# Patient Record
Sex: MALE | Race: WHITE | NOT HISPANIC OR LATINO | Employment: OTHER | URBAN - METROPOLITAN AREA
[De-identification: names, ages, dates, MRNs, and addresses within clinical notes are randomized per-mention and may not be internally consistent; named-entity substitution may affect disease eponyms.]

---

## 2017-01-09 ENCOUNTER — OFFICE VISIT (OUTPATIENT)
Dept: CARDIOLOGY | Facility: CLINIC | Age: 82
End: 2017-01-09
Attending: INTERNAL MEDICINE
Payer: COMMERCIAL

## 2017-01-09 ENCOUNTER — DOCUMENTATION ONLY (OUTPATIENT)
Dept: OTHER | Facility: CLINIC | Age: 82
End: 2017-01-09

## 2017-01-09 VITALS
HEART RATE: 62 BPM | BODY MASS INDEX: 35.12 KG/M2 | DIASTOLIC BLOOD PRESSURE: 60 MMHG | SYSTOLIC BLOOD PRESSURE: 112 MMHG | WEIGHT: 223.8 LBS | HEIGHT: 67 IN

## 2017-01-09 DIAGNOSIS — E78.00 HYPERCHOLESTEROLEMIA: Primary | ICD-10-CM

## 2017-01-09 DIAGNOSIS — I25.10 CORONARY ARTERY DISEASE INVOLVING NATIVE CORONARY ARTERY OF NATIVE HEART WITHOUT ANGINA PECTORIS: ICD-10-CM

## 2017-01-09 DIAGNOSIS — E78.5 HYPERLIPIDEMIA WITH TARGET LDL LESS THAN 100: ICD-10-CM

## 2017-01-09 DIAGNOSIS — Z71.89 ADVANCE CARE PLANNING: Primary | Chronic | ICD-10-CM

## 2017-01-09 LAB
ALT SERPL W P-5'-P-CCNC: <5 U/L (ref 5–30)
CHOLEST SERPL-MCNC: 148 MG/DL
HDLC SERPL-MCNC: 42 MG/DL
LDLC SERPL CALC-MCNC: 85 MG/DL
NONHDLC SERPL-MCNC: 106 MG/DL
TRIGL SERPL-MCNC: 103 MG/DL

## 2017-01-09 PROCEDURE — 80061 LIPID PANEL: CPT | Performed by: INTERNAL MEDICINE

## 2017-01-09 PROCEDURE — 36415 COLL VENOUS BLD VENIPUNCTURE: CPT | Performed by: INTERNAL MEDICINE

## 2017-01-09 PROCEDURE — 99214 OFFICE O/P EST MOD 30 MIN: CPT | Performed by: INTERNAL MEDICINE

## 2017-01-09 PROCEDURE — 84460 ALANINE AMINO (ALT) (SGPT): CPT | Performed by: INTERNAL MEDICINE

## 2017-01-09 RX ORDER — OMEPRAZOLE 40 MG/1
40 CAPSULE, DELAYED RELEASE ORAL DAILY
COMMUNITY
End: 2018-02-13

## 2017-01-09 RX ORDER — ROSUVASTATIN CALCIUM 40 MG/1
40 TABLET, COATED ORAL DAILY
Qty: 90 TABLET | Refills: 3 | Status: SHIPPED | OUTPATIENT
Start: 2017-01-09 | End: 2017-08-16

## 2017-01-09 NOTE — MR AVS SNAPSHOT
After Visit Summary   1/9/2017    Ivan Payton    MRN: [id number]           Patient Information     Date Of Birth          1935        Visit Information        Provider Department      1/9/2017 9:45 AM Willy Johansen MD [facility]        Today's Diagnoses     Hypercholesterolemia    -  1     Coronary artery disease involving native coronary artery of native heart without angina pectoris            Follow-ups after your visit        Additional Services     Follow-Up with Cardiologist                 Future tests that were ordered for you today     Open Future Orders        Priority Expected Expires Ordered    Lipid Profile Routine 7/8/2017 1/9/2018 1/9/2017    ALT Routine 7/8/2017 1/9/2018 1/9/2017    Follow-Up with Cardiologist Routine 7/8/2017 1/9/2018 1/9/2017    NM Exercise stress test (nuc card) Routine 7/8/2017 1/9/2018 1/9/2017            Who to contact     If you have questions or need follow up information about today's clinic visit or your schedule please contact [facility] directly at [phone].  Normal or non-critical lab and imaging results will be communicated to you by [employer], letter or phone within 4 business days after the clinic has received the results. If you do not hear from us within 7 days, please contact the clinic through BO[employer]LT[employer] or phone. If you have a critical or abnormal lab result, we will notify you by phone as soon as possible.  Submit refill requests through [employer] or call your pharmacy and they will forward the refill request to us. Please allow 3 business days for your refill to be completed.          Additional Information About Your Visit        MyChart Information     [employer] gives you secure access to your electronic health record. If you see a primary care provider, you can also send messages to your care team and make appointments. If you have questions, please call  "your primary care clinic.  If you do not have a primary care provider, please call [phone] and they will assist you.        Care EveryWhere ID     This is your Care EveryWhere ID. This could be used by other organizations to access your [facility] medical records  [id number]        Your Vitals Were     Pulse Height BMI (Body Mass Index)             62 1.702 m (5' 7\") 35.04 kg/m2          Blood Pressure from Last 3 Encounters:   01/09/17 112/60   12/20/16 121/64   05/25/16 141/71    Weight from Last 3 Encounters:   01/09/17 101.515 kg (223 lb 12.8 oz)   05/25/16 90.719 kg (200 lb)   05/24/16 98.93 kg (218 lb 1.6 oz)              We Performed the Following     Follow-Up with Cardiologist          Today's Medication Changes          These changes are accurate as of: 1/9/17 10:26 AM.  If you have any questions, ask your nurse or doctor.               Start taking these medicines.        Dose/Directions    rosuvastatin 40 MG tablet   Commonly known as:  CRESTOR   Used for:  Hypercholesterolemia   Started by:  Willy Johansen MD        Dose:  40 mg   Take 1 tablet (40 mg) by mouth daily   Quantity:  90 tablet   Refills:  3         These medicines have changed or have updated prescriptions.        Dose/Directions    clopidogrel 75 MG tablet   Commonly known as:  PLAVIX   This may have changed:  Another medication with the same name was removed. Continue taking this medication, and follow the directions you see here.   Used for:  Carotid artery disease (H)   Changed by:  Yobani Jones MD        TAKE ONE TABLET BY MOUTH EVERY DAY   Quantity:  90 tablet   Refills:  3         Stop taking these medicines if you haven't already. Please contact your care team if you have questions.     atorvastatin 40 MG tablet   Commonly known as:  LIPITOR   Stopped by:  Willy Johansen MD                Where to get your medicines      These medications were sent to [facility] [id number] [place] [address] [address] AT " [address] [place] [address]  [address] [address]    Hours:  24-hours Phone:  [phone]    - rosuvastatin 40 MG tablet             Primary Care Provider Office Phone # Fax #    William Thompson [phone]       [facility] [address]  [facility] [address]        Thank you!     Thank you for choosing [facility]  for your care. Our goal is always to provide you with excellent care. Hearing back from our patients is one way we can continue to improve our services. Please take a few minutes to complete the written survey that you may receive in the mail after your visit with us. Thank you!             Your Updated Medication List - Protect others around you: Learn how to safely use, store and throw away your medicines at www.disposemymeds.org.          This list is accurate as of: 1/9/17 10:26 AM.  Always use your most recent med list.                   Brand Name Dispense Instructions for use    atenolol 50 MG tablet    TENORMIN    90 tablet    TAKE ONE TABLET BY MOUTH EVERY DAY       calcium carbonate 500 MG tablet    OS-SREEDHAR 500 mg [place]. Ca     Take 500 mg by mouth daily       clopidogrel 75 MG tablet    PLAVIX    90 tablet    TAKE ONE TABLET BY MOUTH EVERY DAY       glucosamine-chondroitin 500-400 MG Caps per capsule      Take 1 capsule by mouth daily       lisinopril 10 MG tablet    PRINIVIL/ZESTRIL    90 tablet    Take 1 tablet (10 mg) by mouth daily       OMEGA-3 FISH OIL PO      Take 1 g by mouth daily       omeprazole 40 MG capsule    priLOSEC     Take 40 mg by mouth daily       rosuvastatin 40 MG tablet    CRESTOR    90 tablet    Take 1 tablet (40 mg) by mouth daily       triamterene-hydrochlorothiazide 37.5-25 MG per tablet    MAXZIDE-25    90 tablet    TAKE 1 TABLET BY MOUTH EVERY DAY       vitamin B complex with vitamin C Tabs tablet      Take 1 tablet by mouth daily       VITAMIN C PO      Take 1,000 mg by  mouth daily       VITAMIN D (CHOLECALCIFEROL) PO      Take 5,000 Units by mouth daily

## 2017-01-09 NOTE — Clinical Note
2017             William Thompson MD   [facility]   [address] [place] [address]       RE:    Ivan Payton   MRN:  [id number]   :  1935      Dear William:      I had the opportunity to see Mr. Ivan Payton in Cardiology Clinic today at the [facility] in [place] for reevaluation of coronary artery disease and carotid artery disease.  He has cardiac risk factors including hypertension and dyslipidemia.  He had symptoms primarily of shortness of breath in .  Initially he was found to have carotid artery disease requiring left carotid endarterectomy in 2014 and was discovered to have coronary artery disease somewhat incidentally after stress testing was done for his shortness of breath and atherosclerotic disease issues.  He did not have any chest pain.  He preferred not to do bypass surgery when he was found to have multiple artery blockages but preferred multivessel stenting instead.  This was completed on 2014 with stents to his LAD, circumflex and obtuse marginal.  Some distal disease was not treated, but felt to be in areas of arteries that were too small for stenting or angioplasty.  He has been treated medically and has done well since then.      He has had some stress testing, including a year ago, that looked good.  We do not see any progressive coronary artery disease signs or symptoms at this point.  He continues to exercise on his treadmill 4 times a week and lift weights as well.  He is very active, volunteering at the hospital and elsewhere.  He is undergoing some monitoring of his carotid arteries but does not seem to have any significant residual stenosis there.  Ultrasound recently showed some significant tortuosity on the left side where he had his previous endarterectomy, but no severe occlusive disease was suspected.  He continues on Plavix.      He had some muscle stiffness with atorvastatin in the past and cut the  dose in half on his own.  Unfortunately, his cholesterol numbers are up a bit this year with an LDL of 85, previously 70.  His HDL was 42 and triglycerides 103.      On examination today, his blood pressure is 112/60, heart rate 62 and weight 223 pounds.  His lungs are clear.  His heart rhythm is regular.  He has no cardiac murmurs or carotid bruits.      IMPRESSIONS:  Mr. Ivan Payton is an 81-year-old gentleman with a history of multivessel coronary artery stenting and carotid endarterectomy in 2014.  He seems to be doing well now in terms of cardiac status.  He has no significant chest discomfort or shortness of breath but did not have chest discomfort when he had severe artery blockages either.  He continues his exercise without too much difficulty.  His cholesterol numbers could use some improvement and so I will stop his atorvastatin and start Crestor 40 mg a day, which hopefully he can take without recurrent muscle stiffness.  Otherwise, I will plan to have him come back and visit with me in 6 months, repeat a stress test and a cholesterol panel at that point and review, his symptoms.      Sincerely,         Willy Johansen MD, F.A.C.C.

## 2017-01-09 NOTE — PROGRESS NOTES
2017             William Thompson MD   [facility]   [address] [place] [address]       RE:    Ivan Payton   MRN:  [id number]   :  1935      Dear William:      I had the opportunity to see Mr. Ivan Payton in Cardiology Clinic today at the [facility] in [place] for reevaluation of coronary artery disease and carotid artery disease.  He has cardiac risk factors including hypertension and dyslipidemia.  He had symptoms primarily of shortness of breath in .  Initially he was found to have carotid artery disease requiring left carotid endarterectomy in 2014 and was discovered to have coronary artery disease somewhat incidentally after stress testing was done for his shortness of breath and atherosclerotic disease issues.  He did not have any chest pain.  He preferred not to do bypass surgery when he was found to have multiple artery blockages but preferred multivessel stenting instead.  This was completed on 2014 with stents to his LAD, circumflex and obtuse marginal.  Some distal disease was not treated, but felt to be in areas of arteries that were too small for stenting or angioplasty.  He has been treated medically and has done well since then.      He has had some stress testing, including a year ago, that looked good.  We do not see any progressive coronary artery disease signs or symptoms at this point.  He continues to exercise on his treadmill 4 times a week and lift weights as well.  He is very active, volunteering at the hospital and elsewhere.  He is undergoing some monitoring of his carotid arteries but does not seem to have any significant residual stenosis there.  Ultrasound recently showed some significant tortuosity on the left side where he had his previous endarterectomy, but no severe occlusive disease was suspected.  He continues on Plavix.      He had some muscle stiffness with atorvastatin in the past and cut the  dose in half on his own.  Unfortunately, his cholesterol numbers are up a bit this year with an LDL of 85, previously 70.  His HDL was 42 and triglycerides 103.      On examination today, his blood pressure is 112/60, heart rate 62 and weight 223 pounds.  His lungs are clear.  His heart rhythm is regular.  He has no cardiac murmurs or carotid bruits.      IMPRESSIONS:  Mr. Geovani Payton is an 81-year-old gentleman with a history of multivessel coronary artery stenting and carotid endarterectomy in .  He seems to be doing well now in terms of cardiac status.  He has no significant chest discomfort or shortness of breath but did not have chest discomfort when he had severe artery blockages either.  He continues his exercise without too much difficulty.  His cholesterol numbers could use some improvement and so I will stop his atorvastatin and start Crestor 40 mg a day, which hopefully he can take without recurrent muscle stiffness.  Otherwise, I will plan to have him come back and visit with me in 6 months, repeat a stress test and a cholesterol panel at that point and review, his symptoms.      Sincerely,         Kathy Johansen MD, F.A.C.C.         KATHY JOHANSEN MD, [facility]             D: 2017 10:36   T: 2017 12:03   MT: [place]      Name:     GEOVANI PAYTON   MRN:      [id number]        Account:      [id number]   :      1935           Service Date: 2017      Document: [id number]

## 2017-01-09 NOTE — PROGRESS NOTES
HPI and Plan:   See dictation    Orders Placed This Encounter   Procedures     NM Exercise stress test (nuc card)     Lipid Profile     ALT     Follow-Up with Cardiologist       Orders Placed This Encounter   Medications     omeprazole (PRILOSEC) 40 MG capsule     Sig: Take 40 mg by mouth daily     rosuvastatin (CRESTOR) 40 MG tablet     Sig: Take 1 tablet (40 mg) by mouth daily     Dispense:  90 tablet     Refill:  3       Medications Discontinued During This Encounter   Medication Reason     clotrimazole-betamethasone (LOTRISONE) cream Stopped by Patient     Clopidogrel Bisulfate (PLAVIX PO)      atorvastatin (LIPITOR) 40 MG tablet          Encounter Diagnoses   Name Primary?     Coronary artery disease involving native coronary artery of native heart without angina pectoris      Hypercholesterolemia Yes       CURRENT MEDICATIONS:  Current Outpatient Prescriptions   Medication Sig Dispense Refill     omeprazole (PRILOSEC) 40 MG capsule Take 40 mg by mouth daily       rosuvastatin (CRESTOR) 40 MG tablet Take 1 tablet (40 mg) by mouth daily 90 tablet 3     atenolol (TENORMIN) 50 MG tablet TAKE ONE TABLET BY MOUTH EVERY DAY 90 tablet 3     clopidogrel (PLAVIX) 75 MG tablet TAKE ONE TABLET BY MOUTH EVERY DAY 90 tablet 3     triamterene-hydrochlorothiazide (MAXZIDE-25) 37.5-25 MG per tablet TAKE 1 TABLET BY MOUTH EVERY DAY 90 tablet 3     lisinopril (PRINIVIL,ZESTRIL) 10 MG tablet Take 1 tablet (10 mg) by mouth daily 90 tablet 3     glucosamine-chondroitin 500-400 MG CAPS Take 1 capsule by mouth daily       VITAMIN D, CHOLECALCIFEROL, PO Take 5,000 Units by mouth daily        Omega-3 Fatty Acids (OMEGA-3 FISH OIL PO) Take 1 g by mouth daily        vitamin  B complex with vitamin C (VITAMIN  B COMPLEX) TABS Take 1 tablet by mouth daily       Ascorbic Acid (VITAMIN C PO) Take 1,000 mg by mouth daily       calcium carbonate (OS-SREEDHAR 500 MG [place]. [place]) 500 MG tablet Take 500 mg by mouth daily         ALLERGIES     Allergies    Allergen Reactions     Contrast Dye Hives       PAST MEDICAL HISTORY:  Past Medical History   Diagnosis Date     Essential hypertension      Mixed hyperlipidemia      Hydrocele      Right hydrocelectomy 2/2012     CVA (cerebral infarction) 3/2014     2 small acute lesions noted left parietal/left posterior frontal region. Old lacunar infarct left caudate nucleus     Claustrophobia      Need sedation for MRI scans     RBBB (right bundle branch block) 3/2014     Cerebral vascular disease 3/2014     multiple intracranial stenoses - lt post communic, lt post cerebral, rt middle cerebral, bilat porx M2 segments     Shortness of breath      Enlarged prostate      Coronary artery disease 2014     Cath 9/2014: PTCA and KIKE to mLAD, KIKE to prox circumflex, PTCA and DESx2 to OM2     Stented coronary artery      Uncomplicated asthma      GERD (gastroesophageal reflux disease)      Hiatal hernia      Arthritis        PAST SURGICAL HISTORY:  Past Surgical History   Procedure Laterality Date     Hc removal of tonsils,<12 y/o       Tonsils <13y.o.     Herniorrhaphy inguinal       Right     Surgical history of -        tonail removal     Herniorrhaphy inguinal  2/10/2012     Procedure:HERNIORRHAPHY INGUINAL; LEFT OPEN INGUINAL HERNIA REPAIR WITH MESH, RIGHT HYDROCELECTOMY; Surgeon:JULI LOPES; Location:[facility]     Hydrocelectomy inguinal  2/10/2012     Procedure:HYDROCELECTOMY INGUINAL; Surgeon:JULI LOPES; Location:[facility]     Mr brain and orbits  3/2014     6 mm area of restricted diffusion subcortical white matter left parietal lobe/questionable area of restricted diffusion left posterior frontal region - c/w recent small infarcts. Small chronic lacunar infarct left caudate nucleus     Endarterectomy carotid  3/26/2014     Procedure: ENDARTERECTOMY CAROTID;  LEFT CAROTID ENDARTERECTOMY WITH EEG;  Surgeon: Yobani Jones MD;  Location:  OR     Heart cath, angioplasty  9/9/14     Stenting of LAD,Prox  LCx, and 2 stents to OM2     Hydrocelectomy scrotal Right 1/29/2016     Procedure: HYDROCELECTOMY SCROTAL;  Surgeon: Yobani Stevens MD;  Location: [facility]     Laparoscopic herniorrhaphy inguinal bilateral Bilateral 5/24/2016     Procedure: LAPAROSCOPIC HERNIORRHAPHY INGUINAL BILATERAL;  Surgeon: Chandler Bowen MD;  Location:  OR       FAMILY HISTORY:  Family History   Problem Relation Age of Onset     C.A.D. Brother      older brother, CABG about age 56     C.A.D. Brother      younger brother, angioplasty     Hypertension Sister      Hypertension Brother      Respiratory Father      lung disease - farmer,      Hypertension Mother        SOCIAL HISTORY:  Social History     Social History     Marital Status:      Spouse Name: madhavi     Number of Children: 2     Years of Education: N/A     Occupational History     part time, building maintenance Retired     Social History Main Topics     Smoking status: Former Smoker -- 0.50 packs/day for 20 years     Types: Cigarettes     Quit date: 06/04/1968     Smokeless tobacco: Former User     Alcohol Use: No     Drug Use: No     Sexual Activity:     Partners: Female      Comment:      Other Topics Concern     Blood Transfusions No     Caffeine Concern Yes     1-2 cups per day     Occupational Exposure No     Hobby Hazards No     Sleep Concern No     Stress Concern No     Weight Concern No     Special Diet No     Back Care No     Exercise Yes     gym 4 days week, treadmill, 1.5 miles,  weights     Bike Helmet No     Seat Belt Yes     Self-Exams No     Parent/Sibling W/ Cabg, Mi Or Angioplasty Before 65f 55m? Yes     brothers - 1 had CABG 1 had MI pt unsure of age     Social History Narrative       Review of Systems:  Skin:  Negative       Eyes:  Positive for glasses    ENT:  Negative      Respiratory:  Negative       Cardiovascular:  Negative      Gastroenterology: Negative      Genitourinary:  not assessed      Musculoskeletal:  Negative     "  Neurologic:  Negative      Psychiatric:  Negative      Heme/Lymph/Imm:  Negative      Endocrine:  Negative        Physical Exam:  Vitals: /60 mmHg  Pulse 62  Ht 1.702 m (5' 7\")  Wt 101.515 kg (223 lb 12.8 oz)  BMI 35.04 kg/m2    Constitutional:  cooperative, alert and oriented, well developed, well nourished, in no acute distress        Skin:  warm and dry to the touch, no apparent skin lesions or masses noted        Head:  normocephalic, no masses or lesions        Eyes:  pupils equal and round, conjunctivae and lids unremarkable, sclera white, no xanthalasma, EOMS intact, no nystagmus        ENT:  no pallor or cyanosis, dentition good        Neck:  JVP normal;carotid pulses are full and equal bilaterally, JVP normal, no carotid bruit, no thyromegaly        Chest:  normal breath sounds, clear to auscultation, normal A-P diameter, normal symmetry, normal respiratory excursion, no use of accessory muscles          Cardiac: regular rhythm, normal S1/S2, no S3 or S4, apical impulse not displaced, no murmurs, gallops or rubs                  Abdomen:           Vascular: pulses full and equal, no bruits auscultated                                        Extremities and Back:  no deformities, clubbing, cyanosis, erythema observed;no edema              Neurological:  affect appropriate, oriented to time, person and place;no gross motor deficits              CC  Willy Johansen MD   [facility]  [id number]  [place] [id number]                "

## 2017-02-27 DIAGNOSIS — I77.9 CAROTID ARTERY DISEASE (H): ICD-10-CM

## 2017-02-27 RX ORDER — CLOPIDOGREL BISULFATE 75 MG/1
TABLET ORAL
Qty: 90 TABLET | Refills: 0 | Status: SHIPPED | OUTPATIENT
Start: 2017-02-27 | End: 2017-05-31

## 2017-02-27 NOTE — TELEPHONE ENCOUNTER
"Refill request received for Plavix  Last Fill Quantity: 90; # refills: 3  Last Written Prescription Date: 6-13-16  Last Office Visit 12-20-16 with Dr. Jones who notes \"He is noted to have mild disease in the right carotid bifurcation and bilateral mild intracerebral carotid disease. He also has a history of a Coronary stent and because of the two factors he's on chronic Plavix Which he is tolerating well.\"            Lab Results   Component Value Date    WBC 5.8 05/18/2016     Lab Results   Component Value Date    RBC 5.07 05/18/2016     Lab Results   Component Value Date    HGB 15.5 05/18/2016     Lab Results   Component Value Date    HCT 46.6 05/18/2016     No components found for: MCT  Lab Results   Component Value Date    MCV 92 05/18/2016     Lab Results   Component Value Date    MCH 30.6 05/18/2016     Lab Results   Component Value Date    MCHC 33.3 05/18/2016     Lab Results   Component Value Date    RDW 13.8 05/18/2016     Lab Results   Component Value Date     05/18/2016     Lab Results   Component Value Date    AST 16 12/16/2015     Lab Results   Component Value Date    ALT <5 01/09/2017     Creatinine   Date Value Ref Range Status   05/18/2016 1.10 0.66 - 1.25 mg/dL Final   ]    Will refill per RN protocol, pharmacy to send future requests to pts PCP.     Katina Dumont, ESPERANZA, BSN  "

## 2017-03-08 DIAGNOSIS — I10 ESSENTIAL HYPERTENSION: ICD-10-CM

## 2017-03-08 NOTE — TELEPHONE ENCOUNTER
Triamterene-hctz 37.5-25 mg      Last Written Prescription Date: 3/7/16  Last Fill Quantity: 90, # refills: 3  Last Office Visit with [facility], P or [facility] prescribing provider: 5/18/16       Potassium   Date Value Ref Range Status   05/18/2016 4.1 3.4 - 5.3 mmol/L Final     Creatinine   Date Value Ref Range Status   05/18/2016 1.10 0.66 - 1.25 mg/dL Final     BP Readings from Last 3 Encounters:   01/09/17 112/60   12/20/16 121/64   05/25/16 141/71

## 2017-03-10 RX ORDER — TRIAMTERENE/HYDROCHLOROTHIAZID 37.5-25 MG
TABLET ORAL
Qty: 90 TABLET | Refills: 0 | Status: SHIPPED | OUTPATIENT
Start: 2017-03-10 | End: 2017-06-15

## 2017-03-10 NOTE — TELEPHONE ENCOUNTER
Medication is being filled for 1 time refill only due to:  patient is due for physical and labs in May  Amie Cruz RN- Triage [employer]

## 2017-05-10 ENCOUNTER — HOSPITAL ENCOUNTER (OUTPATIENT)
Dept: ULTRASOUND IMAGING | Facility: CLINIC | Age: 82
Discharge: HOME | End: 2017-05-10
Attending: INTERNAL MEDICINE | Admitting: INTERNAL MEDICINE
Payer: MEDICARE

## 2017-05-10 ENCOUNTER — OFFICE VISIT (OUTPATIENT)
Dept: FAMILY MEDICINE | Facility: CLINIC | Age: 82
End: 2017-05-10
Payer: COMMERCIAL

## 2017-05-10 VITALS
HEART RATE: 60 BPM | WEIGHT: 228.5 LBS | TEMPERATURE: 98.2 F | RESPIRATION RATE: 16 BRPM | BODY MASS INDEX: 35.87 KG/M2 | HEIGHT: 67 IN | SYSTOLIC BLOOD PRESSURE: 136 MMHG | DIASTOLIC BLOOD PRESSURE: 70 MMHG | OXYGEN SATURATION: 96 %

## 2017-05-10 DIAGNOSIS — M79.89 PAIN AND SWELLING OF LEFT LOWER EXTREMITY: ICD-10-CM

## 2017-05-10 DIAGNOSIS — M79.605 PAIN AND SWELLING OF LEFT LOWER EXTREMITY: ICD-10-CM

## 2017-05-10 DIAGNOSIS — M79.89 PAIN AND SWELLING OF LEFT LOWER EXTREMITY: Primary | ICD-10-CM

## 2017-05-10 DIAGNOSIS — M79.605 PAIN AND SWELLING OF LEFT LOWER EXTREMITY: Primary | ICD-10-CM

## 2017-05-10 PROCEDURE — 99213 OFFICE O/P EST LOW 20 MIN: CPT | Performed by: INTERNAL MEDICINE

## 2017-05-10 PROCEDURE — 93971 EXTREMITY STUDY: CPT | Mod: LT

## 2017-05-10 NOTE — PROGRESS NOTES
SUBJECTIVE:                                                    Ivan Payton is a 81 year old male who presents to clinic today for the following health issues:        Musculoskeletal problem/pain      Duration: X1 week    Description  Location: Left knee pain    Intensity:  moderate    Accompanying signs and symptoms: discoloration of left upper thigh    History  Previous similar problem: no   Previous evaluation:  none    Precipitating or alleviating factors:  Trauma or overuse: no   Aggravating factors include: none    Therapies tried and outcome: heat and ice               For approximately 1 week or so, he has noted some poorly localized discomfort mainly posterior to the left knee.   There is more pain actually with sitting than with walking.             Now he has also noted some ecchymosis the left medial distal thigh, with no preceding trauma.          He is lower legs are always swollen, but now the left is a bit larger than the right which is atypical according to the patient.             He was on a very long car trip, but this ended 5 weeks ago.       Anticoagulant medications include Plavix.                 No prior history of knee problems.                Problem list and histories reviewed & adjusted, as indicated.  Additional history: as documented    Current Outpatient Prescriptions   Medication Sig Dispense Refill     triamterene-hydrochlorothiazide (MAXZIDE-25) 37.5-25 MG per tablet TAKE 1 TABLET BY MOUTH EVERY DAY 90 tablet 0     clopidogrel (PLAVIX) 75 MG tablet TAKE 1 TABLET BY MOUTH EVERY DAY 90 tablet 0     lisinopril (PRINIVIL/ZESTRIL) 10 MG tablet TAKE 1 TABLET BY MOUTH DAILY 90 tablet 3     omeprazole (PRILOSEC) 40 MG capsule Take 40 mg by mouth daily       rosuvastatin (CRESTOR) 40 MG tablet Take 1 tablet (40 mg) by mouth daily 90 tablet 3     atenolol (TENORMIN) 50 MG tablet TAKE ONE TABLET BY MOUTH EVERY DAY 90 tablet 3     glucosamine-chondroitin 500-400 MG CAPS Take 1 capsule by  "mouth daily       VITAMIN D, CHOLECALCIFEROL, PO Take 5,000 Units by mouth daily        Omega-3 Fatty Acids (OMEGA-3 FISH OIL PO) Take 1 g by mouth daily        vitamin  B complex with vitamin C (VITAMIN  B COMPLEX) TABS Take 1 tablet by mouth daily       Ascorbic Acid (VITAMIN C PO) Take 1,000 mg by mouth daily       calcium carbonate (OS-SREEDHAR 500 MG [place]. [place]) 500 MG tablet Take 500 mg by mouth daily       Allergies   Allergen Reactions     Contrast Dye Hives     BP Readings from Last 3 Encounters:   05/10/17 136/70   01/09/17 112/60   12/20/16 121/64    Wt Readings from Last 3 Encounters:   05/10/17 228 lb 8 oz (103.6 kg)   01/09/17 223 lb 12.8 oz (101.5 kg)   05/25/16 200 lb (90.7 kg)                    Reviewed and updated as needed this visit by clinical staff  Tobacco  Allergies  Meds  Med Hx  Surg Hx  Fam Hx  Soc Hx      Reviewed and updated as needed this visit by Provider         ROS:  CONSTITUTIONAL:NEGATIVE for fever, chills, change in weight  MUSCULOSKELETAL: NEGATIVE for injury to left leg     OBJECTIVE:                                                    /70 (BP Location: Left arm, Patient Position: Chair, Cuff Size: Adult Regular)  Pulse 60  Temp 98.2  F (36.8  C) (Tympanic)  Resp 16  Ht 5' 7\" (1.702 m)  Wt 228 lb 8 oz (103.6 kg)  SpO2 96%  BMI 35.79 kg/m2  Body mass index is 35.79 kg/(m^2).  GENERAL APPEARANCE: alert and no distress  CV: regular rates and rhythm and pitting B/L LE edema to 2+ left leg, slightly less on the right  MS: Both lower legs are large. There is some ecchymosis left medial distal thigh. This is mildly tender to palpation. Range of motion left knee reasonably good.          Diagnostic test results:  none      ASSESSMENT/PLAN:                                                        ICD-10-CM    1. Pain and swelling of left lower extremity M79.605 US Lower Extremity Venous Duplex Left    M79.89        Etiology of is poorly localized left popliteal area discomfort " and asymmetric leg edema is uncertain.               Ecchymosis distal thigh not suggestive of a DVT, but we do need to look for that.       If that is negative, then I really don't have a good idea as far as what's causing his discomfort. Orthopedics consult advised if the ultrasound is negative.  Patient Instructions   Let's plan an ultrasound of your left leg.                            If this does not show a blood clot, then you should see an orthopedist for a consult, such as at [facility].       William Thompson MD  [facility]

## 2017-05-10 NOTE — PATIENT INSTRUCTIONS
Let's plan an ultrasound of your left leg.                            If this does not show a blood clot, then you should see an orthopedist for a consult, such as at [facility].

## 2017-05-10 NOTE — MR AVS SNAPSHOT
After Visit Summary   5/10/2017    Ivan Payton    MRN: [id number]           Patient Information     Date Of Birth          1935        Visit Information        Provider Department      5/10/2017 3:45 PM William Thompson MD [facility]        Today's Diagnoses     Pain and swelling of left lower extremity    -  1      Care Instructions    Let's plan an ultrasound of your left leg.                            If this does not show a blood clot, then you should see an orthopedist for a consult, such as at [facility].         Follow-ups after your visit        Your next 10 appointments already scheduled     Aug 08, 2017  8:00 AM CDT   NM SH CV MPI MULT RST ST 1 DAY with SCINM1   [facility] CV Nuclear Medicine (Cardiovascular Imaging at [place] [facility])    [address] [place] [address]  [address] [place] [address] [phone]           For a ONE day exam: Allow 3-4 hours for test. For a TWO day exam: Allow 2 hours PER day for test.  You may need to stop some medicines before the test. Follow your doctor s orders. - If you take a beta blocker: Follow your doctor s specific instructions on taking it prior to and on the day of your exam. - If you take Aggrenox or dipyridamole (Persantine, Permole), stop taking it 48 hours before your test. - If you take Viagra, Cialis or Levitra, stop taking it 48 hours before your test. - If you take theophylline or aminophylline, stop taking it 12 hours before your test.  For patients with diabetes: - If you take insulin, call your diabetes care team. Ask if you should take a 1/2 dose the morning of your test. - If you take diabetes medicine by mouth, don t take it on the morning of your test. Bring it with you to take after the test. (If you have questions, call your diabetes care team.)  Do not take nitrates on the day of your test. Do not wear your Nitro-Patch.  Stop all caffeine 12 hours before the test.  This includes coffee, tea, soda pop, chocolate and certain medicines (such as Anacin, Excedrin and NoDoz). Also avoid decaf coffee and tea, as these contain small amounts of caffeine.  No alcohol, smoking or other tobacco for 12 hours before the test.  Stop eating 3 hours before the test. You may drink water.  Please wear a loose two-piece outfit. If you will have an exercise test, bring rubber-soled walking shoes.  When you arrive, please tell us if you: - Have diabetes - Are breastfeeding - May be pregnant - Have a pacemaker of ICD (implantable defibrillator).  Please call your Imaging Department at your exam site with any questions.            Aug 08, 2017  8:30 AM CDT   LAB with BENITES LAB   [facility] ([facility])    [address] [place] [id number] [phone]           Patient must bring picture ID.  Patient should be prepared to give a urine specimen  Please do not eat 10-12 hours before your appointment if you are coming in fasting for labs on lipids, cholesterol, or glucose (sugar).  Pregnant women should follow their Care Team instructions. Water with medications is okay. Do not drink coffee or other fluids.   If you have concerns about taking  your medications, please ask at office or if scheduling via Yostro, send a message by clicking on Secure Messaging, Message Your Care Team.            Aug 16, 2017  1:45 PM CDT   Return Visit with Willy Johansen MD   [facility] ([facility])    [address] [place], [place] [id number]  [facility] [address]   [phone]              Future tests that were ordered for you today     Open Future Orders        Priority Expected Expires Ordered    US Lower Extremity Venous Duplex Left Routine  5/10/2018 5/10/2017            Who to contact     If you have questions or need follow up information about today's clinic visit or your schedule please contact  "[facility] directly at [phone].  Normal or non-critical lab and imaging results will be communicated to you by MyChart, letter or phone within 4 business days after the clinic has received the results. If you do not hear from us within 7 days, please contact the clinic through [employer] or phone. If you have a critical or abnormal lab result, we will notify you by phone as soon as possible.  Submit refill requests through [employer] or call your pharmacy and they will forward the refill request to us. Please allow 3 business days for your refill to be completed.          Additional Information About Your Visit        [employer] Information     [employer] gives you secure access to your electronic health record. If you see a primary care provider, you can also send messages to your care team and make appointments. If you have questions, please call your primary care clinic.  If you do not have a primary care provider, please call [phone] and they will assist you.        Care EveryWhere ID     This is your Care EveryWhere ID. This could be used by other organizations to access your [place] medical records  [id number]        Your Vitals Were     Pulse Temperature Respirations Height Pulse Oximetry BMI (Body Mass Index)    60 98.2  F (36.8  C) (Tympanic) 16 5' 7\" (1.702 m) 96% 35.79 kg/m2       Blood Pressure from Last 3 Encounters:   05/10/17 136/70   01/09/17 112/60   12/20/16 121/64    Weight from Last 3 Encounters:   05/10/17 228 lb 8 oz (103.6 kg)   01/09/17 223 lb 12.8 oz (101.5 kg)   05/25/16 200 lb (90.7 kg)               Primary Care Provider Office Phone # Fax #    William Thompson [phone]       [facility] [address]  [facility] [address]        Thank you!     Thank you for choosing [facility]  for your care. Our goal is always to provide you with excellent care. Hearing back from our patients is one way " we can continue to improve our services. Please take a few minutes to complete the written survey that you may receive in the mail after your visit with us. Thank you!             Your Updated Medication List - Protect others around you: Learn how to safely use, store and throw away your medicines at www.disposemymeds.org.          This list is accurate as of: 5/10/17  4:24 PM.  Always use your most recent med list.                   Brand Name Dispense Instructions for use    atenolol 50 MG tablet    TENORMIN    90 tablet    TAKE ONE TABLET BY MOUTH EVERY DAY       calcium carbonate 500 MG tablet    OS-SREEDHAR 500 mg [place]. [place]     Take 500 mg by mouth daily       clopidogrel 75 MG tablet    PLAVIX    90 tablet    TAKE 1 TABLET BY MOUTH EVERY DAY       glucosamine-chondroitin 500-400 MG Caps per capsule      Take 1 capsule by mouth daily       lisinopril 10 MG tablet    PRINIVIL/ZESTRIL    90 tablet    TAKE 1 TABLET BY MOUTH DAILY       OMEGA-3 FISH OIL PO      Take 1 g by mouth daily       omeprazole 40 MG capsule    priLOSEC     Take 40 mg by mouth daily       rosuvastatin 40 MG tablet    CRESTOR    90 tablet    Take 1 tablet (40 mg) by mouth daily       triamterene-hydrochlorothiazide 37.5-25 MG per tablet    MAXZIDE-25    90 tablet    TAKE 1 TABLET BY MOUTH EVERY DAY       vitamin B complex with vitamin C Tabs tablet      Take 1 tablet by mouth daily       VITAMIN C PO      Take 1,000 mg by mouth daily       VITAMIN D (CHOLECALCIFEROL) PO      Take 5,000 Units by mouth daily

## 2017-05-10 NOTE — NURSING NOTE
"Chief Complaint   Patient presents with     Knee Pain     Left knee pain with bruising on the inner aspect of the upper left thigh, sxs X1 week+       Initial /70 (BP Location: Left arm, Patient Position: Chair, Cuff Size: Adult Regular)  Pulse 60  Temp 98.2  F (36.8  C) (Tympanic)  Resp 16  Ht 5' 7\" (1.702 m)  Wt 228 lb 8 oz (103.6 kg)  SpO2 96%  BMI 35.79 kg/m2 Estimated body mass index is 35.79 kg/(m^2) as calculated from the following:    Height as of this encounter: 5' 7\" (1.702 m).    Weight as of this encounter: 228 lb 8 oz (103.6 kg).  Medication Reconciliation: complete     Beulah Guzman LPN    Patient for schedule @ [facility] @ 5:15 for Venous Doppler Left Leg. They will read and call when exam is completed.  "

## 2017-05-31 DIAGNOSIS — I77.9 CAROTID ARTERY DISEASE (H): ICD-10-CM

## 2017-05-31 NOTE — TELEPHONE ENCOUNTER
"Refill request received for Plavix  Last Fill Quantity: 90; # refills: 0  Last Written Prescription Date: 2-27-17  Last Office Visit 12-20-16 with Dr. Jones who notes \"Known asymptomatic intracerebral disease on Plavix\"  Next OV due in .     Lab Results   Component Value Date    WBC 5.8 05/18/2016     Lab Results   Component Value Date    RBC 5.07 05/18/2016     Lab Results   Component Value Date    HGB 15.5 05/18/2016     Lab Results   Component Value Date    HCT 46.6 05/18/2016     No components found for: MCT  Lab Results   Component Value Date    MCV 92 05/18/2016     Lab Results   Component Value Date    MCH 30.6 05/18/2016     Lab Results   Component Value Date    MCHC 33.3 05/18/2016     Lab Results   Component Value Date    RDW 13.8 05/18/2016     Lab Results   Component Value Date     05/18/2016     Lab Results   Component Value Date    AST 16 12/16/2015     Lab Results   Component Value Date    ALT <5 01/09/2017     Creatinine   Date Value Ref Range Status   05/18/2016 1.10 0.66 - 1.25 mg/dL Final   ]    Refill not within RN protocol, thus sending to MD for approval.    Katina Dumont, RN, BSN  "

## 2017-06-01 RX ORDER — CLOPIDOGREL BISULFATE 75 MG/1
TABLET ORAL
Qty: 90 TABLET | Refills: 0 | Status: SHIPPED | OUTPATIENT
Start: 2017-06-01 | End: 2017-08-25

## 2017-07-08 ENCOUNTER — OFFICE VISIT (OUTPATIENT)
Dept: URGENT CARE | Facility: URGENT CARE | Age: 82
End: 2017-07-08
Payer: COMMERCIAL

## 2017-07-08 VITALS
BODY MASS INDEX: 34.46 KG/M2 | HEART RATE: 76 BPM | SYSTOLIC BLOOD PRESSURE: 154 MMHG | DIASTOLIC BLOOD PRESSURE: 74 MMHG | OXYGEN SATURATION: 97 % | WEIGHT: 220 LBS | TEMPERATURE: 97 F

## 2017-07-08 DIAGNOSIS — L60.9 NAIL PROBLEM: Primary | ICD-10-CM

## 2017-07-08 PROCEDURE — 99213 OFFICE O/P EST LOW 20 MIN: CPT | Performed by: PHYSICIAN ASSISTANT

## 2017-07-08 NOTE — MR AVS SNAPSHOT
After Visit Summary   7/8/2017    Ivan Payton    MRN: [id number]           Patient Information     Date Of Birth          1935        Visit Information        Provider Department      7/8/2017 9:10 AM Maycol Hughes PA-C [facility]        Today's Diagnoses     Nail problem    -  1       Follow-ups after your visit        Your next 10 appointments already scheduled     Aug 08, 2017  8:00 AM CDT   NM SH CV MPI MULT RST ST 1 DAY with SCINM1   [facility] CV Nuclear Medicine (Cardiovascular Imaging at [facility])    [address] [facility]  [address]  [facility] [address] [phone]           For a ONE day exam: Allow 3-4 hours for test. For a TWO day exam: Allow 2 hours PER day for test.  You may need to stop some medicines before the test. Follow your doctor s orders. - If you take a beta blocker: Follow your doctor s specific instructions on taking it prior to and on the day of your exam. - If you take Aggrenox or dipyridamole (Persantine, Permole), stop taking it 48 hours before your test. - If you take Viagra, Cialis or Levitra, stop taking it 48 hours before your test. - If you take theophylline or aminophylline, stop taking it 12 hours before your test.  For patients with diabetes: - If you take insulin, call your diabetes care team. Ask if you should take a 1/2 dose the morning of your test. - If you take diabetes medicine by mouth, don t take it on the morning of your test. Bring it with you to take after the test. (If you have questions, call your diabetes care team.)  Do not take nitrates on the day of your test. Do not wear your Nitro-Patch.  Stop all caffeine 12 hours before the test. This includes coffee, tea, soda pop, chocolate and certain medicines (such as Anacin, Excedrin and NoDoz). Also avoid decaf coffee and tea, as these contain small amounts of caffeine.  No alcohol, smoking or other tobacco for 12 hours before the test.   Stop eating 3 hours before the test. You may drink water.  Please wear a loose two-piece outfit. If you will have an exercise test, bring rubber-soled walking shoes.  When you arrive, please tell us if you: - Have diabetes - Are breastfeeding - May be pregnant - Have a pacemaker of ICD (implantable defibrillator).  Please call your Imaging Department at your exam site with any questions.            Aug 08, 2017  8:30 AM CDT   LAB with BENITES LAB   [facility] ([facility])    [address] [place] [id number] [phone]           Patient must bring picture ID.  Patient should be prepared to give a urine specimen  Please do not eat 10-12 hours before your appointment if you are coming in fasting for labs on lipids, cholesterol, or glucose (sugar).  Pregnant women should follow their Care Team instructions. Water with medications is okay. Do not drink coffee or other fluids.   If you have concerns about taking  your medications, please ask at office or if scheduling via Shaker, send a message by clicking on Secure Messaging, Message Your Care Team.            Aug 16, 2017  1:45 PM CDT   Return Visit with Willy Johansen MD   [facility] ([facility])    [address] [place], [place] [address]  [facility] [id number] [phone]              Who to contact     If you have questions or need follow up information about today's clinic visit or your schedule please contact [facility] directly at [phone].  Normal or non-critical lab and imaging results will be communicated to you by MyChart, letter or phone within 4 business days after the clinic has received the results. If you do not hear from us within 7 days, please contact the clinic through [employer] or phone. If you have a critical or abnormal lab result, we will notify you by phone as soon as possible.  Submit refill requests  through "[employer]" or call your pharmacy and they will forward the refill request to us. Please allow 3 business days for your refill to be completed.          Additional Information About Your Visit        [employer] Information     "[employer]" gives you secure access to your electronic health record. If you see a primary care provider, you can also send messages to your care team and make appointments. If you have questions, please call your primary care clinic.  If you do not have a primary care provider, please call [phone] and they will assist you.        Care EveryWhere ID     This is your Care EveryWhere ID. This could be used by other organizations to access your [place] medical records  [id number]        Your Vitals Were     Pulse Temperature Pulse Oximetry BMI (Body Mass Index)          76 97  F (36.1  C) 97% 34.46 kg/m2         Blood Pressure from Last 3 Encounters:   07/08/17 154/74   05/10/17 136/70   01/09/17 112/60    Weight from Last 3 Encounters:   07/08/17 220 lb (99.8 kg)   05/10/17 228 lb 8 oz (103.6 kg)   01/09/17 223 lb 12.8 oz (101.5 kg)              Today, you had the following     No orders found for display       Primary Care Provider Office Phone # Fax #    William Thompson [phone]       [facility] [address] [facility] [address]        Equal Access to Services     [facility]: Hadii aad ku hadasho Soomaali, waaxda luqadaha, qaybta kaalmada adeegyada, quin bernard . So [facility] [phone].    ATENCIÓN: Si habla español, tiene a beckett disposición servicios gratuitos de asistencia lingüística. Llame al [phone].    We comply with applicable federal civil rights laws and [place] laws. We do not discriminate on the basis of race, color, national origin, age, disability sex, sexual orientation or gender identity.            Thank you!     Thank you for choosing [facility]  for your care. Our goal  is always to provide you with excellent care. Hearing back from our patients is one way we can continue to improve our services. Please take a few minutes to complete the written survey that you may receive in the mail after your visit with us. Thank you!             Your Updated Medication List - Protect others around you: Learn how to safely use, store and throw away your medicines at www.disposemymeds.org.          This list is accurate as of: 7/8/17 10:07 AM.  Always use your most recent med list.                   Brand Name Dispense Instructions for use Diagnosis    atenolol 50 MG tablet    TENORMIN    90 tablet    TAKE ONE TABLET BY MOUTH EVERY DAY    Essential hypertension       calcium carbonate 1250 MG tablet    OS-SREEDHAR 500 mg [place]. [place]     Take 500 mg by mouth daily        clopidogrel 75 MG tablet    PLAVIX    90 tablet    TAKE 1 TABLET BY MOUTH EVERY DAY    Carotid artery disease (H)       glucosamine-chondroitin 500-400 MG Caps per capsule      Take 1 capsule by mouth daily        lisinopril 10 MG tablet    PRINIVIL/ZESTRIL    90 tablet    TAKE 1 TABLET BY MOUTH DAILY    Essential hypertension       OMEGA-3 FISH OIL PO      Take 1 g by mouth daily        omeprazole 40 MG capsule    priLOSEC     Take 40 mg by mouth daily        rosuvastatin 40 MG tablet    CRESTOR    90 tablet    Take 1 tablet (40 mg) by mouth daily    Hypercholesterolemia       triamterene-hydrochlorothiazide 37.5-25 MG per tablet    MAXZIDE-25    90 tablet    TAKE 1 TABLET BY MOUTH EVERY DAY    Essential hypertension       vitamin B complex with vitamin C Tabs tablet      Take 1 tablet by mouth daily        VITAMIN C PO      Take 1,000 mg by mouth daily        VITAMIN D (CHOLECALCIFEROL) PO      Take 5,000 Units by mouth daily

## 2017-07-08 NOTE — NURSING NOTE
"Chief Complaint   Patient presents with     Urgent Care     Ingrown Toenail       Initial /74  Pulse 76  Temp 97  F (36.1  C)  Wt 220 lb (99.8 kg)  SpO2 97%  BMI 34.46 kg/m2 Estimated body mass index is 34.46 kg/(m^2) as calculated from the following:    Height as of 5/10/17: 5' 7\" (1.702 m).    Weight as of this encounter: 220 lb (99.8 kg).  Medication Reconciliation: complete    "

## 2017-07-08 NOTE — PROGRESS NOTES
"SUBJECTIVE:  81 year old male presents for treatment of toenail, which has   been present for months. He said he bumped the nail last night and it was \"sticking up\" with mild pain. No bleeding. He says he noticed the nail coming off over the last several months loosening more recently.     OBJECTIVE:    /74  Pulse 76  Temp 97  F (36.1  C)  Wt 220 lb (99.8 kg)  SpO2 97%  BMI 34.46 kg/m2    No Ingrown nail noted at Right great first toe.  Infection is not noted. Nail hanging on medially with minimal nailbed anchor.     ASSESSMENT:    1. Nail problem        Right great hanging nail    PLAN:  The right greater toenail, with firm pressure, was quickly pulled off with minimal discomfort.  The procedure was well tolerated with no significant bleeding.  Patient is asked to soak the foot daily, change the dressing daily with anti-bacterial ointment application, and return to primary clinic if new pain, redness, or discharge evolve over time.   "

## 2017-07-18 ENCOUNTER — OFFICE VISIT (OUTPATIENT)
Dept: PODIATRY | Facility: CLINIC | Age: 82
End: 2017-07-18
Payer: COMMERCIAL

## 2017-07-18 VITALS
WEIGHT: 229.4 LBS | TEMPERATURE: 97.9 F | HEART RATE: 66 BPM | SYSTOLIC BLOOD PRESSURE: 136 MMHG | HEIGHT: 67 IN | BODY MASS INDEX: 36 KG/M2 | DIASTOLIC BLOOD PRESSURE: 77 MMHG

## 2017-07-18 DIAGNOSIS — M20.42 HAMMER TOE OF LEFT FOOT: ICD-10-CM

## 2017-07-18 DIAGNOSIS — S91.109A WOUND, OPEN, TOE, INITIAL ENCOUNTER: ICD-10-CM

## 2017-07-18 DIAGNOSIS — B35.1 ONYCHOMYCOSIS: ICD-10-CM

## 2017-07-18 DIAGNOSIS — L84 CALLUS: Primary | ICD-10-CM

## 2017-07-18 PROCEDURE — 11720 DEBRIDE NAIL 1-5: CPT | Mod: 59 | Performed by: PODIATRIST

## 2017-07-18 PROCEDURE — 99203 OFFICE O/P NEW LOW 30 MIN: CPT | Mod: 25 | Performed by: PODIATRIST

## 2017-07-18 PROCEDURE — 11055 PARING/CUTG B9 HYPRKER LES 1: CPT | Performed by: PODIATRIST

## 2017-07-18 RX ORDER — BACITRACIN 500 [USP'U]/G
OINTMENT OPHTHALMIC
Refills: 0 | COMMUNITY
Start: 2017-04-05 | End: 2017-08-16

## 2017-07-18 RX ORDER — ATORVASTATIN CALCIUM 40 MG/1
TABLET, FILM COATED ORAL
Refills: 2 | COMMUNITY
Start: 2016-12-01 | End: 2017-08-16

## 2017-07-18 RX ORDER — ERYTHROMYCIN 5 MG/G
OINTMENT OPHTHALMIC
Refills: 2 | COMMUNITY
Start: 2017-01-19 | End: 2017-08-16

## 2017-07-18 NOTE — MR AVS SNAPSHOT
After Visit Summary   7/18/2017    Ivan Payton    MRN: [id number]           Patient Information     Date Of Birth          1935        Visit Information        Provider Department      7/18/2017 3:20 PM Meng Templeton DPM [facility] Instructions    Wound Care Recommendations:    1)  Keep the wound covered by a bandage when bathing.    2)  Gently clean the wound with soap water, separate from bath/shower water.      3)  Each day, apply a topical antibiotic ointment to the wound (Neosporin, Triple antibiotic, Bacitracin).   Cover with large band-aid or gauze.      4)  Please seek immediate medical attention if any increasing redness, drainage, smell, or pain related to the wound.       Calluses, Corns, IPKs, Porokeratosis    When there is excessive friction or pressure on the skin, the body responds by making the skin thicker to protect the deeper structures from becoming exposed.  While this works well to protect the deeper structures, the thickened skin can increase pressure and pain.    Flat, diffuse thickening are simple calluses and they are usually caused by friction.  Often these are the result of rubbing on a shoe or going barefoot.    Calluses with a central core between the toes are called corns.  These result from prominent joints on adjacent toes rubbing together.  Theses are a symptom of bone malalignment and will always recur unless the underlying bones are addressed surgically.    Calluses with a central core on the ball of the foot are usually IPKs (intractable plantar keratosis).  These are caused by excessive pressure from the metatarsals, the bones that make up the ball of the foot.  Often one of these bones is too long or too prominent.  Again, these will always recur unless the underlying bone issue is addressed.  There is no cure for these.  They will either go away by themselves, recur, or more could develop.    Regardless of the diagnosis,  most of these lesions can be kept comfortable with routine maintenance.   1.This consists of filing them with a pumice stone or callus file a couple of times per week.    2. Lotion can be applied to soften the callus. A urea based cream such as Kersal or Vanicream or thicker cream with shea butter are good options.  3. Toe spacers or toe covers can be used for corns, gel pads can be used for other lesions on the bottom of the foot.   If there is a surgical pathology noted, such as a prominent bone, often this needs to be addressed surgically to minimize recurrence. However, sometimes the lesion simply migrates to another spot after surgery, so it is not a guaranteed cure.     Please call with any additional questions.     FOOT CARE NURSES  If you are interested in having a foot care nurse come out to your   home, please call one of these contacts for more information:  [facility]  [phone] [facility]  [phone]   [facility]  [phone]  [facility] [phone]  [place]   [facility]  [phone]  At [facility] [phone]                 Follow-ups after your visit        Your next 10 appointments already scheduled     Aug 08, 2017  8:00 AM CDT   NM  CV MPI MULT RST ST 1 DAY with SCINM1   [facility] CV Nuclear Medicine (Cardiovascular Imaging at [facility])    [address] [place] [address]  [address] [id number] [phone]           For a ONE day exam: Allow 3-4 hours for test. For a TWO day exam: Allow 2 hours PER day for test.  You may need to stop some medicines before the test. Follow your doctor s orders. - If you take a beta blocker: Follow your doctor s specific instructions on taking it prior to and on the day of your exam. - If you take Aggrenox or dipyridamole (Persantine, Permole), stop taking it 48 hours before your test. - If you take Viagra, Cialis or Levitra, stop taking  it 48 hours before your test. - If you take theophylline or aminophylline, stop taking it 12 hours before your test.  For patients with diabetes: - If you take insulin, call your diabetes care team. Ask if you should take a 1/2 dose the morning of your test. - If you take diabetes medicine by mouth, don t take it on the morning of your test. Bring it with you to take after the test. (If you have questions, call your diabetes care team.)  Do not take nitrates on the day of your test. Do not wear your Nitro-Patch.  Stop all caffeine 12 hours before the test. This includes coffee, tea, soda pop, chocolate and certain medicines (such as Anacin, Excedrin and NoDoz). Also avoid decaf coffee and tea, as these contain small amounts of caffeine.  No alcohol, smoking or other tobacco for 12 hours before the test.  Stop eating 3 hours before the test. You may drink water.  Please wear a loose two-piece outfit. If you will have an exercise test, bring rubber-soled walking shoes.  When you arrive, please tell us if you: - Have diabetes - Are breastfeeding - May be pregnant - Have a pacemaker of ICD (implantable defibrillator).  Please call your Imaging Department at your exam site with any questions.            Aug 08, 2017  8:30 AM CDT   LAB with [facility]   [facility] HEART AT [place] ([facility])    [address] [place] [id number] [phone]           Patient must bring picture ID.  Patient should be prepared to give a urine specimen  Please do not eat 10-12 hours before your appointment if you are coming in fasting for labs on lipids, cholesterol, or glucose (sugar).  Pregnant women should follow their Care Team instructions. Water with medications is okay. Do not drink coffee or other fluids.   If you have concerns about taking  your medications, please ask at office or if scheduling via gokit, send a message by clicking on Secure Messaging, Message Your Care Team.  "           Aug 16, 2017  1:45 PM CDT   Return Visit with Willy Johansen MD   [facility] ER PHYSICIANS [facility] AT [place] ([facility])    [facility] [facility] [address]  [place] [address] [phone]              Who to contact     If you have questions or need follow up information about today's clinic visit or your schedule please contact [facility] directly at [phone].  Normal or non-critical lab and imaging results will be communicated to you by ActiveOhart, letter or phone within 4 business days after the clinic has received the results. If you do not hear from us within 7 days, please contact the clinic through EdCouraget or phone. If you have a critical or abnormal lab result, we will notify you by phone as soon as possible.  Submit refill requests through TurningArt or call your pharmacy and they will forward the refill request to us. Please allow 3 business days for your refill to be completed.          Additional Information About Your Visit        TurningArt Information     TurningArt gives you secure access to your electronic health record. If you see a primary care provider, you can also send messages to your care team and make appointments. If you have questions, please call your primary care clinic.  If you do not have a primary care provider, please call [phone] and they will assist you.        Care EveryWhere ID     This is your Care EveryWhere ID. This could be used by other organizations to access your [place] medical records  [id number]        Your Vitals Were     Pulse Temperature Height BMI (Body Mass Index)          66 97.9  F (36.6  C) (Tympanic) 5' 7\" (1.702 m) 35.93 kg/m2         Blood Pressure from Last 3 Encounters:   07/18/17 136/77   07/08/17 154/74   05/10/17 136/70    Weight from Last 3 Encounters:   07/18/17 229 lb 6.4 oz (104.1 kg)   07/08/17 220 lb (99.8 kg)   05/10/17 228 lb 8 oz (103.6 kg)              Today, you had the following     No orders " found for display       Primary Care Provider Office Phone # Fax #    William Thompson [phone]       [facility] [address]  [facility] [address]        Equal Access to Services     GAYATRIWILLIE EMPERATRIZ AH: Hadii aad ku hadasho Soomaali, waaxda luqadaha, qaybta kaalmada adeegyada, waxay idiin hayaan adeeg kharash lamesfinn ah. So [facility] [phone].    ATENCIÓN: Si habla español, tiene a beckett disposición servicios gratuitos de asistencia lingüística. Llame al [phone].    We comply with applicable federal civil rights laws and [place] laws. We do not discriminate on the basis of race, color, national origin, age, disability sex, sexual orientation or gender identity.            Thank you!     Thank you for choosing [facility]  for your care. Our goal is always to provide you with excellent care. Hearing back from our patients is one way we can continue to improve our services. Please take a few minutes to complete the written survey that you may receive in the mail after your visit with us. Thank you!             Your Updated Medication List - Protect others around you: Learn how to safely use, store and throw away your medicines at www.disposemymeds.org.          This list is accurate as of: 7/18/17  3:38 PM.  Always use your most recent med list.                   Brand Name Dispense Instructions for use Diagnosis    atenolol 50 MG tablet    TENORMIN    90 tablet    TAKE ONE TABLET BY MOUTH EVERY DAY    Essential hypertension       atorvastatin 40 MG tablet    LIPITOR     TK 1 T PO AT BEDTIME        bacitracin ophthalmic ointment      APPLY 1/4 INCH RIBBON ON BOTH EYELIDS HS        calcium carbonate 1250 MG tablet    OS-SREEDHAR 500 mg [place]. Ca     Take 500 mg by mouth daily        clopidogrel 75 MG tablet    PLAVIX    90 tablet    TAKE 1 TABLET BY MOUTH EVERY DAY    Carotid artery disease (H)       erythromycin ophthalmic ointment    ROMYCIN     APPLY 1/4 INCH RIBBON IN  BOTH EYELIDS HS        glucosamine-chondroitin 500-400 MG Caps per capsule      Take 1 capsule by mouth daily        lisinopril 10 MG tablet    PRINIVIL/ZESTRIL    90 tablet    TAKE 1 TABLET BY MOUTH DAILY    Essential hypertension       OMEGA-3 FISH OIL PO      Take 1 g by mouth daily        omeprazole 40 MG capsule    priLOSEC     Take 40 mg by mouth daily        rosuvastatin 40 MG tablet    CRESTOR    90 tablet    Take 1 tablet (40 mg) by mouth daily    Hypercholesterolemia       triamterene-hydrochlorothiazide 37.5-25 MG per tablet    MAXZIDE-25    90 tablet    TAKE 1 TABLET BY MOUTH EVERY DAY    Essential hypertension       vitamin B complex with vitamin C Tabs tablet      Take 1 tablet by mouth daily        VITAMIN C PO      Take 1,000 mg by mouth daily        VITAMIN D (CHOLECALCIFEROL) PO      Take 5,000 Units by mouth daily

## 2017-07-18 NOTE — NURSING NOTE
"Chief Complaint   Patient presents with     Toenail     R 1st toenail was removed. L 2nd toenail is growing back thick       Initial /77 (BP Location: Right arm, Patient Position: Chair, Cuff Size: Adult Large)  Pulse 66  Temp 97.9  F (36.6  C) (Tympanic)  Ht 5' 7\" (1.702 m)  Wt 229 lb 6.4 oz (104.1 kg)  BMI 35.93 kg/m2 Estimated body mass index is 35.93 kg/(m^2) as calculated from the following:    Height as of this encounter: 5' 7\" (1.702 m).    Weight as of this encounter: 229 lb 6.4 oz (104.1 kg).  Medication Reconciliation: david Paez MA July 18, 2017 3:26 PM  "

## 2017-07-18 NOTE — PATIENT INSTRUCTIONS
Wound Care Recommendations:    1)  Keep the wound covered by a bandage when bathing.    2)  Gently clean the wound with soap water, separate from bath/shower water.      3)  Each day, apply a topical antibiotic ointment to the wound (Neosporin, Triple antibiotic, Bacitracin).   Cover with large band-aid or gauze.      4)  Please seek immediate medical attention if any increasing redness, drainage, smell, or pain related to the wound.       Calluses, Corns, IPKs, Porokeratosis    When there is excessive friction or pressure on the skin, the body responds by making the skin thicker to protect the deeper structures from becoming exposed.  While this works well to protect the deeper structures, the thickened skin can increase pressure and pain.    Flat, diffuse thickening are simple calluses and they are usually caused by friction.  Often these are the result of rubbing on a shoe or going barefoot.    Calluses with a central core between the toes are called corns.  These result from prominent joints on adjacent toes rubbing together.  Theses are a symptom of bone malalignment and will always recur unless the underlying bones are addressed surgically.    Calluses with a central core on the ball of the foot are usually IPKs (intractable plantar keratosis).  These are caused by excessive pressure from the metatarsals, the bones that make up the ball of the foot.  Often one of these bones is too long or too prominent.  Again, these will always recur unless the underlying bone issue is addressed.  There is no cure for these.  They will either go away by themselves, recur, or more could develop.    Regardless of the diagnosis, most of these lesions can be kept comfortable with routine maintenance.   1.This consists of filing them with a pumice stone or callus file a couple of times per week.    2. Lotion can be applied to soften the callus. A urea based cream such as Kersal or Vanicream or thicker cream with shea butter are  good options.  3. Toe spacers or toe covers can be used for corns, gel pads can be used for other lesions on the bottom of the foot.   If there is a surgical pathology noted, such as a prominent bone, often this needs to be addressed surgically to minimize recurrence. However, sometimes the lesion simply migrates to another spot after surgery, so it is not a guaranteed cure.     Please call with any additional questions.     FOOT CARE NURSES  If you are interested in having a foot care nurse come out to your   home, please call one of these contacts for more information:  [facility]  [phone] [facility]  [phone]   [facility]  [phone]  [place] [phone]  [place]   [place] [facility]  [phone]  At [facility] [phone]

## 2017-07-18 NOTE — PROGRESS NOTES
PATIENT HISTORY:  Ivan Payton is a 81 year old male who presents to clinic for right 1st toe wound s/p nail removal on 7/8/17 at .  Also with painful lesion to tip of left 2nd toe.  No injury per pt.  Shoes make the pain work.  3/10 pain reported.  Pt is dressing the R 1st toe with neosporin.  Denies SOI.  Also requesting nail cutting today.    Review of Systems:  Patient denies fever, chills, rash, wound, stiffness, limping, numbness, weakness, heart burn, blood in stool, chest pain with activity, calf pain when walking, shortness of breath with activity, chronic cough, easy bleeding/bruising, swelling of ankles, excessive thirst, fatigue, depression, anxiety.       PAST MEDICAL HISTORY:   Past Medical History:   Diagnosis Date     Arthritis      Cerebral vascular disease 3/2014    multiple intracranial stenoses - lt post communic, lt post cerebral, rt middle cerebral, bilat porx M2 segments     Claustrophobia     Need sedation for MRI scans     Coronary artery disease 2014    Cath 9/2014: PTCA and KIKE to mLAD, KIKE to prox circumflex, PTCA and DESx2 to OM2     CVA (cerebral infarction) 3/2014    2 small acute lesions noted left parietal/left posterior frontal region. Old lacunar infarct left caudate nucleus     Enlarged prostate      Essential hypertension      GERD (gastroesophageal reflux disease)      Hiatal hernia      Hydrocele     Right hydrocelectomy 2/2012     Mixed hyperlipidemia      RBBB (right bundle branch block) 3/2014     Shortness of breath      Stented coronary artery      Uncomplicated asthma         PAST SURGICAL HISTORY:   Past Surgical History:   Procedure Laterality Date     ENDARTERECTOMY CAROTID  3/26/2014    Procedure: ENDARTERECTOMY CAROTID;  LEFT CAROTID ENDARTERECTOMY WITH EEG;  Surgeon: Yobani Jones MD;  Location:  OR      REMOVAL OF TONSILS,<14 Y/O      Tonsils <13y.o.     HEART CATH, ANGIOPLASTY  9/9/14    Stenting of LAD,Prox LCx, and 2 stents to OM2      HERNIORRHAPHY INGUINAL      Right     HERNIORRHAPHY INGUINAL  2/10/2012    Procedure:HERNIORRHAPHY INGUINAL; LEFT OPEN INGUINAL HERNIA REPAIR WITH MESH, RIGHT HYDROCELECTOMY; Surgeon:JULI LOPES; Location:[facility]     HYDROCELECTOMY INGUINAL  2/10/2012    Procedure:HYDROCELECTOMY INGUINAL; Surgeon:JULI LOPES; Location: [facility]     HYDROCELECTOMY SCROTAL Right 1/29/2016    Procedure: HYDROCELECTOMY SCROTAL;  Surgeon: Yobani Stevens MD;  Location: [facility]     LAPAROSCOPIC HERNIORRHAPHY INGUINAL BILATERAL Bilateral 5/24/2016    Procedure: LAPAROSCOPIC HERNIORRHAPHY INGUINAL BILATERAL;  Surgeon: Chandler Bowen MD;  Location:  OR     MR BRAIN AND ORBITS  3/2014    6 mm area of restricted diffusion subcortical white matter left parietal lobe/questionable area of restricted diffusion left posterior frontal region - c/w recent small infarcts. Small chronic lacunar infarct left caudate nucleus     SURGICAL HISTORY OF -       tonail removal        MEDICATIONS:   Current Outpatient Prescriptions:      atorvastatin (LIPITOR) 40 MG tablet, TK 1 T PO AT BEDTIME, Disp: , Rfl: 2     bacitracin ophthalmic ointment, APPLY 1/4 INCH RIBBON ON BOTH EYELIDS HS, Disp: , Rfl: 0     erythromycin (ROMYCIN) ophthalmic ointment, APPLY 1/4 INCH RIBBON IN BOTH EYELIDS HS, Disp: , Rfl: 2     triamterene-hydrochlorothiazide (MAXZIDE-25) 37.5-25 MG per tablet, TAKE 1 TABLET BY MOUTH EVERY DAY, Disp: 90 tablet, Rfl: 1     clopidogrel (PLAVIX) 75 MG tablet, TAKE 1 TABLET BY MOUTH EVERY DAY, Disp: 90 tablet, Rfl: 0     lisinopril (PRINIVIL/ZESTRIL) 10 MG tablet, TAKE 1 TABLET BY MOUTH DAILY, Disp: 90 tablet, Rfl: 3     omeprazole (PRILOSEC) 40 MG capsule, Take 40 mg by mouth daily, Disp: , Rfl:      rosuvastatin (CRESTOR) 40 MG tablet, Take 1 tablet (40 mg) by mouth daily, Disp: 90 tablet, Rfl: 3     atenolol (TENORMIN) 50 MG tablet, TAKE ONE TABLET BY MOUTH EVERY DAY, Disp: 90 tablet, Rfl: 3     glucosamine-chondroitin  500-400 MG CAPS, Take 1 capsule by mouth daily, Disp: , Rfl:      VITAMIN D, CHOLECALCIFEROL, PO, Take 5,000 Units by mouth daily , Disp: , Rfl:      Omega-3 Fatty Acids (OMEGA-3 FISH OIL PO), Take 1 g by mouth daily , Disp: , Rfl:      vitamin  B complex with vitamin C (VITAMIN  B COMPLEX) TABS, Take 1 tablet by mouth daily, Disp: , Rfl:      Ascorbic Acid (VITAMIN C PO), Take 1,000 mg by mouth daily, Disp: , Rfl:      calcium carbonate (OS-SREEDHAR 500 MG [place]. CA) 500 MG tablet, Take 500 mg by mouth daily, Disp: , Rfl:      ALLERGIES:    Allergies   Allergen Reactions     Contrast Dye Hives        SOCIAL HISTORY:   Social History     Social History     Marital status:      Spouse name: madhavi     Number of children: 2     Years of education: N/A     Occupational History     part time, building maintenance Retired     Social History Main Topics     Smoking status: Former Smoker     Packs/day: 0.50     Years: 20.00     Types: Cigarettes     Quit date: 6/4/1968     Smokeless tobacco: Former User     Alcohol use No     Drug use: No     Sexual activity: Yes     Partners: Female      Comment:      Other Topics Concern     Blood Transfusions No     Caffeine Concern Yes     1-2 cups per day     Occupational Exposure No     Hobby Hazards No     Sleep Concern No     Stress Concern No     Weight Concern No     Special Diet No     Back Care No     Exercise Yes     gym 4 days week, treadmill, 1.5 miles,  weights     Bike Helmet No     Seat Belt Yes     Self-Exams No     Parent/Sibling W/ Cabg, Mi Or Angioplasty Before 65f 55m? Yes     brothers - 1 had CABG 1 had MI pt unsure of age     Social History Narrative        FAMILY HISTORY:   Family History   Problem Relation Age of Onset     C.A.OCTAVIANO. Brother      older brother, CABG about age 56     Respiratory Father      lung disease - farmer,      Hypertension Mother      C.A.OCTAVIANO. Brother      younger brother, angioplasty     Hypertension Sister       "Hypertension Brother         EXAM:Vitals: /77 (BP Location: Right arm, Patient Position: Chair, Cuff Size: Adult Large)  Pulse 66  Temp 97.9  F (36.6  C) (Tympanic)  Ht 5' 7\" (1.702 m)  Wt 229 lb 6.4 oz (104.1 kg)  BMI 35.93 kg/m2  BMI= Body mass index is 35.93 kg/(m^2).    General appearance: Patient is alert and fully cooperative with history & exam.  No sign of distress is noted during the visit.     Psychiatric: Affect is pleasant & appropriate.  Patient appears motivated to improve health.     Respiratory: Breathing is regular & unlabored while sitting.     HEENT: Hearing is intact to spoken word.  Speech is clear.  No gross evidence of visual impairment that would impact ambulation.     Dermatologic: Hyperkeratotic lesion to tip of left 2nd toe with intralesional bleeding.  B/l toenails dystrophic, discolored.  Right 3-5, left 2-3 elongated.  Healing nail bed of right hallux.  No paronychia or evidence of soft tissue infection is noted.     Vascular: DP & PT pulses are intact & regular bilaterally.  Mild b/l LE edema.  CFT and skin temperature are normal to both lower extremities.     Neurologic: Lower extremity sensation is intact to light touch.  No evidence of weakness or contracture in the lower extremities.  No evidence of neuropathy.     Musculoskeletal: L 2nd mallet toe noted.  Patient is ambulatory without assistive device or brace.  No gross ankle deformity noted.  No foot or ankle joint effusion is noted.     ASSESSMENT:   S/p R hallux nail removal with healing wound  L 2nd toe callus with related hammertoe  Onychomycosis     PLAN:  Reviewed patient's chart in epic.  Discussed condition and treatment options including pros and cons.    Wound Care Recommendations given for toe wound:    1)  Keep the wound covered by a bandage when bathing.    2)  Gently clean the wound with soap water, separate from bath/shower water.      3)  Each day, apply a topical antibiotic ointment to the wound " (Neosporin, Triple antibiotic, Bacitracin).   Cover with large band-aid or gauze.      4)  Please seek immediate medical attention if any increasing redness, drainage, smell, or pain related to the wound.     Discussed causes of calluses.  They are due to areas of increased friction.  Discussed treatments such as using foot file, pumice stone, pads, orthotics, and not walking barefoot.   Crest pad given.  Discussed office toe tenotomy as an option for offloading.  Discussed risk of ulcer.     Pt requested debridement of nails/callus today.  I debrided the elongated nails x5 with a nail nipper and the left 2nd toe callus with a 15 blade.  No wound noted.    ABN signed.  List of resources given for future routine foot care.    Handouts given.  F/u prn.      Meng Templeton DPM, FACFAS

## 2017-07-18 NOTE — LETTER
7/18/2017         RE: Ivan Payton  [address] [place] [address]        Dear Colleague,    Thank you for referring your patient, Ivan Payton, to the [facility]. Please see a copy of my visit note below.    Weight management plan: Patient was referred to their PCP to discuss a diet and exercise plan.     MIKE Paez MA July 18, 2017 3:27 PM      PATIENT HISTORY:  Ivan Payton is a 81 year old male who presents to clinic for right 1st toe wound s/p nail removal on 7/8/17 at .  Also with painful lesion to tip of left 2nd toe.  No injury per pt.  Shoes make the pain work.  3/10 pain reported.  Pt is dressing the R 1st toe with neosporin.  Denies SOI.  Also requesting nail cutting today.    Review of Systems:  Patient denies fever, chills, rash, wound, stiffness, limping, numbness, weakness, heart burn, blood in stool, chest pain with activity, calf pain when walking, shortness of breath with activity, chronic cough, easy bleeding/bruising, swelling of ankles, excessive thirst, fatigue, depression, anxiety.       PAST MEDICAL HISTORY:   Past Medical History:   Diagnosis Date     Arthritis      Cerebral vascular disease 3/2014    multiple intracranial stenoses - lt post communic, lt post cerebral, rt middle cerebral, bilat porx M2 segments     Claustrophobia     Need sedation for MRI scans     Coronary artery disease 2014    Cath 9/2014: PTCA and KIKE to mLAD, KIKE to prox circumflex, PTCA and DESx2 to OM2     CVA (cerebral infarction) 3/2014    2 small acute lesions noted left parietal/left posterior frontal region. Old lacunar infarct left caudate nucleus     Enlarged prostate      Essential hypertension      GERD (gastroesophageal reflux disease)      Hiatal hernia      Hydrocele     Right hydrocelectomy 2/2012     Mixed hyperlipidemia      RBBB (right bundle branch block) 3/2014     Shortness of breath      Stented coronary artery      Uncomplicated asthma         PAST SURGICAL  HISTORY:   Past Surgical History:   Procedure Laterality Date     ENDARTERECTOMY CAROTID  3/26/2014    Procedure: ENDARTERECTOMY CAROTID;  LEFT CAROTID ENDARTERECTOMY WITH EEG;  Surgeon: Yobani Jones MD;  Location:  OR     HC REMOVAL OF TONSILS,<12 Y/O      Tonsils <13y.o.     HEART CATH, ANGIOPLASTY  9/9/14    Stenting of LAD,Prox LCx, and 2 stents to OM2     HERNIORRHAPHY INGUINAL      Right     HERNIORRHAPHY INGUINAL  2/10/2012    Procedure:HERNIORRHAPHY INGUINAL; LEFT OPEN INGUINAL HERNIA REPAIR WITH MESH, RIGHT HYDROCELECTOMY; Surgeon:JULI LOPES; Location: [facility]     HYDROCELECTOMY INGUINAL  2/10/2012    Procedure:HYDROCELECTOMY INGUINAL; Surgeon:JULI LOPES; Location: [facility]     HYDROCELECTOMY SCROTAL Right 1/29/2016    Procedure: HYDROCELECTOMY SCROTAL;  Surgeon: Yobani Stevens MD;  Location:  [facility]     LAPAROSCOPIC HERNIORRHAPHY INGUINAL BILATERAL Bilateral 5/24/2016    Procedure: LAPAROSCOPIC HERNIORRHAPHY INGUINAL BILATERAL;  Surgeon: Chandler Bowen MD;  Location:  OR     MR BRAIN AND ORBITS  3/2014    6 mm area of restricted diffusion subcortical white matter left parietal lobe/questionable area of restricted diffusion left posterior frontal region - c/w recent small infarcts. Small chronic lacunar infarct left caudate nucleus     SURGICAL HISTORY OF -       tonail removal        MEDICATIONS:   Current Outpatient Prescriptions:      atorvastatin (LIPITOR) 40 MG tablet, TK 1 T PO AT BEDTIME, Disp: , Rfl: 2     bacitracin ophthalmic ointment, APPLY 1/4 INCH RIBBON ON BOTH EYELIDS HS, Disp: , Rfl: 0     erythromycin (ROMYCIN) ophthalmic ointment, APPLY 1/4 INCH RIBBON IN BOTH EYELIDS HS, Disp: , Rfl: 2     triamterene-hydrochlorothiazide (MAXZIDE-25) 37.5-25 MG per tablet, TAKE 1 TABLET BY MOUTH EVERY DAY, Disp: 90 tablet, Rfl: 1     clopidogrel (PLAVIX) 75 MG tablet, TAKE 1 TABLET BY MOUTH EVERY DAY, Disp: 90 tablet, Rfl: 0     lisinopril (PRINIVIL/ZESTRIL) 10 MG  tablet, TAKE 1 TABLET BY MOUTH DAILY, Disp: 90 tablet, Rfl: 3     omeprazole (PRILOSEC) 40 MG capsule, Take 40 mg by mouth daily, Disp: , Rfl:      rosuvastatin (CRESTOR) 40 MG tablet, Take 1 tablet (40 mg) by mouth daily, Disp: 90 tablet, Rfl: 3     atenolol (TENORMIN) 50 MG tablet, TAKE ONE TABLET BY MOUTH EVERY DAY, Disp: 90 tablet, Rfl: 3     glucosamine-chondroitin 500-400 MG CAPS, Take 1 capsule by mouth daily, Disp: , Rfl:      VITAMIN D, CHOLECALCIFEROL, PO, Take 5,000 Units by mouth daily , Disp: , Rfl:      Omega-3 Fatty Acids (OMEGA-3 FISH OIL PO), Take 1 g by mouth daily , Disp: , Rfl:      vitamin  B complex with vitamin C (VITAMIN  B COMPLEX) TABS, Take 1 tablet by mouth daily, Disp: , Rfl:      Ascorbic Acid (VITAMIN C PO), Take 1,000 mg by mouth daily, Disp: , Rfl:      calcium carbonate (OS-SREEDHAR 500 MG [place]. CA) 500 MG tablet, Take 500 mg by mouth daily, Disp: , Rfl:      ALLERGIES:    Allergies   Allergen Reactions     Contrast Dye Hives        SOCIAL HISTORY:   Social History     Social History     Marital status:      Spouse name: madhavi     Number of children: 2     Years of education: N/A     Occupational History     part time, building maintenance Retired     Social History Main Topics     Smoking status: Former Smoker     Packs/day: 0.50     Years: 20.00     Types: Cigarettes     Quit date: 6/4/1968     Smokeless tobacco: Former User     Alcohol use No     Drug use: No     Sexual activity: Yes     Partners: Female      Comment:      Other Topics Concern     Blood Transfusions No     Caffeine Concern Yes     1-2 cups per day     Occupational Exposure No     Hobby Hazards No     Sleep Concern No     Stress Concern No     Weight Concern No     Special Diet No     Back Care No     Exercise Yes     gym 4 days week, treadmill, 1.5 miles,  weights     Bike Helmet No     Seat Belt Yes     Self-Exams No     Parent/Sibling W/ Cabg, Mi Or Angioplasty Before 65f 55m? Yes     brothers - 1  "had CABG 1 had MI pt unsure of age     Social History Narrative        FAMILY HISTORY:   Family History   Problem Relation Age of Onset     C.A.D. Brother      older brother, CABG about age 56     Respiratory Father      lung disease - farmer,      Hypertension Mother      C.A.D. Brother      younger brother, angioplasty     Hypertension Sister      Hypertension Brother         EXAM:Vitals: /77 (BP Location: Right arm, Patient Position: Chair, Cuff Size: Adult Large)  Pulse 66  Temp 97.9  F (36.6  C) (Tympanic)  Ht 5' 7\" (1.702 m)  Wt 229 lb 6.4 oz (104.1 kg)  BMI 35.93 kg/m2  BMI= Body mass index is 35.93 kg/(m^2).    General appearance: Patient is alert and fully cooperative with history & exam.  No sign of distress is noted during the visit.     Psychiatric: Affect is pleasant & appropriate.  Patient appears motivated to improve health.     Respiratory: Breathing is regular & unlabored while sitting.     HEENT: Hearing is intact to spoken word.  Speech is clear.  No gross evidence of visual impairment that would impact ambulation.     Dermatologic: Hyperkeratotic lesion to tip of left 2nd toe with intralesional bleeding.  B/l toenails dystrophic, discolored.  Right 3-5, left 2-3 elongated.  Healing nail bed of right hallux.  No paronychia or evidence of soft tissue infection is noted.     Vascular: DP & PT pulses are intact & regular bilaterally.  Mild b/l LE edema.  CFT and skin temperature are normal to both lower extremities.     Neurologic: Lower extremity sensation is intact to light touch.  No evidence of weakness or contracture in the lower extremities.  No evidence of neuropathy.     Musculoskeletal: L 2nd mallet toe noted.  Patient is ambulatory without assistive device or brace.  No gross ankle deformity noted.  No foot or ankle joint effusion is noted.     ASSESSMENT:   S/p R hallux nail removal with healing wound  L 2nd toe callus with related hammertoe  Onychomycosis     PLAN: "  Reviewed patient's chart in epic.  Discussed condition and treatment options including pros and cons.    Wound Care Recommendations given for toe wound:    1)  Keep the wound covered by a bandage when bathing.    2)  Gently clean the wound with soap water, separate from bath/shower water.      3)  Each day, apply a topical antibiotic ointment to the wound (Neosporin, Triple antibiotic, Bacitracin).   Cover with large band-aid or gauze.      4)  Please seek immediate medical attention if any increasing redness, drainage, smell, or pain related to the wound.     Discussed causes of calluses.  They are due to areas of increased friction.  Discussed treatments such as using foot file, pumice stone, pads, orthotics, and not walking barefoot.   Crest pad given.  Discussed office toe tenotomy as an option for offloading.  Discussed risk of ulcer.     Pt requested debridement of nails/callus today.  I debrided the elongated nails x5 with a nail nipper and the left 2nd toe callus with a 15 blade.  No wound noted.    ABN signed.  List of resources given for future routine foot care.    Handouts given.  F/u prn.      Meng Templeton DPM, FACFAS      Again, thank you for allowing me to participate in the care of your patient.        Sincerely,        Meng Templeton DPM

## 2017-08-08 ENCOUNTER — HOSPITAL ENCOUNTER (OUTPATIENT)
Dept: CARDIOLOGY | Facility: CLINIC | Age: 82
Discharge: HOME | End: 2017-08-08
Attending: INTERNAL MEDICINE | Admitting: INTERNAL MEDICINE
Payer: MEDICARE

## 2017-08-08 VITALS — HEART RATE: 103 BPM | DIASTOLIC BLOOD PRESSURE: 70 MMHG | SYSTOLIC BLOOD PRESSURE: 140 MMHG

## 2017-08-08 DIAGNOSIS — I25.10 CORONARY ARTERY DISEASE INVOLVING NATIVE CORONARY ARTERY OF NATIVE HEART WITHOUT ANGINA PECTORIS: ICD-10-CM

## 2017-08-08 DIAGNOSIS — E78.00 HYPERCHOLESTEROLEMIA: ICD-10-CM

## 2017-08-08 LAB
ALT SERPL W P-5'-P-CCNC: <5 U/L (ref 5–30)
CHOLEST SERPL-MCNC: 132 MG/DL
HDLC SERPL-MCNC: 45 MG/DL
LDLC SERPL CALC-MCNC: 64 MG/DL
NONHDLC SERPL-MCNC: 87 MG/DL
TRIGL SERPL-MCNC: 115 MG/DL

## 2017-08-08 PROCEDURE — 36415 COLL VENOUS BLD VENIPUNCTURE: CPT | Performed by: INTERNAL MEDICINE

## 2017-08-08 PROCEDURE — 80061 LIPID PANEL: CPT | Performed by: INTERNAL MEDICINE

## 2017-08-08 PROCEDURE — 78452 HT MUSCLE IMAGE SPECT MULT: CPT

## 2017-08-08 PROCEDURE — A9502 TC99M TETROFOSMIN: HCPCS | Performed by: INTERNAL MEDICINE

## 2017-08-08 PROCEDURE — 93016 CV STRESS TEST SUPVJ ONLY: CPT | Performed by: INTERNAL MEDICINE

## 2017-08-08 PROCEDURE — 34300033 ZZH RX 343: Performed by: INTERNAL MEDICINE

## 2017-08-08 PROCEDURE — 93018 CV STRESS TEST I&R ONLY: CPT | Performed by: INTERNAL MEDICINE

## 2017-08-08 PROCEDURE — 25000128 H RX IP 250 OP 636: Performed by: INTERNAL MEDICINE

## 2017-08-08 PROCEDURE — 78452 HT MUSCLE IMAGE SPECT MULT: CPT | Mod: 26 | Performed by: INTERNAL MEDICINE

## 2017-08-08 PROCEDURE — 84460 ALANINE AMINO (ALT) (SGPT): CPT | Performed by: INTERNAL MEDICINE

## 2017-08-08 RX ORDER — REGADENOSON 0.08 MG/ML
0.4 INJECTION, SOLUTION INTRAVENOUS ONCE
Status: COMPLETED | OUTPATIENT
Start: 2017-08-08 | End: 2017-08-08

## 2017-08-08 RX ORDER — AMINOPHYLLINE 25 MG/ML
50-100 INJECTION, SOLUTION INTRAVENOUS
Status: DISCONTINUED | OUTPATIENT
Start: 2017-08-08 | End: 2017-08-09 | Stop reason: HOSPADM

## 2017-08-08 RX ORDER — ALBUTEROL SULFATE 90 UG/1
2 AEROSOL, METERED RESPIRATORY (INHALATION) EVERY 5 MIN PRN
Status: DISCONTINUED | OUTPATIENT
Start: 2017-08-08 | End: 2017-08-09 | Stop reason: HOSPADM

## 2017-08-08 RX ORDER — ACYCLOVIR 200 MG/1
0-1 CAPSULE ORAL
Status: DISCONTINUED | OUTPATIENT
Start: 2017-08-08 | End: 2017-08-09 | Stop reason: HOSPADM

## 2017-08-08 RX ADMIN — TETROFOSMIN 18.8 MCI.: 1.38 INJECTION, POWDER, LYOPHILIZED, FOR SOLUTION INTRAVENOUS at 09:38

## 2017-08-08 RX ADMIN — TETROFOSMIN 6.25 MCI.: 1.38 INJECTION, POWDER, LYOPHILIZED, FOR SOLUTION INTRAVENOUS at 08:18

## 2017-08-08 RX ADMIN — REGADENOSON 0.4 MG: 0.08 INJECTION, SOLUTION INTRAVENOUS at 09:37

## 2017-08-16 ENCOUNTER — OFFICE VISIT (OUTPATIENT)
Dept: CARDIOLOGY | Facility: CLINIC | Age: 82
End: 2017-08-16
Attending: INTERNAL MEDICINE
Payer: COMMERCIAL

## 2017-08-16 VITALS
SYSTOLIC BLOOD PRESSURE: 124 MMHG | BODY MASS INDEX: 34.83 KG/M2 | HEART RATE: 61 BPM | WEIGHT: 221.9 LBS | HEIGHT: 67 IN | DIASTOLIC BLOOD PRESSURE: 70 MMHG

## 2017-08-16 DIAGNOSIS — I67.9 CEREBRAL VASCULAR DISEASE: ICD-10-CM

## 2017-08-16 DIAGNOSIS — I25.10 CORONARY ARTERY DISEASE INVOLVING NATIVE CORONARY ARTERY OF NATIVE HEART WITHOUT ANGINA PECTORIS: ICD-10-CM

## 2017-08-16 DIAGNOSIS — E78.00 HYPERCHOLESTEROLEMIA: ICD-10-CM

## 2017-08-16 DIAGNOSIS — I10 ESSENTIAL HYPERTENSION: Primary | ICD-10-CM

## 2017-08-16 PROCEDURE — 99214 OFFICE O/P EST MOD 30 MIN: CPT | Performed by: INTERNAL MEDICINE

## 2017-08-16 RX ORDER — ATORVASTATIN CALCIUM 40 MG/1
40 TABLET, FILM COATED ORAL DAILY
Qty: 90 TABLET | Refills: 3 | Status: SHIPPED | OUTPATIENT
Start: 2017-08-16 | End: 2018-02-13

## 2017-08-16 NOTE — MR AVS SNAPSHOT
After Visit Summary   8/16/2017    Ivan Payton    MRN: [id number]           Patient Information     Date Of Birth          1935        Visit Information        Provider Department      8/16/2017 1:45 PM Willy Johansen MD [facility]        Today's Diagnoses     Essential hypertension    -  1    Coronary artery disease involving native coronary artery of native heart without angina pectoris        Hypercholesterolemia        Cerebral vascular disease           Follow-ups after your visit        Additional Services     Follow-Up with Cardiologist                 Future tests that were ordered for you today     Open Future Orders        Priority Expected Expires Ordered    Follow-Up with Cardiologist Routine 8/16/2018 8/17/2018 8/16/2017    Lipid Profile Routine 8/16/2018 8/17/2018 8/16/2017            Who to contact     If you have questions or need follow up information about today's clinic visit or your schedule please contact [facility] directly at [phone].  Normal or non-critical lab and imaging results will be communicated to you by MyChart, letter or phone within 4 business days after the clinic has received the results. If you do not hear from us within 7 days, please contact the clinic through [facility] or phone. If you have a critical or abnormal lab result, we will notify you by phone as soon as possible.  Submit refill requests through [employer] or call your pharmacy and they will forward the refill request to us. Please allow 3 business days for your refill to be completed.          Additional Information About Your Visit        [facility] Information     [employer] gives you secure access to your electronic health record. If you see a primary care provider, you can also send messages to your care team and make appointments. If you have questions, please call your primary care clinic.  If you do not have  "a primary care provider, please call [phone] and they will assist you.        Care EveryWhere ID     This is your Care EveryWhere ID. This could be used by other organizations to access your [place] medical records  [id number]        Your Vitals Were     Pulse Height BMI (Body Mass Index)             61 1.702 m (5' 7\") 34.75 kg/m2          Blood Pressure from Last 3 Encounters:   08/16/17 124/70   08/08/17 140/70   07/18/17 136/77    Weight from Last 3 Encounters:   08/16/17 100.7 kg (221 lb 14.4 oz)   07/18/17 104.1 kg (229 lb 6.4 oz)   07/08/17 99.8 kg (220 lb)              We Performed the Following     Follow-Up with Cardiologist          Today's Medication Changes          These changes are accurate as of: 8/16/17  2:25 PM.  If you have any questions, ask your nurse or doctor.               These medicines have changed or have updated prescriptions.        Dose/Directions    atorvastatin 40 MG tablet   Commonly known as:  LIPITOR   This may have changed:  See the new instructions.   Used for:  Hypercholesterolemia   Changed by:  Willy Johansen MD        Dose:  40 mg   Take 1 tablet (40 mg) by mouth daily   Quantity:  90 tablet   Refills:  3         Stop taking these medicines if you haven't already. Please contact your care team if you have questions.     rosuvastatin 40 MG tablet   Commonly known as:  CRESTOR   Stopped by:  Willy Johansen MD                Where to get your medicines      Some of these will need a paper prescription and others can be bought over the counter.  Ask your nurse if you have questions.     Bring a paper prescription for each of these medications     atorvastatin 40 MG tablet                Primary Care Provider Office Phone # Fax #    William Thompson [phone] [address] [facility] [address]        Equal Access to Services     MUKESH PACHECO: Kamran cacereso Soomaali, waaxda luqadaha, qaybta kaalmada adeegyada, quin lara " davey garrisonmayralesly wilkesBreannarico ah. So [facility] [phone].    ATENCIÓN: Si elliottla fred, tiene a beckett disposición servicios gratuitos de asistencia lingüística. Piyush mcwilliams [phone].    We comply with applicable federal civil rights laws and [place] laws. We do not discriminate on the basis of race, color, national origin, age, disability sex, sexual orientation or gender identity.            Thank you!     Thank you for choosing [facility] [place]  for your care. Our goal is always to provide you with excellent care. Hearing back from our patients is one way we can continue to improve our services. Please take a few minutes to complete the written survey that you may receive in the mail after your visit with us. Thank you!             Your Updated Medication List - Protect others around you: Learn how to safely use, store and throw away your medicines at www.disposemymeds.org.          This list is accurate as of: 8/16/17  2:25 PM.  Always use your most recent med list.                   Brand Name Dispense Instructions for use Diagnosis    atenolol 50 MG tablet    TENORMIN    90 tablet    TAKE ONE TABLET BY MOUTH EVERY DAY    Essential hypertension       atorvastatin 40 MG tablet    LIPITOR    90 tablet    Take 1 tablet (40 mg) by mouth daily    Hypercholesterolemia       calcium carbonate 1250 MG tablet    OS-SREEDHAR 500 mg [place]. Ca     Take 500 mg by mouth daily        clopidogrel 75 MG tablet    PLAVIX    90 tablet    TAKE 1 TABLET BY MOUTH EVERY DAY    Carotid artery disease (H)       glucosamine-chondroitin 500-400 MG Caps per capsule      Take 1 capsule by mouth daily        lisinopril 10 MG tablet    PRINIVIL/ZESTRIL    90 tablet    TAKE 1 TABLET BY MOUTH DAILY    Essential hypertension       OMEGA-3 FISH OIL PO      Take 1 g by mouth daily        omeprazole 40 MG capsule    priLOSEC     Take 40 mg by mouth daily        triamterene-hydrochlorothiazide 37.5-25 MG per tablet     MAXZIDE-25    90 tablet    TAKE 1 TABLET BY MOUTH EVERY DAY    Essential hypertension       vitamin B complex with vitamin C Tabs tablet      Take 1 tablet by mouth daily        VITAMIN C PO      Take 1,000 mg by mouth daily        VITAMIN D (CHOLECALCIFEROL) PO      Take 5,000 Units by mouth daily

## 2017-08-16 NOTE — LETTER
8/16/2017    William Thompson MD  [address]  [facility] [address]    RE: Ivan Payton       Dear Colleague,    I had the opportunity to see Mr. Ivan Payton in Cardiology Clinic today at the [facility] in [place] for reevaluation of coronary artery disease and carotid artery disease with risk factors of hypertension and dyslipidemia.      In 2014, he was found to have significant carotid artery disease requiring left carotid endarterectomy.  During that evaluation, he was also found to have significant coronary artery disease by stress testing, which was done primarily for symptoms of shortness of breath as part of a preoperative evaluation.  He was not having any chest pain.  He had severe multivessel disease but preferred not to do bypass surgery and he went on to have multivessel stenting in 09/2014 involving his LAD, circumflex and obtuse marginal.  He had a small PDA with a distal 90% stenosis which was not amenable to stenting.  He also had a fairly small caliber obtuse marginal artery with a moderate distribution which had an 80% stenosis.  Our intention was to do some stress testing to determine whether this caused significant ischemia and consider angioplasty or stenting if necessary.  Fortunately, his stress tests have not shown significant ischemia in this territory.      He had a stress test recently with Lexiscan and nuclear perfusion imaging on 08/08/2017, which showed a small degree of inferior ischemia, possibly with some nontransmural infarction.  This was similar to the findings of 2015.      He continues to be free of any chest pain symptoms.  He does not complain of any shortness of breath, palpitations, lightheadedness or syncope.      He has had some muscle pains with statin drugs but seems to tolerate half of his Crestor dose of 20 mg a day without problems.  His cholesterol numbers look good.  However, the Crestor is very expensive for him for unclear  reasons.  I presumed it would be inexpensive as a generic.      PHYSICAL EXAMINATION:  His blood pressure is 124/70, heart rate 61, weight 221 pounds.  His lungs are clear.  His heart rhythm is regular.  He does not have any carotid bruits at this time.     Outpatient Encounter Prescriptions as of 8/16/2017   Medication Sig Dispense Refill     atorvastatin (LIPITOR) 40 MG tablet Take 1 tablet (40 mg) by mouth daily 90 tablet 3     triamterene-hydrochlorothiazide (MAXZIDE-25) 37.5-25 MG per tablet TAKE 1 TABLET BY MOUTH EVERY DAY 90 tablet 1     clopidogrel (PLAVIX) 75 MG tablet TAKE 1 TABLET BY MOUTH EVERY DAY 90 tablet 0     lisinopril (PRINIVIL/ZESTRIL) 10 MG tablet TAKE 1 TABLET BY MOUTH DAILY 90 tablet 3     omeprazole (PRILOSEC) 40 MG capsule Take 40 mg by mouth daily       atenolol (TENORMIN) 50 MG tablet TAKE ONE TABLET BY MOUTH EVERY DAY 90 tablet 3     glucosamine-chondroitin 500-400 MG CAPS Take 1 capsule by mouth daily       VITAMIN D, CHOLECALCIFEROL, PO Take 5,000 Units by mouth daily        Omega-3 Fatty Acids (OMEGA-3 FISH OIL PO) Take 1 g by mouth daily        vitamin  B complex with vitamin C (VITAMIN  B COMPLEX) TABS Take 1 tablet by mouth daily       Ascorbic Acid (VITAMIN C PO) Take 1,000 mg by mouth daily       calcium carbonate (OS-SREEDHAR 500 MG [place]. CA) 500 MG tablet Take 500 mg by mouth daily       [DISCONTINUED] atorvastatin (LIPITOR) 40 MG tablet TK 1 T PO AT BEDTIME  2     [DISCONTINUED] bacitracin ophthalmic ointment APPLY 1/4 INCH RIBBON ON BOTH EYELIDS HS  0     [DISCONTINUED] erythromycin (ROMYCIN) ophthalmic ointment APPLY 1/4 INCH RIBBON IN BOTH EYELIDS HS  2     [DISCONTINUED] rosuvastatin (CRESTOR) 40 MG tablet Take 1 tablet (40 mg) by mouth daily 90 tablet 3     No facility-administered encounter medications on file as of 8/16/2017.       IMPRESSIONS:  Mr. Ivan Payton is an 81-year-old gentleman with hypertension and dyslipidemia who has had carotid vascular disease and multivessel  coronary artery disease.  He has undergone left carotid endarterectomy and multivessel coronary stenting.  He has some residual small vessel disease involving an obtuse marginal and PDA with a stress test showing a very small area of mild inferior ischemia likely due to the PDA issue.  At this point, he is asymptomatic and the ischemia is minor.  I told him we should continue treating that situation medically and he agrees.      I will switch him back to atorvastatin for cost reasons and we will follow up with him again in 1 year to recheck his cholesterol numbers again.     Again, thank you for allowing me to participate in the care of your patient.      Sincerely,    KATHY BERNSTEIN MD     [facility]

## 2017-08-16 NOTE — PROGRESS NOTES
HPI and Plan:   See dictation    Orders Placed This Encounter   Procedures     Lipid Profile     Follow-Up with Cardiologist       Orders Placed This Encounter   Medications     atorvastatin (LIPITOR) 40 MG tablet     Sig: Take 1 tablet (40 mg) by mouth daily     Dispense:  90 tablet     Refill:  3       Medications Discontinued During This Encounter   Medication Reason     bacitracin ophthalmic ointment Therapy completed     erythromycin (ROMYCIN) ophthalmic ointment Therapy completed     atorvastatin (LIPITOR) 40 MG tablet      rosuvastatin (CRESTOR) 40 MG tablet          Encounter Diagnoses   Name Primary?     Coronary artery disease involving native coronary artery of native heart without angina pectoris      Hypercholesterolemia      Essential hypertension Yes     Cerebral vascular disease        CURRENT MEDICATIONS:  Current Outpatient Prescriptions   Medication Sig Dispense Refill     atorvastatin (LIPITOR) 40 MG tablet Take 1 tablet (40 mg) by mouth daily 90 tablet 3     triamterene-hydrochlorothiazide (MAXZIDE-25) 37.5-25 MG per tablet TAKE 1 TABLET BY MOUTH EVERY DAY 90 tablet 1     clopidogrel (PLAVIX) 75 MG tablet TAKE 1 TABLET BY MOUTH EVERY DAY 90 tablet 0     lisinopril (PRINIVIL/ZESTRIL) 10 MG tablet TAKE 1 TABLET BY MOUTH DAILY 90 tablet 3     omeprazole (PRILOSEC) 40 MG capsule Take 40 mg by mouth daily       atenolol (TENORMIN) 50 MG tablet TAKE ONE TABLET BY MOUTH EVERY DAY 90 tablet 3     glucosamine-chondroitin 500-400 MG CAPS Take 1 capsule by mouth daily       VITAMIN D, CHOLECALCIFEROL, PO Take 5,000 Units by mouth daily        Omega-3 Fatty Acids (OMEGA-3 FISH OIL PO) Take 1 g by mouth daily        vitamin  B complex with vitamin C (VITAMIN  B COMPLEX) TABS Take 1 tablet by mouth daily       Ascorbic Acid (VITAMIN C PO) Take 1,000 mg by mouth daily       calcium carbonate (OS-SREEDHAR 500 MG [place]. [place]) 500 MG tablet Take 500 mg by mouth daily       [DISCONTINUED] atorvastatin (LIPITOR) 40 MG  tablet TK 1 T PO AT BEDTIME  2       ALLERGIES     Allergies   Allergen Reactions     Contrast Dye Hives       PAST MEDICAL HISTORY:  Past Medical History:   Diagnosis Date     Arthritis      Cerebral vascular disease 3/2014    multiple intracranial stenoses - lt post communic, lt post cerebral, rt middle cerebral, bilat porx M2 segments     Claustrophobia     Need sedation for MRI scans     Coronary artery disease 2014    Cath 9/2014: PTCA and KIKE to mLAD, KIKE to prox circumflex, PTCA and DESx2 to OM2     CVA (cerebral infarction) 3/2014    2 small acute lesions noted left parietal/left posterior frontal region. Old lacunar infarct left caudate nucleus     Enlarged prostate      Essential hypertension      GERD (gastroesophageal reflux disease)      Hiatal hernia      Hydrocele     Right hydrocelectomy 2/2012     Mixed hyperlipidemia      RBBB (right bundle branch block) 3/2014     Shortness of breath      Stented coronary artery      Uncomplicated asthma        PAST SURGICAL HISTORY:  Past Surgical History:   Procedure Laterality Date     ENDARTERECTOMY CAROTID  3/26/2014    Procedure: ENDARTERECTOMY CAROTID;  LEFT CAROTID ENDARTERECTOMY WITH EEG;  Surgeon: Yobani Jones MD;  Location:  OR      REMOVAL OF TONSILS,<12 Y/O      Tonsils <13y.o.     HEART CATH, ANGIOPLASTY  9/9/14    Stenting of LAD,Prox LCx, and 2 stents to OM2     HERNIORRHAPHY INGUINAL      Right     HERNIORRHAPHY INGUINAL  2/10/2012    Procedure:HERNIORRHAPHY INGUINAL; LEFT OPEN INGUINAL HERNIA REPAIR WITH MESH, RIGHT HYDROCELECTOMY; Surgeon:JULI LOPES; Location:[facility]     HYDROCELECTOMY INGUINAL  2/10/2012    Procedure:HYDROCELECTOMY INGUINAL; Surgeon:JULI LOPES; Location:[facility]     HYDROCELECTOMY SCROTAL Right 1/29/2016    Procedure: HYDROCELECTOMY SCROTAL;  Surgeon: Yobani Stevens MD;  Location: [facility]     LAPAROSCOPIC HERNIORRHAPHY INGUINAL BILATERAL Bilateral 5/24/2016    Procedure: LAPAROSCOPIC  HERNIORRHAPHY INGUINAL BILATERAL;  Surgeon: Chandler Bowen MD;  Location: [facility] OR     MR BRAIN AND ORBITS  3/2014    6 mm area of restricted diffusion subcortical white matter left parietal lobe/questionable area of restricted diffusion left posterior frontal region - c/w recent small infarcts. Small chronic lacunar infarct left caudate nucleus     SURGICAL HISTORY OF -       tonail removal       FAMILY HISTORY:  Family History   Problem Relation Age of Onset     C.A.D. Brother      older brother, CABG about age 56     Respiratory Father      lung disease - farmer,      Hypertension Mother      C.A.OCTAVIANO. Brother      younger brother, angioplasty     Hypertension Sister      Hypertension Brother        SOCIAL HISTORY:  Social History     Social History     Marital status:      Spouse name: madhavi     Number of children: 2     Years of education: N/A     Occupational History     part time, building maintenance Retired     Social History Main Topics     Smoking status: Former Smoker     Packs/day: 0.50     Years: 20.00     Types: Cigarettes     Quit date: 6/4/1968     Smokeless tobacco: Former User     Alcohol use No     Drug use: No     Sexual activity: Yes     Partners: Female      Comment:      Other Topics Concern     Blood Transfusions No     Caffeine Concern Yes     1-2 cups per day     Occupational Exposure No     Hobby Hazards No     Sleep Concern No     Stress Concern No     Weight Concern No     Special Diet No     Back Care No     Exercise Yes     gym 4 days week, treadmill, 1.5 miles,  weights     Bike Helmet No     Seat Belt Yes     Self-Exams No     Parent/Sibling W/ Cabg, Mi Or Angioplasty Before 65f 55m? Yes     brothers - 1 had CABG 1 had MI pt unsure of age     Social History Narrative       Review of Systems:  Skin:  Positive for lumps or bumps right wrist   Eyes:  Positive for glasses    ENT:  Negative      Respiratory:  Positive for dyspnea on exertion     Cardiovascular:  " Negative      Gastroenterology: Negative      Genitourinary:  not assessed      Musculoskeletal:  Positive for arthritis    Neurologic:  Negative      Psychiatric:  Negative      Heme/Lymph/Imm:  Negative      Endocrine:  Negative        Physical Exam:  Vitals: /70  Pulse 61  Ht 1.702 m (5' 7\")  Wt 100.7 kg (221 lb 14.4 oz)  BMI 34.75 kg/m2    Constitutional:  cooperative, alert and oriented, well developed, well nourished, in no acute distress        Skin:  warm and dry to the touch, no apparent skin lesions or masses noted        Head:  normocephalic, no masses or lesions        Eyes:  pupils equal and round, conjunctivae and lids unremarkable, sclera white, no xanthalasma, EOMS intact, no nystagmus        ENT:  no pallor or cyanosis, dentition good        Neck:  JVP normal;carotid pulses are full and equal bilaterally, JVP normal, no carotid bruit, no thyromegaly        Chest:  normal breath sounds, clear to auscultation, normal A-P diameter, normal symmetry, normal respiratory excursion, no use of accessory muscles          Cardiac: regular rhythm, normal S1/S2, no S3 or S4, apical impulse not displaced, no murmurs, gallops or rubs                  Abdomen:           Vascular: pulses full and equal, no bruits auscultated                                        Extremities and Back:  no deformities, clubbing, cyanosis, erythema observed;no edema              Neurological:  affect appropriate, oriented to time, person and place;no gross motor deficits              CC  Willy Johansen MD  [id number]  [place] [id number]              "

## 2017-08-16 NOTE — PROGRESS NOTES
HISTORY OF PRESENT ILLNESS:  I had the opportunity to see Mr. Ivan Payton in Cardiology Clinic today at the [facility] in [place] for reevaluation of coronary artery disease and carotid artery disease with risk factors of hypertension and dyslipidemia.      In 2014, he was found to have significant carotid artery disease requiring left carotid endarterectomy.  During that evaluation, he was also found to have significant coronary artery disease by stress testing, which was done primarily for symptoms of shortness of breath as part of a preoperative evaluation.  He was not having any chest pain.  He had severe multivessel disease but preferred not to do bypass surgery and he went on to have multivessel stenting in 09/2014 involving his LAD, circumflex and obtuse marginal.  He had a small PDA with a distal 90% stenosis which was not amenable to stenting.  He also had a fairly small caliber obtuse marginal artery with a moderate distribution which had an 80% stenosis.  Our intention was to do some stress testing to determine whether this caused significant ischemia and consider angioplasty or stenting if necessary.  Fortunately, his stress tests have not shown significant ischemia in this territory.      He had a stress test recently with Lexiscan and nuclear perfusion imaging on 08/08/2017, which showed a small degree of inferior ischemia, possibly with some nontransmural infarction.  This was similar to the findings of 2015.      He continues to be free of any chest pain symptoms.  He does not complain of any shortness of breath, palpitations, lightheadedness or syncope.      He has had some muscle pains with statin drugs but seems to tolerate half of his Crestor dose of 20 mg a day without problems.  His cholesterol numbers look good.  However, the Crestor is very expensive for him for unclear reasons.  I presumed it would be inexpensive as a generic.      PHYSICAL EXAMINATION:  His blood  pressure is 124/70, heart rate 61, weight 221 pounds.  His lungs are clear.  His heart rhythm is regular.  He does not have any carotid bruits at this time.      IMPRESSIONS:  Mr. Geovani Payton is an 81-year-old gentleman with hypertension and dyslipidemia who has had carotid vascular disease and multivessel coronary artery disease.  He has undergone left carotid endarterectomy and multivessel coronary stenting.  He has some residual small vessel disease involving an obtuse marginal and PDA with a stress test showing a very small area of mild inferior ischemia likely due to the PDA issue.  At this point, he is asymptomatic and the ischemia is minor.  I told him we should continue treating that situation medically and he agrees.      I will switch him back to atorvastatin for cost reasons and we will follow up with him again in 1 year to recheck his cholesterol numbers again.      cc:      William Thompson MD   [facility]   [address] [place] [address]         KATHY BERNSTEIN MD, [facility]             D: 2017 14:33   T: 2017 15:41   MT: [place]      Name:     GEOVANI PAYTON   MRN:      [id number]        Account:      [id number]   :      1935           Service Date: 2017      Document: [id number]

## 2017-08-25 DIAGNOSIS — I77.9 BILATERAL CAROTID ARTERY DISEASE (H): ICD-10-CM

## 2017-08-28 RX ORDER — CLOPIDOGREL BISULFATE 75 MG/1
TABLET ORAL
Qty: 90 TABLET | Refills: 3 | Status: SHIPPED | OUTPATIENT
Start: 2017-08-28 | End: 2018-02-13

## 2017-08-28 NOTE — TELEPHONE ENCOUNTER
Plavix           Last Written Prescription Date: 6/1/17  Last Fill Quantity: 90, # refills: 0     Future Office Visit:       Lab Results   Component Value Date    WBC 5.8 05/18/2016     Lab Results   Component Value Date    RBC 5.07 05/18/2016     Lab Results   Component Value Date    HGB 15.5 05/18/2016     Lab Results   Component Value Date    HCT 46.6 05/18/2016     No components found for: MCT  Lab Results   Component Value Date    MCV 92 05/18/2016     Lab Results   Component Value Date    MCH 30.6 05/18/2016     Lab Results   Component Value Date    MCHC 33.3 05/18/2016     Lab Results   Component Value Date    RDW 13.8 05/18/2016     Lab Results   Component Value Date     05/18/2016     Lab Results   Component Value Date    AST 16 12/16/2015     Lab Results   Component Value Date    ALT <5 08/08/2017     Creatinine   Date Value Ref Range Status   05/18/2016 1.10 0.66 - 1.25 mg/dL Final   ]    Pt has not had required labs within the last year, unable to fill per RN protocol.  Will route to pt's PCP for approval.    Clarice Small, KIKON, RN

## 2017-12-06 ENCOUNTER — OFFICE VISIT (OUTPATIENT)
Dept: FAMILY MEDICINE | Facility: CLINIC | Age: 82
End: 2017-12-06
Payer: COMMERCIAL

## 2017-12-06 VITALS
RESPIRATION RATE: 18 BRPM | DIASTOLIC BLOOD PRESSURE: 70 MMHG | WEIGHT: 231 LBS | BODY MASS INDEX: 36.18 KG/M2 | SYSTOLIC BLOOD PRESSURE: 120 MMHG | HEART RATE: 64 BPM | OXYGEN SATURATION: 96 % | TEMPERATURE: 97.8 F

## 2017-12-06 DIAGNOSIS — Z12.11 SPECIAL SCREENING FOR MALIGNANT NEOPLASMS, COLON: ICD-10-CM

## 2017-12-06 DIAGNOSIS — R10.31 RLQ ABDOMINAL PAIN: ICD-10-CM

## 2017-12-06 DIAGNOSIS — R10.31 RIGHT INGUINAL PAIN: Primary | ICD-10-CM

## 2017-12-06 PROCEDURE — 99214 OFFICE O/P EST MOD 30 MIN: CPT | Performed by: INTERNAL MEDICINE

## 2017-12-06 NOTE — NURSING NOTE
"Chief Complaint   Patient presents with     Abdominal Pain       Initial /70  Pulse 64  Temp 97.8  F (36.6  C)  Resp 18  Wt 231 lb (104.8 kg)  SpO2 96%  BMI 36.18 kg/m2 Estimated body mass index is 36.18 kg/(m^2) as calculated from the following:    Height as of 8/16/17: 5' 7\" (1.702 m).    Weight as of this encounter: 231 lb (104.8 kg).  Medication Reconciliation: david Blue CMA      "

## 2017-12-06 NOTE — PROGRESS NOTES
SUBJECTIVE:   Ivan Payton is a 82 year old male who presents to clinic today for the following health issues:      Abdominal Pain      Duration: years but worsened lately    Description (location/character/radiation): pt says he is having LRQ abdominal pain       Associated flank pain: None    Intensity:  moderate    Accompanying signs and symptoms:        Fever/Chills: no        Gas/Bloating: YES- gas       Nausea/vomitting: no        Diarrhea: YES- once a month       Dysuria or Hematuria: no     History (previous similar pain/trauma/previous testing): pt had hernia surgery a couple years ago and has felt some discomfort since    Precipitating or alleviating factors:       Pain worse with eating/BM/urination: no       Pain relieved by BM: no     Therapies tried and outcome: None    LMP:  not applicable                         This patient has very vague and apparently mild right inguinal discomfort.     He is here today, with concerns about possible colon cancer.          This was prompted by his wife's recent diagnosis of colon cancer for which she had a partial colon resection, and she seems to be doing well after surgery.            This patient has never had a colonoscopy.      He is not reporting any hematochezia or any real change in his bowel habits.         When I ask him to point to where he feels the discomfort, it is right at his right inguinal hernia scar.                 Problem list and histories reviewed & adjusted, as indicated.  Additional history: as documented    Past Surgical History:   Procedure Laterality Date     ENDARTERECTOMY CAROTID  3/26/2014    Procedure: ENDARTERECTOMY CAROTID;  LEFT CAROTID ENDARTERECTOMY WITH EEG;  Surgeon: Yobani Jones MD;  Location: [facility] OR     HC REMOVAL OF TONSILS,<14 Y/O      Tonsils <13y.o.     HEART CATH, ANGIOPLASTY  9/9/14    Stenting of LAD,Prox LCx, and 2 stents to OM2     HERNIORRHAPHY INGUINAL      Right     HERNIORRHAPHY INGUINAL  2/10/2012     Procedure:HERNIORRHAPHY INGUINAL; LEFT OPEN INGUINAL HERNIA REPAIR WITH MESH, RIGHT HYDROCELECTOMY; Surgeon:JULI LOPES; Location:[facility]     HYDROCELECTOMY INGUINAL  2/10/2012    Procedure:HYDROCELECTOMY INGUINAL; Surgeon:JULI LOPES; Location:[facility]     HYDROCELECTOMY SCROTAL Right 1/29/2016    Procedure: HYDROCELECTOMY SCROTAL;  Surgeon: Yobani Stevens MD;  Location: [facility]     LAPAROSCOPIC HERNIORRHAPHY INGUINAL BILATERAL Bilateral 5/24/2016    Procedure: LAPAROSCOPIC HERNIORRHAPHY INGUINAL BILATERAL;  Surgeon: Chandler Bowen MD;  Location:  OR     MR BRAIN AND ORBITS  3/2014    6 mm area of restricted diffusion subcortical white matter left parietal lobe/questionable area of restricted diffusion left posterior frontal region - c/w recent small infarcts. Small chronic lacunar infarct left caudate nucleus     SURGICAL HISTORY OF -       tonail removal         Reviewed and updated as needed this visit by clinical staff     Reviewed and updated as needed this visit by Provider         ROS:  CONSTITUTIONAL:NEGATIVE for fever, chills, change in weight  GI: NEGATIVE for hematochezia, melena, nausea and poor appetite    OBJECTIVE:                                                    /70  Pulse 64  Temp 97.8  F (36.6  C)  Resp 18  Wt 231 lb (104.8 kg)  SpO2 96%  BMI 36.18 kg/m2  Body mass index is 36.18 kg/(m^2).  GENERAL APPEARANCE: alert and no distress  CV: regular rates and rhythm  ABDOMEN: soft, nontender, without hepatosplenomegaly or masses; there is mild tenderness in the R inguinal area; I do not detect any definite hernia.                Diagnostic test results:  none        ASSESSMENT/PLAN:                                                        ICD-10-CM    1. Right inguinal pain R10.31 GASTROENTEROLOGY ADULT REF PROCEDURE ONLY   2. RLQ abdominal pain R10.31 GASTROENTEROLOGY ADULT REF PROCEDURE ONLY   3. Special screening for malignant neoplasms, colon Z12.11  GASTROENTEROLOGY ADULT REF PROCEDURE ONLY     We discussed his situation at length.          His current symptoms do not suggest colon cancer.        However, at his age, and never having had a colonoscopy, he certainly could be harboring an occult colon cancer.                 We discussed possible bleeding risk with polypectomy.                    We discussed seeing his hernia surgeon later regarding his right inguinal pain.  Patient Instructions   Let's plan a colonoscopy.           They should call you; if they miss you, their number is [phone].               Stop the Plavix one week before your procedure.           On the day before, and the day of your colonoscopy do not take the triamterene/hctz, or the lisinopril.                      After that, consider seeing your hernia surgeon,Dr Chandler Bowen, for a consult.                                                William Thompson MD  [facility]

## 2017-12-06 NOTE — MR AVS SNAPSHOT
After Visit Summary   12/6/2017    Ivan Payton    MRN: [id number]           Patient Information     Date Of Birth          1935        Visit Information        Provider Department      12/6/2017 3:15 PM William Thompson MD [facility]        Today's Diagnoses     Special screening for malignant neoplasms, colon    -  1    RLQ abdominal pain        Right inguinal pain          Care Instructions    Let's plan a colonoscopy.           They should call you; if they miss you, their number is [phone].               Stop the Plavix one week before your procedure.           On the day before, and the day of your colonoscopy do not take the triamterene/hctz, or the lisinopril.                      After that, consider seeing your hernia surgeon,Dr Chandler Bowen, for a consult.                                                    Follow-ups after your visit        Additional Services     GASTROENTEROLOGY ADULT REF PROCEDURE ONLY       Last Lab Result: Creatinine (mg/dL)       Date                     Value                 05/18/2016               1.10             ----------  Body mass index is 36.18 kg/(m^2).     Needed:  No  Language:  English    Patient will be contacted to schedule procedure.     Please be aware that coverage of these services is subject to the terms and limitations of your health insurance plan.  Call member services at your health plan with any benefit or coverage questions.  Any procedures must be performed at a [facility] facility OR coordinated by your clinic's referral office.    Please bring the following with you to your appointment:    (1) Any X-Rays, CTs or MRIs which have been performed.  Contact the facility where they were done to arrange for  prior to your scheduled appointment.    (2) List of current medications   (3) This referral request   (4) Any documents/labs given to you for this referral                  Your next 10  appointments already scheduled     Dec 14, 2017  8:00 AM CST   US CAROTID BILATERAL with SHVUS1   [facility] ([facility])    [address]  [place] [address]   [phone]           Please bring a list of your medicines (including vitamins, minerals and over-the-counter drugs). Also, tell your doctor about any allergies you may have. Wear comfortable clothes and leave your valuables at home.  You do not need to do anything special to prepare for your exam.  Please call the Imaging Department at your exam site with any questions.            Dec 14, 2017  9:00 AM CST   Return Visit with Yobani Jones MD   [facility] [place] [facility] [place]    [address]  [place] [id number] [phone]              Who to contact     If you have questions or need follow up information about today's clinic visit or your schedule please contact [facility] directly at [phone].  Normal or non-critical lab and imaging results will be communicated to you by WebCurfewhart, letter or phone within 4 business days after the clinic has received the results. If you do not hear from us within 7 days, please contact the clinic through WebCurfewhart or phone. If you have a critical or abnormal lab result, we will notify you by phone as soon as possible.  Submit refill requests through SensingStrip or call your pharmacy and they will forward the refill request to us. Please allow 3 business days for your refill to be completed.          Additional Information About Your Visit        SensingStrip Information     SensingStrip gives you secure access to your electronic health record. If you see a primary care provider, you can also send messages to your care team and make appointments. If you have questions, please call your primary care clinic.  If you do not have a primary care provider, please call [phone]  and they will assist you.        Care EveryWhere ID     This is your Care EveryWhere ID. This could be used by other organizations to access your [facility] medical records  [id number]        Your Vitals Were     Pulse Temperature Respirations Pulse Oximetry BMI (Body Mass Index)       64 97.8  F (36.6  C) 18 96% 36.18 kg/m2        Blood Pressure from Last 3 Encounters:   12/06/17 120/70   08/16/17 124/70   08/08/17 140/70    Weight from Last 3 Encounters:   12/06/17 231 lb (104.8 kg)   08/16/17 221 lb 14.4 oz (100.7 kg)   07/18/17 229 lb 6.4 oz (104.1 kg)              We Performed the Following     GASTROENTEROLOGY ADULT REF PROCEDURE ONLY        Primary Care Provider Office Phone # Fax #    William Thompson [phone] [address] [facility] [address]        Equal Access to Services     WILLIE AWAN : Hadii aad ku hadasho Soomaali, waaxda luqadaha, qaybta kaalmada adeegyada, waxay idiin hayangien chicho bernard . So [facility] [place] [phone].    ATENCIÓN: Si habla español, tiene a beckett disposición servicios gratuitos de asistencia lingüística. Llame al [phone].    We comply with applicable federal civil rights laws and [place] laws. We do not discriminate on the basis of race, color, national origin, age, disability, sex, sexual orientation, or gender identity.            Thank you!     Thank you for choosing [facility]  for your care. Our goal is always to provide you with excellent care. Hearing back from our patients is one way we can continue to improve our services. Please take a few minutes to complete the written survey that you may receive in the mail after your visit with us. Thank you!             Your Updated Medication List - Protect others around you: Learn how to safely use, store and throw away your medicines at www.disposemymeds.org.          This list is accurate as of: 12/6/17  3:40 PM.  Always use your most recent med list.                    Brand Name Dispense Instructions for use Diagnosis    aspirin 81 MG tablet     30 tablet    Take by mouth daily        atenolol 50 MG tablet    TENORMIN    90 tablet    TAKE ONE TABLET BY MOUTH EVERY DAY    Essential hypertension       atorvastatin 40 MG tablet    LIPITOR    90 tablet    Take 1 tablet (40 mg) by mouth daily    Hypercholesterolemia       calcium carbonate 1250 MG tablet    OS-SREEDHAR 500 mg [place]. Ca     Take 500 mg by mouth daily        clopidogrel 75 MG tablet    PLAVIX    90 tablet    TAKE 1 TABLET BY MOUTH EVERY DAY    Bilateral carotid artery disease (H)       glucosamine-chondroitin 500-400 MG Caps per capsule      Take 1 capsule by mouth daily        lisinopril 10 MG tablet    PRINIVIL/ZESTRIL    90 tablet    TAKE 1 TABLET BY MOUTH DAILY    Essential hypertension       OMEGA-3 FISH OIL PO      Take 1 g by mouth daily        omeprazole 40 MG capsule    priLOSEC     Take 40 mg by mouth daily        triamterene-hydrochlorothiazide 37.5-25 MG per tablet    MAXZIDE-25    90 tablet    TAKE 1 TABLET BY MOUTH EVERY DAY    Essential hypertension       vitamin B complex with vitamin C Tabs tablet      Take 1 tablet by mouth daily        VITAMIN C PO      Take 1,000 mg by mouth daily        VITAMIN D (CHOLECALCIFEROL) PO      Take 5,000 Units by mouth daily

## 2017-12-06 NOTE — PATIENT INSTRUCTIONS
Let's plan a colonoscopy.           They should call you; if they miss you, their number is [phone].               Stop the Plavix one week before your procedure.           On the day before, and the day of your colonoscopy do not take the triamterene/hctz, or the lisinopril.                      After that, consider seeing your hernia surgeon,Dr Chandler Bowen, for a consult.

## 2017-12-11 ENCOUNTER — HOSPITAL ENCOUNTER (OUTPATIENT)
Facility: CLINIC | Age: 82
End: 2017-12-11
Attending: COLON & RECTAL SURGERY | Admitting: COLON & RECTAL SURGERY
Payer: MEDICARE

## 2017-12-14 ENCOUNTER — HOSPITAL ENCOUNTER (OUTPATIENT)
Dept: ULTRASOUND IMAGING | Facility: CLINIC | Age: 82
Discharge: HOME | End: 2017-12-14
Attending: SURGERY | Admitting: SURGERY
Payer: MEDICARE

## 2017-12-14 ENCOUNTER — OFFICE VISIT (OUTPATIENT)
Dept: OTHER | Facility: CLINIC | Age: 82
End: 2017-12-14
Attending: SURGERY
Payer: MEDICARE

## 2017-12-14 VITALS — HEART RATE: 66 BPM | DIASTOLIC BLOOD PRESSURE: 65 MMHG | SYSTOLIC BLOOD PRESSURE: 132 MMHG

## 2017-12-14 DIAGNOSIS — Z48.812 POSTOP CAROTID ENDARTERECTOMY SURVEILLANCE, ENCOUNTER FOR: Primary | ICD-10-CM

## 2017-12-14 DIAGNOSIS — I65.29 CAROTID ARTERY STENOSIS: ICD-10-CM

## 2017-12-14 PROCEDURE — 93880 EXTRACRANIAL BILAT STUDY: CPT

## 2017-12-14 PROCEDURE — 99214 OFFICE O/P EST MOD 30 MIN: CPT | Mod: ZP | Performed by: SURGERY

## 2017-12-14 PROCEDURE — 99211 OFF/OP EST MAY X REQ PHY/QHP: CPT

## 2017-12-14 NOTE — MR AVS SNAPSHOT
After Visit Summary   12/14/2017    Ivan Payton    MRN: [id number]           Patient Information     Date Of Birth          1935        Visit Information        Provider Department      12/14/2017 9:00 AM Yobani Jones MD [facility] [place] [facility] [place]      Today's Diagnoses     Postop carotid endarterectomy surveillance, encounter for    -  1       Follow-ups after your visit        Follow-up notes from your care team     Return in about 1 year (around 12/14/2018).      Your next 10 appointments already scheduled     Jan 02, 2018   Procedure with William Pretty MD   [facility] ([facility])    [address]  [place] [address] [phone]           [facility] is located at [address] [place]              Who to contact     If you have questions or need follow up information about today's clinic visit or your schedule please contact [facility] directly at [phone].  Normal or non-critical lab and imaging results will be communicated to you by MyChart, letter or phone within 4 business days after the clinic has received the results. If you do not hear from us within 7 days, please contact the clinic through mo9 (moKredit)hart or phone. If you have a critical or abnormal lab result, we will notify you by phone as soon as possible.  Submit refill requests through Sliced Apples or call your pharmacy and they will forward the refill request to us. Please allow 3 business days for your refill to be completed.          Additional Information About Your Visit        mo9 (moKredit)hart Information     Sliced Apples gives you secure access to your electronic health record. If you see a primary care provider, you can also send messages to your care team and make appointments. If you have questions, please call your primary care clinic.  If you do not have a primary care provider, please  call [phone] and they will assist you.        Care EveryWhere ID     This is your Care EveryWhere ID. This could be used by other organizations to access your [facility] medical records  [id number]        Your Vitals Were     Pulse                   66            Blood Pressure from Last 3 Encounters:   12/14/17 132/65   12/06/17 120/70   08/16/17 124/70    Weight from Last 3 Encounters:   12/06/17 231 lb (104.8 kg)   08/16/17 221 lb 14.4 oz (100.7 kg)   07/18/17 229 lb 6.4 oz (104.1 kg)              Today, you had the following     No orders found for display       Primary Care Provider Office Phone # Fax #    William Thompson [phone] [address]  [facility] [address]        Equal Access to Services     [facility]: Hadii aad ku hadasho Soomaali, waaxda luqadaha, qaybta kaalmada adeegyada, waxay cecein hayrico bernard . So [facility] [phone].    ATENCIÓN: Si habla español, tiene a beckett disposición servicios gratuitos de asistencia lingüística. Llame al [phone].    We comply with applicable federal civil rights laws and [place] laws. We do not discriminate on the basis of race, color, national origin, age, disability, sex, sexual orientation, or gender identity.            Thank you!     Thank you for choosing [facility] [place]  for your care. Our goal is always to provide you with excellent care. Hearing back from our patients is one way we can continue to improve our services. Please take a few minutes to complete the written survey that you may receive in the mail after your visit with us. Thank you!             Your Updated Medication List - Protect others around you: Learn how to safely use, store and throw away your medicines at www.disposemymeds.org.          This list is accurate as of: 12/14/17  9:17 AM.  Always use your most recent med list.                   Brand Name Dispense Instructions for use Diagnosis    aspirin 81 MG  tablet     30 tablet    Take by mouth daily        atenolol 50 MG tablet    TENORMIN    90 tablet    TAKE ONE TABLET BY MOUTH EVERY DAY    Essential hypertension       atorvastatin 40 MG tablet    LIPITOR    90 tablet    Take 1 tablet (40 mg) by mouth daily    Hypercholesterolemia       calcium carbonate 1250 MG tablet    OS-SREEDHAR 500 mg [place]. Ca     Take 500 mg by mouth daily        clopidogrel 75 MG tablet    PLAVIX    90 tablet    TAKE 1 TABLET BY MOUTH EVERY DAY    Bilateral carotid artery disease (H)       glucosamine-chondroitin 500-400 MG Caps per capsule      Take 1 capsule by mouth daily        lisinopril 10 MG tablet    PRINIVIL/ZESTRIL    90 tablet    TAKE 1 TABLET BY MOUTH DAILY    Essential hypertension       OMEGA-3 FISH OIL PO      Take 1 g by mouth daily        omeprazole 40 MG capsule    priLOSEC     Take 40 mg by mouth daily        triamterene-hydrochlorothiazide 37.5-25 MG per tablet    MAXZIDE-25    90 tablet    TAKE 1 TABLET BY MOUTH EVERY DAY    Essential hypertension       vitamin B complex with vitamin C Tabs tablet      Take 1 tablet by mouth daily        VITAMIN C PO      Take 1,000 mg by mouth daily        VITAMIN D (CHOLECALCIFEROL) PO      Take 5,000 Units by mouth daily

## 2017-12-14 NOTE — PROGRESS NOTES
[place] [facility]    Ivan Payton his wife return to see him in the office today.  He did undergone a left carotid enterectomy on 3/27/2014 for high-grade stenosis.  Distal ICA was very tortuous with noted somewhat elevated velocities with no narrowing post procedure.  He is noted to have a mild stenosis of the right carotid which is asymptomatic.    History of coronary disease and has undergone stenting periods on chronic Plavix due to this.    PMH: Medications: Aspirin, Plavix, Lipitor, Tenormin, lisinopril, Prilosec            Medical: Coronary disease with prior stenting and no active symptoms                           Hypertension                           Hypercholesterolemia with most recent LDL=64 on Lipitor                           No history PAD                           GERD         Cardiac nuclear med study in August 2017 reveals ejection fraction= 63%  Moderate sized partially reversible inferior /lateral defect system with prior myocardial infarction     Non Smoker.  Lives independently with his wife.    Exam: Alert and appropriate.  Normal affect.  Blood pressure 132/65  pulse 66              Well healed left carotid incision with no mandibular numbness.  No bruits.              Chest= clear   Cardiovascular =RR grade 1/6 murmur              Abdomen= obese, nontender, prior inguinal hernia repair with no                                               Recurrence               Extremities= chronic bilateral ankle edema with normal skin, normal                                                 sensation.                           +3 palpable right posterior tibial pulse left pulses difficult to palpate                      +3 bi/ triphasic Doppler to both posterior tibial arteries.      Duplex reveals a widely patent left CEA with tortuosity.  PSV= 158 cm/s  All the increase in the right ICA stenosis with ICA PSV= 166 cm/s  (increased from 127 cm/s from last year):        Impression: #1    Widely patent left CEA with mild increased right carotid stenosis.  Well-controlled risk factors including LDL and non-smoking.  Repeat duplex 1 year.                          #2  No evidence of PAD with excellent Doppler signals to both posterior tibial arteries and no symptoms.  Follow clinically.        Yobani Jones MD     Please route or send letter to:  Primary Care Provider (PCP)

## 2017-12-14 NOTE — LETTER
[facility] [address]  [place] [address]  Phone: [phone]  Fax: [phone]    2017    Re: Ivan Payton, : 1935    Ivan Payton his wife return to see him in the office today.  He did undergone a left carotid enterectomy on 3/27/2014 for high-grade stenosis.  Distal ICA was very tortuous with noted somewhat elevated velocities with no narrowing post procedure.  He is noted to have a mild stenosis of the right carotid which is asymptomatic.     History of coronary disease and has undergone stenting periods on chronic Plavix due to this.     PMH: Medications: Aspirin, Plavix, Lipitor, Tenormin, lisinopril, Prilosec            Medical: Coronary disease with prior stenting and no active symptoms                           Hypertension                           Hypercholesterolemia with most recent LDL=64 on Lipitor                           No history PAD                           GERD     Cardiac nuclear med study in 2017 reveals ejection fraction= 63%  Moderate sized partially reversible inferior /lateral defect system with prior myocardial infarction      Non Smoker.  Lives independently with his wife.     Exam: Alert and appropriate.  Normal affect.  Blood pressure 132/65  pulse 66              Well healed left carotid incision with no mandibular numbness.  No bruits.              Chest= clear   Cardiovascular =RR grade 1/6 murmur              Abdomen= obese, nontender, prior inguinal hernia repair with no recurrence               Extremities= chronic bilateral ankle edema with normal skin, normal sensation.                           +3 palpable right posterior tibial pulse left pulses difficult to palpate                      +3 bi/ triphasic Doppler to both posterior tibial arteries.     Duplex reveals a widely patent left CEA with tortuosity.  PSV= 158 cm/s  All the increase in the right ICA stenosis with ICA PSV= 166 cm/s   (increased from 127 cm/s from last year):     Impression: #1   Widely patent left CEA with mild increased right carotid stenosis. Well-controlled risk factors including LDL and non-smoking.  Repeat duplex 1 year.                         #2  No evidence of PAD with excellent Doppler signals to both posterior tibial arteries and no symptoms.  Follow clinically.     Yobani Jones MD

## 2017-12-14 NOTE — NURSING NOTE
"Chief Complaint   Patient presents with     RECHECK     Bilateral carotid (VHC 8:00; WRO 9:00 SHSURG) *History of left carotid endarterectomy; 1 year follow up to 12/20/16 appointment with        Initial /65 (BP Location: Left arm, Patient Position: Chair, Cuff Size: Adult Large)  Pulse 66 Estimated body mass index is 36.18 kg/(m^2) as calculated from the following:    Height as of 8/16/17: 5' 7\" (1.702 m).    Weight as of 12/6/17: 231 lb (104.8 kg).  Medication Reconciliation: complete     Face to face nursing time: 8 minutes    Lyudmila Mena MA     "

## 2017-12-15 ENCOUNTER — TELEPHONE (OUTPATIENT)
Dept: FAMILY MEDICINE | Facility: CLINIC | Age: 82
End: 2017-12-15

## 2017-12-15 NOTE — TELEPHONE ENCOUNTER
Does she want a FIT test, or a Cologard test (which is not available through )?       Please get more information regarding this issue.

## 2017-12-15 NOTE — TELEPHONE ENCOUNTER
Please see  note below and advice.     Patient's wife would like to discuss her  getting a fecal test instead of a colonoscopy because her colon Dr feels because of his age they should do something less invasive.

## 2017-12-19 ENCOUNTER — TELEPHONE (OUTPATIENT)
Dept: FAMILY MEDICINE | Facility: CLINIC | Age: 82
End: 2017-12-19

## 2017-12-19 DIAGNOSIS — Z12.11 SPECIAL SCREENING FOR MALIGNANT NEOPLASMS, COLON: Primary | ICD-10-CM

## 2017-12-19 NOTE — TELEPHONE ENCOUNTER
Reason for Call:  Other call back  Detailed comments: please call patient spouse Margo  Phone Number Patient can be reached at: Home number on file [phone] (home)  Best Time: any  Can we leave a detailed message on this number? YES  Call taken on 12/19/2017 at 10:07 AM by LEANDRO GREGG

## 2017-12-19 NOTE — TELEPHONE ENCOUNTER
Margo stated that they would like a less invasive test for Ivan like the fit test instead of the colonoscopy. Would you be willing to order that.

## 2017-12-21 NOTE — TELEPHONE ENCOUNTER
Voicemail not set up on requested number  Left message at home number.     FIT test futured per Dr Jay Cruz RN- Triage [employer]

## 2017-12-22 ENCOUNTER — TELEPHONE (OUTPATIENT)
Dept: FAMILY MEDICINE | Facility: CLINIC | Age: 82
End: 2017-12-22

## 2017-12-22 NOTE — TELEPHONE ENCOUNTER
Patient's wife madhavi called back regarding the FIT test encounter on 12/15/17.     It has been taken care of.    Closing encounter

## 2017-12-22 NOTE — TELEPHONE ENCOUNTER
Call attempted x 3 pt's voice mail is not set up yet. Unable to leave a message. Encounter will be closed.

## 2017-12-28 PROCEDURE — G0328 FECAL BLOOD SCRN IMMUNOASSAY: HCPCS | Performed by: INTERNAL MEDICINE

## 2017-12-29 DIAGNOSIS — Z12.11 SPECIAL SCREENING FOR MALIGNANT NEOPLASMS, COLON: ICD-10-CM

## 2017-12-29 LAB — HEMOCCULT STL QL IA: NEGATIVE

## 2018-01-03 ENCOUNTER — OFFICE VISIT (OUTPATIENT)
Dept: SURGERY | Facility: CLINIC | Age: 83
End: 2018-01-03
Payer: COMMERCIAL

## 2018-01-03 VITALS
HEART RATE: 78 BPM | SYSTOLIC BLOOD PRESSURE: 145 MMHG | HEIGHT: 67 IN | DIASTOLIC BLOOD PRESSURE: 78 MMHG | BODY MASS INDEX: 35.31 KG/M2 | WEIGHT: 225 LBS

## 2018-01-03 DIAGNOSIS — K44.9 HIATAL HERNIA: ICD-10-CM

## 2018-01-03 DIAGNOSIS — R10.31 RIGHT GROIN PAIN: Primary | ICD-10-CM

## 2018-01-03 PROCEDURE — 99214 OFFICE O/P EST MOD 30 MIN: CPT | Performed by: SURGERY

## 2018-01-03 ASSESSMENT — ENCOUNTER SYMPTOMS
JAUNDICE: 0
VOMITING: 0
WEIGHT LOSS: 0
TROUBLE SWALLOWING: 0
APPETITE CHANGE: 0
ABDOMINAL PAIN: 0
DIARRHEA: 0
HEADACHES: 0
CHANGE IN BOWEL HABIT: 0

## 2018-01-03 NOTE — MR AVS SNAPSHOT
"              After Visit Summary   1/3/2018    Ivan Payton    MRN: [id number]           Patient Information     Date Of Birth          1935        Visit Information        Provider Department      1/3/2018 9:30 AM Chandler Bowen MD [facility]       Follow-ups after your visit        Who to contact     If you have questions or need follow up information about today's clinic visit or your schedule please contact [facility] directly at [phone].  Normal or non-critical lab and imaging results will be communicated to you by DesignHub[employer], letter or phone within 4 business days after the clinic has received the results. If you do not hear from us within 7 days, please contact the clinic through VoAPPs or phone. If you have a critical or abnormal lab result, we will notify you by phone as soon as possible.  Submit refill requests through VoAPPs or call your pharmacy and they will forward the refill request to us. Please allow 3 business days for your refill to be completed.          Additional Information About Your Visit        MyChart Information     Vo[employer] gives you secure access to your electronic health record. If you see a primary care provider, you can also send messages to your care team and make appointments. If you have questions, please call your primary care clinic.  If you do not have a primary care provider, please call [phone] and they will assist you.        Care EveryWhere ID     This is your Care EveryWhere ID. This could be used by other organizations to access your [facility] medical records  [id number]        Your Vitals Were     Pulse Height BMI (Body Mass Index)             78 5' 7\" (1.702 m) 35.24 kg/m2          Blood Pressure from Last 3 Encounters:   01/03/18 145/78   12/14/17 132/65   12/06/17 120/70    Weight from Last 3 Encounters:   01/03/18 225 lb (102.1 kg)   12/06/17 231 lb (104.8 kg) "   08/16/17 221 lb 14.4 oz (100.7 kg)              Today, you had the following     No orders found for display       Primary Care Provider Office Phone # Fax #    William Thompson [phone] [address]  [facility] [address]        Equal Access to Services     [facility]: Hadii aad ku hadasho Soomaali, waaxda luqadaha, qaybta kaalmada adeegyada, waxay idiin hayaan adeeg kharash laBreannaaan . So [facility] [phone].    ATENCIÓN: Si habla español, tiene a beckett disposición servicios gratuitos de asistencia lingüística. [facility] [phone].    We comply with applicable federal civil rights laws and [place] laws. We do not discriminate on the basis of race, color, national origin, age, disability, sex, sexual orientation, or gender identity.            Thank you!     Thank you for choosing [facility]  for your care. Our goal is always to provide you with excellent care. Hearing back from our patients is one way we can continue to improve our services. Please take a few minutes to complete the written survey that you may receive in the mail after your visit with us. Thank you!             Your Updated Medication List - Protect others around you: Learn how to safely use, store and throw away your medicines at www.disposemymeds.org.          This list is accurate as of: 1/3/18 10:05 AM.  Always use your most recent med list.                   Brand Name Dispense Instructions for use Diagnosis    aspirin 81 MG tablet     30 tablet    Take by mouth daily        atenolol 50 MG tablet    TENORMIN    90 tablet    TAKE ONE TABLET BY MOUTH EVERY DAY    Essential hypertension       atorvastatin 40 MG tablet    LIPITOR    90 tablet    Take 1 tablet (40 mg) by mouth daily    Hypercholesterolemia       calcium carbonate 1250 MG tablet    OS-SREEDHAR 500 mg [place]     Take 500 mg by mouth daily        clopidogrel 75 MG tablet    PLAVIX    90 tablet    TAKE 1 TABLET BY MOUTH EVERY DAY     Bilateral carotid artery disease (H)       glucosamine-chondroitin 500-400 MG Caps per capsule      Take 1 capsule by mouth daily        lisinopril 10 MG tablet    PRINIVIL/ZESTRIL    90 tablet    TAKE 1 TABLET BY MOUTH DAILY    Essential hypertension       OMEGA-3 FISH OIL PO      Take 1 g by mouth daily        omeprazole 40 MG capsule    priLOSEC     Take 40 mg by mouth daily        triamterene-hydrochlorothiazide 37.5-25 MG per tablet    MAXZIDE-25    90 tablet    TAKE 1 TABLET BY MOUTH EVERY DAY    Essential hypertension       vitamin B complex with vitamin C Tabs tablet      Take 1 tablet by mouth daily        VITAMIN C PO      Take 1,000 mg by mouth daily        VITAMIN D (CHOLECALCIFEROL) PO      Take 5,000 Units by mouth daily

## 2018-01-03 NOTE — LETTER
Forrest 3, 2018    Re: Ivan Payton, : 1935    We are asked by Dr. Thompson to see Mr. Payton in consultation concerning a hiatal hernia and right groin uncomfortableness.   HPI the patient had a right groin hernia repair in the past.  He had a right hydrocele removed in . In 2016 he had a right hydrocelectomy and orchiectomy.  In 2016 he had a laparoscopic recurrent bilateral inguinal hernia repair.  Since that time he has noticed a firmness and occasional uncomfortable feeling in his right groin.  He has not seen a bulge.  The pain never awakens him.  He says this is not really painful but is just uncomfortable particularly with standing.  There is no change in this uncomfortable feeling with urination, eating, time of day.  The uncomfortable feeling does not radiate.  His second problem involves the knowledge that he has a hiatal hernia.  This is been documented by gastroenterologists and was seen on an upper GI two years ago.  He has pain in his left chest with coughing a couple of times a year.  He has no vomiting, weight loss, or trouble eating.  He has occasional heartburn that is controlled with every other day ranitidine.  Once or twice a year he gets food stuck if he does not chew it well.     ROS (Review of Systems):  Negative for weight loss, appetite change, oral steroids taken and hepatitis.   HENT: Negative for difficult swallowing.    GASTROINTESTINAL: Negative for vomiting, abdominal pain, diarrhea, change in bowel habit and jaundice.  All other systems reviewed and are negative     Physical Exam   Nursing note and vitals reviewed.  Constitutional: He appears well-developed and well-nourished. No distress.   Eyes: Conjunctivae are normal. Right eye exhibits no discharge. Left eye exhibits no discharge. No scleral icterus.   HENT:   Head: Normocephalic and atraumatic.   Nose: Nose normal.   Neck: Normal range of motion. No tracheal deviation present. No thyromegaly  present.  Effort normal.   Pulmonary/Chest: No respiratory distress.   Pulmonary/Chest: He exhibits no tenderness.   Abdominal: Soft. He exhibits no distension and no mass. There is no tenderness. There is no rebound. No hernia. Hernia confirmed negative in the umbilical area, confirmed negative in the ventral area, confirmed negative in the right inguinal area, confirmed negative in the left inguinal area and confirmed negative in the  femoral hernia.   Right testicle absent   Lymphadenopathy:   He has no cervical adenopathy. No inguinal adenopathy noted on the right or left side.   Neurological: He is alert and oriented to person, place, and time.   Skin: He is not diaphoretic.   Impression: 1)  intermittent mild uncomfortable feeling in the right groin after multiple operations.  He does not have a hernia.  There is no sign of infection or mass.   I do not think we can improve this much with any significant intervention.   I encouraged him to consider continuing his exercise.  If this really troubles him on a day he could use an anti-inflammatory or ice.  It does not awaken him and is really not limiting his activity.  I would leave this alone since I think this is an situation resulting from multiple operations in the right groin.  2) known hiatal hernia.  Really minimally symptomatic.  I would also advise against intervention for this unless she becomes more symptomatic.  He does not have bleeding, vomiting, weight loss.  He is being managed now with minimal medication use and I would leave this alone also.     The patient was a bit disappointed that I could not fix  Both of these problems with an easy operation.  I explained clearly to the patient and his wife that an operative intervention may or may not be helpful to him and carries a significant risk.  He appears to understand this.    Chandler Bowen MD

## 2018-01-03 NOTE — PROGRESS NOTES
We are asked by Dr. Thompson to see Mr. Payton in consultation concerning a hiatal hernia and right groin uncomfortableness.   HPI the patient had a right groin hernia repair in the past.  He had a right hydrocele removed in 2012.  In January 2016 he had a right hydrocelectomy and orchiectomy.  In February 2016 he had a laparoscopic recurrent bilateral inguinal hernia repair.  Since that time he has noticed a firmness and occasional uncomfortable feeling in his right groin.  He has not seen a bulge.  The pain never awakens him.  He says this is not really painful but is just uncomfortable particularly with standing.  There is no change in this uncomfortable feeling with urination, eating, time of day.  The uncomfortable feeling does not radiate.  His second problem involves the knowledge that he has a hiatal hernia.  This is been documented by gastroenterologists and was seen on an upper GI two years ago.  He has pain in his left chest with coughing a couple of times a year.  He has no vomiting, weight loss, or trouble eating.  He has occasional heartburn that is controlled with every other day ranitidine.  Once or twice a year he gets food stuck if he does not chew it well.      ROS (Review of Systems):    Negative for weight loss, appetite change, oral steroids taken and hepatitis.   HENT: Negative for difficult swallowing.    GASTROINTESTINAL: Negative for vomiting, abdominal pain, diarrhea, change in bowel habit and jaundice.  All other systems reviewed and are negative        Physical Exam   Nursing note and vitals reviewed.  Constitutional: He appears well-developed and well-nourished. No distress.   Eyes: Conjunctivae are normal. Right eye exhibits no discharge. Left eye exhibits no discharge. No scleral icterus.   HENT:   Head: Normocephalic and atraumatic.   Nose: Nose normal.   Neck: Normal range of motion. No tracheal deviation present. No thyromegaly present.  Effort normal.   Pulmonary/Chest: No  respiratory distress.   Pulmonary/Chest: He exhibits no tenderness.   Abdominal: Soft. He exhibits no distension and no mass. There is no tenderness. There is no rebound. No hernia. Hernia confirmed negative in the umbilical area, confirmed negative in the ventral area, confirmed negative in the right inguinal area, confirmed negative in the left inguinal area and confirmed negative in the  femoral hernia.   Right testicle absent   Lymphadenopathy:     He has no cervical adenopathy. No inguinal adenopathy noted on the right or left side.   Neurological: He is alert and oriented to person, place, and time.   Skin: He is not diaphoretic.      Impression: 1)  intermittent mild uncomfortable feeling in the right groin after multiple operations.  He does not have a hernia.  There is no sign of infection or mass.   I do not think we can improve this much with any significant intervention.   I encouraged him to consider continuing his exercise.  If this really troubles him on a day he could use an anti-inflammatory or ice.  It does not awaken him and is really not limiting his activity.  I would leave this alone since I think this is an situation resulting from multiple operations in the right groin.  2) known hiatal hernia.  Really minimally symptomatic.  I would also advise against intervention for this unless she becomes more symptomatic.  He does not have bleeding, vomiting, weight loss.  He is being managed now with minimal medication use and I would leave this alone also.    The patient was a bit disappointed that I could not fix  Both of these problems with an easy operation.  I explained clearly to the patient and his wife that an operative intervention may or may not be helpful to him and carries a significant risk.  He appears to understand this.    Copy to PCP

## 2018-01-29 DIAGNOSIS — I10 ESSENTIAL HYPERTENSION: ICD-10-CM

## 2018-01-29 RX ORDER — TRIAMTERENE/HYDROCHLOROTHIAZID 37.5-25 MG
1 TABLET ORAL DAILY
Qty: 30 TABLET | Refills: 0 | Status: SHIPPED | OUTPATIENT
Start: 2018-01-29 | End: 2018-02-13

## 2018-01-29 RX ORDER — LISINOPRIL 10 MG/1
10 TABLET ORAL DAILY
Qty: 30 TABLET | Refills: 0 | Status: SHIPPED | OUTPATIENT
Start: 2018-01-29 | End: 2018-02-13

## 2018-01-29 NOTE — TELEPHONE ENCOUNTER
Reason for Call:  Medication or medication refill:    Do you use a [facility]?  Name of the pharmacy and phone number for the current request:  [facility] ([facility]) [id number] - [phone]    Name of the medication requested: triamterene-hydrochlorothiazide (MAXZIDE-25) 37.5-25 MG per tablet and lisinopril (PRINIVIL/ZESTRIL) 10 MG tablet    Other request: Patient had to switch pharmacies and he will be out by 1/31/2018    Can we leave a detailed message on this number? YES    Phone number patient can be reached at: Home number on file [phone] (home)    Best Time:     Call taken on 1/29/2018 at 10:38 AM by DAKOTA GONZALES

## 2018-01-29 NOTE — TELEPHONE ENCOUNTER
"Medication is being filled for 1 time refill only due to:  Patient needs labs basic metabolic panel and BP check.     Requested Prescriptions   Pending Prescriptions Disp Refills     triamterene-hydrochlorothiazide (MAXZIDE-25) 37.5-25 MG per tablet 90 tablet 1     Sig: Take 1 tablet by mouth daily    Diuretics (Including Combos) Protocol Failed    1/29/2018 10:40 AM       Failed - Blood pressure under 140/90    BP Readings from Last 3 Encounters:   01/03/18 145/78   12/14/17 132/65   12/06/17 120/70                Failed - Normal serum creatinine on file in past 12 months    Recent Labs   Lab Test  05/18/16   1058   CR  1.10             Failed - Normal serum potassium on file in past 12 months    Recent Labs   Lab Test  05/18/16   1058   POTASSIUM  4.1                   Failed - Normal serum sodium on file in past 12 months    Recent Labs   Lab Test  05/18/16   1058   NA  141             Passed - Recent or future visit with authorizing provider's specialty    Patient had office visit in the last year or has a visit in the next 30 days with authorizing provider.  See \"Patient Info\" tab in inbasket, or \"Choose Columns\" in Meds & Orders section of the refill encounter.            Passed - Patient is age 18 or older        lisinopril (PRINIVIL/ZESTRIL) 10 MG tablet 90 tablet 3     Sig: Take 1 tablet (10 mg) by mouth daily    ACE Inhibitors (Including Combos) Protocol Failed    1/29/2018 10:40 AM       Failed - Blood pressure under 140/90    BP Readings from Last 3 Encounters:   01/03/18 145/78   12/14/17 132/65   12/06/17 120/70                Failed - Normal serum creatinine on file in past 12 months    Recent Labs   Lab Test  05/18/16   1058   CR  1.10            Failed - Normal serum potassium on file in past 12 months    Recent Labs   Lab Test  05/18/16   1058   POTASSIUM  4.1            Passed - Recent or future visit with authorizing provider's specialty    Patient had office visit in the last year or has a visit " "in the next 30 days with authorizing provider.  See \"Patient Info\" tab in inbasket, or \"Choose Columns\" in Meds & Orders section of the refill encounter.            Passed - Patient is age 18 or older          "

## 2018-02-13 ENCOUNTER — OFFICE VISIT (OUTPATIENT)
Dept: FAMILY MEDICINE | Facility: CLINIC | Age: 83
End: 2018-02-13
Payer: COMMERCIAL

## 2018-02-13 VITALS
HEIGHT: 67 IN | BODY MASS INDEX: 35.79 KG/M2 | TEMPERATURE: 97.8 F | WEIGHT: 228 LBS | RESPIRATION RATE: 22 BRPM | OXYGEN SATURATION: 97 % | SYSTOLIC BLOOD PRESSURE: 126 MMHG | HEART RATE: 74 BPM | DIASTOLIC BLOOD PRESSURE: 72 MMHG

## 2018-02-13 DIAGNOSIS — R73.01 IFG (IMPAIRED FASTING GLUCOSE): ICD-10-CM

## 2018-02-13 DIAGNOSIS — I10 ESSENTIAL HYPERTENSION: Primary | ICD-10-CM

## 2018-02-13 DIAGNOSIS — K22.2 ESOPHAGEAL STRICTURE: ICD-10-CM

## 2018-02-13 LAB
ALBUMIN SERPL-MCNC: 3.3 G/DL (ref 3.4–5)
ALP SERPL-CCNC: 48 U/L (ref 40–150)
ALT SERPL W P-5'-P-CCNC: 23 U/L (ref 0–70)
ANION GAP SERPL CALCULATED.3IONS-SCNC: 4 MMOL/L (ref 3–14)
AST SERPL W P-5'-P-CCNC: 12 U/L (ref 0–45)
BASOPHILS # BLD AUTO: 0 10E9/L (ref 0–0.2)
BASOPHILS NFR BLD AUTO: 0.7 %
BILIRUB SERPL-MCNC: 0.4 MG/DL (ref 0.2–1.3)
BUN SERPL-MCNC: 23 MG/DL (ref 7–30)
CALCIUM SERPL-MCNC: 8.7 MG/DL (ref 8.5–10.1)
CHLORIDE SERPL-SCNC: 107 MMOL/L (ref 94–109)
CO2 SERPL-SCNC: 29 MMOL/L (ref 20–32)
CREAT SERPL-MCNC: 0.85 MG/DL (ref 0.66–1.25)
DIFFERENTIAL METHOD BLD: NORMAL
EOSINOPHIL # BLD AUTO: 0.2 10E9/L (ref 0–0.7)
EOSINOPHIL NFR BLD AUTO: 3.5 %
ERYTHROCYTE [DISTWIDTH] IN BLOOD BY AUTOMATED COUNT: 14.4 % (ref 10–15)
GFR SERPL CREATININE-BSD FRML MDRD: 87 ML/MIN/1.7M2
GLUCOSE SERPL-MCNC: 145 MG/DL (ref 70–99)
HBA1C MFR BLD: 6.3 % (ref 4.3–6)
HCT VFR BLD AUTO: 45.8 % (ref 40–53)
HGB BLD-MCNC: 15.5 G/DL (ref 13.3–17.7)
LYMPHOCYTES # BLD AUTO: 1.1 10E9/L (ref 0.8–5.3)
LYMPHOCYTES NFR BLD AUTO: 17.9 %
MCH RBC QN AUTO: 31.4 PG (ref 26.5–33)
MCHC RBC AUTO-ENTMCNC: 33.8 G/DL (ref 31.5–36.5)
MCV RBC AUTO: 93 FL (ref 78–100)
MONOCYTES # BLD AUTO: 0.8 10E9/L (ref 0–1.3)
MONOCYTES NFR BLD AUTO: 13.6 %
NEUTROPHILS # BLD AUTO: 3.9 10E9/L (ref 1.6–8.3)
NEUTROPHILS NFR BLD AUTO: 64.3 %
PLATELET # BLD AUTO: 198 10E9/L (ref 150–450)
POTASSIUM SERPL-SCNC: 4.3 MMOL/L (ref 3.4–5.3)
PROT SERPL-MCNC: 6.1 G/DL (ref 6.8–8.8)
RBC # BLD AUTO: 4.94 10E12/L (ref 4.4–5.9)
SODIUM SERPL-SCNC: 140 MMOL/L (ref 133–144)
VIT B12 SERPL-MCNC: 431 PG/ML (ref 193–986)
WBC # BLD AUTO: 6.1 10E9/L (ref 4–11)

## 2018-02-13 PROCEDURE — 80053 COMPREHEN METABOLIC PANEL: CPT | Performed by: INTERNAL MEDICINE

## 2018-02-13 PROCEDURE — 36415 COLL VENOUS BLD VENIPUNCTURE: CPT | Performed by: INTERNAL MEDICINE

## 2018-02-13 PROCEDURE — 82607 VITAMIN B-12: CPT | Performed by: INTERNAL MEDICINE

## 2018-02-13 PROCEDURE — 85025 COMPLETE CBC W/AUTO DIFF WBC: CPT | Performed by: INTERNAL MEDICINE

## 2018-02-13 PROCEDURE — 83036 HEMOGLOBIN GLYCOSYLATED A1C: CPT | Performed by: INTERNAL MEDICINE

## 2018-02-13 PROCEDURE — 99213 OFFICE O/P EST LOW 20 MIN: CPT | Performed by: INTERNAL MEDICINE

## 2018-02-13 RX ORDER — OMEPRAZOLE 40 MG/1
40 CAPSULE, DELAYED RELEASE ORAL DAILY
Qty: 30 CAPSULE | Status: CANCELLED | OUTPATIENT
Start: 2018-02-13

## 2018-02-13 RX ORDER — CLOPIDOGREL BISULFATE 75 MG/1
75 TABLET ORAL DAILY
Qty: 90 TABLET | Refills: 3 | Status: SHIPPED | OUTPATIENT
Start: 2018-02-13 | End: 2019-02-20

## 2018-02-13 RX ORDER — ATORVASTATIN CALCIUM 40 MG/1
20 TABLET, FILM COATED ORAL DAILY
Qty: 45 TABLET | Refills: 3 | COMMUNITY
Start: 2018-02-13 | End: 2018-10-09

## 2018-02-13 RX ORDER — TRIAMTERENE/HYDROCHLOROTHIAZID 37.5-25 MG
1 TABLET ORAL DAILY
Qty: 90 TABLET | Refills: 3 | Status: SHIPPED | OUTPATIENT
Start: 2018-02-13 | End: 2019-03-11

## 2018-02-13 RX ORDER — ATENOLOL 50 MG/1
50 TABLET ORAL DAILY
Qty: 90 TABLET | Refills: 3 | Status: SHIPPED | OUTPATIENT
Start: 2018-02-13 | End: 2018-05-26

## 2018-02-13 RX ORDER — LISINOPRIL 10 MG/1
10 TABLET ORAL DAILY
Qty: 90 TABLET | Refills: 3 | Status: SHIPPED | OUTPATIENT
Start: 2018-02-13 | End: 2019-02-20

## 2018-02-13 NOTE — LETTER
February 13, 2018      Ivan Payton  [address] [place] [address]        Dear ,    We are writing to inform you of your test results.          Most of your lab results are good,including the kidney,liver,bone marrow,and potassium results.                                                                                                                                                  The hemoglobin A1C is a test that gives us your average blood sugar over the last 3 months.           This test gives us good information, and it does not need to be done in a fasting condition.   This result was elevated.        It correlates to an average blood sugar of 129, which is in the prediabetic range.             Please work harder on restricting carbohydrates ( starches, sweets, etc).    Resulted Orders   Comprehensive metabolic panel (BMP + Alb, Alk Phos, ALT, AST, Total. Bili, TP)   Result Value Ref Range    Sodium 140 133 - 144 mmol/L    Potassium 4.3 3.4 - 5.3 mmol/L    Chloride 107 94 - 109 mmol/L    Carbon Dioxide 29 20 - 32 mmol/L    Anion Gap 4 3 - 14 mmol/L    Glucose 145 (H) 70 - 99 mg/dL      Comment:      Non Fasting    Urea Nitrogen 23 7 - 30 mg/dL    Creatinine 0.85 0.66 - 1.25 mg/dL    GFR Estimate 87 >60 mL/min/1.7m2      Comment:      Non  GFR Calc    GFR Estimate If Black >90 >60 mL/min/1.7m2      Comment:       GFR Calc    Calcium 8.7 8.5 - 10.1 mg/dL    Bilirubin Total 0.4 0.2 - 1.3 mg/dL    Albumin 3.3 (L) 3.4 - 5.0 g/dL    Protein Total 6.1 (L) 6.8 - 8.8 g/dL    Alkaline Phosphatase 48 40 - 150 U/L    ALT 23 0 - 70 U/L    AST 12 0 - 45 U/L   CBC with platelets and differential   Result Value Ref Range    WBC 6.1 4.0 - 11.0 10e9/L    RBC Count 4.94 4.4 - 5.9 10e12/L    Hemoglobin 15.5 13.3 - 17.7 g/dL    Hematocrit 45.8 40.0 - 53.0 %    MCV 93 78 - 100 fl    MCH 31.4 26.5 - 33.0 pg    MCHC 33.8 31.5 - 36.5 g/dL    RDW 14.4 10.0 - 15.0 %    Platelet Count  198 150 - 450 10e9/L    Diff Method Automated Method     % Neutrophils 64.3 %    % Lymphocytes 17.9 %    % Monocytes 13.6 %    % Eosinophils 3.5 %    % Basophils 0.7 %    Absolute Neutrophil 3.9 1.6 - 8.3 10e9/L    Absolute Lymphocytes 1.1 0.8 - 5.3 10e9/L    Absolute Monocytes 0.8 0.0 - 1.3 10e9/L    Absolute Eosinophils 0.2 0.0 - 0.7 10e9/L    Absolute Basophils 0.0 0.0 - 0.2 10e9/L   Hemoglobin A1c   Result Value Ref Range    Hemoglobin A1C 6.3 (H) 4.3 - 6.0 %   Vitamin B12   Result Value Ref Range    Vitamin B12 431 193 - 986 pg/mL       If you have any questions or concerns, please call the clinic at the number listed above.       Sincerely,        William Thompson MD

## 2018-02-13 NOTE — MR AVS SNAPSHOT
After Visit Summary   2/13/2018    Ivan Payton    MRN: [id number]           Patient Information     Date Of Birth          1935        Visit Information        Provider Department      2/13/2018 10:30 AM William Thompson MD [facility]        Today's Diagnoses     Essential hypertension    -  1    IFG (impaired fasting glucose)        Esophageal stricture          Care Instructions    I will let you know your lab results.   You are planning to take the ranitidine every day.           Follow-ups after your visit        Who to contact     If you have questions or need follow up information about today's clinic visit or your schedule please contact [facility] directly at [phone].  Normal or non-critical lab and imaging results will be communicated to you by MyChart, letter or phone within 4 business days after the clinic has received the results. If you do not hear from us within 7 days, please contact the clinic through Skritter[employer] or phone. If you have a critical or abnormal lab result, we will notify you by phone as soon as possible.  Submit refill requests through [employer] or call your pharmacy and they will forward the refill request to us. Please allow 3 business days for your refill to be completed.          Additional Information About Your Visit        MyChart Information     [employer] gives you secure access to your electronic health record. If you see a primary care provider, you can also send messages to your care team and make appointments. If you have questions, please call your primary care clinic.  If you do not have a primary care provider, please call [phone] and they will assist you.        Care EveryWhere ID     This is your Care EveryWhere ID. This could be used by other organizations to access your [facility] medical records  [id number]        Your Vitals Were     Pulse Temperature Respirations Height Pulse  "Oximetry BMI (Body Mass Index)    74 97.8  F (36.6  C) 22 5' 7\" (1.702 m) 97% 35.71 kg/m2       Blood Pressure from Last 3 Encounters:   02/13/18 126/72   01/03/18 145/78   12/14/17 132/65    Weight from Last 3 Encounters:   02/13/18 228 lb (103.4 kg)   01/03/18 225 lb (102.1 kg)   12/06/17 231 lb (104.8 kg)              We Performed the Following     CBC with platelets and differential     Comprehensive metabolic panel (BMP + Alb, Alk Phos, ALT, AST, Total. Bili, TP)     Hemoglobin A1c     Vitamin B12          Today's Medication Changes          These changes are accurate as of 2/13/18 10:54 AM.  If you have any questions, ask your nurse or doctor.               These medicines have changed or have updated prescriptions.        Dose/Directions    atenolol 50 MG tablet   Commonly known as:  TENORMIN   This may have changed:  See the new instructions.   Used for:  Essential hypertension   Changed by:  William Thompson MD        Dose:  50 mg   Take 1 tablet (50 mg) by mouth daily   Quantity:  90 tablet   Refills:  3       atorvastatin 40 MG tablet   Commonly known as:  LIPITOR   This may have changed:  how much to take   Changed by:  William Thompson MD        Dose:  20 mg   Take 0.5 tablets (20 mg) by mouth daily   Quantity:  45 tablet   Refills:  3       clopidogrel 75 MG tablet   Commonly known as:  PLAVIX   This may have changed:  See the new instructions.   Changed by:  William Thompson MD        Dose:  75 mg   Take 1 tablet (75 mg) by mouth daily   Quantity:  90 tablet   Refills:  3         Stop taking these medicines if you haven't already. Please contact your care team if you have questions.     omeprazole 40 MG capsule   Commonly known as:  priLOSEC   Stopped by:  William Thompson MD                Where to get your medicines      These medications were sent to [facility]/pharmacy #[id number] - [place], [place] - [address]  [address] [address]     Phone:  [phone]     atenolol 50 MG tablet    clopidogrel " 75 MG tablet    lisinopril 10 MG tablet    triamterene-hydrochlorothiazide 37.5-25 MG per tablet                Primary Care Provider Office Phone # Fax #    William Thompson [phone] [address] [facility] [address]        Equal Access to Services     MUKESH AWAN : Hadii aad ku hadasho Soomaali, waaxda luqadaha, qaybta kaalmada adeegyada, waxay idiin hayaan adeeg kharash la'aan . So [facility] [phone].    ATENCIÓN: Si habla español, tiene a beckett disposición servicios gratuitos de asistencia lingüística. Piyush al [phone].    We comply with applicable federal civil rights laws and [place] laws. We do not discriminate on the basis of race, color, national origin, age, disability, sex, sexual orientation, or gender identity.            Thank you!     Thank you for choosing [facility]  for your care. Our goal is always to provide you with excellent care. Hearing back from our patients is one way we can continue to improve our services. Please take a few minutes to complete the written survey that you may receive in the mail after your visit with us. Thank you!             Your Updated Medication List - Protect others around you: Learn how to safely use, store and throw away your medicines at www.disposemymeds.org.          This list is accurate as of 2/13/18 10:54 AM.  Always use your most recent med list.                   Brand Name Dispense Instructions for use Diagnosis    aspirin 81 MG tablet     30 tablet    Take by mouth daily        atenolol 50 MG tablet    TENORMIN    90 tablet    Take 1 tablet (50 mg) by mouth daily    Essential hypertension       atorvastatin 40 MG tablet    LIPITOR    45 tablet    Take 0.5 tablets (20 mg) by mouth daily        calcium carbonate 1250 MG tablet    OS-SREEDHAR 500 mg [place]. [place]     Take 500 mg by mouth daily        clopidogrel 75 MG tablet    PLAVIX    90 tablet    Take 1 tablet (75 mg) by mouth daily         glucosamine-chondroitin 500-400 MG Caps per capsule      Take 1 capsule by mouth daily        lisinopril 10 MG tablet    PRINIVIL/ZESTRIL    90 tablet    Take 1 tablet (10 mg) by mouth daily    Essential hypertension       OMEGA-3 FISH OIL PO      Take 1 g by mouth daily        ranitidine 75 MG tablet    ZANTAC    30 tablet    Take 1 tablet (75 mg) by mouth daily        triamterene-hydrochlorothiazide 37.5-25 MG per tablet    MAXZIDE-25    90 tablet    Take 1 tablet by mouth daily    Essential hypertension       vitamin B complex with vitamin C Tabs tablet      Take 1 tablet by mouth daily        VITAMIN C PO      Take 1,000 mg by mouth daily        VITAMIN D (CHOLECALCIFEROL) PO      Take 5,000 Units by mouth daily

## 2018-02-13 NOTE — PROGRESS NOTES
SUBJECTIVE:   Ivan Payton is a 82 year old male who presents to clinic today for the following health issues:      Hyperlipidemia Follow-Up      Rate your low fat/cholesterol diet?: good    Taking statin?  Yes, no muscle aches from statin    Other lipid medications/supplements?:  none    Hypertension Follow-up      Outpatient blood pressures are not being checked.    Low Salt Diet: no added salt      Amount of exercise or physical activity: occ.    Problems taking medications regularly: No    Medication side effects: none    Diet: low salt and low fat/cholesterol                   Lipids were checked in 8/17.  Cardiology orders these.     Takes atorva 20 mg per day.                           FIT was negative in 12/17.                             He saw surgery recently, who did not recommend intervention with respect to his right groin pain.                                               He switched from a PPI to OTC zantac on his own,due to concerns about long term risks.                             No swallowing sx.                     Problem list and histories reviewed & adjusted, as indicated.      Current Outpatient Prescriptions   Medication Sig Dispense Refill     triamterene-hydrochlorothiazide (MAXZIDE-25) 37.5-25 MG per tablet Take 1 tablet by mouth daily 90 tablet 3     lisinopril (PRINIVIL/ZESTRIL) 10 MG tablet Take 1 tablet (10 mg) by mouth daily 90 tablet 3     atenolol (TENORMIN) 50 MG tablet Take 1 tablet (50 mg) by mouth daily 90 tablet 3     clopidogrel (PLAVIX) 75 MG tablet Take 1 tablet (75 mg) by mouth daily 90 tablet 3     atorvastatin (LIPITOR) 40 MG tablet Take 0.5 tablets (20 mg) by mouth daily 45 tablet 3     ranitidine (ZANTAC) 75 MG tablet Take 1 tablet (75 mg) by mouth daily 30 tablet      aspirin 81 MG tablet Take by mouth daily 30 tablet      glucosamine-chondroitin 500-400 MG CAPS Take 1 capsule by mouth daily       VITAMIN D, CHOLECALCIFEROL, PO Take 5,000 Units by mouth daily    "     Omega-3 Fatty Acids (OMEGA-3 FISH OIL PO) Take 1 g by mouth daily        vitamin  B complex with vitamin C (VITAMIN  B COMPLEX) TABS Take 1 tablet by mouth daily       Ascorbic Acid (VITAMIN C PO) Take 1,000 mg by mouth daily       calcium carbonate (OS-SREEDHAR 500 MG [place]. CA) 500 MG tablet Take 500 mg by mouth daily                                               Allergies   Allergen Reactions     Contrast Dye Hives     BP Readings from Last 3 Encounters:   02/13/18 126/72   01/03/18 145/78   12/14/17 132/65    Wt Readings from Last 3 Encounters:   02/13/18 228 lb (103.4 kg)   01/03/18 225 lb (102.1 kg)   12/06/17 231 lb (104.8 kg)                    Reviewed and updated as needed this visit by clinical staff       Reviewed and updated as needed this visit by Provider         ROS:  CONSTITUTIONAL:NEGATIVE for fever, chills, change in weight  RESP:NEGATIVE for significant cough or SOB  CV: NEGATIVE for chest pain, palpitations or peripheral edema    OBJECTIVE:                                                    /72 (BP Location: Left arm, Patient Position: Chair, Cuff Size: Adult Large)  Pulse 74  Temp 97.8  F (36.6  C)  Resp 22  Ht 5' 7\" (1.702 m)  Wt 228 lb (103.4 kg)  SpO2 97%  BMI 35.71 kg/m2  Body mass index is 35.71 kg/(m^2).  GENERAL APPEARANCE: alert, no distress and over weight  CV: regular rates and rhythm    Diagnostic test results:  pending     ASSESSMENT/PLAN:                                                        ICD-10-CM    1. Essential hypertension I10 triamterene-hydrochlorothiazide (MAXZIDE-25) 37.5-25 MG per tablet     lisinopril (PRINIVIL/ZESTRIL) 10 MG tablet     atenolol (TENORMIN) 50 MG tablet     Comprehensive metabolic panel (BMP + Alb, Alk Phos, ALT, AST, Total. Bili, TP)   2. IFG (impaired fasting glucose) R73.01 Comprehensive metabolic panel (BMP + Alb, Alk Phos, ALT, AST, Total. Bili, TP)     Hemoglobin A1c   3. Esophageal stricture K22.2 CBC with platelets and differential "     Vitamin B12       BP is controlled.                   Med list corrected.                           Check labs and adjust medications as indicated; 2 hrs pp.                              Results via mail.     Patient Instructions   I will let you know your lab results.   You are planning to take the ranitidine every day.       William Thompson MD  [facility]                Results for orders placed or performed in visit on 02/13/18   Comprehensive metabolic panel (BMP + Alb, Alk Phos, ALT, AST, Total. Bili, TP)   Result Value Ref Range    Sodium 140 133 - 144 mmol/L    Potassium 4.3 3.4 - 5.3 mmol/L    Chloride 107 94 - 109 mmol/L    Carbon Dioxide 29 20 - 32 mmol/L    Anion Gap 4 3 - 14 mmol/L    Glucose 145 (H) 70 - 99 mg/dL    Urea Nitrogen 23 7 - 30 mg/dL    Creatinine 0.85 0.66 - 1.25 mg/dL    GFR Estimate 87 >60 mL/min/1.7m2    GFR Estimate If Black >90 >60 mL/min/1.7m2    Calcium 8.7 8.5 - 10.1 mg/dL    Bilirubin Total 0.4 0.2 - 1.3 mg/dL    Albumin 3.3 (L) 3.4 - 5.0 g/dL    Protein Total 6.1 (L) 6.8 - 8.8 g/dL    Alkaline Phosphatase 48 40 - 150 U/L    ALT 23 0 - 70 U/L    AST 12 0 - 45 U/L   CBC with platelets and differential   Result Value Ref Range    WBC 6.1 4.0 - 11.0 10e9/L    RBC Count 4.94 4.4 - 5.9 10e12/L    Hemoglobin 15.5 13.3 - 17.7 g/dL    Hematocrit 45.8 40.0 - 53.0 %    MCV 93 78 - 100 fl    MCH 31.4 26.5 - 33.0 pg    MCHC 33.8 31.5 - 36.5 g/dL    RDW 14.4 10.0 - 15.0 %    Platelet Count 198 150 - 450 10e9/L    Diff Method Automated Method     % Neutrophils 64.3 %    % Lymphocytes 17.9 %    % Monocytes 13.6 %    % Eosinophils 3.5 %    % Basophils 0.7 %    Absolute Neutrophil 3.9 1.6 - 8.3 10e9/L    Absolute Lymphocytes 1.1 0.8 - 5.3 10e9/L    Absolute Monocytes 0.8 0.0 - 1.3 10e9/L    Absolute Eosinophils 0.2 0.0 - 0.7 10e9/L    Absolute Basophils 0.0 0.0 - 0.2 10e9/L   Hemoglobin A1c   Result Value Ref Range    Hemoglobin A1C 6.3 (H) 4.3 - 6.0 %   Vitamin  B12   Result Value Ref Range    Vitamin B12 431 193 - 986 pg/mL     Letter sent.                     Most of your lab results are good,including the kidney,liver,bone marrow,and potassium results.                     The hemoglobin A1C is a test that gives us your average blood sugar over the last 3 months.           This test gives us good information, and it does not need to be done in a fasting condition.   This result was elevated.        It correlates to an average blood sugar of 129, which is in the prediabetic range.             Please work harder on restricting carbohydrates ( starches, sweets, etc).

## 2018-02-13 NOTE — NURSING NOTE
"Chief Complaint   Patient presents with     Hypertension     Lipids       Initial /72 (BP Location: Left arm, Patient Position: Chair, Cuff Size: Adult Large)  Pulse 74  Temp 97.8  F (36.6  C)  Resp 22  Ht 5' 7\" (1.702 m)  Wt 228 lb (103.4 kg)  SpO2 97%  BMI 35.71 kg/m2 Estimated body mass index is 35.71 kg/(m^2) as calculated from the following:    Height as of this encounter: 5' 7\" (1.702 m).    Weight as of this encounter: 228 lb (103.4 kg).  Medication Reconciliation: complete   Clarice Schmitt LPN  "

## 2018-05-26 DIAGNOSIS — I10 ESSENTIAL HYPERTENSION: ICD-10-CM

## 2018-05-29 NOTE — TELEPHONE ENCOUNTER
"Requested Prescriptions   Pending Prescriptions Disp Refills     atenolol (TENORMIN) 50 MG tablet [Pharmacy Med Name: ATENOLOL 50 MG TABLET]  Last Written Prescription Date:  2/13/18  Last Fill Quantity: 90,  # refills: 3   Last office visit: 2/13/2018 with prescribing provider:  Jay   Future Office Visit:     90 tablet 0     Sig: TAKE 1 TABLET BY MOUTH EVERY DAY    Beta-Blockers Protocol Passed    5/26/2018  8:13 AM       Passed - Blood pressure under 140/90 in past 12 months    BP Readings from Last 3 Encounters:   02/13/18 126/72   01/03/18 145/78   12/14/17 132/65                Passed - Patient is age 6 or older       Passed - Recent (12 mo) or future (30 days) visit within the authorizing provider's specialty    Patient had office visit in the last 12 months or has a visit in the next 30 days with authorizing provider or within the authorizing provider's specialty.  See \"Patient Info\" tab in inbasket, or \"Choose Columns\" in Meds & Orders section of the refill encounter.              "

## 2018-05-30 RX ORDER — ATENOLOL 50 MG/1
TABLET ORAL
Qty: 90 TABLET | Refills: 2 | Status: SHIPPED | OUTPATIENT
Start: 2018-05-30 | End: 2019-03-11

## 2018-09-25 DIAGNOSIS — I65.29 CAROTID STENOSIS: Primary | ICD-10-CM

## 2018-10-09 DIAGNOSIS — I25.10 CORONARY ARTERY DISEASE INVOLVING NATIVE CORONARY ARTERY OF NATIVE HEART, ANGINA PRESENCE UNSPECIFIED: Primary | ICD-10-CM

## 2018-10-09 RX ORDER — ATORVASTATIN CALCIUM 40 MG/1
40 TABLET, FILM COATED ORAL DAILY
Qty: 90 TABLET | Refills: 3 | Status: SHIPPED | OUTPATIENT
Start: 2018-10-09 | End: 2020-01-08

## 2018-10-09 NOTE — TELEPHONE ENCOUNTER
Pt will get lab work done on 11/27/18 and see Dr. Johansen on 11/29/18.  Will give him a year's worth of atorvastatin.  ESPERANZA James 10/9/2018

## 2018-11-27 DIAGNOSIS — E78.00 HYPERCHOLESTEROLEMIA: ICD-10-CM

## 2018-11-27 LAB
CHOLEST SERPL-MCNC: 132 MG/DL
HDLC SERPL-MCNC: 42 MG/DL
LDLC SERPL CALC-MCNC: 68 MG/DL
NONHDLC SERPL-MCNC: 90 MG/DL
TRIGL SERPL-MCNC: 112 MG/DL

## 2018-11-27 PROCEDURE — 36415 COLL VENOUS BLD VENIPUNCTURE: CPT | Performed by: INTERNAL MEDICINE

## 2018-11-27 PROCEDURE — 80061 LIPID PANEL: CPT | Performed by: INTERNAL MEDICINE

## 2018-11-29 ENCOUNTER — OFFICE VISIT (OUTPATIENT)
Dept: CARDIOLOGY | Facility: CLINIC | Age: 83
End: 2018-11-29
Payer: COMMERCIAL

## 2018-11-29 VITALS
DIASTOLIC BLOOD PRESSURE: 70 MMHG | WEIGHT: 228 LBS | HEIGHT: 67 IN | SYSTOLIC BLOOD PRESSURE: 112 MMHG | HEART RATE: 80 BPM | BODY MASS INDEX: 35.79 KG/M2

## 2018-11-29 DIAGNOSIS — R07.89 ATYPICAL CHEST PAIN: ICD-10-CM

## 2018-11-29 DIAGNOSIS — I67.9 CEREBRAL VASCULAR DISEASE: ICD-10-CM

## 2018-11-29 DIAGNOSIS — I10 ESSENTIAL HYPERTENSION: ICD-10-CM

## 2018-11-29 DIAGNOSIS — I45.10 RBBB (RIGHT BUNDLE BRANCH BLOCK): ICD-10-CM

## 2018-11-29 DIAGNOSIS — I25.10 CORONARY ARTERY DISEASE INVOLVING NATIVE CORONARY ARTERY OF NATIVE HEART WITHOUT ANGINA PECTORIS: ICD-10-CM

## 2018-11-29 DIAGNOSIS — E78.5 HYPERLIPIDEMIA WITH TARGET LDL LESS THAN 100: Primary | ICD-10-CM

## 2018-11-29 PROCEDURE — 99214 OFFICE O/P EST MOD 30 MIN: CPT | Performed by: INTERNAL MEDICINE

## 2018-11-29 NOTE — LETTER
11/29/2018      William Thompson MD  [address]  [facility] [address]      RE: Ivan Payton       Dear Colleague,    I had the pleasure of seeing Ivan Payton in the [facility].    Service Date: 11/29/2018      HISTORY OF PRESENT ILLNESS:  I had the opportunity to see Mr. Ivan Payton in Cardiology Clinic today at the [facility] in [place] for re-evaluation of coronary artery disease and carotid artery disease with risk factors of hypertension and dyslipidemia.      In 2014, he was found to have significant carotid artery disease requiring left carotid endarterectomy.  During that evaluation, he was also found to have significant coronary artery disease by stress testing, which was done to evaluate symptoms of shortness of breath.  He did not have any chest pain at that time.  However, he was found to have severe multivessel coronary artery disease, but preferred not to do bypass surgery and went on to have multivessel stenting involving his LAD, circumflex and obtuse marginal arteries.  He did have some residual disease within small vessels including the PDA and distal obtuse marginal branch that could not be treated with angioplasty or stenting.  Fortunately, he has not had problems with chest discomfort in the past and his stress tests have shown minimal inferior ischemia which we have been treating medically.      This year he tells me that he has had a near daily discomfort in the left side of his chest that occurred with exertion such as mowing the lawn in the summer, but also at other times as well.  It is not always consistent with exertion, but seems to resolve with rest.  Typically the discomfort is fairly mild, lasts just a few minutes in duration and is not always predictable.  He has not tried nitroglycerin or antacids for this.  He does use Zantac daily because he had a history of heartburn symptoms with a hiatal hernia.  He has not  had any further heartburn type symptoms.      His cholesterol numbers remain excellent with an LDL of 68, HDL 42, and his blood pressure has been well controlled as well.  He has followed up with Dr. Jones for re-evaluation of his carotid disease which remains moderate bilaterally and under observation.        On examination today, his blood pressure is 112/70, heart rate 80 and weight 228 pounds.  His lungs are clear.  Heart rhythm is regular.  There are no cardiac murmurs or carotid bruits.      IMPRESSIONS:  Mr. Ivan Payton is an 83-year-old gentleman with hypertension and dyslipidemia which are well controlled.  He has a history of carotid disease status post endarterectomy.  He has coronary disease status post multivessel stenting.  He also has some small vessel disease which was not amenable to stenting in the past.  He now has relatively new symptoms of left lateral chest discomfort from time to time which is somewhat atypical by description for coronary artery disease.  He also has some shortness of breath with exertion which is chronic and stable, but fairly mild.      I am concerned about his left-sided chest discomfort.  I do not know if this represents a progression in his coronary artery disease issues.  I have recommended a Lexiscan nuclear perfusion imaging stress test to be done in the next week or two to evaluate this and then decide about possible repeat coronary angiography.  I will compare his stress test this year to his study from last year and determine whether he has any progression in his disease based on those image results.  I offered him the possibility of repeating a coronary angiogram at this point, but he would rather do stress testing first.      I will continue his current medications without change for now and re-evaluate that issue after we see the results of his stress test.      cc:   William Thompson MD   [address] [place]  [address]         KATHY BERNSTEIN MD, [facility]             D: 2018   T: 2018   MT: LEXUS      Name:     GEOVANI STOREY   MRN:      [id number]        Account:      [id number]   :      1935           Service Date: 2018      Document: [id number]         Outpatient Encounter Prescriptions as of 2018   Medication Sig Dispense Refill     Ascorbic Acid (VITAMIN C PO) Take 1,000 mg by mouth daily       aspirin 81 MG tablet Take by mouth daily 30 tablet      atenolol (TENORMIN) 50 MG tablet TAKE 1 TABLET BY MOUTH EVERY DAY 90 tablet 2     atorvastatin (LIPITOR) 40 MG tablet Take 1 tablet (40 mg) by mouth daily 90 tablet 3     calcium carbonate (OS-SREEDHAR 500 MG [place]. [place]) 500 MG tablet Take 500 mg by mouth daily       clopidogrel (PLAVIX) 75 MG tablet Take 1 tablet (75 mg) by mouth daily 90 tablet 3     glucosamine-chondroitin 500-400 MG CAPS Take 1 capsule by mouth daily       lisinopril (PRINIVIL/ZESTRIL) 10 MG tablet Take 1 tablet (10 mg) by mouth daily 90 tablet 3     Omega-3 Fatty Acids (OMEGA-3 FISH OIL PO) Take 1 g by mouth daily        omeprazole (PRILOSEC) 20 MG DR capsule Take 1 capsule (20 mg) by mouth daily 90 capsule 3     triamterene-hydrochlorothiazide (MAXZIDE-25) 37.5-25 MG per tablet Take 1 tablet by mouth daily 90 tablet 3     vitamin  B complex with vitamin C (VITAMIN  B COMPLEX) TABS Take 1 tablet by mouth daily       VITAMIN D, CHOLECALCIFEROL, PO Take 5,000 Units by mouth daily        [DISCONTINUED] ranitidine (ZANTAC) 75 MG tablet Take 1 tablet (75 mg) by mouth daily 30 tablet      No facility-administered encounter medications on file as of 2018.        Again, thank you for allowing me to participate in the care of your patient.      Sincerely,    KATHY BERNSTEIN MD     [facility]

## 2018-11-29 NOTE — PROGRESS NOTES
Service Date: 11/29/2018      HISTORY OF PRESENT ILLNESS:  I had the opportunity to see Mr. Ivan Payton in Cardiology Clinic today at the [facility] in [place] for re-evaluation of coronary artery disease and carotid artery disease with risk factors of hypertension and dyslipidemia.      In 2014, he was found to have significant carotid artery disease requiring left carotid endarterectomy.  During that evaluation, he was also found to have significant coronary artery disease by stress testing, which was done to evaluate symptoms of shortness of breath.  He did not have any chest pain at that time.  However, he was found to have severe multivessel coronary artery disease, but preferred not to do bypass surgery and went on to have multivessel stenting involving his LAD, circumflex and obtuse marginal arteries.  He did have some residual disease within small vessels including the PDA and distal obtuse marginal branch that could not be treated with angioplasty or stenting.  Fortunately, he has not had problems with chest discomfort in the past and his stress tests have shown minimal inferior ischemia which we have been treating medically.      This year he tells me that he has had a near daily discomfort in the left side of his chest that occurred with exertion such as mowing the lawn in the summer, but also at other times as well.  It is not always consistent with exertion, but seems to resolve with rest.  Typically the discomfort is fairly mild, lasts just a few minutes in duration and is not always predictable.  He has not tried nitroglycerin or antacids for this.  He does use Zantac daily because he had a history of heartburn symptoms with a hiatal hernia.  He has not had any further heartburn type symptoms.      His cholesterol numbers remain excellent with an LDL of 68, HDL 42, and his blood pressure has been well controlled as well.  He has followed up with Dr. Jones for re-evaluation of  his carotid disease which remains moderate bilaterally and under observation.        On examination today, his blood pressure is 112/70, heart rate 80 and weight 228 pounds.  His lungs are clear.  Heart rhythm is regular.  There are no cardiac murmurs or carotid bruits.      IMPRESSIONS:  Mr. Geovani Payton is an 83-year-old gentleman with hypertension and dyslipidemia which are well controlled.  He has a history of carotid disease status post endarterectomy.  He has coronary disease status post multivessel stenting.  He also has some small vessel disease which was not amenable to stenting in the past.  He now has relatively new symptoms of left lateral chest discomfort from time to time which is somewhat atypical by description for coronary artery disease.  He also has some shortness of breath with exertion which is chronic and stable, but fairly mild.      I am concerned about his left-sided chest discomfort.  I do not know if this represents a progression in his coronary artery disease issues.  I have recommended a Lexiscan nuclear perfusion imaging stress test to be done in the next week or two to evaluate this and then decide about possible repeat coronary angiography.  I will compare his stress test this year to his study from last year and determine whether he has any progression in his disease based on those image results.  I offered him the possibility of repeating a coronary angiogram at this point, but he would rather do stress testing first.      I will continue his current medications without change for now and re-evaluate that issue after we see the results of his stress test.      cc:   [place] [address]         KATHY BERNSTEIN MD, [facility]             D: 2018   T: 2018   MT: LEXUS      Name:     GEOVANI PAYTON   MRN:      [id number]        Account:      [id number]   :      1935            Service Date: 11/29/2018      Document: [id number]

## 2018-11-29 NOTE — LETTER
11/29/2018    William Thompson MD  [address]  [facility] [address]    RE: Ivan Payton       Dear Colleague,    I had the pleasure of seeing Ivan Payton in the [facility].    HPI and Plan:   See dictation    Orders Placed This Encounter   Procedures     NM Lexiscan stress test (nuc card)     Lipid Profile     ALT     Basic metabolic panel     Follow-Up with Cardiologist       Orders Placed This Encounter   Medications     omeprazole (PRILOSEC) 20 MG DR capsule     Sig: Take 1 capsule (20 mg) by mouth daily     Dispense:  90 capsule     Refill:  3       Medications Discontinued During This Encounter   Medication Reason     ranitidine (ZANTAC) 75 MG tablet          Encounter Diagnoses   Name Primary?     Hyperlipidemia with target LDL less than 100 Yes     Essential hypertension      Cerebral vascular disease      RBBB (right bundle branch block)      Coronary artery disease involving native coronary artery of native heart without angina pectoris      Atypical chest pain        CURRENT MEDICATIONS:  Current Outpatient Prescriptions   Medication Sig Dispense Refill     Ascorbic Acid (VITAMIN C PO) Take 1,000 mg by mouth daily       aspirin 81 MG tablet Take by mouth daily 30 tablet      atenolol (TENORMIN) 50 MG tablet TAKE 1 TABLET BY MOUTH EVERY DAY 90 tablet 2     atorvastatin (LIPITOR) 40 MG tablet Take 1 tablet (40 mg) by mouth daily 90 tablet 3     calcium carbonate (OS-SREEDHAR 500 MG [place]. [place]) 500 MG tablet Take 500 mg by mouth daily       clopidogrel (PLAVIX) 75 MG tablet Take 1 tablet (75 mg) by mouth daily 90 tablet 3     glucosamine-chondroitin 500-400 MG CAPS Take 1 capsule by mouth daily       lisinopril (PRINIVIL/ZESTRIL) 10 MG tablet Take 1 tablet (10 mg) by mouth daily 90 tablet 3     Omega-3 Fatty Acids (OMEGA-3 FISH OIL PO) Take 1 g by mouth daily        omeprazole (PRILOSEC) 20 MG DR capsule Take 1 capsule (20 mg) by mouth daily 90 capsule 3      triamterene-hydrochlorothiazide (MAXZIDE-25) 37.5-25 MG per tablet Take 1 tablet by mouth daily 90 tablet 3     vitamin  B complex with vitamin C (VITAMIN  B COMPLEX) TABS Take 1 tablet by mouth daily       VITAMIN D, CHOLECALCIFEROL, PO Take 5,000 Units by mouth daily          ALLERGIES     Allergies   Allergen Reactions     Contrast Dye Hives       PAST MEDICAL HISTORY:  Past Medical History:   Diagnosis Date     Arthritis      Cerebral vascular disease 3/2014    multiple intracranial stenoses - lt post communic, lt post cerebral, rt middle cerebral, bilat porx M2 segments     Claustrophobia     Need sedation for MRI scans     Coronary artery disease 2014    Cath 9/2014: PTCA and KIKE to mLAD, KIKE to prox circumflex, PTCA and DESx2 to OM2     CVA (cerebral infarction) 3/2014    2 small acute lesions noted left parietal/left posterior frontal region. Old lacunar infarct left caudate nucleus     Enlarged prostate      Essential hypertension      GERD (gastroesophageal reflux disease)      Hiatal hernia      Hydrocele     Right hydrocelectomy 2/2012     Mixed hyperlipidemia      RBBB (right bundle branch block) 3/2014     Shortness of breath      Stented coronary artery      Uncomplicated asthma        PAST SURGICAL HISTORY:  Past Surgical History:   Procedure Laterality Date     ENDARTERECTOMY CAROTID  3/26/2014    Procedure: ENDARTERECTOMY CAROTID;  LEFT CAROTID ENDARTERECTOMY WITH EEG;  Surgeon: Yobani Jones MD;  Location:  OR      REMOVAL OF TONSILS,<12 Y/O      Tonsils <13y.o.     HEART CATH, ANGIOPLASTY  9/9/14    Stenting of LAD,Prox LCx, and 2 stents to OM2     HERNIORRHAPHY INGUINAL      Right     HERNIORRHAPHY INGUINAL  2/10/2012    Procedure:HERNIORRHAPHY INGUINAL; LEFT OPEN INGUINAL HERNIA REPAIR WITH MESH, RIGHT HYDROCELECTOMY; Surgeon:JULI LOPES; Location:[facility]     HYDROCELECTOMY INGUINAL  2/10/2012    Procedure:HYDROCELECTOMY INGUINAL; Surgeon:JULI LOPES;  Location: [facility]     HYDROCELECTOMY SCROTAL Right 1/29/2016    Procedure: HYDROCELECTOMY SCROTAL;  Surgeon: Yobani Stevens MD;  Location:  [facility]     LAPAROSCOPIC HERNIORRHAPHY INGUINAL BILATERAL Bilateral 5/24/2016    Procedure: LAPAROSCOPIC HERNIORRHAPHY INGUINAL BILATERAL;  Surgeon: Chandler Bowen MD;  Location:  OR     MR BRAIN AND ORBITS  3/2014    6 mm area of restricted diffusion subcortical white matter left parietal lobe/questionable area of restricted diffusion left posterior frontal region - c/w recent small infarcts. Small chronic lacunar infarct left caudate nucleus     SURGICAL HISTORY OF -       tonail removal       FAMILY HISTORY:  Family History   Problem Relation Age of Onset     C.A.D. Brother      older brother, CABG about age 56     Respiratory Father      lung disease - farmer,      Hypertension Mother      C.A.D. Brother      younger brother, angioplasty     Hypertension Sister      Hypertension Brother        SOCIAL HISTORY:  Social History     Social History     Marital status:      Spouse name: madhavi     Number of children: 2     Years of education: N/A     Occupational History     part time, building maintenance Retired     Social History Main Topics     Smoking status: Former Smoker     Packs/day: 0.50     Years: 20.00     Types: Cigarettes     Quit date: 6/4/1968     Smokeless tobacco: Former User     Alcohol use No     Drug use: No     Sexual activity: Yes     Partners: Female      Comment:      Other Topics Concern     Blood Transfusions No     Caffeine Concern Yes     1-2 cups per day     Occupational Exposure No     Hobby Hazards No     Sleep Concern No     Stress Concern No     Weight Concern No     Special Diet No     Back Care No     Exercise Yes     gym 4 days week, treadmill, 1.5 miles,  weights     Bike Helmet No     Seat Belt Yes     Self-Exams No     Parent/Sibling W/ Cabg, Mi Or Angioplasty Before 65f 55m? Yes     brothers - 1 had CABG 1  "had MI pt unsure of age     Social History Narrative       Review of Systems:  Skin:  Positive for lumps or bumps right wrist   Eyes:  Positive for glasses    ENT:  Negative hearing loss    Respiratory:  Positive for dyspnea on exertion     Cardiovascular:  Negative Positive for;fatigue;edema    Gastroenterology: Negative      Genitourinary:  not assessed nocturia    Musculoskeletal:  Positive for arthritis    Neurologic:  Negative      Psychiatric:  Negative      Heme/Lymph/Imm:  Negative allergies;easy bruising    Endocrine:  Negative        Physical Exam:  Vitals: /70  Pulse 80  Ht 1.702 m (5' 7\")  Wt 103.4 kg (228 lb)  BMI 35.71 kg/m2    Constitutional:  cooperative, alert and oriented, well developed, well nourished, in no acute distress        Skin:  warm and dry to the touch, no apparent skin lesions or masses noted          Head:  normocephalic, no masses or lesions        Eyes:  pupils equal and round, conjunctivae and lids unremarkable, sclera white, no xanthalasma, EOMS intact, no nystagmus        Lymph:      ENT:  no pallor or cyanosis, dentition good        Neck:    left carotid bruit      Respiratory:  normal breath sounds, clear to auscultation, normal A-P diameter, normal symmetry, normal respiratory excursion, no use of accessory muscles         Cardiac: regular rhythm, normal S1/S2, no S3 or S4, apical impulse not displaced, no murmurs, gallops or rubs                     (hematoma 2 x 2 cm)                                    GI:  abdomen soft;BS normoactive        Extremities and Muscular Skeletal:  no deformities, clubbing, cyanosis, erythema observed;no edema              Neurological:  no gross motor deficits;affect appropriate        Psych:  oriented to time, person and place        CC  No referring provider defined for this encounter.                Thank you for allowing me to participate in the care of your patient.      Sincerely,     KATHY BERNSTEIN MD     [facility]" [facility]    cc:   No referring provider defined for this encounter.

## 2018-11-29 NOTE — MR AVS SNAPSHOT
After Visit Summary   11/29/2018    Ivan Payton    MRN: [id number]           Patient Information     Date Of Birth          1935        Visit Information        Provider Department      11/29/2018 8:15 AM Willy Johansen MD [facility]        Today's Diagnoses     Hyperlipidemia with target LDL less than 100    -  1    Essential hypertension        Cerebral vascular disease        RBBB (right bundle branch block)        Coronary artery disease involving native coronary artery of native heart without angina pectoris        Atypical chest pain           Follow-ups after your visit        Additional Services     Follow-Up with Cardiologist                 Your next 10 appointments already scheduled     Dec 11, 2018  9:00 AM CST   [facility]YSICAL with William Thompson MD   [facility]    [address] [place], [place] [address] [phone]              Future tests that were ordered for you today     Open Future Orders        Priority Expected Expires Ordered    Follow-Up with Cardiologist Routine 11/29/2019 11/30/2019 11/29/2018    NM Lexiscan stress test (nuc card) Routine 11/30/2018 11/29/2019 11/29/2018    Lipid Profile Routine 11/29/2019 11/29/2019 11/29/2018    ALT Routine 11/29/2019 11/29/2019 11/29/2018    Basic metabolic panel Routine 11/29/2019 11/30/2019 11/29/2018            Who to contact     If you have questions or need follow up information about today's clinic visit or your schedule please contact [facility] directly at [phone].  Normal or non-critical lab and imaging results will be communicated to you by MyChart, letter or phone within 4 business days after the clinic has received the results. If you do not hear from us within 7 days, please contact the clinic through MyChart or phone. If you have a critical  "or abnormal lab result, we will notify you by phone as soon as possible.  Submit refill requests through [employer] or call your pharmacy and they will forward the refill request to us. Please allow 3 business days for your refill to be completed.          Additional Information About Your Visit        [email or web address] Information     [employer] gives you secure access to your electronic health record. If you see a primary care provider, you can also send messages to your care team and make appointments. If you have questions, please call your primary care clinic.  If you do not have a primary care provider, please call [phone] and they will assist you.        Care EveryWhere ID     This is your Care EveryWhere ID. This could be used by other organizations to access your [place] medical records  [id number]        Your Vitals Were     Pulse Height BMI (Body Mass Index)             80 1.702 m (5' 7\") 35.71 kg/m2          Blood Pressure from Last 3 Encounters:   11/29/18 112/70   02/13/18 126/72   01/03/18 145/78    Weight from Last 3 Encounters:   11/29/18 103.4 kg (228 lb)   02/13/18 103.4 kg (228 lb)   01/03/18 102.1 kg (225 lb)                 Today's Medication Changes          These changes are accurate as of 11/29/18  9:04 AM.  If you have any questions, ask your nurse or doctor.               Start taking these medicines.        Dose/Directions    omeprazole 20 MG DR capsule   Commonly known as:  priLOSEC   Used for:  Atypical chest pain   Started by:  Willy Johansen MD        Dose:  20 mg   Take 1 capsule (20 mg) by mouth daily   Quantity:  90 capsule   Refills:  3         Stop taking these medicines if you haven't already. Please contact your care team if you have questions.     ranitidine 75 MG tablet   Commonly known as:  ZANTAC   Stopped by:  Willy Johansen MD                Where to get your medicines      These medications were sent to [facility]/pharmacy #[id number] - [place], [place] - [address], " [facility] [address]     Phone:  [phone]     omeprazole 20 MG DR capsule                Primary Care Provider Office Phone # Fax #    William Thompson [phone] [address]  [facility] [address]        Equal Access to Services     [facility] : Hadii aad ku hadasho Soomaali, waaxda luqadaha, qaybta kaalmada adeegyada, waxay idiin hayaan adeeg khmayrash la'aan . So [facility] [phone].    ATENCIÓN: Si habla español, tiene a beckett disposición servicios gratuitos de asistencia lingüística. Llame al [phone].    We comply with applicable federal civil rights laws and [place] laws. We do not discriminate on the basis of race, color, national origin, age, disability, sex, sexual orientation, or gender identity.            Thank you!     Thank you for choosing [facility]  for your care. Our goal is always to provide you with excellent care. Hearing back from our patients is one way we can continue to improve our services. Please take a few minutes to complete the written survey that you may receive in the mail after your visit with us. Thank you!             Your Updated Medication List - Protect others around you: Learn how to safely use, store and throw away your medicines at www.disposemymeds.org.          This list is accurate as of 11/29/18  9:04 AM.  Always use your most recent med list.                   Brand Name Dispense Instructions for use Diagnosis    aspirin 81 MG tablet    ASA    30 tablet    Take by mouth daily        atenolol 50 MG tablet    TENORMIN    90 tablet    TAKE 1 TABLET BY MOUTH EVERY DAY    Essential hypertension       atorvastatin 40 MG tablet    LIPITOR    90 tablet    Take 1 tablet (40 mg) by mouth daily    Coronary artery disease involving native coronary artery of native heart, angina presence unspecified       calcium carbonate 500 MG tablet    OS-SREEDHAR     Take 500 mg by mouth daily        clopidogrel 75 MG tablet     PLAVIX    90 tablet    Take 1 tablet (75 mg) by mouth daily        glucosamine-chondroitin 500-400 MG Caps per capsule      Take 1 capsule by mouth daily        lisinopril 10 MG tablet    PRINIVIL/ZESTRIL    90 tablet    Take 1 tablet (10 mg) by mouth daily    Essential hypertension       OMEGA-3 FISH OIL PO      Take 1 g by mouth daily        omeprazole 20 MG DR capsule    priLOSEC    90 capsule    Take 1 capsule (20 mg) by mouth daily    Atypical chest pain       triamterene-HCTZ 37.5-25 MG tablet    MAXZIDE-25    90 tablet    Take 1 tablet by mouth daily    Essential hypertension       vitamin B complex with vitamin C tablet      Take 1 tablet by mouth daily        VITAMIN C PO      Take 1,000 mg by mouth daily        VITAMIN D (CHOLECALCIFEROL) PO      Take 5,000 Units by mouth daily

## 2018-11-29 NOTE — PROGRESS NOTES
HPI and Plan:   See dictation    Orders Placed This Encounter   Procedures     NM Lexiscan stress test (nuc card)     Lipid Profile     ALT     Basic metabolic panel     Follow-Up with Cardiologist       Orders Placed This Encounter   Medications     omeprazole (PRILOSEC) 20 MG DR capsule     Sig: Take 1 capsule (20 mg) by mouth daily     Dispense:  90 capsule     Refill:  3       Medications Discontinued During This Encounter   Medication Reason     ranitidine (ZANTAC) 75 MG tablet          Encounter Diagnoses   Name Primary?     Hyperlipidemia with target LDL less than 100 Yes     Essential hypertension      Cerebral vascular disease      RBBB (right bundle branch block)      Coronary artery disease involving native coronary artery of native heart without angina pectoris      Atypical chest pain        CURRENT MEDICATIONS:  Current Outpatient Prescriptions   Medication Sig Dispense Refill     Ascorbic Acid (VITAMIN C PO) Take 1,000 mg by mouth daily       aspirin 81 MG tablet Take by mouth daily 30 tablet      atenolol (TENORMIN) 50 MG tablet TAKE 1 TABLET BY MOUTH EVERY DAY 90 tablet 2     atorvastatin (LIPITOR) 40 MG tablet Take 1 tablet (40 mg) by mouth daily 90 tablet 3     calcium carbonate (OS-SREEDHAR 500 MG [place]) 500 MG tablet Take 500 mg by mouth daily       clopidogrel (PLAVIX) 75 MG tablet Take 1 tablet (75 mg) by mouth daily 90 tablet 3     glucosamine-chondroitin 500-400 MG CAPS Take 1 capsule by mouth daily       lisinopril (PRINIVIL/ZESTRIL) 10 MG tablet Take 1 tablet (10 mg) by mouth daily 90 tablet 3     Omega-3 Fatty Acids (OMEGA-3 FISH OIL PO) Take 1 g by mouth daily        omeprazole (PRILOSEC) 20 MG DR capsule Take 1 capsule (20 mg) by mouth daily 90 capsule 3     triamterene-hydrochlorothiazide (MAXZIDE-25) 37.5-25 MG per tablet Take 1 tablet by mouth daily 90 tablet 3     vitamin  B complex with vitamin C (VITAMIN  B COMPLEX) TABS Take 1 tablet by mouth daily       VITAMIN D, CHOLECALCIFEROL,  PO Take 5,000 Units by mouth daily          ALLERGIES     Allergies   Allergen Reactions     Contrast Dye Hives       PAST MEDICAL HISTORY:  Past Medical History:   Diagnosis Date     Arthritis      Cerebral vascular disease 3/2014    multiple intracranial stenoses - lt post communic, lt post cerebral, rt middle cerebral, bilat porx M2 segments     Claustrophobia     Need sedation for MRI scans     Coronary artery disease 2014    Cath 9/2014: PTCA and KIKE to mLAD, KIKE to prox circumflex, PTCA and DESx2 to OM2     CVA (cerebral infarction) 3/2014    2 small acute lesions noted left parietal/left posterior frontal region. Old lacunar infarct left caudate nucleus     Enlarged prostate      Essential hypertension      GERD (gastroesophageal reflux disease)      Hiatal hernia      Hydrocele     Right hydrocelectomy 2/2012     Mixed hyperlipidemia      RBBB (right bundle branch block) 3/2014     Shortness of breath      Stented coronary artery      Uncomplicated asthma        PAST SURGICAL HISTORY:  Past Surgical History:   Procedure Laterality Date     ENDARTERECTOMY CAROTID  3/26/2014    Procedure: ENDARTERECTOMY CAROTID;  LEFT CAROTID ENDARTERECTOMY WITH EEG;  Surgeon: Yobani Jones MD;  Location:  OR      REMOVAL OF TONSILS,<14 Y/O      Tonsils <13y.o.     HEART CATH, ANGIOPLASTY  9/9/14    Stenting of LAD,Prox LCx, and 2 stents to OM2     HERNIORRHAPHY INGUINAL      Right     HERNIORRHAPHY INGUINAL  2/10/2012    Procedure:HERNIORRHAPHY INGUINAL; LEFT OPEN INGUINAL HERNIA REPAIR WITH MESH, RIGHT HYDROCELECTOMY; Surgeon:JULI LOPES; Location:[facility]     HYDROCELECTOMY INGUINAL  2/10/2012    Procedure:HYDROCELECTOMY INGUINAL; Surgeon:JULI LOPES; Location:[facility]     HYDROCELECTOMY SCROTAL Right 1/29/2016    Procedure: HYDROCELECTOMY SCROTAL;  Surgeon: Yobani Stevens MD;  Location: [facility]     LAPAROSCOPIC HERNIORRHAPHY INGUINAL BILATERAL Bilateral 5/24/2016    Procedure: LAPAROSCOPIC  HERNIORRHAPHY INGUINAL BILATERAL;  Surgeon: Chandler Bowen MD;  Location: [facility] OR     MR BRAIN AND ORBITS  3/2014    6 mm area of restricted diffusion subcortical white matter left parietal lobe/questionable area of restricted diffusion left posterior frontal region - c/w recent small infarcts. Small chronic lacunar infarct left caudate nucleus     SURGICAL HISTORY OF -       tonail removal       FAMILY HISTORY:  Family History   Problem Relation Age of Onset     C.A.D. Brother      older brother, CABG about age 56     Respiratory Father      lung disease - farmer,      Hypertension Mother      C.A.OCTAVIANO. Brother      younger brother, angioplasty     Hypertension Sister      Hypertension Brother        SOCIAL HISTORY:  Social History     Social History     Marital status:      Spouse name: madhavi     Number of children: 2     Years of education: N/A     Occupational History     part time, building maintenance Retired     Social History Main Topics     Smoking status: Former Smoker     Packs/day: 0.50     Years: 20.00     Types: Cigarettes     Quit date: 6/4/1968     Smokeless tobacco: Former User     Alcohol use No     Drug use: No     Sexual activity: Yes     Partners: Female      Comment:      Other Topics Concern     Blood Transfusions No     Caffeine Concern Yes     1-2 cups per day     Occupational Exposure No     Hobby Hazards No     Sleep Concern No     Stress Concern No     Weight Concern No     Special Diet No     Back Care No     Exercise Yes     gym 4 days week, treadmill, 1.5 miles,  weights     Bike Helmet No     Seat Belt Yes     Self-Exams No     Parent/Sibling W/ Cabg, Mi Or Angioplasty Before 65f 55m? Yes     brothers - 1 had CABG 1 had MI pt unsure of age     Social History Narrative       Review of Systems:  Skin:  Positive for lumps or bumps right wrist   Eyes:  Positive for glasses    ENT:  Negative hearing loss    Respiratory:  Positive for dyspnea on exertion    "  Cardiovascular:  Negative Positive for;fatigue;edema    Gastroenterology: Negative      Genitourinary:  not assessed nocturia    Musculoskeletal:  Positive for arthritis    Neurologic:  Negative      Psychiatric:  Negative      Heme/Lymph/Imm:  Negative allergies;easy bruising    Endocrine:  Negative        Physical Exam:  Vitals: /70  Pulse 80  Ht 1.702 m (5' 7\")  Wt 103.4 kg (228 lb)  BMI 35.71 kg/m2    Constitutional:  cooperative, alert and oriented, well developed, well nourished, in no acute distress        Skin:  warm and dry to the touch, no apparent skin lesions or masses noted          Head:  normocephalic, no masses or lesions        Eyes:  pupils equal and round, conjunctivae and lids unremarkable, sclera white, no xanthalasma, EOMS intact, no nystagmus        Lymph:      ENT:  no pallor or cyanosis, dentition good        Neck:    left carotid bruit      Respiratory:  normal breath sounds, clear to auscultation, normal A-P diameter, normal symmetry, normal respiratory excursion, no use of accessory muscles         Cardiac: regular rhythm, normal S1/S2, no S3 or S4, apical impulse not displaced, no murmurs, gallops or rubs                     (hematoma 2 x 2 cm)                                    GI:  abdomen soft;BS normoactive        Extremities and Muscular Skeletal:  no deformities, clubbing, cyanosis, erythema observed;no edema              Neurological:  no gross motor deficits;affect appropriate        Psych:  oriented to time, person and place        CC  No referring provider defined for this encounter.              "

## 2018-11-30 ENCOUNTER — HOSPITAL ENCOUNTER (OUTPATIENT)
Dept: CARDIOLOGY | Facility: CLINIC | Age: 83
Discharge: HOME | End: 2018-11-30
Attending: INTERNAL MEDICINE | Admitting: INTERNAL MEDICINE
Payer: MEDICARE

## 2018-11-30 VITALS — SYSTOLIC BLOOD PRESSURE: 120 MMHG | HEART RATE: 79 BPM | DIASTOLIC BLOOD PRESSURE: 60 MMHG

## 2018-11-30 DIAGNOSIS — R07.89 ATYPICAL CHEST PAIN: ICD-10-CM

## 2018-11-30 PROCEDURE — 25000128 H RX IP 250 OP 636: Performed by: INTERNAL MEDICINE

## 2018-11-30 PROCEDURE — A9502 TC99M TETROFOSMIN: HCPCS | Performed by: INTERNAL MEDICINE

## 2018-11-30 PROCEDURE — 93016 CV STRESS TEST SUPVJ ONLY: CPT | Performed by: INTERNAL MEDICINE

## 2018-11-30 PROCEDURE — 93018 CV STRESS TEST I&R ONLY: CPT | Performed by: INTERNAL MEDICINE

## 2018-11-30 PROCEDURE — 34300033 ZZH RX 343: Performed by: INTERNAL MEDICINE

## 2018-11-30 PROCEDURE — 78452 HT MUSCLE IMAGE SPECT MULT: CPT | Mod: 26 | Performed by: INTERNAL MEDICINE

## 2018-11-30 PROCEDURE — 93017 CV STRESS TEST TRACING ONLY: CPT

## 2018-11-30 RX ORDER — CAFFEINE 200 MG
200 TABLET ORAL
Status: DISCONTINUED | OUTPATIENT
Start: 2018-11-30 | End: 2018-12-01 | Stop reason: HOSPADM

## 2018-11-30 RX ORDER — ACYCLOVIR 200 MG/1
0-1 CAPSULE ORAL
Status: DISCONTINUED | OUTPATIENT
Start: 2018-11-30 | End: 2018-12-01 | Stop reason: HOSPADM

## 2018-11-30 RX ORDER — CAFFEINE CITRATE 20 MG/ML
60 SOLUTION INTRAVENOUS
Status: DISCONTINUED | OUTPATIENT
Start: 2018-11-30 | End: 2018-12-01 | Stop reason: HOSPADM

## 2018-11-30 RX ORDER — REGADENOSON 0.08 MG/ML
0.4 INJECTION, SOLUTION INTRAVENOUS ONCE
Status: COMPLETED | OUTPATIENT
Start: 2018-11-30 | End: 2018-11-30

## 2018-11-30 RX ORDER — ALBUTEROL SULFATE 90 UG/1
2 AEROSOL, METERED RESPIRATORY (INHALATION) EVERY 5 MIN PRN
Status: DISCONTINUED | OUTPATIENT
Start: 2018-11-30 | End: 2018-12-01 | Stop reason: HOSPADM

## 2018-11-30 RX ORDER — AMINOPHYLLINE 25 MG/ML
50-100 INJECTION, SOLUTION INTRAVENOUS
Status: DISCONTINUED | OUTPATIENT
Start: 2018-11-30 | End: 2018-12-01 | Stop reason: HOSPADM

## 2018-11-30 RX ADMIN — TETROFOSMIN 5.89 MCI.: 1.38 INJECTION, POWDER, LYOPHILIZED, FOR SOLUTION INTRAVENOUS at 08:24

## 2018-11-30 RX ADMIN — TETROFOSMIN 18.4 MCI.: 1.38 INJECTION, POWDER, LYOPHILIZED, FOR SOLUTION INTRAVENOUS at 09:38

## 2018-11-30 RX ADMIN — REGADENOSON 0.4 MG: 0.08 INJECTION, SOLUTION INTRAVENOUS at 09:33

## 2018-12-03 ENCOUNTER — CARE COORDINATION (OUTPATIENT)
Dept: CARDIOLOGY | Facility: CLINIC | Age: 83
End: 2018-12-03

## 2018-12-04 ENCOUNTER — OFFICE VISIT (OUTPATIENT)
Dept: CARDIOLOGY | Facility: CLINIC | Age: 83
End: 2018-12-04
Payer: COMMERCIAL

## 2018-12-04 VITALS
BODY MASS INDEX: 35.46 KG/M2 | HEIGHT: 67 IN | DIASTOLIC BLOOD PRESSURE: 80 MMHG | WEIGHT: 225.9 LBS | SYSTOLIC BLOOD PRESSURE: 136 MMHG

## 2018-12-04 DIAGNOSIS — I25.10 CAD (CORONARY ARTERY DISEASE): Primary | ICD-10-CM

## 2018-12-04 DIAGNOSIS — I10 ESSENTIAL HYPERTENSION: ICD-10-CM

## 2018-12-04 DIAGNOSIS — I25.118 CORONARY ARTERY DISEASE OF NATIVE ARTERY OF NATIVE HEART WITH STABLE ANGINA PECTORIS (H): Primary | ICD-10-CM

## 2018-12-04 PROCEDURE — 99214 OFFICE O/P EST MOD 30 MIN: CPT | Performed by: PHYSICIAN ASSISTANT

## 2018-12-04 RX ORDER — NITROGLYCERIN 0.4 MG/1
TABLET SUBLINGUAL
Qty: 25 TABLET | Refills: 1 | Status: SHIPPED | OUTPATIENT
Start: 2018-12-04 | End: 2018-12-27

## 2018-12-04 RX ORDER — PREDNISONE 20 MG/1
TABLET ORAL
Qty: 5 TABLET | Refills: 0 | Status: SHIPPED | OUTPATIENT
Start: 2018-12-04 | End: 2019-01-04

## 2018-12-04 NOTE — MR AVS SNAPSHOT
After Visit Summary   12/4/2018    Ivan Payton    MRN: [id number]           Patient Information     Date Of Birth          1935        Visit Information        Provider Department      12/4/2018 10:10 AM Justine Otero PA-C [facility]   [place]        Today's Diagnoses     Coronary artery disease of native artery of native heart with stable angina pectoris (H)    -  1    Essential hypertension          Care Instructions    Thank you for your [facility] visit today. Your provider has recommended the following:    Medication Changes:  No medication changes for now. I sent in a prescription for nitroglycerine to use as needed for symptoms.  Recommendations:  Call us with concerns. If you have persistent chest pain or shortness of breath - go to the ED.  Follow-up:  See us back for cardiology follow up after the angiogram.  Reminder:  Please bring in all current medications, over the counter supplements and vitamin bottles to your next appointment.            [facility]            Follow-ups after your visit        Additional Services     Follow-Up with Cardiac Advanced Practice Provider (post procedure)       5-7 day post procedure follow-up.                  Your next 10 appointments already scheduled     Dec 11, 2018  9:00 AM CST   PHYSICAL with William Thompson MD   [facility]    [address] [place] [id number] [phone]              Future tests that were ordered for you today     Open Future Orders        Priority Expected Expires Ordered    Cardiac Cath: Coronary Angiography Adult Order Routine 12/11/2018 12/4/2019 12/4/2018    Follow-Up with Cardiac Advanced Practice Provider (post procedure) Routine 12/16/2018 12/4/2019 12/4/2018            Who to contact     If you have questions or need follow up information  "about today's clinic visit or your schedule please contact [facility] directly at [phone].  Normal or non-critical lab and imaging results will be communicated to you by [employer], letter or phone within 4 business days after the clinic has received the results. If you do not hear from us within 7 days, please contact the clinic through [employer] or phone. If you have a critical or abnormal lab result, we will notify you by phone as soon as possible.  Submit refill requests through [employer] or call your pharmacy and they will forward the refill request to us. Please allow 3 business days for your refill to be completed.          Additional Information About Your Visit        [employer] Information     [employer] gives you secure access to your electronic health record. If you see a primary care provider, you can also send messages to your care team and make appointments. If you have questions, please call your primary care clinic.  If you do not have a primary care provider, please call [phone] and they will assist you.        Care EveryWhere ID     This is your Care EveryWhere ID. This could be used by other organizations to access your [facility] medical records  [id number]        Your Vitals Were     Height BMI (Body Mass Index)                1.702 m (5' 7\") 35.38 kg/m2           Blood Pressure from Last 3 Encounters:   12/04/18 136/80   11/30/18 120/60   11/29/18 112/70    Weight from Last 3 Encounters:   12/04/18 102.5 kg (225 lb 14.4 oz)   11/29/18 103.4 kg (228 lb)   02/13/18 103.4 kg (228 lb)                 Today's Medication Changes          These changes are accurate as of 12/4/18 10:42 AM.  If you have any questions, ask your nurse or doctor.               Start taking these medicines.        Dose/Directions    nitroGLYcerin 0.4 MG sublingual tablet   Commonly known as:  NITROSTAT   Used for:  Essential hypertension, Coronary artery disease of native artery of " native heart with stable angina pectoris (H)   Started by:  Justine Otero PA-C        For chest pain place 1 tablet under the tongue every 5 minutes for 3 doses. If symptoms persist 5 minutes after 1st dose call 911.   Quantity:  25 tablet   Refills:  1            Where to get your medicines      These medications were sent to [facility]pharmacy #[id number] - [place], [place] - [address]  [address] [facility] [address]     Phone:  [phone]     nitroGLYcerin 0.4 MG sublingual tablet                Primary Care Provider Office Phone # Fax #    William Thompson [phone]       [address] [facility] [address]        Equal Access to Services     MUKESH AWAN : Hadii fina burgos hadasho Sojonathan, waaxda luqadaha, qaybta kaalmada adeegyada, waxronald barrett. So [facility] [place] [phone].    ATENCIÓN: Si habla español, tiene a beckett disposición servicios gratuitos de asistencia lingüística. Ll[facility] [phone].    We comply with applicable federal civil rights laws and [place] laws. We do not discriminate on the basis of race, color, national origin, age, disability, sex, sexual orientation, or gender identity.            Thank you!     Thank you for choosing [facility]  for your care. Our goal is always to provide you with excellent care. Hearing back from our patients is one way we can continue to improve our services. Please take a few minutes to complete the written survey that you may receive in the mail after your visit with us. Thank you!             Your Updated Medication List - Protect others around you: Learn how to safely use, store and throw away your medicines at www.disposemymeds.org.          This list is accurate as of 12/4/18 10:42 AM.  Always use your most recent med list.                   Brand Name Dispense Instructions for use Diagnosis    aspirin 81 MG tablet    ASA    30 tablet    Take by mouth daily         atenolol 50 MG tablet    TENORMIN    90 tablet    TAKE 1 TABLET BY MOUTH EVERY DAY    Essential hypertension       atorvastatin 40 MG tablet    LIPITOR    90 tablet    Take 1 tablet (40 mg) by mouth daily    Coronary artery disease involving native coronary artery of native heart, angina presence unspecified       calcium carbonate 500 MG tablet    OS-SREEDHAR     Take 500 mg by mouth daily        clopidogrel 75 MG tablet    PLAVIX    90 tablet    Take 1 tablet (75 mg) by mouth daily        glucosamine-chondroitin 500-400 MG Caps per capsule      Take 1 capsule by mouth daily        lisinopril 10 MG tablet    PRINIVIL/ZESTRIL    90 tablet    Take 1 tablet (10 mg) by mouth daily    Essential hypertension       nitroGLYcerin 0.4 MG sublingual tablet    NITROSTAT    25 tablet    For chest pain place 1 tablet under the tongue every 5 minutes for 3 doses. If symptoms persist 5 minutes after 1st dose call 911.    Essential hypertension, Coronary artery disease of native artery of native heart with stable angina pectoris (H)       OMEGA-3 FISH OIL PO      Take 1 g by mouth daily        omeprazole 20 MG DR capsule    priLOSEC    90 capsule    Take 1 capsule (20 mg) by mouth daily    Atypical chest pain       triamterene-HCTZ 37.5-25 MG tablet    MAXZIDE-25    90 tablet    Take 1 tablet by mouth daily    Essential hypertension       vitamin B complex with vitamin C tablet      Take 1 tablet by mouth daily        VITAMIN C PO      Take 1,000 mg by mouth daily        VITAMIN D (CHOLECALCIFEROL) PO      Take 5,000 Units by mouth daily

## 2018-12-04 NOTE — LETTER
12/4/2018    William Thompson MD  [address]  [facility] [id number]    RE: Ivan Payton       Dear Colleague,    I had the pleasure of seeing Ivan Payton in the [facility].      Cardiology Progress Note    Date of Service: 12/04/2018  Patient seen today in follow up of: abnormal stress test  Primary cardiologist: Dr. Johansen    HPI:  Ivan Payton is a very pleasant 83 year old male with a history of coronary artery disease, carotid artery disease, hypertension and dyslipidemia who is here today for follow up after a recent stress test.     In 2014, he was found to have significant carotid artery disease.  He underwent left carotid endarterectomy.  During this evaluation, he was also found to have significant coronary artery disease by stress testing.  A stress test was done to evaluate some ongoing symptoms of exertional dyspnea.  He was not having chest pain.  Coronary angiography revealed severe multivessel coronary artery disease.  He preferred not to proceed with bypass surgery and went on to have multivessel stenting of his LAD, circumflex, and obtuse marginal arteries.  He has some residual disease within the PDA and distal obtuse marginal which was not intervened upon.  He is followed clinic on a yearly basis and has not had ongoing issues with chest discomfort until recently.  His previous stress tests have showed minimal inferior ischemia which has been medically managed.    He was recently seen in clinic by Dr. Johansen on 11/29/18.  At that visit, he noted he was dispensing nearly daily episodes of chest discomfort on his left side.  This occurred with exertion, such as when he was mowing the lawn during the summer. It resolves with rest although is not always predictable.  returns to discuss possibly treating with angiography after discussion elected to proceed with a Lexiscan nuclear stress test.  This was done on 11/30.  Myocardial perfusion imaging there was  a small to moderate sized area of ischemia involving the mid and distal inferior and inferolateral walls suspicious for coronary artery disease within the left circumflex distribution.  Compared to his prior stress test in 2017, the severity of a ischemia appeared greater. Dr. Johansen was concerned about his known residual OM disease and recommended further evaluation with coronary angiography.    He is here today with his wife, Margo. He remains on aspirin and plavix. He has not had issues with bleeding. His symptoms have been stable since his recent visit with Dr. Johansen. He continues to have primarily ongoing shortness of breath and some episodes of chest discomfort with exertion which is relieved with rest.     ASSESSMENT/PLAN:  1.  Coronary artery disease. With prior stenting of the LAD, circumflex, and OM. He had known residual small vessel disease within the PDA and distal obtuse marginal branch. As noted above, his stress test is suggestive of possible ischemia in the circumflex distribution. Dr. Johanesn has recommended coronary angiography for further evaluation. I reviewed the stress test results with the patient and his wife. The risks and benefits of coronary angiogram discussed today including, bleeding, bruising, infection, allergic reaction, kidney damage (including need for dialysis), stroke, heart attack, vascular damage, emergency open heart surgery, up to and including death.  He understands and is agreeable to proceed. He did have hives after his last angiogram and therefore we will premedicate him with 60 mg of prednisone the evening before the angiogram and 30 mg of prednisone in the morning. I did discuss possible adding Imdur as an addition anti-anginal. At this point, he does not want to do so. He is appropriately on aspirin, plavix, high intensity statin and beta blocker therapy. He does not have a prescription for as needed nitroglycerin at home. I instructed him on it's use and provided one  prescription for him today. I did advise him to seek evaluation in the ED for any persistent symptoms of chest discomfort or shortness of breath. We will see him back for re-evaluation after his angiogram. Of course, I asked him to contact us sooner with any concerns.   2.  Carotid artery disease. S/p prior L sided carotid enterectomy.   3.  Hypertension. This is well controlled.   4.  Hyperlipidemia. His recent lipid profile showed excellent control with an LDL of 68, HDL of 42, and total cholesterol of 132. He remains on Lipitor 40 mg daily.    This note was completed in part using Dragon voice recognition software. Although reviewed after completion, some word and grammatical errors may occur.    Orders this Visit:  No orders of the defined types were placed in this encounter.    No orders of the defined types were placed in this encounter.    There are no discontinued medications.    CURRENT MEDICATIONS:  Current Outpatient Prescriptions   Medication Sig Dispense Refill     Ascorbic Acid (VITAMIN C PO) Take 1,000 mg by mouth daily       aspirin 81 MG tablet Take by mouth daily 30 tablet      atenolol (TENORMIN) 50 MG tablet TAKE 1 TABLET BY MOUTH EVERY DAY 90 tablet 2     atorvastatin (LIPITOR) 40 MG tablet Take 1 tablet (40 mg) by mouth daily 90 tablet 3     calcium carbonate (OS-SREEDHAR 500 MG [place]. [place]) 500 MG tablet Take 500 mg by mouth daily       clopidogrel (PLAVIX) 75 MG tablet Take 1 tablet (75 mg) by mouth daily 90 tablet 3     glucosamine-chondroitin 500-400 MG CAPS Take 1 capsule by mouth daily       lisinopril (PRINIVIL/ZESTRIL) 10 MG tablet Take 1 tablet (10 mg) by mouth daily 90 tablet 3     Omega-3 Fatty Acids (OMEGA-3 FISH OIL PO) Take 1 g by mouth daily        omeprazole (PRILOSEC) 20 MG DR capsule Take 1 capsule (20 mg) by mouth daily 90 capsule 3     triamterene-hydrochlorothiazide (MAXZIDE-25) 37.5-25 MG per tablet Take 1 tablet by mouth daily 90 tablet 3     vitamin  B complex with vitamin C  (VITAMIN  B COMPLEX) TABS Take 1 tablet by mouth daily       VITAMIN D, CHOLECALCIFEROL, PO Take 5,000 Units by mouth daily        ALLERGIES  Allergies   Allergen Reactions     Contrast Dye Hives     PAST MEDICAL HISTORY:  Past Medical History:   Diagnosis Date     Arthritis      Cerebral vascular disease 3/2014    multiple intracranial stenoses - lt post communic, lt post cerebral, rt middle cerebral, bilat porx M2 segments     Claustrophobia     Need sedation for MRI scans     Coronary artery disease 2014    Cath 9/2014: PTCA and KIKE to mLAD, KIKE to prox circumflex, PTCA and DESx2 to OM2     CVA (cerebral infarction) 3/2014    2 small acute lesions noted left parietal/left posterior frontal region. Old lacunar infarct left caudate nucleus     Enlarged prostate      Essential hypertension      GERD (gastroesophageal reflux disease)      Hiatal hernia      Hydrocele     Right hydrocelectomy 2/2012     Mixed hyperlipidemia      RBBB (right bundle branch block) 3/2014     Shortness of breath      Stented coronary artery      Uncomplicated asthma      PAST SURGICAL HISTORY:  Past Surgical History:   Procedure Laterality Date     ENDARTERECTOMY CAROTID  3/26/2014    Procedure: ENDARTERECTOMY CAROTID;  LEFT CAROTID ENDARTERECTOMY WITH EEG;  Surgeon: Yobani Jones MD;  Location:  OR      REMOVAL OF TONSILS,<14 Y/O      Tonsils <13y.o.     HEART CATH, ANGIOPLASTY  9/9/14    Stenting of LAD,Prox LCx, and 2 stents to OM2     HERNIORRHAPHY INGUINAL      Right     HERNIORRHAPHY INGUINAL  2/10/2012    Procedure:HERNIORRHAPHY INGUINAL; LEFT OPEN INGUINAL HERNIA REPAIR WITH MESH, RIGHT HYDROCELECTOMY; Surgeon:JULI LOPES; Location:[facility]     HYDROCELECTOMY INGUINAL  2/10/2012    Procedure:HYDROCELECTOMY INGUINAL; Surgeon:JULI LOPES; Location:[facility]     HYDROCELECTOMY SCROTAL Right 1/29/2016    Procedure: HYDROCELECTOMY SCROTAL;  Surgeon: Yobani Stevens MD;  Location: [facility]     LAPAROSCOPIC  HERNIORRHAPHY INGUINAL BILATERAL Bilateral 5/24/2016    Procedure: LAPAROSCOPIC HERNIORRHAPHY INGUINAL BILATERAL;  Surgeon: Chandler Bowen MD;  Location: [facility] OR     MR BRAIN AND ORBITS  3/2014    6 mm area of restricted diffusion subcortical white matter left parietal lobe/questionable area of restricted diffusion left posterior frontal region - c/w recent small infarcts. Small chronic lacunar infarct left caudate nucleus     SURGICAL HISTORY OF -       tonail removal     FAMILY HISTORY:  Family History   Problem Relation Age of Onset     C.A.D. Brother      older brother, CABG about age 56     Respiratory Father      lung disease - farmer,      Hypertension Mother      C.A.D. Brother      younger brother, angioplasty     Hypertension Sister      Hypertension Brother      SOCIAL HISTORY:  Social History     Social History     Marital status:      Spouse name: madhavi     Number of children: 2     Years of education: N/A     Occupational History     part time, building maintenance Retired     Social History Main Topics     Smoking status: Former Smoker     Packs/day: 0.50     Years: 20.00     Types: Cigarettes     Quit date: 6/4/1968     Smokeless tobacco: Former User     Alcohol use No     Drug use: No     Sexual activity: Yes     Partners: Female      Comment:      Other Topics Concern     Blood Transfusions No     Caffeine Concern Yes     1-2 cups per day     Occupational Exposure No     Hobby Hazards No     Sleep Concern No     Stress Concern No     Weight Concern No     Special Diet No     Back Care No     Exercise Yes     gym 4 days week, treadmill, 1.5 miles,  weights     Bike Helmet No     Seat Belt Yes     Self-Exams No     Parent/Sibling W/ Cabg, Mi Or Angioplasty Before 65f 55m? Yes     brothers - 1 had CABG 1 had MI pt unsure of age     Social History Narrative     Review of Systems:  Skin:  Positive for lumps or bumps   Eyes:  Positive for glasses  ENT:  Negative   "  Respiratory:  Positive for dyspnea on exertion  Cardiovascular:    Positive for;fatigue;edema  Gastroenterology: Negative    Genitourinary:  Negative    Musculoskeletal:  Positive for arthritis  Neurologic:  Negative    Psychiatric:  Negative    Heme/Lymph/Imm:  Negative    Endocrine:  Negative       Physical Exam:  Vitals: /80  Ht 1.702 m (5' 7\")  Wt 102.5 kg (225 lb 14.4 oz)  BMI 35.38 kg/m2   Wt Readings from Last 4 Encounters:   12/04/18 102.5 kg (225 lb 14.4 oz)   11/29/18 103.4 kg (228 lb)   02/13/18 103.4 kg (228 lb)   01/03/18 102.1 kg (225 lb)     GEN: well nourished, in no acute distress.  HEENT:  Pupils equal, round. Sclerae nonicteric.   C/V:  Regular rate and rhythm   RESP: Respirations are unlabored.   EXTREM: No LE edema.  NEURO: Alert and oriented, cooperative.  SKIN: Warm and dry.     Recent Lab Results:  LIPID RESULTS:  Lab Results   Component Value Date    CHOL 132 11/27/2018    HDL 42 11/27/2018    LDL 68 11/27/2018    TRIG 112 11/27/2018    CHOLHDLRATIO 3.0 06/19/2015     LIVER ENZYME RESULTS:  Lab Results   Component Value Date    AST 12 02/13/2018    ALT 23 02/13/2018     CBC RESULTS:  Lab Results   Component Value Date    WBC 6.1 02/13/2018    RBC 4.94 02/13/2018    HGB 15.5 02/13/2018    HCT 45.8 02/13/2018    MCV 93 02/13/2018    MCH 31.4 02/13/2018    MCHC 33.8 02/13/2018    RDW 14.4 02/13/2018     02/13/2018     BMP RESULTS:  Lab Results   Component Value Date     02/13/2018    POTASSIUM 4.3 02/13/2018    CHLORIDE 107 02/13/2018    CO2 29 02/13/2018    ANIONGAP 4 02/13/2018     (H) 02/13/2018    BUN 23 02/13/2018    CR 0.85 02/13/2018    GFRESTIMATED 87 02/13/2018    GFRESTBLACK >90 02/13/2018    SREEDHAR 8.7 02/13/2018      A1C RESULTS:  Lab Results   Component Value Date    A1C 6.3 (H) 02/13/2018     INR RESULTS:  Lab Results   Component Value Date    INR 1.03 09/09/2014    INR 1.04 08/05/2014       New/Pertinent imaging results since last visit:  NM Lexiscan " stress test 11/30/18:  Impression  1.  Myocardial perfusion imaging using single isotope technique  demonstrated a small to moderate sized area of ischemia involving the  mid and distal inferior and inferolateral walls, most suspicious for  significant coronary artery disease within the left circumflex artery  distribution.   2. Gated images demonstrated normal wall motion and normal left  ventricular chamber size.  The left ventricular systolic function is  normal, with an ejection fraction measured at 67%.  3. Compared to the prior study from 8/8/2017, the size of the  perfusion defect may be slightly smaller than previously seen, but the  severity of ischemia seems slightly greater and I no longer appreciate  any significant nontransmural infarction as previously suggested. The  patient had significant residual obtuse marginal disease at the time  of his original stenting in 2014. I believe that disease is now  symptomatic and causing is abnormal stress test findings. The disease  may have progressed since the previous angiogram. Based on his report  that he was experiencing near daily symptoms of chest discomfort, I  would recommend repeat coronary angiography unless the patient would  be insistent on continuing a medical management strategy.       Justine Otero PA-C  [facility]      Thank you for allowing me to participate in the care of your patient.      Sincerely,     Justine Otero PA-C     [facility]

## 2018-12-04 NOTE — PROGRESS NOTES
Cardiology Progress Note    Date of Service: 12/04/2018  Patient seen today in follow up of: abnormal stress test  Primary cardiologist: Dr. Johansen    HPI:  Ivan Payton is a very pleasant 83 year old male with a history of coronary artery disease, carotid artery disease, hypertension and dyslipidemia who is here today for follow up after a recent stress test.     In 2014, he was found to have significant carotid artery disease.  He underwent left carotid endarterectomy.  During this evaluation, he was also found to have significant coronary artery disease by stress testing.  A stress test was done to evaluate some ongoing symptoms of exertional dyspnea.  He was not having chest pain.  Coronary angiography revealed severe multivessel coronary artery disease.  He preferred not to proceed with bypass surgery and went on to have multivessel stenting of his LAD, circumflex, and obtuse marginal arteries.  He has some residual disease within the PDA and distal obtuse marginal which was not intervened upon.  He is followed clinic on a yearly basis and has not had ongoing issues with chest discomfort until recently.  His previous stress tests have showed minimal inferior ischemia which has been medically managed.    He was recently seen in clinic by Dr. Johansen on 11/29/18.  At that visit, he noted he was dispensing nearly daily episodes of chest discomfort on his left side.  This occurred with exertion, such as when he was mowing the lawn during the summer. It resolves with rest although is not always predictable.  returns to discuss possibly treating with angiography after discussion elected to proceed with a Lexiscan nuclear stress test.  This was done on 11/30.  Myocardial perfusion imaging there was a small to moderate sized area of ischemia involving the mid and distal inferior and inferolateral walls suspicious for coronary artery disease within the left circumflex distribution.  Compared to his prior stress test in  2017, the severity of a ischemia appeared greater. Dr. Johansen was concerned about his known residual OM disease and recommended further evaluation with coronary angiography.    He is here today with his wife, Margo. He remains on aspirin and plavix. He has not had issues with bleeding. His symptoms have been stable since his recent visit with Dr. Johansen. He continues to have primarily ongoing shortness of breath and some episodes of chest discomfort with exertion which is relieved with rest.     ASSESSMENT/PLAN:  1.  Coronary artery disease. With prior stenting of the LAD, circumflex, and OM. He had known residual small vessel disease within the PDA and distal obtuse marginal branch. As noted above, his stress test is suggestive of possible ischemia in the circumflex distribution. Dr. Johansen has recommended coronary angiography for further evaluation. I reviewed the stress test results with the patient and his wife. The risks and benefits of coronary angiogram discussed today including, bleeding, bruising, infection, allergic reaction, kidney damage (including need for dialysis), stroke, heart attack, vascular damage, emergency open heart surgery, up to and including death.  He understands and is agreeable to proceed. He did have hives after his last angiogram and therefore we will premedicate him with 60 mg of prednisone the evening before the angiogram and 30 mg of prednisone in the morning. I did discuss possible adding Imdur as an addition anti-anginal. At this point, he does not want to do so. He is appropriately on aspirin, plavix, high intensity statin and beta blocker therapy. He does not have a prescription for as needed nitroglycerin at home. I instructed him on it's use and provided one prescription for him today. I did advise him to seek evaluation in the ED for any persistent symptoms of chest discomfort or shortness of breath. We will see him back for re-evaluation after his angiogram. Of course, I  asked him to contact us sooner with any concerns.   2.  Carotid artery disease. S/p prior L sided carotid enterectomy.   3.  Hypertension. This is well controlled.   4.  Hyperlipidemia. His recent lipid profile showed excellent control with an LDL of 68, HDL of 42, and total cholesterol of 132. He remains on Lipitor 40 mg daily.    This note was completed in part using Dragon voice recognition software. Although reviewed after completion, some word and grammatical errors may occur.    Orders this Visit:  No orders of the defined types were placed in this encounter.    No orders of the defined types were placed in this encounter.    There are no discontinued medications.    CURRENT MEDICATIONS:  Current Outpatient Prescriptions   Medication Sig Dispense Refill     Ascorbic Acid (VITAMIN C PO) Take 1,000 mg by mouth daily       aspirin 81 MG tablet Take by mouth daily 30 tablet      atenolol (TENORMIN) 50 MG tablet TAKE 1 TABLET BY MOUTH EVERY DAY 90 tablet 2     atorvastatin (LIPITOR) 40 MG tablet Take 1 tablet (40 mg) by mouth daily 90 tablet 3     calcium carbonate (OS-SREEDHAR 500 MG [place]. [place]) 500 MG tablet Take 500 mg by mouth daily       clopidogrel (PLAVIX) 75 MG tablet Take 1 tablet (75 mg) by mouth daily 90 tablet 3     glucosamine-chondroitin 500-400 MG CAPS Take 1 capsule by mouth daily       lisinopril (PRINIVIL/ZESTRIL) 10 MG tablet Take 1 tablet (10 mg) by mouth daily 90 tablet 3     Omega-3 Fatty Acids (OMEGA-3 FISH OIL PO) Take 1 g by mouth daily        omeprazole (PRILOSEC) 20 MG DR capsule Take 1 capsule (20 mg) by mouth daily 90 capsule 3     triamterene-hydrochlorothiazide (MAXZIDE-25) 37.5-25 MG per tablet Take 1 tablet by mouth daily 90 tablet 3     vitamin  B complex with vitamin C (VITAMIN  B COMPLEX) TABS Take 1 tablet by mouth daily       VITAMIN D, CHOLECALCIFEROL, PO Take 5,000 Units by mouth daily        ALLERGIES  Allergies   Allergen Reactions     Contrast Dye Hives     PAST MEDICAL  HISTORY:  Past Medical History:   Diagnosis Date     Arthritis      Cerebral vascular disease 3/2014    multiple intracranial stenoses - lt post communic, lt post cerebral, rt middle cerebral, bilat porx M2 segments     Claustrophobia     Need sedation for MRI scans     Coronary artery disease 2014    Cath 9/2014: PTCA and KIKE to mLAD, KIKE to prox circumflex, PTCA and DESx2 to OM2     CVA (cerebral infarction) 3/2014    2 small acute lesions noted left parietal/left posterior frontal region. Old lacunar infarct left caudate nucleus     Enlarged prostate      Essential hypertension      GERD (gastroesophageal reflux disease)      Hiatal hernia      Hydrocele     Right hydrocelectomy 2/2012     Mixed hyperlipidemia      RBBB (right bundle branch block) 3/2014     Shortness of breath      Stented coronary artery      Uncomplicated asthma      PAST SURGICAL HISTORY:  Past Surgical History:   Procedure Laterality Date     ENDARTERECTOMY CAROTID  3/26/2014    Procedure: ENDARTERECTOMY CAROTID;  LEFT CAROTID ENDARTERECTOMY WITH EEG;  Surgeon: Yobani Jones MD;  Location:  OR      REMOVAL OF TONSILS,<12 Y/O      Tonsils <13y.o.     HEART CATH, ANGIOPLASTY  9/9/14    Stenting of LAD,Prox LCx, and 2 stents to OM2     HERNIORRHAPHY INGUINAL      Right     HERNIORRHAPHY INGUINAL  2/10/2012    Procedure:HERNIORRHAPHY INGUINAL; LEFT OPEN INGUINAL HERNIA REPAIR WITH MESH, RIGHT HYDROCELECTOMY; Surgeon:JULI LOPES; Location:[facility]     HYDROCELECTOMY INGUINAL  2/10/2012    Procedure:HYDROCELECTOMY INGUINAL; Surgeon:JULI LOPES; Location:[facility]     HYDROCELECTOMY SCROTAL Right 1/29/2016    Procedure: HYDROCELECTOMY SCROTAL;  Surgeon: Yobani Stevens MD;  Location: [facility]     LAPAROSCOPIC HERNIORRHAPHY INGUINAL BILATERAL Bilateral 5/24/2016    Procedure: LAPAROSCOPIC HERNIORRHAPHY INGUINAL BILATERAL;  Surgeon: Chandler Bowen MD;  Location:  OR     MR BRAIN AND ORBITS  3/2014    6 mm area of  restricted diffusion subcortical white matter left parietal lobe/questionable area of restricted diffusion left posterior frontal region - c/w recent small infarcts. Small chronic lacunar infarct left caudate nucleus     SURGICAL HISTORY OF -       tonail removal     FAMILY HISTORY:  Family History   Problem Relation Age of Onset     C.A.OCTAVIANO. Brother      older brother, CABG about age 56     Respiratory Father      lung disease - farmer,      Hypertension Mother      C.A.OCTAVIANO. Brother      younger brother, angioplasty     Hypertension Sister      Hypertension Brother      SOCIAL HISTORY:  Social History     Social History     Marital status:      Spouse name: madhavi     Number of children: 2     Years of education: N/A     Occupational History     part time, building maintenance Retired     Social History Main Topics     Smoking status: Former Smoker     Packs/day: 0.50     Years: 20.00     Types: Cigarettes     Quit date: 6/4/1968     Smokeless tobacco: Former User     Alcohol use No     Drug use: No     Sexual activity: Yes     Partners: Female      Comment:      Other Topics Concern     Blood Transfusions No     Caffeine Concern Yes     1-2 cups per day     Occupational Exposure No     Hobby Hazards No     Sleep Concern No     Stress Concern No     Weight Concern No     Special Diet No     Back Care No     Exercise Yes     gym 4 days week, treadmill, 1.5 miles,  weights     Bike Helmet No     Seat Belt Yes     Self-Exams No     Parent/Sibling W/ Cabg, Mi Or Angioplasty Before 65f 55m? Yes     brothers - 1 had CABG 1 had MI pt unsure of age     Social History Narrative     Review of Systems:  Skin:  Positive for lumps or bumps   Eyes:  Positive for glasses  ENT:  Negative    Respiratory:  Positive for dyspnea on exertion  Cardiovascular:    Positive for;fatigue;edema  Gastroenterology: Negative    Genitourinary:  Negative    Musculoskeletal:  Positive for arthritis  Neurologic:  Negative   "  Psychiatric:  Negative    Heme/Lymph/Imm:  Negative    Endocrine:  Negative       Physical Exam:  Vitals: /80  Ht 1.702 m (5' 7\")  Wt 102.5 kg (225 lb 14.4 oz)  BMI 35.38 kg/m2   Wt Readings from Last 4 Encounters:   12/04/18 102.5 kg (225 lb 14.4 oz)   11/29/18 103.4 kg (228 lb)   02/13/18 103.4 kg (228 lb)   01/03/18 102.1 kg (225 lb)     GEN: well nourished, in no acute distress.  HEENT:  Pupils equal, round. Sclerae nonicteric.   C/V:  Regular rate and rhythm   RESP: Respirations are unlabored.   EXTREM: No LE edema.  NEURO: Alert and oriented, cooperative.  SKIN: Warm and dry.     Recent Lab Results:  LIPID RESULTS:  Lab Results   Component Value Date    CHOL 132 11/27/2018    HDL 42 11/27/2018    LDL 68 11/27/2018    TRIG 112 11/27/2018    CHOLHDLRATIO 3.0 06/19/2015     LIVER ENZYME RESULTS:  Lab Results   Component Value Date    AST 12 02/13/2018    ALT 23 02/13/2018     CBC RESULTS:  Lab Results   Component Value Date    WBC 6.1 02/13/2018    RBC 4.94 02/13/2018    HGB 15.5 02/13/2018    HCT 45.8 02/13/2018    MCV 93 02/13/2018    MCH 31.4 02/13/2018    MCHC 33.8 02/13/2018    RDW 14.4 02/13/2018     02/13/2018     BMP RESULTS:  Lab Results   Component Value Date     02/13/2018    POTASSIUM 4.3 02/13/2018    CHLORIDE 107 02/13/2018    CO2 29 02/13/2018    ANIONGAP 4 02/13/2018     (H) 02/13/2018    BUN 23 02/13/2018    CR 0.85 02/13/2018    GFRESTIMATED 87 02/13/2018    GFRESTBLACK >90 02/13/2018    SREEDHAR 8.7 02/13/2018      A1C RESULTS:  Lab Results   Component Value Date    A1C 6.3 (H) 02/13/2018     INR RESULTS:  Lab Results   Component Value Date    INR 1.03 09/09/2014    INR 1.04 08/05/2014       New/Pertinent imaging results since last visit:  NM Lexiscan stress test 11/30/18:  Impression  1.  Myocardial perfusion imaging using single isotope technique  demonstrated a small to moderate sized area of ischemia involving the  mid and distal inferior and inferolateral walls, " most suspicious for  significant coronary artery disease within the left circumflex artery  distribution.   2. Gated images demonstrated normal wall motion and normal left  ventricular chamber size.  The left ventricular systolic function is  normal, with an ejection fraction measured at 67%.  3. Compared to the prior study from 8/8/2017, the size of the  perfusion defect may be slightly smaller than previously seen, but the  severity of ischemia seems slightly greater and I no longer appreciate  any significant nontransmural infarction as previously suggested. The  patient had significant residual obtuse marginal disease at the time  of his original stenting in 2014. I believe that disease is now  symptomatic and causing is abnormal stress test findings. The disease  may have progressed since the previous angiogram. Based on his report  that he was experiencing near daily symptoms of chest discomfort, I  would recommend repeat coronary angiography unless the patient would  be insistent on continuing a medical management strategy.       Justine Otero PA-C  [facility]

## 2018-12-04 NOTE — PATIENT INSTRUCTIONS
Thank you for your [facility] visit today. Your provider has recommended the following:    Medication Changes:  No medication changes for now. I sent in a prescription for nitroglycerine to use as needed for symptoms.  Recommendations:  Call us with concerns. If you have persistent chest pain or shortness of breath - go to the ED.  Follow-up:  See us back for cardiology follow up after the angiogram.  Reminder:  Please bring in all current medications, over the counter supplements and vitamin bottles to your next appointment.            [facility]

## 2018-12-11 ENCOUNTER — OFFICE VISIT (OUTPATIENT)
Dept: FAMILY MEDICINE | Facility: CLINIC | Age: 83
End: 2018-12-11
Payer: COMMERCIAL

## 2018-12-11 VITALS
RESPIRATION RATE: 20 BRPM | HEIGHT: 67 IN | WEIGHT: 222 LBS | TEMPERATURE: 97.8 F | SYSTOLIC BLOOD PRESSURE: 128 MMHG | DIASTOLIC BLOOD PRESSURE: 76 MMHG | BODY MASS INDEX: 34.84 KG/M2 | OXYGEN SATURATION: 96 % | HEART RATE: 68 BPM

## 2018-12-11 DIAGNOSIS — R73.01 IFG (IMPAIRED FASTING GLUCOSE): ICD-10-CM

## 2018-12-11 DIAGNOSIS — Z00.00 ROUTINE HISTORY AND PHYSICAL EXAMINATION OF ADULT: Primary | ICD-10-CM

## 2018-12-11 DIAGNOSIS — I25.10 CORONARY ARTERY DISEASE INVOLVING NATIVE HEART, ANGINA PRESENCE UNSPECIFIED, UNSPECIFIED VESSEL OR LESION TYPE: ICD-10-CM

## 2018-12-11 DIAGNOSIS — I10 ESSENTIAL HYPERTENSION: ICD-10-CM

## 2018-12-11 DIAGNOSIS — E78.5 HYPERLIPIDEMIA WITH TARGET LDL LESS THAN 100: ICD-10-CM

## 2018-12-11 DIAGNOSIS — K21.9 GASTROESOPHAGEAL REFLUX DISEASE WITHOUT ESOPHAGITIS: ICD-10-CM

## 2018-12-11 LAB
ALBUMIN SERPL-MCNC: 3.6 G/DL (ref 3.4–5)
ALP SERPL-CCNC: 50 U/L (ref 40–150)
ALT SERPL W P-5'-P-CCNC: 25 U/L (ref 0–70)
ANION GAP SERPL CALCULATED.3IONS-SCNC: 10 MMOL/L (ref 3–14)
AST SERPL W P-5'-P-CCNC: 10 U/L (ref 0–45)
BASOPHILS # BLD AUTO: 0 10E9/L (ref 0–0.2)
BASOPHILS NFR BLD AUTO: 0.5 %
BILIRUB SERPL-MCNC: 0.5 MG/DL (ref 0.2–1.3)
BUN SERPL-MCNC: 22 MG/DL (ref 7–30)
CALCIUM SERPL-MCNC: 9.1 MG/DL (ref 8.5–10.1)
CHLORIDE SERPL-SCNC: 105 MMOL/L (ref 94–109)
CO2 SERPL-SCNC: 25 MMOL/L (ref 20–32)
CREAT SERPL-MCNC: 0.87 MG/DL (ref 0.66–1.25)
DIFFERENTIAL METHOD BLD: NORMAL
EOSINOPHIL # BLD AUTO: 0.2 10E9/L (ref 0–0.7)
EOSINOPHIL NFR BLD AUTO: 3.7 %
ERYTHROCYTE [DISTWIDTH] IN BLOOD BY AUTOMATED COUNT: 14.4 % (ref 10–15)
GFR SERPL CREATININE-BSD FRML MDRD: 84 ML/MIN/1.7M2
GLUCOSE SERPL-MCNC: 136 MG/DL (ref 70–99)
HBA1C MFR BLD: 6.5 % (ref 0–5.6)
HCT VFR BLD AUTO: 48.7 % (ref 40–53)
HGB BLD-MCNC: 16.3 G/DL (ref 13.3–17.7)
LYMPHOCYTES # BLD AUTO: 1.1 10E9/L (ref 0.8–5.3)
LYMPHOCYTES NFR BLD AUTO: 18 %
MCH RBC QN AUTO: 31 PG (ref 26.5–33)
MCHC RBC AUTO-ENTMCNC: 33.5 G/DL (ref 31.5–36.5)
MCV RBC AUTO: 93 FL (ref 78–100)
MONOCYTES # BLD AUTO: 0.8 10E9/L (ref 0–1.3)
MONOCYTES NFR BLD AUTO: 12.5 %
NEUTROPHILS # BLD AUTO: 3.9 10E9/L (ref 1.6–8.3)
NEUTROPHILS NFR BLD AUTO: 65.3 %
PLATELET # BLD AUTO: 179 10E9/L (ref 150–450)
POTASSIUM SERPL-SCNC: 3.7 MMOL/L (ref 3.4–5.3)
PROT SERPL-MCNC: 6.8 G/DL (ref 6.8–8.8)
RBC # BLD AUTO: 5.26 10E12/L (ref 4.4–5.9)
SODIUM SERPL-SCNC: 140 MMOL/L (ref 133–144)
WBC # BLD AUTO: 6 10E9/L (ref 4–11)

## 2018-12-11 PROCEDURE — 99397 PER PM REEVAL EST PAT 65+ YR: CPT | Performed by: INTERNAL MEDICINE

## 2018-12-11 PROCEDURE — 85025 COMPLETE CBC W/AUTO DIFF WBC: CPT | Performed by: INTERNAL MEDICINE

## 2018-12-11 PROCEDURE — 36415 COLL VENOUS BLD VENIPUNCTURE: CPT | Performed by: INTERNAL MEDICINE

## 2018-12-11 PROCEDURE — 83036 HEMOGLOBIN GLYCOSYLATED A1C: CPT | Performed by: INTERNAL MEDICINE

## 2018-12-11 PROCEDURE — 80053 COMPREHEN METABOLIC PANEL: CPT | Performed by: INTERNAL MEDICINE

## 2018-12-11 ASSESSMENT — ACTIVITIES OF DAILY LIVING (ADL): CURRENT_FUNCTION: NO ASSISTANCE NEEDED

## 2018-12-11 ASSESSMENT — MIFFLIN-ST. JEOR: SCORE: 1665.62

## 2018-12-11 NOTE — LETTER
December 11, 2018      Ivan Payton  [address] [place] [address]        Dear ,    We are writing to inform you of your test results.       Your glucose is a bit higher, and now is technically in the diabetic range, though just barely.     The A1C of 6.5 correlates to an average blood sugar of approximately 135.     Please work harder on restricting carbohydrates ( starches, sweets, etc).             The kidney and liver and bone marrow function tests are all normal.    Results for orders placed or performed in visit on 12/11/18   Comprehensive metabolic panel (BMP + Alb, Alk Phos, ALT, AST, Total. Bili, TP)   Result Value Ref Range    Sodium 140 133 - 144 mmol/L    Potassium 3.7 3.4 - 5.3 mmol/L    Chloride 105 94 - 109 mmol/L    Carbon Dioxide 25 20 - 32 mmol/L    Anion Gap 10 3 - 14 mmol/L    Glucose 136 (H) 70 - 99 mg/dL    Urea Nitrogen 22 7 - 30 mg/dL    Creatinine 0.87 0.66 - 1.25 mg/dL    GFR Estimate 84 >60 mL/min/1.7m2    GFR Estimate If Black >90 >60 mL/min/1.7m2    Calcium 9.1 8.5 - 10.1 mg/dL    Bilirubin Total 0.5 0.2 - 1.3 mg/dL    Albumin 3.6 3.4 - 5.0 g/dL    Protein Total 6.8 6.8 - 8.8 g/dL    Alkaline Phosphatase 50 40 - 150 U/L    ALT 25 0 - 70 U/L    AST 10 0 - 45 U/L   Hemoglobin A1c   Result Value Ref Range    Hemoglobin A1C 6.5 (H) 0 - 5.6 %   CBC with platelets and differential   Result Value Ref Range    WBC 6.0 4.0 - 11.0 10e9/L    RBC Count 5.26 4.4 - 5.9 10e12/L    Hemoglobin 16.3 13.3 - 17.7 g/dL    Hematocrit 48.7 40.0 - 53.0 %    MCV 93 78 - 100 fl    MCH 31.0 26.5 - 33.0 pg    MCHC 33.5 31.5 - 36.5 g/dL    RDW 14.4 10.0 - 15.0 %    Platelet Count 179 150 - 450 10e9/L    % Neutrophils 65.3 %    % Lymphocytes 18.0 %    % Monocytes 12.5 %    % Eosinophils 3.7 %    % Basophils 0.5 %    Absolute Neutrophil 3.9 1.6 - 8.3 10e9/L    Absolute Lymphocytes 1.1 0.8 - 5.3 10e9/L    Absolute Monocytes 0.8 0.0 - 1.3 10e9/L    Absolute Eosinophils 0.2 0.0 - 0.7 10e9/L     Absolute Basophils 0.0 0.0 - 0.2 10e9/L    Diff Method Automated Method          If you have any questions or concerns, please call the clinic at the number listed above.       Sincerely,        William Thompson MD

## 2018-12-11 NOTE — PROGRESS NOTES
"SUBJECTIVE:   Ivan Payton is a 83 year old male who presents for Preventive Visit.      Are you in the first 12 months of your Medicare coverage?  No    Annual Wellness Visit     In general, how would you rate your overall health?  Good    Frequency of exercise:  2-3 days/week    Do you usually eat at least 4 servings of fruit and vegetables a day, include whole grains    & fiber and avoid regularly eating high fat or \"junk\" foods?  No    Taking medications regularly:  Yes    Medication side effects:  None    Ability to successfully perform activities of daily living:  No assistance needed    Home Safety:  No safety concerns identified    Hearing Impairment:  Difficulty following dialogue in the theater    In the past 6 months, have you been bothered by leaking of urine?  No    In general, how would you rate your overall mental or emotional health?  Good    PHQ-2 Total Score: 0    Additional concerns today:  No    Do you feel safe in your environment? Yes    Do you have a Health Care Directive? Yes: Advance Directive has been received and scanned.      Fall risk     No falls within last year  Cognitive Screening   1) Repeat 3 items (Leader, Season, Table)    2) Clock draw: NORMAL  3) 3 item recall: Recalls 3 objects  Results: 3 items recalled: COGNITIVE IMPAIRMENT LESS LIKELY    Mini-CogTM Copyright S Abdirashid. Licensed by the author for use in [facility]; reprinted with permission (solukas@.[email or web address]). All rights reserved.      Do you have sleep apnea, excessive snoring or daytime drowsiness?: no    Reviewed and updated as needed this visit by clinical staff  Tobacco  Allergies  Meds  Med Hx  Surg Hx  Fam Hx  Soc Hx        Reviewed and updated as needed this visit by Provider                   Cardiology ordered every day PPI;  pt reports no change in sx.       (minimal sx)                                                        Has a coronary angio coming up.                   He wants to have his " prior interventionist, Dr Nogueira, do the procedure.              He is undecided whether to insist on this.                                                                  S/p influenza vaccine; does not agree to have Prevnar.                                          Alcohol Use 12/11/2018   If you drink alcohol do you typically have greater than 3 drinks per day OR greater than 7 drinks per week? No               Current providers sharing in care for this patient include:   Patient Care Team:  William Thompson MD as PCP - General (Internal Medicine)    The following health maintenance items are reviewed in Epic and correct as of today:  Health Maintenance   Topic Date Due     DTAP/TDAP/TD IMMUNIZATION (1 - Tdap) 10/22/1942     ZOSTER IMMUNIZATION (1 of 2) 10/22/1986     PNEUMOVAX IMMUNIZATION 65+ LOW/MEDIUM RISK (1 of 2 - PCV13) 10/22/2001     FALL RISK ASSESSMENT  05/10/2018     INFLUENZA VACCINE (1) 09/01/2018     PHQ-2 Q1 YR  02/13/2019     ADVANCE DIRECTIVE PLANNING Q5 YRS  01/09/2022     IPV IMMUNIZATION  Aged Out     MENINGITIS IMMUNIZATION  Aged Out     Patient Active Problem List    Diagnosis Date Noted     Coronary artery disease      Priority: High     Cath 9/2014: PTCA and KIKE to mLAD, KIKE to prox circumflex, PTCA and DESx2 to OM2          nuc med stress 12/15; ?minor ischemia; see Cardiology note;  Ok to stop plavix pending vasc surg input.    Continue plavix per Dr Jones         Dyspnea 07/21/2014     Priority: High     Carotid stenosis 03/26/2014     Priority: High     S/p L CEA; R ICA ok;          See CUS 12/15 and Dr Jones's consult; continue plavix       Essential hypertension      Priority: High     Problem list name updated by automated process. Provider to review       Cerebral infarction (H) 03/01/2014     Priority: High     2 small acute lesions noted left parietal/left posterior frontal region. Old lacunar infarct left caudate nucleus  Diagnosis updated by automated process. Provider to  review and confirm.       Cerebral vascular disease 03/01/2014     Priority: High     multiple intracranial stenoses - lt post communic, lt post cerebral, rt middle cerebral, bilat porx M2 segments       CAD (coronary artery disease) 12/04/2018     Priority: Medium     Added automatically from request for surgery [id number]       Right inguinal pain 12/06/2017     Priority: Medium     Pain and swelling of left lower extremity 05/10/2017     Priority: Medium     Right-sided low back pain without sciatica: 2005 x2d& reonset mid 3-16  04/08/2016     Priority: Medium     Non morbid obesity due to excess calories 04/08/2016     Priority: Medium     Esophageal stricture 12/16/2015     Priority: Medium     EGD in 1/16 shows reflux esophagitis; esophagram shows paraesophageal hernia       IFG (impaired fasting glucose) 12/16/2015     Priority: Medium     122 in 12/15; A1C Equals 6.3 in 2/18       Hydrocele, bilateral 11/19/2014     Priority: Medium     Hyperlipidemia with target LDL less than 100 03/14/2014     Priority: Medium     Diagnosis updated by automated process. Provider to review and confirm.       Claustrophobia      Priority: Medium     Need sedation for MRI scans       RBBB (right bundle branch block) 03/01/2014     Priority: Medium     Trochanteric bursitis 04/17/2012     Priority: Medium     Family history of other cardiovascular diseases 08/31/2006     Priority: Medium     Hypertrophy of prostate without urinary obstruction 08/31/2006     Priority: Medium     Problem list name updated by automated process. Provider to review       Preventive measure 11/16/2014     Priority: Low     PSA 2.2 in 10/13                        Colonoscopy never; no reason.  FIT neg in 12/17          Advance care planning 02/08/2012     Priority: Low     Advance Care Planning 1/9/2017: ACP Facilitation Session:    Ivan Payton presented for ACP Facilitation session at a group session. He was accompanied by spouse. Honoring Choices  information provided and resources reviewed. He currently wishes to give additional consideration to ACP  He currently has the following questions or concerns about Advance Care Planning: none.  Confirmed/documented legally designated decision maker(s).  Follow-up meeting: not needed/applicable. Added by Allison Molina   Advance Care Planning Liaison  Advance Care Planning 2/8/2012: Discussed advance care planning with patient; information given to patient to review.          Past Surgical History:   Procedure Laterality Date     ENDARTERECTOMY CAROTID  3/26/2014    Procedure: ENDARTERECTOMY CAROTID;  LEFT CAROTID ENDARTERECTOMY WITH EEG;  Surgeon: Yobain Jones MD;  Location:  OR      REMOVAL OF TONSILS,<11 Y/O      Tonsils <13y.o.     HEART CATH, ANGIOPLASTY  9/9/14    Stenting of LAD,Prox LCx, and 2 stents to OM2     HERNIORRHAPHY INGUINAL      Right     HERNIORRHAPHY INGUINAL  2/10/2012    Procedure:HERNIORRHAPHY INGUINAL; LEFT OPEN INGUINAL HERNIA REPAIR WITH MESH, RIGHT HYDROCELECTOMY; Surgeon:JULI LOPES; Location:[facility]     HYDROCELECTOMY INGUINAL  2/10/2012    Procedure:HYDROCELECTOMY INGUINAL; Surgeon:JULI LOPES; Location: [facility]     HYDROCELECTOMY SCROTAL Right 1/29/2016    Procedure: HYDROCELECTOMY SCROTAL;  Surgeon: Yobani Stevens MD;  Location: [facility]     LAPAROSCOPIC HERNIORRHAPHY INGUINAL BILATERAL Bilateral 5/24/2016    Procedure: LAPAROSCOPIC HERNIORRHAPHY INGUINAL BILATERAL;  Surgeon: Chandler Bowen MD;  Location:  OR     MR BRAIN AND ORBITS  3/2014    6 mm area of restricted diffusion subcortical white matter left parietal lobe/questionable area of restricted diffusion left posterior frontal region - c/w recent small infarcts. Small chronic lacunar infarct left caudate nucleus     SURGICAL HISTORY OF -       tonail removal     Social History     Socioeconomic History     Marital status:      Spouse name: madhavi     Number of children: 2      Years of education: Not on file     Highest education level: Not on file   Social Needs     Financial resource strain: Not on file     Food insecurity - worry: Not on file     Food insecurity - inability: Not on file     Transportation needs - medical: Not on file     Transportation needs - non-medical: Not on file   Occupational History     Occupation: part time, building maintenance     Employer: RETIRED   Tobacco Use     Smoking status: Former Smoker     Packs/day: 0.50     Years: 20.00     Pack years: 10.00     Types: Cigarettes     Last attempt to quit: 1968     Years since quittin.5     Smokeless tobacco: Former User   Substance and Sexual Activity     Alcohol use: No     Alcohol/week: 0.0 oz     Drug use: No     Sexual activity: Yes     Partners: Female     Comment:    Other Topics Concern      Service Not Asked     Blood Transfusions No     Caffeine Concern Yes     Comment: 1-2 cups per day     Occupational Exposure No     Hobby Hazards No     Sleep Concern No     Stress Concern No     Weight Concern No     Special Diet No     Back Care No     Exercise Yes     Comment: gym 4 days week, treadmill, 1.5 miles,  weights     Bike Helmet No     Seat Belt Yes     Self-Exams No     Parent/sibling w/ CABG, MI or angioplasty before 65F 55M? Yes     Comment: brothers - 1 had CABG 1 had MI pt unsure of age   Social History Narrative     Not on file     Immunization History   Administered Date(s) Administered     Influenza (High Dose) 3 valent vaccine 10/01/2017, 10/11/2018     Influenza (IIV3) PF 10/31/2014, 10/16/2015     Pneumococcal 23 valent 2009     TD (ADULT, 7+) 2005     Tdap (Adacel,Boostrix) 2011       BP Readings from Last 3 Encounters:   18 128/76   18 136/80   18 120/60    Wt Readings from Last 3 Encounters:   18 100.7 kg (222 lb)   18 102.5 kg (225 lb 14.4 oz)   18 103.4 kg (228 lb)                  Current Outpatient Medications    Medication Sig Dispense Refill     Ascorbic Acid (VITAMIN C PO) Take 1,000 mg by mouth daily       aspirin 81 MG tablet Take by mouth daily 30 tablet      atenolol (TENORMIN) 50 MG tablet TAKE 1 TABLET BY MOUTH EVERY DAY 90 tablet 2     atorvastatin (LIPITOR) 40 MG tablet Take 1 tablet (40 mg) by mouth daily 90 tablet 3     calcium carbonate (OS-SREEDHAR 500 MG [place]. [place]) 500 MG tablet Take 500 mg by mouth daily       clopidogrel (PLAVIX) 75 MG tablet Take 1 tablet (75 mg) by mouth daily 90 tablet 3     glucosamine-chondroitin 500-400 MG CAPS Take 1 capsule by mouth daily       lisinopril (PRINIVIL/ZESTRIL) 10 MG tablet Take 1 tablet (10 mg) by mouth daily 90 tablet 3     nitroGLYcerin (NITROSTAT) 0.4 MG sublingual tablet For chest pain place 1 tablet under the tongue every 5 minutes for 3 doses. If symptoms persist 5 minutes after 1st dose call 911. 25 tablet 1     Omega-3 Fatty Acids (OMEGA-3 FISH OIL PO) Take 1 g by mouth daily        omeprazole (PRILOSEC) 20 MG DR capsule Take 1 capsule (20 mg) by mouth daily 90 capsule 3     triamterene-hydrochlorothiazide (MAXZIDE-25) 37.5-25 MG per tablet Take 1 tablet by mouth daily 90 tablet 3     vitamin  B complex with vitamin C (VITAMIN  B COMPLEX) TABS Take 1 tablet by mouth daily       VITAMIN D, CHOLECALCIFEROL, PO Take 5,000 Units by mouth daily        predniSONE (DELTASONE) 20 MG tablet Take 60 mg (3 tablets) the evening before the scheduled angiogram and 30 mg (1 and 1/2 tablet) the morning of the angiogram. (Patient not taking: Reported on 12/11/2018.) 5 tablet 0     Allergies   Allergen Reactions     Contrast Dye Hives         Review of Systems  CONSTITUTIONAL: NEGATIVE for fever, chills, change in weight  INTEGUMENTARY/SKIN: NEGATIVE for worrisome rashes, moles or lesions  EYES: NEGATIVE for vision changes or irritation  ENT/MOUTH: NEGATIVE for ear, mouth and throat problems  RESP: NEGATIVE for significant cough or SOB  CV: NEGATIVE for orthopnea, palpitations and  "paroxysmal nocturnal dyspnea  GI: NEGATIVE for nausea, abdominal pain, heartburn, or change in bowel habits  : NEGATIVE for frequency, dysuria, or hematuria  MUSCULOSKELETAL: NEGATIVE for significant arthralgias or myalgia  NEURO: NEGATIVE for weakness, dizziness or paresthesias  ENDOCRINE: NEGATIVE for temperature intolerance, skin/hair changes  HEME: NEGATIVE for bleeding problems  PSYCHIATRIC: NEGATIVE for changes in mood or affect    OBJECTIVE:   /76 (BP Location: Right arm, Patient Position: Chair, Cuff Size: Adult Large)   Pulse 68   Temp 97.8  F (36.6  C)   Resp 20   Ht 1.702 m (5' 7\")   Wt 100.7 kg (222 lb)   SpO2 96%   BMI 34.77 kg/m   Estimated body mass index is 34.77 kg/m  as calculated from the following:    Height as of this encounter: 1.702 m (5' 7\").    Weight as of this encounter: 100.7 kg (222 lb).  Physical Exam  GENERAL: alert, no distress and over weight  EYES: Eyes grossly normal to inspection  HENT: ear canals and TM's normal, nose and mouth without ulcers or lesions  NECK: no adenopathy, no asymmetry, masses, or scars and thyroid normal to palpation  RESP: lungs clear to auscultation - no rales, rhonchi or wheezes  CV: regular rates and rhythm, normal S1 S2, no S3 or S4, no murmur, click or rub and 1+ bilateral lower extremity pitting edema     ABDOMEN: soft, nontender, without hepatosplenomegaly or masses   (male): normal male genitalia without lesions or urethral discharge, no hernia  RECTAL: normal sphincter tone, no rectal masses and prostate 1+ enlarged, nontender  MS: extremities normal- no gross deformities noted  SKIN: no suspicious lesions or rashes  NEURO: Normal strength and tone, mentation intact and speech normal  PSYCH: mentation appears normal, affect normal/bright  LYMPH: no cervical, supraclavicular, axillary, or inguinal adenopathy    Diagnostic Test Results:  Recent Results (from the past 800 hour(s))   Lipid Profile    Collection Time: 11/27/18  7:50 AM " "  Result Value Ref Range    Cholesterol 132 <200 mg/dL    Triglycerides 112 <150 mg/dL    HDL Cholesterol 42 >39 mg/dL    LDL Cholesterol Calculated 68 <100 mg/dL    Non HDL Cholesterol 90 <130 mg/dL       pending    ASSESSMENT / PLAN:   Ivan was seen today for physical.    Diagnoses and all orders for this visit:    Routine history and physical examination of adult    IFG (impaired fasting glucose)  -     Comprehensive metabolic panel (BMP + Alb, Alk Phos, ALT, AST, Total. Bili, TP)  -     Hemoglobin A1c    Essential hypertension  -     Comprehensive metabolic panel (BMP + Alb, Alk Phos, ALT, AST, Total. Bili, TP)    Hyperlipidemia with target LDL less than 100    Coronary artery disease involving native heart, angina presence unspecified, unspecified vessel or lesion type    Gastroesophageal reflux disease without esophagitis  -     CBC with platelets and differential                   Summary and implications:  Several issues.         At risk of becoming diabetic.                  Check labs.        Cor angio pending.                           Patient Instructions        I will let you know your lab results.            Consider getting the shingles vaccine (Shingrix) at your pharmacy.        Consider getting the second pneumonia vaccine; Prevnar.                                              End of Life Planning:  Patient currently has an advanced directive: Yes.  Practitioner is supportive of decision.    COUNSELING:  Reviewed preventive health counseling, as reflected in patient instructions    BP Readings from Last 1 Encounters:   12/11/18 128/76     Estimated body mass index is 34.77 kg/m  as calculated from the following:    Height as of this encounter: 1.702 m (5' 7\").    Weight as of this encounter: 100.7 kg (222 lb).      Weight management plan: Discussed healthy diet and exercise guidelines     reports that he quit smoking about 50 years ago. His smoking use included cigarettes. He has a 10.00 pack-year " smoking history. He has quit using smokeless tobacco.      Appropriate preventive services were discussed with this patient, including applicable screening as appropriate for cardiovascular disease, diabetes, osteopenia/osteoporosis, and glaucoma.  As appropriate for age/gender, discussed screening for colorectal cancer, prostate cancer, breast cancer, and cervical cancer. Checklist reviewing preventive services available has been given to the patient.    Reviewed patients plan of care and provided an AVS. The Basic Care Plan (routine screening as documented in Health Maintenance) for Ivan meets the Care Plan requirement. This Care Plan has been established and reviewed with the Patient.    Counseling Resources:  ATP IV Guidelines  Pooled Cohorts Equation Calculator  Breast Cancer Risk Calculator  FRAX Risk Assessment  ICSI Preventive Guidelines  Dietary Guidelines for Americans, 2010  [employer]'s MyPlate  ASA Prophylaxis  Lung CA Screening    William Thompson MD  [facility]                  Results for orders placed or performed in visit on 12/11/18   Comprehensive metabolic panel (BMP + Alb, Alk Phos, ALT, AST, Total. Bili, TP)   Result Value Ref Range    Sodium 140 133 - 144 mmol/L    Potassium 3.7 3.4 - 5.3 mmol/L    Chloride 105 94 - 109 mmol/L    Carbon Dioxide 25 20 - 32 mmol/L    Anion Gap 10 3 - 14 mmol/L    Glucose 136 (H) 70 - 99 mg/dL    Urea Nitrogen 22 7 - 30 mg/dL    Creatinine 0.87 0.66 - 1.25 mg/dL    GFR Estimate 84 >60 mL/min/1.7m2    GFR Estimate If Black >90 >60 mL/min/1.7m2    Calcium 9.1 8.5 - 10.1 mg/dL    Bilirubin Total 0.5 0.2 - 1.3 mg/dL    Albumin 3.6 3.4 - 5.0 g/dL    Protein Total 6.8 6.8 - 8.8 g/dL    Alkaline Phosphatase 50 40 - 150 U/L    ALT 25 0 - 70 U/L    AST 10 0 - 45 U/L   Hemoglobin A1c   Result Value Ref Range    Hemoglobin A1C 6.5 (H) 0 - 5.6 %   CBC with platelets and differential   Result Value Ref Range    WBC 6.0 4.0 - 11.0 10e9/L    RBC Count  5.26 4.4 - 5.9 10e12/L    Hemoglobin 16.3 13.3 - 17.7 g/dL    Hematocrit 48.7 40.0 - 53.0 %    MCV 93 78 - 100 fl    MCH 31.0 26.5 - 33.0 pg    MCHC 33.5 31.5 - 36.5 g/dL    RDW 14.4 10.0 - 15.0 %    Platelet Count 179 150 - 450 10e9/L    % Neutrophils 65.3 %    % Lymphocytes 18.0 %    % Monocytes 12.5 %    % Eosinophils 3.7 %    % Basophils 0.5 %    Absolute Neutrophil 3.9 1.6 - 8.3 10e9/L    Absolute Lymphocytes 1.1 0.8 - 5.3 10e9/L    Absolute Monocytes 0.8 0.0 - 1.3 10e9/L    Absolute Eosinophils 0.2 0.0 - 0.7 10e9/L    Absolute Basophils 0.0 0.0 - 0.2 10e9/L    Diff Method Automated Method              Letter sent.                   Your glucose is a bit higher, and now is technically in the diabetic range, though just barely.     The A1C of 6.5 correlates to an average blood sugar of approximately 135.     Please work harder on restricting carbohydrates ( starches, sweets, etc).             The kidney and liver and bone marrow function tests are all normal.

## 2018-12-11 NOTE — PATIENT INSTRUCTIONS
I will let you know your lab results.            Consider getting the shingles vaccine (Shingrix) at your pharmacy.        Consider getting the second pneumonia vaccine; Prevnar.

## 2018-12-14 ENCOUNTER — CARE COORDINATION (OUTPATIENT)
Dept: CARDIOLOGY | Facility: CLINIC | Age: 83
End: 2018-12-14

## 2018-12-14 RX ORDER — POTASSIUM CHLORIDE 1500 MG/1
20 TABLET, EXTENDED RELEASE ORAL
Status: CANCELLED | OUTPATIENT
Start: 2018-12-14

## 2018-12-14 RX ORDER — PREDNISONE 20 MG/1
60 TABLET ORAL ONCE
Status: CANCELLED | OUTPATIENT
Start: 2018-12-17

## 2018-12-14 RX ORDER — LIDOCAINE 40 MG/G
CREAM TOPICAL
Status: CANCELLED | OUTPATIENT
Start: 2018-12-14

## 2018-12-14 RX ORDER — SODIUM CHLORIDE 9 MG/ML
INJECTION, SOLUTION INTRAVENOUS CONTINUOUS
Status: CANCELLED | OUTPATIENT
Start: 2018-12-14

## 2018-12-14 NOTE — PROGRESS NOTES
Called pt to review prep for left heart cath, pt scheduled for procedure 12/18/18. No answer, left VM requesting pt to call back to review prep/pre-procedure instructions. Pt denies concerns or questions at this time. Shelby Bass RN December 14, 2018, 4:21 PM      [facility]: [facility]  Arrival Time: 0630, procedure scheduled for 0830   to/from procedure? Yes  Responsible adult with patient 24hrs post procedure? Yes  NPO starting at midnight (except allowable meds morning of procedure with small sip of water) reviewed with patient? Yes  On Anticoagulant? No  Diabetic? No  On Aspirin? Yes  Any other Meds to Hold (Such as phosphodiesterase type 5 inhibitors (Viagra/Cialis/Levitra)? No  Contrast Dye Allergy? Yes - prednisone was prescribed by KILEY Warner at procedure H&P OV on 12/4/18 - confirmed with pt he has already picked up Rx and reviewed instructions for that.

## 2018-12-18 ENCOUNTER — SURGERY (OUTPATIENT)
Age: 83
End: 2018-12-18
Payer: COMMERCIAL

## 2018-12-18 ENCOUNTER — HOSPITAL ENCOUNTER (OUTPATIENT)
Facility: CLINIC | Age: 83
Discharge: HOME | End: 2018-12-18
Admitting: INTERNAL MEDICINE
Payer: MEDICARE

## 2018-12-18 VITALS
TEMPERATURE: 97 F | HEART RATE: 89 BPM | OXYGEN SATURATION: 97 % | WEIGHT: 225 LBS | HEIGHT: 67 IN | RESPIRATION RATE: 16 BRPM | SYSTOLIC BLOOD PRESSURE: 132 MMHG | DIASTOLIC BLOOD PRESSURE: 81 MMHG | BODY MASS INDEX: 35.31 KG/M2

## 2018-12-18 DIAGNOSIS — I25.10 CAD (CORONARY ARTERY DISEASE): ICD-10-CM

## 2018-12-18 DIAGNOSIS — I25.10 CORONARY ARTERY DISEASE INVOLVING NATIVE CORONARY ARTERY OF NATIVE HEART WITHOUT ANGINA PECTORIS: Primary | ICD-10-CM

## 2018-12-18 PROBLEM — Z98.890 STATUS POST CORONARY ANGIOGRAM: Status: ACTIVE | Noted: 2018-12-18

## 2018-12-18 LAB
ANION GAP SERPL CALCULATED.3IONS-SCNC: 8 MMOL/L (ref 3–14)
APTT PPP: 29 SEC (ref 22–37)
BUN SERPL-MCNC: 19 MG/DL (ref 7–30)
CALCIUM SERPL-MCNC: 8.6 MG/DL (ref 8.5–10.1)
CHLORIDE SERPL-SCNC: 106 MMOL/L (ref 94–109)
CO2 SERPL-SCNC: 26 MMOL/L (ref 20–32)
CREAT SERPL-MCNC: 0.78 MG/DL (ref 0.66–1.25)
ERYTHROCYTE [DISTWIDTH] IN BLOOD BY AUTOMATED COUNT: 13.7 % (ref 10–15)
GFR SERPL CREATININE-BSD FRML MDRD: >90 ML/MIN/1.7M2
GLUCOSE SERPL-MCNC: 192 MG/DL (ref 70–99)
HCT VFR BLD AUTO: 44.9 % (ref 40–53)
HGB BLD-MCNC: 15.7 G/DL (ref 13.3–17.7)
INR PPP: 1.06 (ref 0.86–1.14)
KCT BLD-ACNC: 233 SEC (ref 75–150)
MCH RBC QN AUTO: 31.3 PG (ref 26.5–33)
MCHC RBC AUTO-ENTMCNC: 35 G/DL (ref 31.5–36.5)
MCV RBC AUTO: 89 FL (ref 78–100)
PLATELET # BLD AUTO: 186 10E9/L (ref 150–450)
POTASSIUM SERPL-SCNC: 4 MMOL/L (ref 3.4–5.3)
RBC # BLD AUTO: 5.02 10E12/L (ref 4.4–5.9)
SODIUM SERPL-SCNC: 140 MMOL/L (ref 133–144)
WBC # BLD AUTO: 4.9 10E9/L (ref 4–11)

## 2018-12-18 PROCEDURE — C1725 CATH, TRANSLUMIN NON-LASER: HCPCS | Performed by: INTERNAL MEDICINE

## 2018-12-18 PROCEDURE — 40000852 ZZH STATISTIC HEART CATH LAB OR EP LAB

## 2018-12-18 PROCEDURE — C9600 PERC DRUG-EL COR STENT SING: HCPCS | Performed by: INTERNAL MEDICINE

## 2018-12-18 PROCEDURE — 99152 MOD SED SAME PHYS/QHP 5/>YRS: CPT | Mod: GC | Performed by: INTERNAL MEDICINE

## 2018-12-18 PROCEDURE — 93010 ELECTROCARDIOGRAM REPORT: CPT | Performed by: INTERNAL MEDICINE

## 2018-12-18 PROCEDURE — C1894 INTRO/SHEATH, NON-LASER: HCPCS | Performed by: INTERNAL MEDICINE

## 2018-12-18 PROCEDURE — 25000128 H RX IP 250 OP 636: Performed by: INTERNAL MEDICINE

## 2018-12-18 PROCEDURE — C9600 PERC DRUG-EL COR STENT SING: HCPCS

## 2018-12-18 PROCEDURE — 40000235 ZZH STATISTIC TELEMETRY

## 2018-12-18 PROCEDURE — 85027 COMPLETE CBC AUTOMATED: CPT

## 2018-12-18 PROCEDURE — 25000125 ZZHC RX 250: Performed by: INTERNAL MEDICINE

## 2018-12-18 PROCEDURE — 27210794 ZZH OR GENERAL SUPPLY STERILE: Performed by: INTERNAL MEDICINE

## 2018-12-18 PROCEDURE — C1769 GUIDE WIRE: HCPCS | Performed by: INTERNAL MEDICINE

## 2018-12-18 PROCEDURE — 40000065 ZZH STATISTIC EKG NON-CHARGEABLE

## 2018-12-18 PROCEDURE — 99153 MOD SED SAME PHYS/QHP EA: CPT

## 2018-12-18 PROCEDURE — A9270 NON-COVERED ITEM OR SERVICE: HCPCS | Mod: GY | Performed by: INTERNAL MEDICINE

## 2018-12-18 PROCEDURE — 85347 COAGULATION TIME ACTIVATED: CPT

## 2018-12-18 PROCEDURE — 93005 ELECTROCARDIOGRAM TRACING: CPT

## 2018-12-18 PROCEDURE — 99152 MOD SED SAME PHYS/QHP 5/>YRS: CPT | Performed by: INTERNAL MEDICINE

## 2018-12-18 PROCEDURE — 93454 CORONARY ARTERY ANGIO S&I: CPT | Performed by: INTERNAL MEDICINE

## 2018-12-18 PROCEDURE — 27210946 ZZH KIT HC TOTES DISP CR8: Performed by: INTERNAL MEDICINE

## 2018-12-18 PROCEDURE — 80048 BASIC METABOLIC PNL TOTAL CA: CPT

## 2018-12-18 PROCEDURE — 85730 THROMBOPLASTIN TIME PARTIAL: CPT

## 2018-12-18 PROCEDURE — 92928 PRQ TCAT PLMT NTRAC ST 1 LES: CPT | Mod: 76 | Performed by: INTERNAL MEDICINE

## 2018-12-18 PROCEDURE — C9601 PERC DRUG-EL COR STENT BRAN: HCPCS | Mod: LC | Performed by: INTERNAL MEDICINE

## 2018-12-18 PROCEDURE — 85610 PROTHROMBIN TIME: CPT

## 2018-12-18 PROCEDURE — C1874 STENT, COATED/COV W/DEL SYS: HCPCS | Performed by: INTERNAL MEDICINE

## 2018-12-18 PROCEDURE — 40000104 ZZH STATISTIC MODERATE SEDATION < 10 MIN: Performed by: INTERNAL MEDICINE

## 2018-12-18 PROCEDURE — 25000132 ZZH RX MED GY IP 250 OP 250 PS 637: Mod: GY | Performed by: INTERNAL MEDICINE

## 2018-12-18 PROCEDURE — 93454 CORONARY ARTERY ANGIO S&I: CPT | Mod: 26 | Performed by: INTERNAL MEDICINE

## 2018-12-18 PROCEDURE — C1887 CATHETER, GUIDING: HCPCS | Performed by: INTERNAL MEDICINE

## 2018-12-18 DEVICE — IMPLANTABLE DEVICE: Type: IMPLANTABLE DEVICE | Status: FUNCTIONAL

## 2018-12-18 RX ORDER — SODIUM CHLORIDE 9 MG/ML
INJECTION, SOLUTION INTRAVENOUS CONTINUOUS
Status: DISCONTINUED | OUTPATIENT
Start: 2018-12-18 | End: 2018-12-18 | Stop reason: HOSPADM

## 2018-12-18 RX ORDER — POTASSIUM CHLORIDE 1500 MG/1
20 TABLET, EXTENDED RELEASE ORAL
Status: DISCONTINUED | OUTPATIENT
Start: 2018-12-18 | End: 2018-12-18 | Stop reason: HOSPADM

## 2018-12-18 RX ORDER — ATROPINE SULFATE 0.1 MG/ML
0.5 INJECTION INTRAVENOUS EVERY 5 MIN PRN
Status: DISCONTINUED | OUTPATIENT
Start: 2018-12-18 | End: 2018-12-18 | Stop reason: HOSPADM

## 2018-12-18 RX ORDER — VERAPAMIL HYDROCHLORIDE 2.5 MG/ML
INJECTION, SOLUTION INTRAVENOUS
Status: DISCONTINUED | OUTPATIENT
Start: 2018-12-18 | End: 2018-12-18 | Stop reason: HOSPADM

## 2018-12-18 RX ORDER — FLUMAZENIL 0.1 MG/ML
0.2 INJECTION, SOLUTION INTRAVENOUS
Status: DISCONTINUED | OUTPATIENT
Start: 2018-12-18 | End: 2018-12-18 | Stop reason: HOSPADM

## 2018-12-18 RX ORDER — ACETAMINOPHEN 325 MG/1
325-650 TABLET ORAL EVERY 4 HOURS PRN
Status: DISCONTINUED | OUTPATIENT
Start: 2018-12-18 | End: 2018-12-18 | Stop reason: HOSPADM

## 2018-12-18 RX ORDER — NALOXONE HYDROCHLORIDE 0.4 MG/ML
.2-.4 INJECTION, SOLUTION INTRAMUSCULAR; INTRAVENOUS; SUBCUTANEOUS
Status: DISCONTINUED | OUTPATIENT
Start: 2018-12-18 | End: 2018-12-18 | Stop reason: HOSPADM

## 2018-12-18 RX ORDER — HYDROCODONE BITARTRATE AND ACETAMINOPHEN 5; 325 MG/1; MG/1
1-2 TABLET ORAL EVERY 4 HOURS PRN
Status: DISCONTINUED | OUTPATIENT
Start: 2018-12-18 | End: 2018-12-18 | Stop reason: HOSPADM

## 2018-12-18 RX ORDER — LIDOCAINE 40 MG/G
CREAM TOPICAL
Status: DISCONTINUED | OUTPATIENT
Start: 2018-12-18 | End: 2018-12-18 | Stop reason: HOSPADM

## 2018-12-18 RX ORDER — ASPIRIN 81 MG/1
81 TABLET ORAL DAILY
Status: DISCONTINUED | OUTPATIENT
Start: 2018-12-18 | End: 2018-12-18 | Stop reason: HOSPADM

## 2018-12-18 RX ORDER — NITROGLYCERIN 5 MG/ML
VIAL (ML) INTRAVENOUS
Status: DISCONTINUED | OUTPATIENT
Start: 2018-12-18 | End: 2018-12-18 | Stop reason: HOSPADM

## 2018-12-18 RX ORDER — HEPARIN SODIUM 1000 [USP'U]/ML
INJECTION, SOLUTION INTRAVENOUS; SUBCUTANEOUS
Status: DISCONTINUED | OUTPATIENT
Start: 2018-12-18 | End: 2018-12-18 | Stop reason: HOSPADM

## 2018-12-18 RX ORDER — NALOXONE HYDROCHLORIDE 0.4 MG/ML
.1-.4 INJECTION, SOLUTION INTRAMUSCULAR; INTRAVENOUS; SUBCUTANEOUS
Status: DISCONTINUED | OUTPATIENT
Start: 2018-12-18 | End: 2018-12-18 | Stop reason: HOSPADM

## 2018-12-18 RX ORDER — FENTANYL CITRATE 50 UG/ML
INJECTION, SOLUTION INTRAMUSCULAR; INTRAVENOUS
Status: DISCONTINUED | OUTPATIENT
Start: 2018-12-18 | End: 2018-12-18 | Stop reason: HOSPADM

## 2018-12-18 RX ORDER — NITROGLYCERIN 0.4 MG/1
0.4 TABLET SUBLINGUAL EVERY 5 MIN PRN
Status: DISCONTINUED | OUTPATIENT
Start: 2018-12-18 | End: 2018-12-18 | Stop reason: HOSPADM

## 2018-12-18 RX ORDER — FENTANYL CITRATE 50 UG/ML
25-50 INJECTION, SOLUTION INTRAMUSCULAR; INTRAVENOUS
Status: DISCONTINUED | OUTPATIENT
Start: 2018-12-18 | End: 2018-12-18 | Stop reason: HOSPADM

## 2018-12-18 RX ORDER — CLOPIDOGREL BISULFATE 75 MG/1
TABLET ORAL
Status: DISCONTINUED | OUTPATIENT
Start: 2018-12-18 | End: 2018-12-18 | Stop reason: HOSPADM

## 2018-12-18 RX ADMIN — MIDAZOLAM 1 MG: 1 INJECTION INTRAMUSCULAR; INTRAVENOUS at 08:45

## 2018-12-18 RX ADMIN — VERAPAMIL HYDROCHLORIDE 2.5 MG: 2.5 INJECTION, SOLUTION INTRAVENOUS at 08:54

## 2018-12-18 RX ADMIN — NITROGLYCERIN 300 MCG: 5 INJECTION, SOLUTION INTRAVENOUS at 09:07

## 2018-12-18 RX ADMIN — CLOPIDOGREL BISULFATE 300 MG: 75 TABLET ORAL at 10:06

## 2018-12-18 RX ADMIN — FENTANYL CITRATE 50 MCG: 50 INJECTION, SOLUTION INTRAMUSCULAR; INTRAVENOUS at 08:45

## 2018-12-18 RX ADMIN — HEPARIN SODIUM 5000 UNITS: 1000 INJECTION, SOLUTION INTRAVENOUS; SUBCUTANEOUS at 09:15

## 2018-12-18 RX ADMIN — SODIUM CHLORIDE: 9 INJECTION, SOLUTION INTRAVENOUS at 07:12

## 2018-12-18 RX ADMIN — LIDOCAINE HYDROCHLORIDE 1 ML: 10 INJECTION, SOLUTION EPIDURAL; INFILTRATION; INTRACAUDAL; PERINEURAL at 08:48

## 2018-12-18 RX ADMIN — FENTANYL CITRATE 50 MCG: 50 INJECTION, SOLUTION INTRAMUSCULAR; INTRAVENOUS at 09:00

## 2018-12-18 RX ADMIN — MIDAZOLAM 1 MG: 1 INJECTION INTRAMUSCULAR; INTRAVENOUS at 09:00

## 2018-12-18 RX ADMIN — NITROGLYCERIN 200 MCG: 5 INJECTION, SOLUTION INTRAVENOUS at 08:54

## 2018-12-18 RX ADMIN — NITROGLYCERIN 200 MCG: 5 INJECTION, SOLUTION INTRAVENOUS at 09:59

## 2018-12-18 RX ADMIN — HEPARIN SODIUM 3000 UNITS: 1000 INJECTION, SOLUTION INTRAVENOUS; SUBCUTANEOUS at 09:43

## 2018-12-18 RX ADMIN — HEPARIN SODIUM 5000 UNITS: 1000 INJECTION, SOLUTION INTRAVENOUS; SUBCUTANEOUS at 09:11

## 2018-12-18 ASSESSMENT — MIFFLIN-ST. JEOR: SCORE: 1679.22

## 2018-12-18 NOTE — Clinical Note
The first balloon was inserted into the right coronary artery and RPDA.  Max pressure = 8 durga. Total duration = 22 seconds.

## 2018-12-18 NOTE — Clinical Note
The first balloon was inserted into the other vessel and OM.  Max pressure = 14 durga. Total duration = 20 seconds.

## 2018-12-18 NOTE — Clinical Note
The first balloon was inserted into the other vessel and OM.  Max pressure = 10 durga. Total duration = 27 seconds.

## 2018-12-18 NOTE — PROGRESS NOTES
Discharge instructions reviewed with pt and spouse prior to discharge to home. Pt aware to continue all medications as pre procedure. Denies pain at this time. Pt up ambulating safely. Pt given new stent card. Pt aware that cardiac rehab referral as outpatient.  All questions answered and pt appears to accept and understand.

## 2018-12-18 NOTE — IP AVS SNAPSHOT
MRN:[id number]                      After Visit Summary   12/18/2018    Ivan Payton    MRN: [id number]           Visit Information        Department      12/18/2018  6:26 AM [facility]          Review of your medicines      UNREVIEWED medicines. Ask your doctor about these medicines       Dose / Directions   aspirin 81 MG tablet  Commonly known as:  ASA      Take by mouth daily  Quantity:  30 tablet  Refills:  0     atenolol 50 MG tablet  Commonly known as:  TENORMIN  Used for:  Essential hypertension      TAKE 1 TABLET BY MOUTH EVERY DAY  Quantity:  90 tablet  Refills:  2     atorvastatin 40 MG tablet  Commonly known as:  LIPITOR  Used for:  Coronary artery disease involving native coronary artery of native heart, angina presence unspecified      Dose:  40 mg  Take 1 tablet (40 mg) by mouth daily  Quantity:  90 tablet  Refills:  3     calcium carbonate 500 MG tablet  Commonly known as:  OS-SREEDHAR      Dose:  500 mg  Take 500 mg by mouth daily  Refills:  0     clopidogrel 75 MG tablet  Commonly known as:  PLAVIX      Dose:  75 mg  Take 1 tablet (75 mg) by mouth daily  Quantity:  90 tablet  Refills:  3     glucosamine-chondroitin 500-400 MG Caps per capsule      Dose:  1 capsule  Take 1 capsule by mouth daily  Refills:  0     lisinopril 10 MG tablet  Commonly known as:  PRINIVIL/ZESTRIL  Used for:  Essential hypertension      Dose:  10 mg  Take 1 tablet (10 mg) by mouth daily  Quantity:  90 tablet  Refills:  3     nitroGLYcerin 0.4 MG sublingual tablet  Commonly known as:  NITROSTAT  Used for:  Essential hypertension, Coronary artery disease of native artery of native heart with stable angina pectoris (H)      For chest pain place 1 tablet under the tongue every 5 minutes for 3 doses. If symptoms persist 5 minutes after 1st dose call 911.  Quantity:  25 tablet  Refills:  1     OMEGA-3 FISH OIL PO      Dose:  1 g  Take 1 g by mouth daily  Refills:  0     omeprazole 20 MG   capsule  Commonly known as:  priLOSEC  Used for:  Atypical chest pain      Dose:  20 mg  Take 1 capsule (20 mg) by mouth daily  Quantity:  90 capsule  Refills:  3     predniSONE 20 MG tablet  Commonly known as:  DELTASONE  Used for:  Essential hypertension, Coronary artery disease of native artery of native heart with stable angina pectoris (H)      Take 60 mg (3 tablets) the evening before the scheduled angiogram and 30 mg (1 and 1/2 tablet) the morning of the angiogram.  Quantity:  5 tablet  Refills:  0     triamterene-HCTZ 37.5-25 MG tablet  Commonly known as:  MAXZIDE-25  Used for:  Essential hypertension      Dose:  1 tablet  Take 1 tablet by mouth daily  Quantity:  90 tablet  Refills:  3     vitamin B complex with vitamin C tablet      Dose:  1 tablet  Take 1 tablet by mouth daily  Refills:  0     VITAMIN C PO      Dose:  1000 mg  Take 1,000 mg by mouth daily  Refills:  0     VITAMIN D (CHOLECALCIFEROL) PO      Dose:  5000 Units  Take 5,000 Units by mouth daily  Refills:  0              Protect others around you: Learn how to safely use, store and throw away your medicines at www.disposemymeds.org.       Follow-ups after your visit       Additional Services     CARDIAC REHAB REFERRAL      Please be aware that coverage of these services is subject to the terms and limitations of your health insurance plan.  Call member services at your health plan with any benefit or coverage questions.     Order is sent electronically to central rehab scheduling. Call [phone] if you haven't been contacted regarding these appointments within 2 business days of discharge.           Your next 10 appointments already scheduled    Dec 26, 2018  9:30 AM CST  Return Visit with JAILENE Sanford CNP  [facility] ([facility]) [address] [place] [address] [id number] [phone] OPT 2      Care Instructions       After Care Instructions     Discharge Instructions - IF  on Metformin (Glucophage or Glucovance) or Metformin containing medications      IF on Metformin (Glucophage or Glucovance) or Metformin containing medications , schedule a Basic Metabolic Panel at [facility] or Primary Clinic in 48 - 72 hours post procedure and PRIOR TO resuming the Metformin or Metformin containing medications.  Hold Metformin (Glucophage or Glucovance) or Metformin containing medications until after the Basic Metabolic Panel on the 2nd or 3rd day following the procedure.  May resume after blood draw is complete.           Further instructions from your care team       Cardiac Angioplasty/Stent Discharge Instructions - Radial    After you go home:      Have an adult stay with you until tomorrow.    Drink extra fluids for 2 days.    You may resume your normal diet.    No smoking       For 24 hours - due to the sedation you received:    Relax and take it easy.    Do NOT make any important or legal decisions.    Do NOT drive or operate machines at home or at work.    Do NOT drink alcohol.    Care of Wrist Puncture Site:      For the first 24 hrs - check the puncture site every 1-2 hours while awake.    It is normal to have soreness at the puncture site and mild tingling in your hand for up to 3 days.    Remove the bandaid after 24 hours. If there is minor oozing, apply another bandaid and remove it after 12 hours.    You may shower tomorrow.  Do NOT take a bath, or use a hot tub or pool for at least 3 days. Do NOT scrub the site. Do not use lotion or powder near the puncture site.           Activity:          For 2 days:     do not use your hand or arm to support your weight (such as rising from a chair)     do not bend your wrist (such as lifting a garage door).    do not lift more than 5 pounds or exercise your arm (such as tennis, golf or bowling).    Do NOT do any heavy activity such as exercise, lifting, or straining.     Bleeding:      If you start bleeding from the site in your wrist, sit down  "and press firmly on/above the site for 10 minutes.     Once bleeding stops, keep arm still for 2 hours.     Call [facility] as soon as you can.       Call 911 right away if you have heavy bleeding or bleeding that does not stop.      Medicines:    Continue Aspirin and Plavix-do not stop until your cardiologist tells you it is OK.       Take your medications,  unless your provider tells you not to.     If you have stopped any medicines, check with your provider about when to restart them.    Follow Up Appointments:      Follow up with [facility] Nurse Practitioner at [facility] of patient preference in 7-10 days.    Cardiac Rehab will contact you for follow up care.    Call the clinic if:      You have a large or growing hard lump around the site.    The site is red, swollen, hot or tender.    Blood or fluid is draining from the site.    You have chills or a fever greater than 101 F (38 C).    Your arm feels numb, cool or changes color.    You have hives, a rash or unusual itching.    Any questions or concerns.    Other Instructions:    -Carry your stent card with you at all times.      [facility] [place]:    [phone] [facility] (7 days a week)          Additional Information About Your Visit       [email or web address] Information    [employer] gives you secure access to your electronic health record. If you see a primary care provider, you can also send messages to your care team and make appointments. If you have questions, please call your primary care clinic.  If you do not have a primary care provider, please call [phone] and they will assist you.       Care EveryWhere ID    This is your Care EveryWhere ID. This could be used by other organizations to access your [facility] medical records  [id number]       Your Vitals Were     Blood Pressure   132/81          Pulse   89          Temperature   97  F (36.1  C) (Oral)          Respirations   16          Height   1.702 m (5' 7\")             " Weight   102.1 kg (225 lb)    Pulse Oximetry   97%    BMI (Body Mass Index)   35.24 kg/m           Primary Care Provider Office Phone # Fax #    William Thompson [phone]      Equal Access to Services    MUKESH GARCIAAUDREY : Hadii aad ku hadparrisho Soomaali, waaxda luqadaha, qaybta kaalmada adeegyada, quin cecein hayaan charlykaila rubio lamesfinkaela . So [facility] [phone].    ATENCIÓN: Si habla español, tiene a beckett disposición servicios gratuitos de asistencia lingüística. Llame al [phone].    We comply with applicable federal civil rights laws and [place] laws. We do not discriminate on the basis of race, color, national origin, age, disability, sex, sexual orientation, or gender identity.           Thank you!    Thank you for choosing [facility] for your care. Our goal is always to provide you with excellent care. Hearing back from our patients is one way we can continue to improve our services. Please take a few minutes to complete the written survey that you may receive in the mail after you visit with us. Thank you!            Medication List      ASK your doctor about these medications          Morning Afternoon Evening Bedtime As Needed    aspirin 81 MG tablet  Commonly known as:  ASA  Take by mouth daily                     atenolol 50 MG tablet  Commonly known as:  TENORMIN  TAKE 1 TABLET BY MOUTH EVERY DAY                     atorvastatin 40 MG tablet  Commonly known as:  LIPITOR  Take 1 tablet (40 mg) by mouth daily                     calcium carbonate 500 MG tablet  Commonly known as:  OS-SREEDHAR  Take 500 mg by mouth daily                     clopidogrel 75 MG tablet  Commonly known as:  PLAVIX  Take 1 tablet (75 mg) by mouth daily  Last Dose:  300 mg on 12/18/2018 10:06 AM                     glucosamine-chondroitin 500-400 MG Caps per capsule  Take 1 capsule by mouth daily                     lisinopril 10 MG tablet  Commonly known as:  PRINIVIL/ZESTRIL  Take 1 tablet (10 mg) by mouth daily                      nitroGLYcerin 0.4 MG sublingual tablet  Commonly known as:  NITROSTAT  For chest pain place 1 tablet under the tongue every 5 minutes for 3 doses. If symptoms persist 5 minutes after 1st dose call 911.  Last Dose:  Ask your nurse or doctor                     OMEGA-3 FISH OIL PO  Take 1 g by mouth daily                     omeprazole 20 MG DR capsule  Commonly known as:  priLOSEC  Take 1 capsule (20 mg) by mouth daily                     predniSONE 20 MG tablet  Commonly known as:  DELTASONE  Take 60 mg (3 tablets) the evening before the scheduled angiogram and 30 mg (1 and 1/2 tablet) the morning of the angiogram.                     triamterene-HCTZ 37.5-25 MG tablet  Commonly known as:  MAXZIDE-25  Take 1 tablet by mouth daily                     vitamin B complex with vitamin C tablet  Take 1 tablet by mouth daily                     VITAMIN C PO  Take 1,000 mg by mouth daily                     VITAMIN D (CHOLECALCIFEROL) PO  Take 5,000 Units by mouth daily

## 2018-12-18 NOTE — DISCHARGE INSTRUCTIONS
Cardiac Angioplasty/Stent Discharge Instructions - Radial    After you go home:      Have an adult stay with you until tomorrow.    Drink extra fluids for 2 days.    You may resume your normal diet.    No smoking       For 24 hours - due to the sedation you received:    Relax and take it easy.    Do NOT make any important or legal decisions.    Do NOT drive or operate machines at home or at work.    Do NOT drink alcohol.    Care of Wrist Puncture Site:      For the first 24 hrs - check the puncture site every 1-2 hours while awake.    It is normal to have soreness at the puncture site and mild tingling in your hand for up to 3 days.    Remove the bandaid after 24 hours. If there is minor oozing, apply another bandaid and remove it after 12 hours.    You may shower tomorrow.  Do NOT take a bath, or use a hot tub or pool for at least 3 days. Do NOT scrub the site. Do not use lotion or powder near the puncture site.           Activity:          For 2 days:     do not use your hand or arm to support your weight (such as rising from a chair)     do not bend your wrist (such as lifting a garage door).    do not lift more than 5 pounds or exercise your arm (such as tennis, golf or bowling).    Do NOT do any heavy activity such as exercise, lifting, or straining.     Bleeding:      If you start bleeding from the site in your wrist, sit down and press firmly on/above the site for 10 minutes.     Once bleeding stops, keep arm still for 2 hours.     Call [facility] as soon as you can.       Call 911 right away if you have heavy bleeding or bleeding that does not stop.      Medicines:    Continue Aspirin and Plavix-do not stop until your cardiologist tells you it is OK.       Take your medications,  unless your provider tells you not to.     If you have stopped any medicines, check with your provider about when to restart them.    Follow Up Appointments:      Follow up with [facility] Nurse Practitioner at [facility] of  patient preference in 7-10 days.    Cardiac Rehab will contact you for follow up care.    Call the clinic if:      You have a large or growing hard lump around the site.    The site is red, swollen, hot or tender.    Blood or fluid is draining from the site.    You have chills or a fever greater than 101 F (38 C).    Your arm feels numb, cool or changes color.    You have hives, a rash or unusual itching.    Any questions or concerns.    Other Instructions:    -Carry your stent card with you at all times.      [facility] [place]:    [phone] [facility] (7 days a week)

## 2018-12-18 NOTE — PROGRESS NOTES
Pt up to bathroom in the loyd with stand by assist and tolerated activity well. Pt able to void good amount when up.  Denies dizziness.

## 2018-12-18 NOTE — PROGRESS NOTES
Pre procedure plan of care reviewed with pt and spouse prior to sedation. All questions answered and pt appears to accept and understand.

## 2018-12-18 NOTE — Clinical Note
The first balloon was inserted into the right coronary artery and RPDA.  Max pressure = 10 durga. Total duration = 14 seconds.

## 2018-12-18 NOTE — IP AVS SNAPSHOT
[place] [address] [place] [address]  Phone:  [phone]                                    After Visit Summary   12/18/2018    Ivan Payton    MRN: [id number]           After Visit Summary Signature Page    I have received my discharge instructions, and my questions have been answered. I have discussed any challenges I see with this plan with the nurse or doctor.    ..........................................................................................................................................  Patient/Patient Representative Signature      ..........................................................................................................................................  Patient Representative Print Name and Relationship to Patient    ..................................................               ................................................  Date                                   Time    ..........................................................................................................................................  Reviewed by Signature/Title    ...................................................              ..............................................  Date                                               Time          22EPIC Rev 08/18

## 2018-12-18 NOTE — Clinical Note
Potential access sites were evaluated for patency using ultrasound.   The left radial artery was selected.   Access was obtained under ultrasound guidance with direct visualization of needle entry.

## 2018-12-18 NOTE — Clinical Note
The first balloon was inserted into the other vessel and OM.  Max pressure = 12 durga. Total duration = 20 seconds.

## 2018-12-18 NOTE — PROGRESS NOTES
Pt returns to [facility] via bed at 1015 -Awake and alert.  Pt denies pain, nausea or vomiting. Tolerating po fluids.  Left radial TR band in place without bleeding or hematoma. Family at bedside after speaking with MD.

## 2018-12-18 NOTE — PROGRESS NOTES
Care Suites Discharge Summary    Discharge Criteria:   Discharge Criteria met per MD orders: Yes.   Vital signs stable.     Pt demonstrates ability to ambulate safely: Yes.  (See discharge questionnaire for additional information)    Discharge instructions & education:   Discharge instructions reviewed with patient and spouse. Patient verbalizes  understanding.   Additional patient education provided:  Stent card.     Medications:   Patient will be discharging on new medications- No. Patient verbalizes reason for use, start date, and side effects NA.    Items returned to patient:   Home and hospital acquired medications returned to patient NA   Listed belongings gathered and returned to patient: Yes    Patient discharged to home  with wife driving.    Vickie Luu

## 2018-12-18 NOTE — Clinical Note
The first balloon was inserted into the other vessel and OM.  Max pressure = 6 durga. Total duration = 15 seconds.

## 2018-12-19 ENCOUNTER — TELEPHONE (OUTPATIENT)
Dept: CARDIOLOGY | Facility: CLINIC | Age: 83
End: 2018-12-19

## 2018-12-19 LAB — INTERPRETATION ECG - MUSE: NORMAL

## 2018-12-19 NOTE — TELEPHONE ENCOUNTER
Patient was scheduled for OP coronary angiogram on 12/18/18 secondary to angina and abnormal MPI. PCI with KIKE to PDA and OM1. Patent KIKE to CFx and LAD. Called patient to discuss any post hospital d/c questions, review medication changes, and confirm f/u appts. RN confirmed patient was d/c with antiplatelet Plavix. Patient denied any questions regarding new medications or changes to PTA medications. Patient denied any SOB, chest pain, or light headedness. LRA cardiac cath site is without bleeding, swelling, redness or tenderness. Denies fever. RN confirmed with patient that he has an apt scheduled on 12/26/18 with ANSELMO Helene Grande. Patient advised to call clinic with any cardiac related questions or concerns prior to this anselmo't. Patient verbalized understanding and agreed with plan. KAMILLA Casas RN.

## 2018-12-27 DIAGNOSIS — I25.118 CORONARY ARTERY DISEASE OF NATIVE ARTERY OF NATIVE HEART WITH STABLE ANGINA PECTORIS (H): ICD-10-CM

## 2018-12-27 DIAGNOSIS — I10 ESSENTIAL HYPERTENSION: ICD-10-CM

## 2018-12-27 RX ORDER — NITROGLYCERIN 0.4 MG/1
TABLET SUBLINGUAL
Qty: 25 TABLET | Refills: 1 | Status: SHIPPED | OUTPATIENT
Start: 2018-12-27 | End: 2023-03-27

## 2018-12-27 NOTE — TELEPHONE ENCOUNTER
Called pt - left voice message that I have refilled ntg sl but pt should call if he is experiencing any angina or shortness of breath.

## 2018-12-28 LAB — INTERPRETATION ECG - MUSE: NORMAL

## 2019-01-04 ENCOUNTER — OFFICE VISIT (OUTPATIENT)
Dept: CARDIOLOGY | Facility: CLINIC | Age: 84
End: 2019-01-04
Attending: PHYSICIAN ASSISTANT
Payer: COMMERCIAL

## 2019-01-04 VITALS
DIASTOLIC BLOOD PRESSURE: 66 MMHG | HEIGHT: 67 IN | BODY MASS INDEX: 35.47 KG/M2 | HEART RATE: 66 BPM | WEIGHT: 226 LBS | SYSTOLIC BLOOD PRESSURE: 118 MMHG

## 2019-01-04 DIAGNOSIS — I10 ESSENTIAL HYPERTENSION: ICD-10-CM

## 2019-01-04 DIAGNOSIS — I25.118 CORONARY ARTERY DISEASE OF NATIVE ARTERY OF NATIVE HEART WITH STABLE ANGINA PECTORIS (H): ICD-10-CM

## 2019-01-04 PROCEDURE — 99214 OFFICE O/P EST MOD 30 MIN: CPT | Performed by: NURSE PRACTITIONER

## 2019-01-04 ASSESSMENT — MIFFLIN-ST. JEOR: SCORE: 1683.76

## 2019-01-04 NOTE — LETTER
1/4/2019    William Thompson MD  [address]  [facility] [address]    RE: Ivan Payton       Dear Colleague,    I had the pleasure of seeing Ivan Payton in the [facility].        [facility] Progress Note  Ivan Payton MRN# [id number]   YOB: 1935 Age: 83 year old     Reason For Visit:  Follow up post angiogram   Primary Cardiologist:   Dr. Johansen          History of Presenting Illness:    Ivan Payton is a pleasant 83 year old patient who carries a past medical history significant for CAD s/p stenting to LAD, circ and OM, carotid disease s/p left carotid endarterectomy (2014), hypertension, and dyslipidemia.     He recently underwent an abnormal stress test after complaints of recurrent chest pain. A follow up angiogram confirmed multivessel CAD with patent stents in the circumflex and LAD. There is severe disease in the PDA and OM resulting in a 2.5 x 18 mm Alpine KIKE to the PDA and 2.5 x 28 mm Alpine KIKE to the OM1. EF is well preserved at 67%.     He is feeling well on a cardiac standpoint, denies no recurrent chest pain since his PCI, shortness of breath, PND, orthopnea, presyncope, syncope, edema, heart racing, or palpitations.      Blood pressure is well controlled at 118/66, HR 66, managed well on atenolol, lisinopril, and Maxide  Last BMP showed a sodium of 140, potassium 4.0, BUN 19, creatinine 0.78, and GFR >90  BMI 35.47    He has opted out of cardiac rehab and works out regularly 3x/week at his local gym.   Follows a heart healthy diet  Last lipid panel showed a LDL of 68, HDL 42, cholesterol 132, and triglycerides of 112    He remains compliant with all medications.                   Assessment and Plan:     1. CAD - s/p KIKE to PDA and OM1. Angiogram confirmed patent stents to the circ and LAD. Well preserved EF.   2. Hypertension - Well controlled on current medical therapy.   3. Hyperlipidemia - LDL at goal 68, Continue on  atorvastatin  4. Carotid disease - remove history of carotid endarterectomy (2014). Last carotid US showed 50-69% stenosis to bilateral internal carotids.  Scheduled with Dr. Jones next week with carotid US prior.                 Thank you for allowing me to participate in this delightful patient's care.        Helene Grande, APRN, CNP          Review of Systems:   Review of Systems:  Skin:  Positive for lumps or bumps   Eyes:  Positive for glasses  ENT:  Negative    Respiratory:  Negative    Cardiovascular:    Positive for;fatigue;edema  Gastroenterology: Negative    Genitourinary:  Negative    Musculoskeletal:  Positive for arthritis  Neurologic:  Negative    Psychiatric:  Negative    Heme/Lymph/Imm:  Negative    Endocrine:  Negative                Physical Exam:     GEN:  In general, this is a well nourished male in no acute distress   HEENT:  Pupils equal, round. Sclerae nonicteric. Clear oropharynx. Mucous membranes moist.  NECK: Supple, no masses appreciated. Trachea midline. No JVD   C/V:  Regular rate and rhythm, no murmur, rub or gallop. No S3 or RV heave.   RESP: Respirations are unlabored. No use of accessory muscles. Clear to auscultation bilaterally without wheezing, rales, or rhonchi.  GI: Abdomen soft, nontender, nondistended. No HSM appreciated.   EXTREM: 1+ LE edema. No cyanosis or clubbing.  NEURO: Alert and oriented, cooperative.  No obvious focal deficits.   PSYCH: Normal affect.  SKIN: Warm and dry. No rashes or petechiae appreciated.          Past Medical History:     Past Medical History:   Diagnosis Date     Arthritis      CAD (coronary artery disease) 12/18/2018    KIKE to PDA, OM1     Carotid stenosis 3/26/2014    S/p L CEA; R ICA ok;          See CUS 12/15 and Dr Jones's consult; continue plavix      Cerebral vascular disease 3/2014    multiple intracranial stenoses - lt post communic, lt post cerebral, rt middle cerebral, bilat porx M2 segments     Claustrophobia     Need sedation for  MRI scans     Coronary artery disease 2014    Cath 9/2014: PTCA and KIKE to mLAD, KIKE to prox circumflex, PTCA and DESx2 to OM2     CVA (cerebral infarction) 3/2014    2 small acute lesions noted left parietal/left posterior frontal region. Old lacunar infarct left caudate nucleus     Dyspnea 7/21/2014     Enlarged prostate      Essential hypertension      Problem list name updated by automated process. Provider to review     GERD (gastroesophageal reflux disease)      Hiatal hernia      Hydrocele     Right hydrocelectomy 2/2012     Hyperlipidemia with target LDL less than 100 3/14/2014     Diagnosis updated by automated process. Provider to review and confirm.     RBBB (right bundle branch block) 3/2014     Shortness of breath      Stented coronary artery      Uncomplicated asthma               Past Surgical History:     Past Surgical History:   Procedure Laterality Date     CV HEART CATHETERIZATION WITH POSSIBLE INTERVENTION N/A 12/18/2018    Procedure: Coronary Angiography;  Surgeon: Geovanni Tanner MD;  Location: [facility] CARDIAC CATH LAB     ENDARTERECTOMY CAROTID  3/26/2014    Procedure: ENDARTERECTOMY CAROTID;  LEFT CAROTID ENDARTERECTOMY WITH EEG;  Surgeon: Yobani Jones MD;  Location:  OR      REMOVAL OF TONSILS,<11 Y/O      Tonsils <13y.o.     HEART CATH, ANGIOPLASTY  9/9/14    Stenting of LAD,Prox LCx, and 2 stents to OM2     HERNIORRHAPHY INGUINAL      Right     HERNIORRHAPHY INGUINAL  2/10/2012    Procedure:HERNIORRHAPHY INGUINAL; LEFT OPEN INGUINAL HERNIA REPAIR WITH MESH, RIGHT HYDROCELECTOMY; Surgeon:JULI LOPES; Location:[facility]     HYDROCELECTOMY INGUINAL  2/10/2012    Procedure:HYDROCELECTOMY INGUINAL; Surgeon:JULI LOPES; Location:[facility]     HYDROCELECTOMY SCROTAL Right 1/29/2016    Procedure: HYDROCELECTOMY SCROTAL;  Surgeon: Yobani Stevens MD;  Location: [facility]     LAPAROSCOPIC HERNIORRHAPHY INGUINAL BILATERAL Bilateral 5/24/2016    Procedure: LAPAROSCOPIC  HERNIORRHAPHY INGUINAL BILATERAL;  Surgeon: Chandler Bowen MD;  Location: [facility] OR     MR BRAIN AND ORBITS  3/2014    6 mm area of restricted diffusion subcortical white matter left parietal lobe/questionable area of restricted diffusion left posterior frontal region - c/w recent small infarcts. Small chronic lacunar infarct left caudate nucleus     SURGICAL HISTORY OF -       tonail removal              Allergies:   Contrast dye       Data:   All laboratory data reviewed:    LAST CHOLESTEROL:  Lab Results   Component Value Date    CHOL 132 11/27/2018     Lab Results   Component Value Date    HDL 42 11/27/2018     Lab Results   Component Value Date    LDL 68 11/27/2018     Lab Results   Component Value Date    TRIG 112 11/27/2018     Lab Results   Component Value Date    CHOLHDLRATIO 3.0 06/19/2015       LAST BMP:  Lab Results   Component Value Date     12/18/2018      Lab Results   Component Value Date    POTASSIUM 4.0 12/18/2018     Lab Results   Component Value Date    CHLORIDE 106 12/18/2018     Lab Results   Component Value Date    SREEDHAR 8.6 12/18/2018     Lab Results   Component Value Date    CO2 26 12/18/2018     Lab Results   Component Value Date    BUN 19 12/18/2018     Lab Results   Component Value Date    CR 0.78 12/18/2018     Lab Results   Component Value Date     12/18/2018       LAST CBC:  Lab Results   Component Value Date    WBC 4.9 12/18/2018     Lab Results   Component Value Date    RBC 5.02 12/18/2018     Lab Results   Component Value Date    HGB 15.7 12/18/2018     Lab Results   Component Value Date    HCT 44.9 12/18/2018     Lab Results   Component Value Date    MCV 89 12/18/2018     Lab Results   Component Value Date    MCH 31.3 12/18/2018     Lab Results   Component Value Date    MCHC 35.0 12/18/2018     Lab Results   Component Value Date    RDW 13.7 12/18/2018     Lab Results   Component Value Date     12/18/2018         ank you for allowing me to participate in the care  of your patient.      Sincerely,     JAILENE Sanford CNP     [facility]    cc:   Justine Otero PA-C  [id number]  [place] [address]

## 2019-01-04 NOTE — LETTER
1/4/2019    William Thompson MD  [address]  [facility] [address]    RE: Ivan Payton       Dear Colleague,    I had the pleasure of seeing Ivan Payton in the [facility].    Cardiology Clinic Progress Note  Ivan Payton MRN# [id number]   YOB: 1935 Age: 83 year old     Reason For Visit:  Follow up post angiogram   Primary Cardiologist:   Dr. Johansen          History of Presenting Illness:    Ivan Payton is a pleasant 83 year old patient who carries a past medical history significant for CAD s/p stenting to LAD, circ and OM, carotid disease s/p left carotid endarterectomy (2014), hypertension, and dyslipidemia.     He recently underwent an abnormal stress test after complaints of recurrent chest pain. A follow up angiogram confirmed multivessel CAD with patent stents in the circumflex and LAD. There is severe disease in the PDA and OM resulting in a 2.5 x 18 mm Alpine KIKE to the PDA and 2.5 x 28 mm Alpine KIKE to the OM1. EF is well preserved at 67%.     He is feeling well on a cardiac standpoint, denies no recurrent chest pain since his PCI, shortness of breath, PND, orthopnea, presyncope, syncope, edema, heart racing, or palpitations.      Blood pressure is well controlled at 118/66, HR 66, managed well on atenolol, lisinopril, and Maxide  Last BMP showed a sodium of 140, potassium 4.0, BUN 19, creatinine 0.78, and GFR >90  BMI 35.47    He has opted out of cardiac rehab and works out regularly 3x/week at his local gym.   Follows a heart healthy diet  Last lipid panel showed a LDL of 68, HDL 42, cholesterol 132, and triglycerides of 112    He remains compliant with all medications.                   Assessment and Plan:     1. CAD - s/p KIKE to PDA and OM1. Angiogram confirmed patent stents to the circ and LAD. Well preserved EF.   2. Hypertension - Well controlled on current medical therapy.   3. Hyperlipidemia - LDL at goal 68, Continue on  atorvastatin  4. Carotid disease - remove history of carotid endarterectomy (2014). Last carotid US showed 50-69% stenosis to bilateral internal carotids.  Scheduled with Dr. Jones next week with carotid US prior.                 Thank you for allowing me to participate in this delightful patient's care.        Helene Grande, APRN, CNP          Review of Systems:   Review of Systems:  Skin:  Positive for lumps or bumps   Eyes:  Positive for glasses  ENT:  Negative    Respiratory:  Negative    Cardiovascular:    Positive for;fatigue;edema  Gastroenterology: Negative    Genitourinary:  Negative    Musculoskeletal:  Positive for arthritis  Neurologic:  Negative    Psychiatric:  Negative    Heme/Lymph/Imm:  Negative    Endocrine:  Negative                Physical Exam:     GEN:  In general, this is a well nourished male in no acute distress   HEENT:  Pupils equal, round. Sclerae nonicteric. Clear oropharynx. Mucous membranes moist.  NECK: Supple, no masses appreciated. Trachea midline. No JVD   C/V:  Regular rate and rhythm, no murmur, rub or gallop. No S3 or RV heave.   RESP: Respirations are unlabored. No use of accessory muscles. Clear to auscultation bilaterally without wheezing, rales, or rhonchi.  GI: Abdomen soft, nontender, nondistended. No HSM appreciated.   EXTREM: 1+ LE edema. No cyanosis or clubbing.  NEURO: Alert and oriented, cooperative.  No obvious focal deficits.   PSYCH: Normal affect.  SKIN: Warm and dry. No rashes or petechiae appreciated.          Past Medical History:     Past Medical History:   Diagnosis Date     Arthritis      CAD (coronary artery disease) 12/18/2018    KIKE to PDA, OM1     Carotid stenosis 3/26/2014    S/p L CEA; R ICA ok;          See CUS 12/15 and Dr Jones's consult; continue plavix      Cerebral vascular disease 3/2014    multiple intracranial stenoses - lt post communic, lt post cerebral, rt middle cerebral, bilat porx M2 segments     Claustrophobia     Need sedation for  MRI scans     Coronary artery disease 2014    Cath 9/2014: PTCA and KIKE to mLAD, KIKE to prox circumflex, PTCA and DESx2 to OM2     CVA (cerebral infarction) 3/2014    2 small acute lesions noted left parietal/left posterior frontal region. Old lacunar infarct left caudate nucleus     Dyspnea 7/21/2014     Enlarged prostate      Essential hypertension      Problem list name updated by automated process. Provider to review     GERD (gastroesophageal reflux disease)      Hiatal hernia      Hydrocele     Right hydrocelectomy 2/2012     Hyperlipidemia with target LDL less than 100 3/14/2014     Diagnosis updated by automated process. Provider to review and confirm.     RBBB (right bundle branch block) 3/2014     Shortness of breath      Stented coronary artery      Uncomplicated asthma               Past Surgical History:     Past Surgical History:   Procedure Laterality Date     CV HEART CATHETERIZATION WITH POSSIBLE INTERVENTION N/A 12/18/2018    Procedure: Coronary Angiography;  Surgeon: Geovanni Tanner MD;  Location: [facility] CARDIAC CATH LAB     ENDARTERECTOMY CAROTID  3/26/2014    Procedure: ENDARTERECTOMY CAROTID;  LEFT CAROTID ENDARTERECTOMY WITH EEG;  Surgeon: Yobani Jones MD;  Location:  OR      REMOVAL OF TONSILS,<13 Y/O      Tonsils <13y.o.     HEART CATH, ANGIOPLASTY  9/9/14    Stenting of LAD,Prox LCx, and 2 stents to OM2     HERNIORRHAPHY INGUINAL      Right     HERNIORRHAPHY INGUINAL  2/10/2012    Procedure:HERNIORRHAPHY INGUINAL; LEFT OPEN INGUINAL HERNIA REPAIR WITH MESH, RIGHT HYDROCELECTOMY; Surgeon:JULI LOPES; Location:[facility]     HYDROCELECTOMY INGUINAL  2/10/2012    Procedure:HYDROCELECTOMY INGUINAL; Surgeon:JULI LOPES; Location:[facility]     HYDROCELECTOMY SCROTAL Right 1/29/2016    Procedure: HYDROCELECTOMY SCROTAL;  Surgeon: Yobani Stevens MD;  Location: [facility]     LAPAROSCOPIC HERNIORRHAPHY INGUINAL BILATERAL Bilateral 5/24/2016    Procedure: LAPAROSCOPIC  HERNIORRHAPHY INGUINAL BILATERAL;  Surgeon: Chandler Bowen MD;  Location: [facility] OR     MR BRAIN AND ORBITS  3/2014    6 mm area of restricted diffusion subcortical white matter left parietal lobe/questionable area of restricted diffusion left posterior frontal region - c/w recent small infarcts. Small chronic lacunar infarct left caudate nucleus     SURGICAL HISTORY OF -       tonail removal              Allergies:   Contrast dye       Data:   All laboratory data reviewed:    LAST CHOLESTEROL:  Lab Results   Component Value Date    CHOL 132 11/27/2018     Lab Results   Component Value Date    HDL 42 11/27/2018     Lab Results   Component Value Date    LDL 68 11/27/2018     Lab Results   Component Value Date    TRIG 112 11/27/2018     Lab Results   Component Value Date    CHOLHDLRATIO 3.0 06/19/2015       LAST BMP:  Lab Results   Component Value Date     12/18/2018      Lab Results   Component Value Date    POTASSIUM 4.0 12/18/2018     Lab Results   Component Value Date    CHLORIDE 106 12/18/2018     Lab Results   Component Value Date    SREEDHAR 8.6 12/18/2018     Lab Results   Component Value Date    CO2 26 12/18/2018     Lab Results   Component Value Date    BUN 19 12/18/2018     Lab Results   Component Value Date    CR 0.78 12/18/2018     Lab Results   Component Value Date     12/18/2018       LAST CBC:  Lab Results   Component Value Date    WBC 4.9 12/18/2018     Lab Results   Component Value Date    RBC 5.02 12/18/2018     Lab Results   Component Value Date    HGB 15.7 12/18/2018     Lab Results   Component Value Date    HCT 44.9 12/18/2018     Lab Results   Component Value Date    MCV 89 12/18/2018     Lab Results   Component Value Date    MCH 31.3 12/18/2018     Lab Results   Component Value Date    MCHC 35.0 12/18/2018     Lab Results   Component Value Date    RDW 13.7 12/18/2018     Lab Results   Component Value Date     12/18/2018         Thank you for allowing me to participate in the care  of your patient.    Sincerely,     JAILENE Sanford [facility]

## 2019-01-04 NOTE — PATIENT INSTRUCTIONS
Thanks for coming into [facility] clinic today.    Doing well on a cardiac standpoint  Continue on current medical therapy  Encourage continued exercise, healthy weight and heart healthy diet  Follow up with Dr. Jones next week with carotid ultrasound prior  Follow up with Dr. Johansen in 6 months or sooner if needed.           Please call my nurse at [phone] with any questions or concerns.    Scheduling phone number: [phone]  Reminder: Please bring in all current medications, over the counter supplements and vitamin bottles to your next appointment.

## 2019-01-04 NOTE — PROGRESS NOTES
Cardiology Clinic Progress Note  Ivan Payton MRN# [id number]   YOB: 1935 Age: 83 year old     Reason For Visit:  Follow up post angiogram   Primary Cardiologist:   Dr. Johansen          History of Presenting Illness:    Ivan Payton is a pleasant 83 year old patient who carries a past medical history significant for CAD s/p stenting to LAD, circ and OM, carotid disease s/p left carotid endarterectomy (2014), hypertension, and dyslipidemia.     He recently underwent an abnormal stress test after complaints of recurrent chest pain. A follow up angiogram confirmed multivessel CAD with patent stents in the circumflex and LAD. There is severe disease in the PDA and OM resulting in a 2.5 x 18 mm Alpine KIKE to the PDA and 2.5 x 28 mm Alpine KIKE to the OM1. EF is well preserved at 67%.     He is feeling well on a cardiac standpoint, denies no recurrent chest pain since his PCI, shortness of breath, PND, orthopnea, presyncope, syncope, edema, heart racing, or palpitations.      Blood pressure is well controlled at 118/66, HR 66, managed well on atenolol, lisinopril, and Maxide  Last BMP showed a sodium of 140, potassium 4.0, BUN 19, creatinine 0.78, and GFR >90  BMI 35.47    He has opted out of cardiac rehab and works out regularly 3x/week at his local gym.   Follows a heart healthy diet  Last lipid panel showed a LDL of 68, HDL 42, cholesterol 132, and triglycerides of 112    He remains compliant with all medications.                   Assessment and Plan:     1. CAD - s/p KIKE to PDA and OM1. Angiogram confirmed patent stents to the circ and LAD. Well preserved EF.   2. Hypertension - Well controlled on current medical therapy.   3. Hyperlipidemia - LDL at goal 68, Continue on atorvastatin  4. Carotid disease - remove history of carotid endarterectomy (2014). Last carotid US showed 50-69% stenosis to bilateral internal carotids.  Scheduled with Dr. Jones next week with carotid US prior.                  Thank you for allowing me to participate in this delightful patient's care.        Helene Grande, APRN, CNP          Review of Systems:   Review of Systems:  Skin:  Positive for lumps or bumps   Eyes:  Positive for glasses  ENT:  Negative    Respiratory:  Negative    Cardiovascular:    Positive for;fatigue;edema  Gastroenterology: Negative    Genitourinary:  Negative    Musculoskeletal:  Positive for arthritis  Neurologic:  Negative    Psychiatric:  Negative    Heme/Lymph/Imm:  Negative    Endocrine:  Negative                Physical Exam:     GEN:  In general, this is a well nourished male in no acute distress   HEENT:  Pupils equal, round. Sclerae nonicteric. Clear oropharynx. Mucous membranes moist.  NECK: Supple, no masses appreciated. Trachea midline. No JVD   C/V:  Regular rate and rhythm, no murmur, rub or gallop. No S3 or RV heave.   RESP: Respirations are unlabored. No use of accessory muscles. Clear to auscultation bilaterally without wheezing, rales, or rhonchi.  GI: Abdomen soft, nontender, nondistended. No HSM appreciated.   EXTREM: 1+ LE edema. No cyanosis or clubbing.  NEURO: Alert and oriented, cooperative.  No obvious focal deficits.   PSYCH: Normal affect.  SKIN: Warm and dry. No rashes or petechiae appreciated.          Past Medical History:     Past Medical History:   Diagnosis Date     Arthritis      CAD (coronary artery disease) 12/18/2018    KIKE to PDA, OM1     Carotid stenosis 3/26/2014    S/p L CEA; R ICA ok;          See CUS 12/15 and Dr Jones's consult; continue plavix      Cerebral vascular disease 3/2014    multiple intracranial stenoses - lt post communic, lt post cerebral, rt middle cerebral, bilat porx M2 segments     Claustrophobia     Need sedation for MRI scans     Coronary artery disease 2014    Cath 9/2014: PTCA and KIKE to mLAD, KIKE to prox circumflex, PTCA and DESx2 to OM2     CVA (cerebral infarction) 3/2014    2 small acute lesions noted left parietal/left posterior  frontal region. Old lacunar infarct left caudate nucleus     Dyspnea 7/21/2014     Enlarged prostate      Essential hypertension      Problem list name updated by automated process. Provider to review     GERD (gastroesophageal reflux disease)      Hiatal hernia      Hydrocele     Right hydrocelectomy 2/2012     Hyperlipidemia with target LDL less than 100 3/14/2014     Diagnosis updated by automated process. Provider to review and confirm.     RBBB (right bundle branch block) 3/2014     Shortness of breath      Stented coronary artery      Uncomplicated asthma               Past Surgical History:     Past Surgical History:   Procedure Laterality Date     CV HEART CATHETERIZATION WITH POSSIBLE INTERVENTION N/A 12/18/2018    Procedure: Coronary Angiography;  Surgeon: Geovanni Tanner MD;  Location:  HEART CARDIAC CATH LAB     ENDARTERECTOMY CAROTID  3/26/2014    Procedure: ENDARTERECTOMY CAROTID;  LEFT CAROTID ENDARTERECTOMY WITH EEG;  Surgeon: Yobani Jones MD;  Location:  OR      REMOVAL OF TONSILS,<11 Y/O      Tonsils <12y.o.     HEART CATH, ANGIOPLASTY  9/9/14    Stenting of LAD,Prox LCx, and 2 stents to OM2     HERNIORRHAPHY INGUINAL      Right     HERNIORRHAPHY INGUINAL  2/10/2012    Procedure:HERNIORRHAPHY INGUINAL; LEFT OPEN INGUINAL HERNIA REPAIR WITH MESH, RIGHT HYDROCELECTOMY; Surgeon:JULI LOPES; Location:[facility]     HYDROCELECTOMY INGUINAL  2/10/2012    Procedure:HYDROCELECTOMY INGUINAL; Surgeon:JULI LOPES; Location:[facility]     HYDROCELECTOMY SCROTAL Right 1/29/2016    Procedure: HYDROCELECTOMY SCROTAL;  Surgeon: Yobani Stevens MD;  Location: [facility]     LAPAROSCOPIC HERNIORRHAPHY INGUINAL BILATERAL Bilateral 5/24/2016    Procedure: LAPAROSCOPIC HERNIORRHAPHY INGUINAL BILATERAL;  Surgeon: Chandler Bowen MD;  Location:  OR     MR BRAIN AND ORBITS  3/2014    6 mm area of restricted diffusion subcortical white matter left parietal lobe/questionable area of  restricted diffusion left posterior frontal region - c/w recent small infarcts. Small chronic lacunar infarct left caudate nucleus     SURGICAL HISTORY OF -       tonail removal              Allergies:   Contrast dye       Data:   All laboratory data reviewed:    LAST CHOLESTEROL:  Lab Results   Component Value Date    CHOL 132 11/27/2018     Lab Results   Component Value Date    HDL 42 11/27/2018     Lab Results   Component Value Date    LDL 68 11/27/2018     Lab Results   Component Value Date    TRIG 112 11/27/2018     Lab Results   Component Value Date    CHOLHDLRATIO 3.0 06/19/2015       LAST BMP:  Lab Results   Component Value Date     12/18/2018      Lab Results   Component Value Date    POTASSIUM 4.0 12/18/2018     Lab Results   Component Value Date    CHLORIDE 106 12/18/2018     Lab Results   Component Value Date    SREEDHAR 8.6 12/18/2018     Lab Results   Component Value Date    CO2 26 12/18/2018     Lab Results   Component Value Date    BUN 19 12/18/2018     Lab Results   Component Value Date    CR 0.78 12/18/2018     Lab Results   Component Value Date     12/18/2018       LAST CBC:  Lab Results   Component Value Date    WBC 4.9 12/18/2018     Lab Results   Component Value Date    RBC 5.02 12/18/2018     Lab Results   Component Value Date    HGB 15.7 12/18/2018     Lab Results   Component Value Date    HCT 44.9 12/18/2018     Lab Results   Component Value Date    MCV 89 12/18/2018     Lab Results   Component Value Date    MCH 31.3 12/18/2018     Lab Results   Component Value Date    MCHC 35.0 12/18/2018     Lab Results   Component Value Date    RDW 13.7 12/18/2018     Lab Results   Component Value Date     12/18/2018

## 2019-01-15 ENCOUNTER — OFFICE VISIT (OUTPATIENT)
Dept: OTHER | Facility: CLINIC | Age: 84
End: 2019-01-15
Attending: SURGERY
Payer: COMMERCIAL

## 2019-01-15 ENCOUNTER — HOSPITAL ENCOUNTER (OUTPATIENT)
Dept: ULTRASOUND IMAGING | Facility: CLINIC | Age: 84
Discharge: HOME | End: 2019-01-15
Attending: SURGERY | Admitting: SURGERY
Payer: COMMERCIAL

## 2019-01-15 VITALS — HEART RATE: 66 BPM | DIASTOLIC BLOOD PRESSURE: 78 MMHG | SYSTOLIC BLOOD PRESSURE: 138 MMHG

## 2019-01-15 DIAGNOSIS — Z98.890 HISTORY OF LEFT-SIDED CAROTID ENDARTERECTOMY: ICD-10-CM

## 2019-01-15 DIAGNOSIS — E78.5 HYPERLIPIDEMIA LDL GOAL <70: ICD-10-CM

## 2019-01-15 DIAGNOSIS — I65.29 CAROTID STENOSIS: ICD-10-CM

## 2019-01-15 DIAGNOSIS — I73.9 PAD (PERIPHERAL ARTERY DISEASE) (H): ICD-10-CM

## 2019-01-15 DIAGNOSIS — I65.23 CAROTID STENOSIS, ASYMPTOMATIC, BILATERAL: Primary | ICD-10-CM

## 2019-01-15 PROCEDURE — 93880 EXTRACRANIAL BILAT STUDY: CPT

## 2019-01-15 PROCEDURE — 99214 OFFICE O/P EST MOD 30 MIN: CPT | Mod: ZP | Performed by: SURGERY

## 2019-01-15 PROCEDURE — G0463 HOSPITAL OUTPT CLINIC VISIT: HCPCS

## 2019-01-15 NOTE — LETTER
[facility]  [address]  [place] [address]  Phone: [phone]  Fax: [phone]    January 15, 2019    Re: Ivan Payotn, : 1935    [facility]     Ivan Payton returns for vascular follow-up.  This 83-year-old gentleman underwent a left CEA on 3/27/2014 for high-grade asymptomatic stenosis.  He has a history of a very tortuous distal ICA with elevated velocities but no stenosis.  Mild stenosis of the right carotid bifurcation.     Duplex follow-up on 2017 revealed a right ICA PSV= 166 cm/s and left ICA PSV= 158 cm/s with tortuosity     PMH: Medications: Aspirin, Plavix, Lipitor, Tenormin, lisinopril, Maxide, Prilosec            Medical: Coronary disease with prior stenting on Plavix                           Hypertension                           Hyperlipidemia                           GERD                           Mild PAD                           Chronic asymptomatic calf edema     Quit smoking decades ago                2018 laboratory: K=4.0  SCr= 0.78   Hgb= 15.7                                       LDL= 68     Exam: Alert and appropriate. Normal affect.              Blood pressure 138/78.  Pulse 66              HEENT= very well-healed left carotid incision with no numbness.                              No bruits.              Chest= clear               Cardiovascular= regular rate.               Abdomen= soft, nontender.               Extremities= bilateral calf edema with some hemosiderin changes.  No                   ulcers.  Normal sensation.                    +3 right posterior tibial pulse but difficult to palpate left pedal pulses.                    No ischemic changes.     Bedside Doppler with +3 triphasic signal in the right posterior tibial with strong biphasic signal left posterior tibial artery.     Duplex of the carotids today reveals mild stable plaque in the origin of the right ICA with distal  tortuosity.  Left CEA is widely patent though there is an elevated velocity of 194 cm/s in the proximal ICA.  This velocities similar to what we noticed in 2015 but again with no significant narrowing.     Impression: #1.  Patent left CEA with elevated velocity but no clinically narrowing appreciated. Mild disease in the right carotid bifurcation.  Good risk factor control with LDL less than 70 and well-controlled blood pressure.  Plan repeat duplex 1 year.                          #2.  Mild PAD with very adequate blood flow.  Discussed with patient.  Clinical follow-up only.                        Yobani Jones MD

## 2019-01-15 NOTE — NURSING NOTE
"Ivan Payton is a 83 year old male who presents for:  Chief Complaint   Patient presents with     RECHECK     Bilateral carotid (VHC 9:15; WRO 10:30) *Status post left carotid endarterectomy in 3-2014; 1 year follow up to 12-14-17 appointment with Dr. Karen Santoss:    Vitals:    01/15/19 1017   BP: 138/78   BP Location: Left arm   Patient Position: Chair   Cuff Size: Adult Regular   Pulse: 66       BMI:  Estimated body mass index is 35.4 kg/m  as calculated from the following:    Height as of 1/4/19: 5' 7\" (1.702 m).    Weight as of 1/4/19: 226 lb (102.5 kg).    Pain Score:  Data Unavailable        Vonda Rm MA    "

## 2019-01-15 NOTE — PROGRESS NOTES
[place] [facility]      Ivan Payton returns for vascular follow-up.  This 83-year-old gentleman underwent a left CEA on 3/27/2014 for high-grade asymptomatic stenosis.  He has a history of a very tortuous distal ICA with elevated velocities but no stenosis.  Mild stenosis of the right carotid bifurcation.    Duplex follow-up on 12/14/2017 revealed a right ICA PSV= 166 cm/s and left ICA PSV= 158 cm/s with tortuosity      PMH: Medications: Aspirin, Plavix, Lipitor, Tenormin, lisinopril, Maxide, Prilosec            Medical: Coronary disease with prior stenting on Plavix                           Hypertension                           Hyperlipidemia                           GERD                           Mild PAD                           Chronic asymptomatic calf edema    Quit smoking decades ago                 12/18/2018 laboratory: K=4.0  SCr= 0.78   Hgb= 15.7                                       LDL= 68    Exam: Alert and appropriate. Normal affect.              Blood pressure 138/78.  Pulse 66              HEENT= very well-healed left carotid incision with no numbness.                              No bruits.              Chest= clear               Cardiovascular= regular rate.               Abdomen= soft, nontender.               Extremities= bilateral calf edema with some hemosiderin changes.  No                   ulcers.  Normal sensation.                    +3 right posterior tibial pulse but difficult to palpate left pedal pulses.                    No ischemic changes.    Bedside Doppler with +3 triphasic signal in the right posterior tibial with strong biphasic signal left posterior tibial artery.    Duplex of the carotids today reveals mild stable plaque in the origin of the right ICA with distal tortuosity.  Left CEA is widely patent though there is an elevated velocity of 194 cm/s in the proximal ICA.  This velocities similar to what we noticed in 2015 but again with no significant  narrowing.      Impression: #1.  Patent left CEA with elevated velocity but no clinically narrowing appreciated.  Mild disease in the right carotid bifurcation.  Good risk factor control with LDL less than 70 and well-controlled blood pressure.  Plan repeat duplex 1 year.                         #2.  Mild PAD with very adequate blood flow.  Discussed with patient.  Clinical follow-up only.                             Yobani Jones MD   CC  Patient Care Team:  Kylee [facility] MD as PCP - General (Internal Medicine)  Kylee [facility] MD as PCP - Assigned PCP  KYLEE THOMPSON

## 2019-02-18 DIAGNOSIS — I25.10 CORONARY ARTERY DISEASE INVOLVING NATIVE HEART, ANGINA PRESENCE UNSPECIFIED, UNSPECIFIED VESSEL OR LESION TYPE: Primary | ICD-10-CM

## 2019-02-18 DIAGNOSIS — I10 ESSENTIAL HYPERTENSION: ICD-10-CM

## 2019-02-19 NOTE — TELEPHONE ENCOUNTER
"Requested Prescriptions   Pending Prescriptions Disp Refills     lisinopril (PRINIVIL/ZESTRIL) 10 MG tablet  Last Written Prescription Date:  2/13/2018  Last Fill Quantity: 90 tablet,  # refills: 3   Last office visit: 12/11/2018 with prescribing provider:  Jay   Future Office Visit:     90 tablet 3     Sig: Take 1 tablet (10 mg) by mouth daily    ACE Inhibitors (Including Combos) Protocol Passed - 2/18/2019  3:18 PM       Passed - Blood pressure under 140/90 in past 12 months    BP Readings from Last 3 Encounters:   01/15/19 138/78   01/04/19 118/66   12/18/18 132/81                Passed - Recent (12 mo) or future (30 days) visit within the authorizing provider's specialty    Patient had office visit in the last 12 months or has a visit in the next 30 days with authorizing provider or within the authorizing provider's specialty.  See \"Patient Info\" tab in inbasket, or \"Choose Columns\" in Meds & Orders section of the refill encounter.             Passed - Medication is active on med list       Passed - Patient is age 18 or older       Passed - Normal serum creatinine on file in past 12 months    Recent Labs   Lab Test 12/18/18  0700  03/12/14  0906   CR 0.78   < >  --    CREAT  --   --  1.0    < > = values in this interval not displayed.            Passed - Normal serum potassium on file in past 12 months    Recent Labs   Lab Test 12/18/18  0700   POTASSIUM 4.0             clopidogrel (PLAVIX) 75 MG tablet  Last Written Prescription Date:  2/13/2018  Last Fill Quantity: 90 tablet,  # refills: 3   Last office visit: 12/11/2018 with prescribing provider:  Jay   Future Office Visit:     90 tablet 3     Sig: Take 1 tablet (75 mg) by mouth daily    Plavix Failed - 2/18/2019  3:18 PM       Failed - No active PPI on record unless is Protonix       Passed - Normal HGB on file in past 12 months    Recent Labs   Lab Test 12/18/18  0700   HGB 15.7              Passed - Normal Platelets on file in past 12 months    " "Recent Labs   Lab Test 12/18/18  0700                 Passed - Recent (12 mo) or future (30 days) visit within the authorizing provider's specialty    Patient had office visit in the last 12 months or has a visit in the next 30 days with authorizing provider or within the authorizing provider's specialty.  See \"Patient Info\" tab in inbasket, or \"Choose Columns\" in Meds & Orders section of the refill encounter.             Passed - Medication is active on med list       Passed - Patient is age 18 or older           "

## 2019-02-20 RX ORDER — LISINOPRIL 10 MG/1
10 TABLET ORAL DAILY
Qty: 90 TABLET | Refills: 2 | Status: SHIPPED | OUTPATIENT
Start: 2019-02-20 | End: 2019-08-30

## 2019-02-20 RX ORDER — CLOPIDOGREL BISULFATE 75 MG/1
75 TABLET ORAL DAILY
Qty: 90 TABLET | Refills: 3 | Status: SHIPPED | OUTPATIENT
Start: 2019-02-20 | End: 2019-12-02

## 2019-02-20 NOTE — TELEPHONE ENCOUNTER
Lisinopril approved per [facility] Refill Protocol for 12 months of refills since last appointment, which was 12/11/18    plavix refill request to provider for review/approval because:  Fails protocol

## 2019-03-12 DIAGNOSIS — I10 ESSENTIAL HYPERTENSION: ICD-10-CM

## 2019-03-13 RX ORDER — ATENOLOL 50 MG/1
TABLET ORAL
Qty: 90 TABLET | Refills: 2
Start: 2019-03-13

## 2019-08-23 DIAGNOSIS — I10 ESSENTIAL HYPERTENSION: Primary | ICD-10-CM

## 2019-08-26 DIAGNOSIS — I10 ESSENTIAL HYPERTENSION: ICD-10-CM

## 2019-08-26 LAB
ANION GAP SERPL CALCULATED.3IONS-SCNC: 9 MMOL/L (ref 6–17)
BUN SERPL-MCNC: 20 MG/DL (ref 7–30)
CALCIUM SERPL-MCNC: 9.6 MG/DL (ref 8.5–10.5)
CHLORIDE SERPL-SCNC: 105 MMOL/L (ref 98–107)
CO2 SERPL-SCNC: 29 MMOL/L (ref 23–29)
CREAT SERPL-MCNC: 0.81 MG/DL (ref 0.7–1.3)
GFR SERPL CREATININE-BSD FRML MDRD: >90 ML/MIN/{1.73_M2}
GLUCOSE SERPL-MCNC: 126 MG/DL (ref 70–105)
POTASSIUM SERPL-SCNC: 4 MMOL/L (ref 3.5–5.1)
SODIUM SERPL-SCNC: 139 MMOL/L (ref 136–145)

## 2019-08-26 PROCEDURE — 36415 COLL VENOUS BLD VENIPUNCTURE: CPT | Performed by: INTERNAL MEDICINE

## 2019-08-26 PROCEDURE — 80048 BASIC METABOLIC PNL TOTAL CA: CPT | Performed by: INTERNAL MEDICINE

## 2019-08-30 ENCOUNTER — OFFICE VISIT (OUTPATIENT)
Dept: CARDIOLOGY | Facility: CLINIC | Age: 84
End: 2019-08-30
Attending: NURSE PRACTITIONER
Payer: COMMERCIAL

## 2019-08-30 VITALS
HEIGHT: 67 IN | WEIGHT: 228 LBS | SYSTOLIC BLOOD PRESSURE: 138 MMHG | HEART RATE: 60 BPM | BODY MASS INDEX: 35.79 KG/M2 | DIASTOLIC BLOOD PRESSURE: 68 MMHG

## 2019-08-30 DIAGNOSIS — I67.9 CEREBRAL VASCULAR DISEASE: ICD-10-CM

## 2019-08-30 DIAGNOSIS — I25.10 CORONARY ARTERY DISEASE INVOLVING NATIVE CORONARY ARTERY OF NATIVE HEART WITHOUT ANGINA PECTORIS: Primary | ICD-10-CM

## 2019-08-30 DIAGNOSIS — E78.5 HYPERLIPIDEMIA WITH TARGET LDL LESS THAN 100: ICD-10-CM

## 2019-08-30 DIAGNOSIS — R01.1 HEART MURMUR: ICD-10-CM

## 2019-08-30 DIAGNOSIS — I10 ESSENTIAL HYPERTENSION: ICD-10-CM

## 2019-08-30 PROCEDURE — 99214 OFFICE O/P EST MOD 30 MIN: CPT | Performed by: INTERNAL MEDICINE

## 2019-08-30 RX ORDER — TERAZOSIN 2 MG/1
2 CAPSULE ORAL AT BEDTIME
Qty: 90 CAPSULE | Refills: 3 | Status: SHIPPED | OUTPATIENT
Start: 2019-08-30 | End: 2020-08-18

## 2019-08-30 RX ORDER — LISINOPRIL AND HYDROCHLOROTHIAZIDE 12.5; 2 MG/1; MG/1
1 TABLET ORAL DAILY
Qty: 90 TABLET | Refills: 3 | Status: SHIPPED | OUTPATIENT
Start: 2019-08-30 | End: 2020-08-20

## 2019-08-30 ASSESSMENT — MIFFLIN-ST. JEOR: SCORE: 1692.83

## 2019-08-30 NOTE — LETTER
8/30/2019    William Thompson MD  [address]  [facility] [address]    RE: Ivan Payton       Dear Colleague,    I had the pleasure of seeing Ivan Payton in the [facility].    HPI and Plan:   See dictation    Orders Placed This Encounter   Procedures     Basic metabolic panel     Lipid Profile     ALT     Follow-Up with Cardiologist     Echocardiogram Complete       Orders Placed This Encounter   Medications     lisinopril-hydrochlorothiazide (PRINZIDE/ZESTORETIC) 20-12.5 MG tablet     Sig: Take 1 tablet by mouth daily     Dispense:  90 tablet     Refill:  3     terazosin (HYTRIN) 2 MG capsule     Sig: Take 1 capsule (2 mg) by mouth At Bedtime     Dispense:  90 capsule     Refill:  3       Medications Discontinued During This Encounter   Medication Reason     lisinopril (PRINIVIL/ZESTRIL) 10 MG tablet      triamterene-HCTZ (MAXZIDE-25) 37.5-25 MG tablet          Encounter Diagnoses   Name Primary?     Essential hypertension      Coronary artery disease involving native coronary artery of native heart without angina pectoris Yes     Hyperlipidemia with target LDL less than 100      Cerebral vascular disease      Heart murmur        CURRENT MEDICATIONS:  Current Outpatient Medications   Medication Sig Dispense Refill     Ascorbic Acid (VITAMIN C PO) Take 1,000 mg by mouth daily       aspirin 81 MG tablet Take by mouth daily 30 tablet      atenolol (TENORMIN) 50 MG tablet TAKE 1 TABLET BY MOUTH EVERY DAY 90 tablet 2     atorvastatin (LIPITOR) 40 MG tablet Take 1 tablet (40 mg) by mouth daily (Patient taking differently: Take 20 mg by mouth daily Patient taking 1/2 tablet daily ( 20mg )) 90 tablet 3     calcium carbonate (OS-SREEDHAR 500 MG [place]. [place]) 500 MG tablet Take 500 mg by mouth daily       clopidogrel (PLAVIX) 75 MG tablet Take 1 tablet (75 mg) by mouth daily 90 tablet 3     glucosamine-chondroitin 500-400 MG CAPS Take 1 capsule by mouth daily        lisinopril-hydrochlorothiazide (PRINZIDE/ZESTORETIC) 20-12.5 MG tablet Take 1 tablet by mouth daily 90 tablet 3     nitroGLYcerin (NITROSTAT) 0.4 MG sublingual tablet For chest pain place 1 tablet under the tongue every 5 minutes for 3 doses. If symptoms persist 5 minutes after 1st dose call 911. 25 tablet 1     Omega-3 Fatty Acids (OMEGA-3 FISH OIL PO) Take 1 g by mouth daily        omeprazole (PRILOSEC) 20 MG DR capsule Take 1 capsule (20 mg) by mouth daily 90 capsule 3     terazosin (HYTRIN) 2 MG capsule Take 1 capsule (2 mg) by mouth At Bedtime 90 capsule 3     vitamin  B complex with vitamin C (VITAMIN  B COMPLEX) TABS Take 1 tablet by mouth daily       VITAMIN D, CHOLECALCIFEROL, PO Take 5,000 Units by mouth daily          ALLERGIES     Allergies   Allergen Reactions     Contrast Dye Hives       PAST MEDICAL HISTORY:  Past Medical History:   Diagnosis Date     Arthritis      Carotid stenosis 3/26/2014    S/p L CEA; R ICA ok;          See CUS 12/15 and Dr Jones's consult; continue plavix      Cerebral vascular disease 3/2014    multiple intracranial stenoses - lt post communic, lt post cerebral, rt middle cerebral, bilat porx M2 segments     Claustrophobia     Need sedation for MRI scans     Coronary artery disease 2014    Cath 9/2014: PTCA and KIKE to mLAD, KIKE to prox circumflex, PTCA and DESx2 to OM2; Cath 12/18/18: KIKE to PDA and OM1, patent stents to LAD and Cfx     CVA (cerebral infarction) 3/2014    2 small acute lesions noted left parietal/left posterior frontal region. Old lacunar infarct left caudate nucleus     Dyspnea 7/21/2014     Enlarged prostate      Essential hypertension      Problem list name updated by automated process. Provider to review     GERD (gastroesophageal reflux disease)      Hiatal hernia      Hydrocele     Right hydrocelectomy 2/2012     Hyperlipidemia with target LDL less than 100 3/14/2014     Diagnosis updated by automated process. Provider to review and confirm.     RBBB  (right bundle branch block) 3/2014     Shortness of breath      Stented coronary artery      Uncomplicated asthma        PAST SURGICAL HISTORY:  Past Surgical History:   Procedure Laterality Date     CV HEART CATHETERIZATION WITH POSSIBLE INTERVENTION N/A 12/18/2018    Procedure: Coronary Angiography;  Surgeon: Geovanni Tanner MD;  Location:  HEART CARDIAC CATH LAB     ENDARTERECTOMY CAROTID  3/26/2014    Procedure: ENDARTERECTOMY CAROTID;  LEFT CAROTID ENDARTERECTOMY WITH EEG;  Surgeon: Yobani Jones MD;  Location:  OR      REMOVAL OF TONSILS,<14 Y/O      Tonsils <13y.o.     HEART CATH, ANGIOPLASTY  9/9/14    Stenting of LAD,Prox LCx, and 2 stents to OM2     HERNIORRHAPHY INGUINAL      Right     HERNIORRHAPHY INGUINAL  2/10/2012    Procedure:HERNIORRHAPHY INGUINAL; LEFT OPEN INGUINAL HERNIA REPAIR WITH MESH, RIGHT HYDROCELECTOMY; Surgeon:JULI LOPES; Location: [facility]     HYDROCELECTOMY INGUINAL  2/10/2012    Procedure:HYDROCELECTOMY INGUINAL; Surgeon:JULI LOPES; Location:[facility]     HYDROCELECTOMY SCROTAL Right 1/29/2016    Procedure: HYDROCELECTOMY SCROTAL;  Surgeon: Yobani Stevens MD;  Location: [facility]     LAPAROSCOPIC HERNIORRHAPHY INGUINAL BILATERAL Bilateral 5/24/2016    Procedure: LAPAROSCOPIC HERNIORRHAPHY INGUINAL BILATERAL;  Surgeon: Chandler Bowen MD;  Location:  OR     MR BRAIN AND ORBITS  3/2014    6 mm area of restricted diffusion subcortical white matter left parietal lobe/questionable area of restricted diffusion left posterior frontal region - c/w recent small infarcts. Small chronic lacunar infarct left caudate nucleus     SURGICAL HISTORY OF -       tonail removal       FAMILY HISTORY:  Family History   Problem Relation Age of Onset     C.A.OCTAVIANO. Brother         older brother, CABG about age 56     Respiratory Father         lung disease - farmer,      Hypertension Mother      C.A.OCTAVIANO. Brother         younger brother, angioplasty      Hypertension Sister      Hypertension Brother      Cerebrovascular Disease Son         2018       SOCIAL HISTORY:  Social History     Socioeconomic History     Marital status:      Spouse name: madhavi     Number of children: 2     Years of education: None     Highest education level: None   Occupational History     Occupation: part time, building maintenance     Employer: RETIRED   Social Needs     Financial resource strain: None     Food insecurity:     Worry: None     Inability: None     Transportation needs:     Medical: None     Non-medical: None   Tobacco Use     Smoking status: Former Smoker     Packs/day: 0.50     Years: 20.00     Pack years: 10.00     Types: Cigarettes     Start date:      Last attempt to quit: 1968     Years since quittin.2     Smokeless tobacco: Former User   Substance and Sexual Activity     Alcohol use: No     Alcohol/week: 0.0 oz     Drug use: No     Sexual activity: Yes     Partners: Female     Comment:    Lifestyle     Physical activity:     Days per week: None     Minutes per session: None     Stress: None   Relationships     Social connections:     Talks on phone: None     Gets together: None     Attends Orthodoxy service: None     Active member of club or organization: None     Attends meetings of clubs or organizations: None     Relationship status: None     Intimate partner violence:     Fear of current or ex partner: None     Emotionally abused: None     Physically abused: None     Forced sexual activity: None   Other Topics Concern      Service Not Asked     Blood Transfusions No     Caffeine Concern Yes     Comment: 1-2 cups per day     Occupational Exposure No     Hobby Hazards No     Sleep Concern No     Stress Concern No     Weight Concern No     Special Diet No     Back Care No     Exercise Yes     Comment: gym 4 days week, treadmill, 1.5 miles,  weights     Bike Helmet No     Seat Belt Yes     Self-Exams No     Parent/sibling w/  "CABG, MI or angioplasty before 65F 55M? Yes     Comment: brothers - 1 had CABG 1 had MI pt unsure of age   Social History Narrative     None       Review of Systems:  Skin:  Positive for lumps or bumps right wrist   Eyes:  Positive for glasses    ENT:  Negative      Respiratory:  Positive for dyspnea on exertion Rests between activity.   Cardiovascular:    Positive for;fatigue;edema    Gastroenterology: Negative      Genitourinary:  Positive for urinary frequency(disruptive to sleep)    Musculoskeletal:  Positive for arthritis    Neurologic:  Negative      Psychiatric:  Negative      Heme/Lymph/Imm:  Positive for allergies    Endocrine:  Positive for diabetes(borderline)      Physical Exam:  Vitals: /68   Pulse 60   Ht 1.702 m (5' 7\")   Wt 103.4 kg (228 lb)   BMI 35.71 kg/m       Constitutional:  cooperative, alert and oriented, well developed, well nourished, in no acute distress        Skin:  warm and dry to the touch, no apparent skin lesions or masses noted          Head:  normocephalic, no masses or lesions        Eyes:  pupils equal and round, conjunctivae and lids unremarkable, sclera white, no xanthalasma, EOMS intact, no nystagmus        Lymph:      ENT:  no pallor or cyanosis, dentition good        Neck:    left carotid bruit      Respiratory:  normal breath sounds, clear to auscultation, normal A-P diameter, normal symmetry, normal respiratory excursion, no use of accessory muscles         Cardiac: regular rhythm, normal S1/S2, no S3 or S4, apical impulse not displaced, no murmurs, gallops or rubs                    (hematoma 2 x 2 cm)                                    GI:  abdomen soft;BS normoactive        Extremities and Muscular Skeletal:  no deformities, clubbing, cyanosis, erythema observed   bilateral LE edema;trace          Neurological:  no gross motor deficits;affect appropriate        Psych:  oriented to time, person and place        CC  Helene Grande, APRN CNP  [address] " [place] [id number]                Thank you for allowing me to participate in the care of your patient.      Sincerely,     KATHY BERNSTEIN MD     [facility]    cc:   Helene Grande, JAILENE CNP  [address] [place] [address]

## 2019-08-30 NOTE — PROGRESS NOTES
HPI and Plan:   See dictation    Orders Placed This Encounter   Procedures     Basic metabolic panel     Lipid Profile     ALT     Follow-Up with Cardiologist     Echocardiogram Complete       Orders Placed This Encounter   Medications     lisinopril-hydrochlorothiazide (PRINZIDE/ZESTORETIC) 20-12.5 MG tablet     Sig: Take 1 tablet by mouth daily     Dispense:  90 tablet     Refill:  3     terazosin (HYTRIN) 2 MG capsule     Sig: Take 1 capsule (2 mg) by mouth At Bedtime     Dispense:  90 capsule     Refill:  3       Medications Discontinued During This Encounter   Medication Reason     lisinopril (PRINIVIL/ZESTRIL) 10 MG tablet      triamterene-HCTZ (MAXZIDE-25) 37.5-25 MG tablet          Encounter Diagnoses   Name Primary?     Essential hypertension      Coronary artery disease involving native coronary artery of native heart without angina pectoris Yes     Hyperlipidemia with target LDL less than 100      Cerebral vascular disease      Heart murmur        CURRENT MEDICATIONS:  Current Outpatient Medications   Medication Sig Dispense Refill     Ascorbic Acid (VITAMIN C PO) Take 1,000 mg by mouth daily       aspirin 81 MG tablet Take by mouth daily 30 tablet      atenolol (TENORMIN) 50 MG tablet TAKE 1 TABLET BY MOUTH EVERY DAY 90 tablet 2     atorvastatin (LIPITOR) 40 MG tablet Take 1 tablet (40 mg) by mouth daily (Patient taking differently: Take 20 mg by mouth daily Patient taking 1/2 tablet daily ( 20mg )) 90 tablet 3     calcium carbonate (OS-SREEDHAR 500 MG [place]. [place]) 500 MG tablet Take 500 mg by mouth daily       clopidogrel (PLAVIX) 75 MG tablet Take 1 tablet (75 mg) by mouth daily 90 tablet 3     glucosamine-chondroitin 500-400 MG CAPS Take 1 capsule by mouth daily       lisinopril-hydrochlorothiazide (PRINZIDE/ZESTORETIC) 20-12.5 MG tablet Take 1 tablet by mouth daily 90 tablet 3     nitroGLYcerin (NITROSTAT) 0.4 MG sublingual tablet For chest pain place 1 tablet under the tongue every 5 minutes for 3 doses. If  symptoms persist 5 minutes after 1st dose call 911. 25 tablet 1     Omega-3 Fatty Acids (OMEGA-3 FISH OIL PO) Take 1 g by mouth daily        omeprazole (PRILOSEC) 20 MG DR capsule Take 1 capsule (20 mg) by mouth daily 90 capsule 3     terazosin (HYTRIN) 2 MG capsule Take 1 capsule (2 mg) by mouth At Bedtime 90 capsule 3     vitamin  B complex with vitamin C (VITAMIN  B COMPLEX) TABS Take 1 tablet by mouth daily       VITAMIN D, CHOLECALCIFEROL, PO Take 5,000 Units by mouth daily          ALLERGIES     Allergies   Allergen Reactions     Contrast Dye Hives       PAST MEDICAL HISTORY:  Past Medical History:   Diagnosis Date     Arthritis      Carotid stenosis 3/26/2014    S/p L CEA; R ICA ok;          See CUS 12/15 and Dr Jones's consult; continue plavix      Cerebral vascular disease 3/2014    multiple intracranial stenoses - lt post communic, lt post cerebral, rt middle cerebral, bilat porx M2 segments     Claustrophobia     Need sedation for MRI scans     Coronary artery disease 2014    Cath 9/2014: PTCA and KIKE to mLAD, KIKE to prox circumflex, PTCA and DESx2 to OM2; Cath 12/18/18: KIKE to PDA and OM1, patent stents to LAD and Cfx     CVA (cerebral infarction) 3/2014    2 small acute lesions noted left parietal/left posterior frontal region. Old lacunar infarct left caudate nucleus     Dyspnea 7/21/2014     Enlarged prostate      Essential hypertension      Problem list name updated by automated process. Provider to review     GERD (gastroesophageal reflux disease)      Hiatal hernia      Hydrocele     Right hydrocelectomy 2/2012     Hyperlipidemia with target LDL less than 100 3/14/2014     Diagnosis updated by automated process. Provider to review and confirm.     RBBB (right bundle branch block) 3/2014     Shortness of breath      Stented coronary artery      Uncomplicated asthma        PAST SURGICAL HISTORY:  Past Surgical History:   Procedure Laterality Date     CV HEART CATHETERIZATION WITH POSSIBLE  INTERVENTION N/A 12/18/2018    Procedure: Coronary Angiography;  Surgeon: Geovanni Tanner MD;  Location:  HEART CARDIAC CATH LAB     ENDARTERECTOMY CAROTID  3/26/2014    Procedure: ENDARTERECTOMY CAROTID;  LEFT CAROTID ENDARTERECTOMY WITH EEG;  Surgeon: Yobani Jones MD;  Location:  OR     HC REMOVAL OF TONSILS,<12 Y/O      Tonsils <13y.o.     HEART CATH, ANGIOPLASTY  9/9/14    Stenting of LAD,Prox LCx, and 2 stents to OM2     HERNIORRHAPHY INGUINAL      Right     HERNIORRHAPHY INGUINAL  2/10/2012    Procedure:HERNIORRHAPHY INGUINAL; LEFT OPEN INGUINAL HERNIA REPAIR WITH MESH, RIGHT HYDROCELECTOMY; Surgeon:JULI LOPES; Location:[facility]     HYDROCELECTOMY INGUINAL  2/10/2012    Procedure:HYDROCELECTOMY INGUINAL; Surgeon:JULI LOPES; Location:[facility]     HYDROCELECTOMY SCROTAL Right 1/29/2016    Procedure: HYDROCELECTOMY SCROTAL;  Surgeon: Yobani Stevens MD;  Location: [facility]     LAPAROSCOPIC HERNIORRHAPHY INGUINAL BILATERAL Bilateral 5/24/2016    Procedure: LAPAROSCOPIC HERNIORRHAPHY INGUINAL BILATERAL;  Surgeon: Chandler Bowen MD;  Location:  OR     MR BRAIN AND ORBITS  3/2014    6 mm area of restricted diffusion subcortical white matter left parietal lobe/questionable area of restricted diffusion left posterior frontal region - c/w recent small infarcts. Small chronic lacunar infarct left caudate nucleus     SURGICAL HISTORY OF -       tonail removal       FAMILY HISTORY:  Family History   Problem Relation Age of Onset     C.A.D. Brother         older brother, CABG about age 56     Respiratory Father         lung disease - farmer,      Hypertension Mother      C.A.D. Brother         younger brother, angioplasty     Hypertension Sister      Hypertension Brother      Cerebrovascular Disease Son         12/26/2018       SOCIAL HISTORY:  Social History     Socioeconomic History     Marital status:      Spouse name: madhavi     Number of children: 2      Years of education: None     Highest education level: None   Occupational History     Occupation: part time, building maintenance     Employer: RETIRED   Social Needs     Financial resource strain: None     Food insecurity:     Worry: None     Inability: None     Transportation needs:     Medical: None     Non-medical: None   Tobacco Use     Smoking status: Former Smoker     Packs/day: 0.50     Years: 20.00     Pack years: 10.00     Types: Cigarettes     Start date:      Last attempt to quit: 1968     Years since quittin.2     Smokeless tobacco: Former User   Substance and Sexual Activity     Alcohol use: No     Alcohol/week: 0.0 oz     Drug use: No     Sexual activity: Yes     Partners: Female     Comment:    Lifestyle     Physical activity:     Days per week: None     Minutes per session: None     Stress: None   Relationships     Social connections:     Talks on phone: None     Gets together: None     Attends Scientology service: None     Active member of club or organization: None     Attends meetings of clubs or organizations: None     Relationship status: None     Intimate partner violence:     Fear of current or ex partner: None     Emotionally abused: None     Physically abused: None     Forced sexual activity: None   Other Topics Concern      Service Not Asked     Blood Transfusions No     Caffeine Concern Yes     Comment: 1-2 cups per day     Occupational Exposure No     Hobby Hazards No     Sleep Concern No     Stress Concern No     Weight Concern No     Special Diet No     Back Care No     Exercise Yes     Comment: gym 4 days week, treadmill, 1.5 miles,  weights     Bike Helmet No     Seat Belt Yes     Self-Exams No     Parent/sibling w/ CABG, MI or angioplasty before 65F 55M? Yes     Comment: brothers - 1 had CABG 1 had MI pt unsure of age   Social History Narrative     None       Review of Systems:  Skin:  Positive for lumps or bumps right wrist   Eyes:  Positive for glasses   "  ENT:  Negative      Respiratory:  Positive for dyspnea on exertion Rests between activity.   Cardiovascular:    Positive for;fatigue;edema    Gastroenterology: Negative      Genitourinary:  Positive for urinary frequency(disruptive to sleep)    Musculoskeletal:  Positive for arthritis    Neurologic:  Negative      Psychiatric:  Negative      Heme/Lymph/Imm:  Positive for allergies    Endocrine:  Positive for diabetes(borderline)      Physical Exam:  Vitals: /68   Pulse 60   Ht 1.702 m (5' 7\")   Wt 103.4 kg (228 lb)   BMI 35.71 kg/m      Constitutional:  cooperative, alert and oriented, well developed, well nourished, in no acute distress        Skin:  warm and dry to the touch, no apparent skin lesions or masses noted          Head:  normocephalic, no masses or lesions        Eyes:  pupils equal and round, conjunctivae and lids unremarkable, sclera white, no xanthalasma, EOMS intact, no nystagmus        Lymph:      ENT:  no pallor or cyanosis, dentition good        Neck:    left carotid bruit      Respiratory:  normal breath sounds, clear to auscultation, normal A-P diameter, normal symmetry, normal respiratory excursion, no use of accessory muscles         Cardiac: regular rhythm, normal S1/S2, no S3 or S4, apical impulse not displaced, no murmurs, gallops or rubs                    (hematoma 2 x 2 cm)                                    GI:  abdomen soft;BS normoactive        Extremities and Muscular Skeletal:  no deformities, clubbing, cyanosis, erythema observed   bilateral LE edema;trace          Neurological:  no gross motor deficits;affect appropriate        Psych:  oriented to time, person and place        COLTON Grande, APRN CNP  [address] [place] [address]              "

## 2019-08-30 NOTE — LETTER
8/30/2019      William Thompson MD  [address]  [facility] [address]      RE: Ivan Payton       Dear Colleague,    I had the pleasure of seeing Ivan Payton in the [facility].    Service Date: 08/30/2019      HISTORY OF PRESENT ILLNESS:  I had the opportunity to see Mr. Ivan Payton in Cardiology Clinic today at the [facility] in [place] for reevaluation of coronary artery disease, carotid vascular disease and cardiac risk factors including hypertension and dyslipidemia.  He is an 83-year-old gentleman who has a history of stenting of his obtuse marginal, proximal circumflex and LAD in the past.  He was having recurrent chest pain.  When I saw him last in 11/2018 and I referred him for stress testing.  This showed significant inferolateral ischemia and I referred him back to the Cardiac Catheterization Lab on 12/18/2018, where he was treated with stenting of his PDA and first obtuse marginal arteries.  He immediately felt better and has had complete resolution of his angina since then.  He was getting some joint aching and reduced his atorvastatin to 20 mg daily several years ago.  Despite this, his cholesterol numbers have remained excellent.  He was having some nocturnal urination problems and reduced his diuretic in half and then in quarters.  He was taking Dyazide 37.5/25 mg daily in the morning.  Now with a reduced dose of diuretic, he is up only once a night.  He continues to have a mild degree of lower extremity edema as well.      He has previously undergone carotid endarterectomy and is followed by Dr. Jones in the [facility] Surgery Clinic for that.      PHYSICAL EXAMINATION:  Today, his blood pressure is 138/68, heart rate 60 and weight 228 pounds.  His lungs are clear.  His heart rhythm is regular.  He has no cardiac murmurs.  He has a soft holosystolic apical murmur.  His last echo was in 2014, which showed mild mitral regurgitation.       IMPRESSIONS:  Mr. Geovani Payton is an 83-year-old gentleman with coronary artery disease with multiple previous stenting procedures as described above.  Most recently, he underwent stenting of his PDA and obtuse marginal arteries on 2018 with complete resolution of his angina.  He has been doing very well since then.      I suggested that his blood pressure needs somewhat better control.  I will stop his Dyazide and increase his lisinopril from 10 mg to 20 mg a day and combine that with a low dose of hydrochlorothiazide 12.5 mg and have him take that in the morning.  I will also add a low dose of terazosin 2 mg at night for additional blood pressure control and improvement in the nocturnal urination.      I will have him continue his 20 mg of atorvastatin daily.  That does seem to be working very well for him.      I will have him follow up with me again in 1 year and repeat an echocardiogram for evaluation of his cardiac murmur at that time.      cc:   William Thompson MD   [facility]   [address] [place] [address]         KATHY BERNSTEIN MD, [facility]             D: 2019   T: 2019   MT: [place]      Name:     GEOVANI PAYTON   MRN:      [id number]        Account:      [id number]   :      1935           Service Date: 2019      Document: [id number]         Outpatient Encounter Medications as of 2019   Medication Sig Dispense Refill     Ascorbic Acid (VITAMIN C PO) Take 1,000 mg by mouth daily       aspirin 81 MG tablet Take by mouth daily 30 tablet      atenolol (TENORMIN) 50 MG tablet TAKE 1 TABLET BY MOUTH EVERY DAY 90 tablet 2     atorvastatin (LIPITOR) 40 MG tablet Take 1 tablet (40 mg) by mouth daily (Patient taking differently: Take 20 mg by mouth daily Patient taking 1/2 tablet daily ( 20mg )) 90 tablet 3     calcium carbonate (OS-SREEDHAR 500 MG [place]. [place]) 500 MG tablet Take 500 mg by mouth daily       clopidogrel (PLAVIX) 75 MG tablet Take 1 tablet (75  mg) by mouth daily 90 tablet 3     glucosamine-chondroitin 500-400 MG CAPS Take 1 capsule by mouth daily       lisinopril-hydrochlorothiazide (PRINZIDE/ZESTORETIC) 20-12.5 MG tablet Take 1 tablet by mouth daily 90 tablet 3     nitroGLYcerin (NITROSTAT) 0.4 MG sublingual tablet For chest pain place 1 tablet under the tongue every 5 minutes for 3 doses. If symptoms persist 5 minutes after 1st dose call 911. 25 tablet 1     Omega-3 Fatty Acids (OMEGA-3 FISH OIL PO) Take 1 g by mouth daily        omeprazole (PRILOSEC) 20 MG DR capsule Take 1 capsule (20 mg) by mouth daily 90 capsule 3     terazosin (HYTRIN) 2 MG capsule Take 1 capsule (2 mg) by mouth At Bedtime 90 capsule 3     vitamin  B complex with vitamin C (VITAMIN  B COMPLEX) TABS Take 1 tablet by mouth daily       VITAMIN D, CHOLECALCIFEROL, PO Take 5,000 Units by mouth daily        [DISCONTINUED] lisinopril (PRINIVIL/ZESTRIL) 10 MG tablet Take 1 tablet (10 mg) by mouth daily 90 tablet 2     [DISCONTINUED] triamterene-HCTZ (MAXZIDE-25) 37.5-25 MG tablet TAKE 1 TABLET BY MOUTH DAILY (Patient taking differently: Take 1 tablet by mouth daily Patient taking 1/2 tab daily.) 90 tablet 2     No facility-administered encounter medications on file as of 8/30/2019.      Again, thank you for allowing me to participate in the care of your patient.      Sincerely,    KATHY BERNSTEIN MD     [facility]

## 2019-08-30 NOTE — PROGRESS NOTES
Service Date: 08/30/2019      HISTORY OF PRESENT ILLNESS:  I had the opportunity to see Mr. Ivan Payton in Cardiology Clinic today at the [facility] in [place] for reevaluation of coronary artery disease, carotid vascular disease and cardiac risk factors including hypertension and dyslipidemia.  He is an 83-year-old gentleman who has a history of stenting of his obtuse marginal, proximal circumflex and LAD in the past.  He was having recurrent chest pain.  When I saw him last in 11/2018 and I referred him for stress testing.  This showed significant inferolateral ischemia and I referred him back to the Cardiac Catheterization Lab on 12/18/2018, where he was treated with stenting of his PDA and first obtuse marginal arteries.  He immediately felt better and has had complete resolution of his angina since then.  He was getting some joint aching and reduced his atorvastatin to 20 mg daily several years ago.  Despite this, his cholesterol numbers have remained excellent.  He was having some nocturnal urination problems and reduced his diuretic in half and then in quarters.  He was taking Dyazide 37.5/25 mg daily in the morning.  Now with a reduced dose of diuretic, he is up only once a night.  He continues to have a mild degree of lower extremity edema as well.      He has previously undergone carotid endarterectomy and is followed by Dr. Jones in the Vascular Surgery Clinic for that.      PHYSICAL EXAMINATION:  Today, his blood pressure is 138/68, heart rate 60 and weight 228 pounds.  His lungs are clear.  His heart rhythm is regular.  He has no cardiac murmurs.  He has a soft holosystolic apical murmur.  His last echo was in 2014, which showed mild mitral regurgitation.      IMPRESSIONS:  Mr. Ivan Payton is an 83-year-old gentleman with coronary artery disease with multiple previous stenting procedures as described above.  Most recently, he underwent stenting of his PDA and obtuse marginal  arteries on 2018 with complete resolution of his angina.  He has been doing very well since then.      I suggested that his blood pressure needs somewhat better control.  I will stop his Dyazide and increase his lisinopril from 10 mg to 20 mg a day and combine that with a low dose of hydrochlorothiazide 12.5 mg and have him take that in the morning.  I will also add a low dose of terazosin 2 mg at night for additional blood pressure control and improvement in the nocturnal urination.      I will have him continue his 20 mg of atorvastatin daily.  That does seem to be working very well for him.      I will have him follow up with me again in 1 year and repeat an echocardiogram for evaluation of his cardiac murmur at that time.      cc:   William Thompson MD   [facility]   [address] [facility] [address]         KATHY BERNSTEIN MD, FACC             D: 2019   T: 2019   MT: [place]      Name:     GEOVANI STOREY   MRN:      [id number]        Account:      [id number]   :      1935           Service Date: 2019      Document: [id number]

## 2019-09-13 DIAGNOSIS — Z98.890 HISTORY OF LEFT-SIDED CAROTID ENDARTERECTOMY: ICD-10-CM

## 2019-09-13 DIAGNOSIS — I65.29 CAROTID STENOSIS: Primary | ICD-10-CM

## 2019-10-23 DIAGNOSIS — R07.89 ATYPICAL CHEST PAIN: ICD-10-CM

## 2019-10-23 NOTE — TELEPHONE ENCOUNTER
"Requested Prescriptions   Pending Prescriptions Disp Refills     omeprazole (PRILOSEC) 20 MG DR capsule [Pharmacy Med Name: OMEPRAZOLE DR 20 MG CAPSULE] 90 capsule 3     Sig: TAKE 1 CAPSULE BY MOUTH EVERY DAY       PPI Protocol Failed - 10/23/2019  1:53 PM        Failed - Not on Clopidogrel (unless Pantoprazole ordered)        Passed - No diagnosis of osteoporosis on record        Passed - Recent (12 mo) or future (30 days) visit within the authorizing provider's specialty     Patient has had an office visit with the authorizing provider or a provider within the authorizing providers department within the previous 12 mos or has a future within next 30 days. See \"Patient Info\" tab in inbasket, or \"Choose Columns\" in Meds & Orders section of the refill encounter.              Passed - Medication is active on med list        Passed - Patient is age 18 or older        Routing refill request to provider for review/approval because:  Protocol failed.      .  "

## 2019-11-19 DIAGNOSIS — I10 ESSENTIAL HYPERTENSION: ICD-10-CM

## 2019-11-19 RX ORDER — LISINOPRIL 10 MG/1
TABLET ORAL
Qty: 90 TABLET | Refills: 2 | OUTPATIENT
Start: 2019-11-19

## 2019-11-19 NOTE — TELEPHONE ENCOUNTER
Routing refill request to provider for review/approval because:  Medication and dose adjusted by cardiology.    KIKO SoriaN, RN  [employer] [facility]

## 2019-11-19 NOTE — TELEPHONE ENCOUNTER
"Requested Prescriptions   Pending Prescriptions Disp Refills     lisinopril (PRINIVIL/ZESTRIL) 10 MG tablet [Pharmacy Med Name: LISINOPRIL 10 MG TABLET]  DISCONTINUED  Last Written Prescription Date:  2/20/2019 END: 8/30/2019  Last Fill Quantity: 90 tablet,  # refills: 2   Last office visit: 12/11/2018 with prescribing provider:  Jay   Future Office Visit:     90 tablet 2     Sig: TAKE 1 TABLET BY MOUTH EVERY DAY       ACE Inhibitors (Including Combos) Protocol Failed - 11/19/2019  2:11 AM        Failed - Medication is active on med list        Passed - Blood pressure under 140/90 in past 12 months     BP Readings from Last 3 Encounters:   08/30/19 138/68   01/15/19 138/78   01/04/19 118/66                 Passed - Recent (12 mo) or future (30 days) visit within the authorizing provider's specialty     Patient has had an office visit with the authorizing provider or a provider within the authorizing providers department within the previous 12 mos or has a future within next 30 days. See \"Patient Info\" tab in inbasket, or \"Choose Columns\" in Meds & Orders section of the refill encounter.              Passed - Patient is age 18 or older        Passed - Normal serum creatinine on file in past 12 months     Recent Labs   Lab Test 08/26/19  1023  03/12/14  0906   CR 0.81   < >  --    CREAT  --   --  1.0    < > = values in this interval not displayed.             Passed - Normal serum potassium on file in past 12 months     Recent Labs   Lab Test 08/26/19  1023   POTASSIUM 4.0                "

## 2019-11-25 ENCOUNTER — CARE COORDINATION (OUTPATIENT)
Dept: CARDIOLOGY | Facility: CLINIC | Age: 84
End: 2019-11-25

## 2019-11-25 NOTE — PROGRESS NOTES
Pt called, left  requesting Dr. Johansen to review if OK to discontinue Plavix in December. Pt states at last OV in August they discussed him coming off Plavix once he hits one year post placement of KIKE.    Returned call to pt. I reviewed that he for sure needs to complete 12 months on DAPT, so through 12/18/19. Pt denies any concerns or symptoms at this time. Pt also denies any bleeding or bruising concerns. I told pt I will get back to him next week w/Dr. Johansen's thoughts regarding when he can discontinue Plavix. Shelby Bass RN on 11/25/2019 at 2:08 PM      Results per cath report 12/18/18:    SUMMARY:   Multivessel CAD with patent stents in the LCx and LAD. There is severe  disease of the PDA and OM1  Successful PCI with KIKE placed to the rPDA 2.5x18mm Alpine  Successful PCI with KIKE placed to the OM1 2.5x28mm Alpine     PLAN:   ASA 81 mg qd and Plavix 75 mg daily  Continue BB, ACE and statin per primary team  Continued medical management and lifestyle modification for  cardiovascular risk factor optimization.     The attending interventional cardiologist, Dr Tanner, was present and  supervised all critical aspects the procedure.     Kevin Wilson  Interventional Cardiology Fellow     I was present for the entire procedure and agree with Dr. Wilson's  assessment, interpretation and plan.     I have personally reviewed the examination and initial interpretation  and I agree with the findings.     MIKA TANNER MD

## 2019-12-02 NOTE — PROGRESS NOTES
I called pt and reviewed 's recommendation that ok to stop plavix 12/18/19 if he is not having any cardiac symptoms. Pt to keep taking ASA 81 mg daily. Pt said he feels well and has not had any chest pain or SOB. Nestor MATA December 2, 2019, 11:17 AM

## 2019-12-02 NOTE — TELEPHONE ENCOUNTER
If he is feeling well with no recurrent cardiac symptoms, he can stop Plavix on 12/18/19 and continue aspirin 81 mg daily as antiplatelet monotherapy. EE

## 2019-12-09 DIAGNOSIS — I10 ESSENTIAL HYPERTENSION: ICD-10-CM

## 2019-12-09 NOTE — TELEPHONE ENCOUNTER
"Requested Prescriptions   Pending Prescriptions Disp Refills     atenolol (TENORMIN) 50 MG tablet [Pharmacy Med Name: ATENOLOL 50 MG TABLET]  Last Written Prescription Date:  3/12/2019  Last Fill Quantity: 90 tablet,  # refills: 2   Last office visit: 12/11/2018 with prescribing provider:  Jay   Future Office Visit:     90 tablet 2     Sig: TAKE 1 TABLET BY MOUTH EVERY DAY       Beta-Blockers Protocol Passed - 12/9/2019  2:19 AM        Passed - Blood pressure under 140/90 in past 12 months     BP Readings from Last 3 Encounters:   08/30/19 138/68   01/15/19 138/78   01/04/19 118/66                 Passed - Patient is age 6 or older        Passed - Recent (12 mo) or future (30 days) visit within the authorizing provider's specialty     Patient has had an office visit with the authorizing provider or a provider within the authorizing providers department within the previous 12 mos or has a future within next 30 days. See \"Patient Info\" tab in inbasket, or \"Choose Columns\" in Meds & Orders section of the refill encounter.              Passed - Medication is active on med list           "

## 2019-12-11 RX ORDER — ATENOLOL 50 MG/1
TABLET ORAL
Qty: 30 TABLET | Refills: 0 | Status: SHIPPED | OUTPATIENT
Start: 2019-12-11 | End: 2020-01-07

## 2020-01-04 DIAGNOSIS — I10 ESSENTIAL HYPERTENSION: ICD-10-CM

## 2020-01-04 PROBLEM — I25.10 CORONARY ARTERY DISEASE INVOLVING NATIVE HEART, ANGINA PRESENCE UNSPECIFIED, UNSPECIFIED VESSEL OR LESION TYPE: Status: ACTIVE | Noted: 2019-02-20

## 2020-01-06 NOTE — TELEPHONE ENCOUNTER
"Requested Prescriptions   Pending Prescriptions Disp Refills     atenolol (TENORMIN) 50 MG tablet [Pharmacy Med Name: ATENOLOL 50 MG TABLET]  Last Written Prescription Date:  12/11/2019  Last Fill Quantity: 30 tablet,  # refills: 0   Last office visit: 12/11/2018 with prescribing provider:  Jay   Future Office Visit:   Next 5 appointments (look out 90 days)    Jan 07, 2020  7:30 AM CST  PHYSICAL with William Thompson MD  [facility] ([facility]) [address] [place] [address]  [address]  [facility] [address]  [phone]   Feb 18, 2020 11:45 AM CST  Return Visit with Yobani Jones MD  [facility] ([facility]) [address]  [facility] [address]  [phone]          30 tablet 0     Sig: TAKE 1 TABLET BY MOUTH EVERY DAY       Beta-Blockers Protocol Passed - 1/4/2020  1:39 PM        Passed - Blood pressure under 140/90 in past 12 months     BP Readings from Last 3 Encounters:   08/30/19 138/68   01/15/19 138/78   01/04/19 118/66                 Passed - Patient is age 6 or older        Passed - Recent (12 mo) or future (30 days) visit within the authorizing provider's specialty     Patient has had an office visit with the authorizing provider or a provider within the authorizing providers department within the previous 12 mos or has a future within next 30 days. See \"Patient Info\" tab in inbasket, or \"Choose Columns\" in Meds & Orders section of the refill encounter.              Passed - Medication is active on med list           "

## 2020-01-06 NOTE — TELEPHONE ENCOUNTER
Routing refill request to provider for review/approval because:  Veronica given x1 and patient did not follow up, please advise    OV scheduled for tomorrow 1/7/2019. Decline medication, until tomorrow during OV?

## 2020-01-07 ENCOUNTER — OFFICE VISIT (OUTPATIENT)
Dept: FAMILY MEDICINE | Facility: CLINIC | Age: 85
End: 2020-01-07
Payer: COMMERCIAL

## 2020-01-07 VITALS
SYSTOLIC BLOOD PRESSURE: 138 MMHG | WEIGHT: 230 LBS | RESPIRATION RATE: 20 BRPM | BODY MASS INDEX: 36.1 KG/M2 | OXYGEN SATURATION: 94 % | HEIGHT: 67 IN | HEART RATE: 79 BPM | DIASTOLIC BLOOD PRESSURE: 80 MMHG

## 2020-01-07 DIAGNOSIS — I65.29 STENOSIS OF CAROTID ARTERY, UNSPECIFIED LATERALITY: ICD-10-CM

## 2020-01-07 DIAGNOSIS — I25.10 CORONARY ARTERY DISEASE INVOLVING NATIVE CORONARY ARTERY OF NATIVE HEART WITHOUT ANGINA PECTORIS: ICD-10-CM

## 2020-01-07 DIAGNOSIS — E78.5 HYPERLIPIDEMIA WITH TARGET LDL LESS THAN 100: ICD-10-CM

## 2020-01-07 DIAGNOSIS — L98.9 FINGER LESION: ICD-10-CM

## 2020-01-07 DIAGNOSIS — R73.01 IFG (IMPAIRED FASTING GLUCOSE): ICD-10-CM

## 2020-01-07 DIAGNOSIS — I10 ESSENTIAL HYPERTENSION: ICD-10-CM

## 2020-01-07 DIAGNOSIS — K21.9 GASTROESOPHAGEAL REFLUX DISEASE WITHOUT ESOPHAGITIS: ICD-10-CM

## 2020-01-07 DIAGNOSIS — M40.00 KYPHOSIS (ACQUIRED) (POSTURAL): ICD-10-CM

## 2020-01-07 DIAGNOSIS — Z00.00 ROUTINE HISTORY AND PHYSICAL EXAMINATION OF ADULT: Primary | ICD-10-CM

## 2020-01-07 LAB
ALBUMIN SERPL-MCNC: 3.4 G/DL (ref 3.4–5)
ALP SERPL-CCNC: 62 U/L (ref 40–150)
ALT SERPL W P-5'-P-CCNC: 23 U/L (ref 0–70)
ANION GAP SERPL CALCULATED.3IONS-SCNC: 8 MMOL/L (ref 3–14)
AST SERPL W P-5'-P-CCNC: 8 U/L (ref 0–45)
BASOPHILS # BLD AUTO: 0 10E9/L (ref 0–0.2)
BASOPHILS NFR BLD AUTO: 0.4 %
BILIRUB SERPL-MCNC: 0.5 MG/DL (ref 0.2–1.3)
BUN SERPL-MCNC: 23 MG/DL (ref 7–30)
CALCIUM SERPL-MCNC: 8.9 MG/DL (ref 8.5–10.1)
CHLORIDE SERPL-SCNC: 107 MMOL/L (ref 94–109)
CHOLEST SERPL-MCNC: 129 MG/DL
CO2 SERPL-SCNC: 28 MMOL/L (ref 20–32)
CREAT SERPL-MCNC: 0.83 MG/DL (ref 0.66–1.25)
DIFFERENTIAL METHOD BLD: NORMAL
EOSINOPHIL # BLD AUTO: 0.2 10E9/L (ref 0–0.7)
EOSINOPHIL NFR BLD AUTO: 4.7 %
ERYTHROCYTE [DISTWIDTH] IN BLOOD BY AUTOMATED COUNT: 13.7 % (ref 10–15)
GFR SERPL CREATININE-BSD FRML MDRD: 81 ML/MIN/{1.73_M2}
GLUCOSE SERPL-MCNC: 147 MG/DL (ref 70–99)
HBA1C MFR BLD: 6.4 % (ref 0–5.6)
HCT VFR BLD AUTO: 44.9 % (ref 40–53)
HDLC SERPL-MCNC: 45 MG/DL
HGB BLD-MCNC: 14.9 G/DL (ref 13.3–17.7)
LDLC SERPL CALC-MCNC: 67 MG/DL
LYMPHOCYTES # BLD AUTO: 0.9 10E9/L (ref 0.8–5.3)
LYMPHOCYTES NFR BLD AUTO: 18.6 %
MCH RBC QN AUTO: 30.4 PG (ref 26.5–33)
MCHC RBC AUTO-ENTMCNC: 33.2 G/DL (ref 31.5–36.5)
MCV RBC AUTO: 92 FL (ref 78–100)
MONOCYTES # BLD AUTO: 0.5 10E9/L (ref 0–1.3)
MONOCYTES NFR BLD AUTO: 10.5 %
NEUTROPHILS # BLD AUTO: 3.3 10E9/L (ref 1.6–8.3)
NEUTROPHILS NFR BLD AUTO: 65.8 %
NONHDLC SERPL-MCNC: 84 MG/DL
PLATELET # BLD AUTO: 183 10E9/L (ref 150–450)
POTASSIUM SERPL-SCNC: 3.7 MMOL/L (ref 3.4–5.3)
PROT SERPL-MCNC: 6.3 G/DL (ref 6.8–8.8)
RBC # BLD AUTO: 4.9 10E12/L (ref 4.4–5.9)
SODIUM SERPL-SCNC: 143 MMOL/L (ref 133–144)
TRIGL SERPL-MCNC: 85 MG/DL
VIT B12 SERPL-MCNC: 552 PG/ML (ref 193–986)
WBC # BLD AUTO: 5.1 10E9/L (ref 4–11)

## 2020-01-07 PROCEDURE — 82607 VITAMIN B-12: CPT | Performed by: INTERNAL MEDICINE

## 2020-01-07 PROCEDURE — 80061 LIPID PANEL: CPT | Performed by: INTERNAL MEDICINE

## 2020-01-07 PROCEDURE — 36415 COLL VENOUS BLD VENIPUNCTURE: CPT | Performed by: INTERNAL MEDICINE

## 2020-01-07 PROCEDURE — 85025 COMPLETE CBC W/AUTO DIFF WBC: CPT | Performed by: INTERNAL MEDICINE

## 2020-01-07 PROCEDURE — 99397 PER PM REEVAL EST PAT 65+ YR: CPT | Performed by: INTERNAL MEDICINE

## 2020-01-07 PROCEDURE — 83036 HEMOGLOBIN GLYCOSYLATED A1C: CPT | Performed by: INTERNAL MEDICINE

## 2020-01-07 PROCEDURE — 80053 COMPREHEN METABOLIC PANEL: CPT | Performed by: INTERNAL MEDICINE

## 2020-01-07 RX ORDER — ATENOLOL 50 MG/1
TABLET ORAL
Qty: 30 TABLET | Refills: 0 | OUTPATIENT
Start: 2020-01-07

## 2020-01-07 RX ORDER — ATENOLOL 50 MG/1
50 TABLET ORAL DAILY
Qty: 90 TABLET | Refills: 4 | Status: SHIPPED | OUTPATIENT
Start: 2020-01-07 | End: 2021-01-05

## 2020-01-07 ASSESSMENT — ACTIVITIES OF DAILY LIVING (ADL): CURRENT_FUNCTION: NO ASSISTANCE NEEDED

## 2020-01-07 ASSESSMENT — MIFFLIN-ST. JEOR: SCORE: 1696.9

## 2020-01-07 NOTE — LETTER
January 7, 2020      Ivan Payton  [address] [place] [address]        Dear ,    We are writing to inform you of your test results.        Your blood sugar of 147 is in the diabetic range.       The A1C of 6.4 correlates to an average blood sugar of approximately 132.                Please work harder on restricting carbohydrates ( starches, sweets, etc).     Your other lab results are normal,including the liver,kidney, cholesterol, bone marrow, and B12 level.    Results for orders placed or performed in visit on 01/07/20   Comprehensive metabolic panel (BMP + Alb, Alk Phos, ALT, AST, Total. Bili, TP)     Status: Abnormal   Result Value Ref Range    Sodium 143 133 - 144 mmol/L    Potassium 3.7 3.4 - 5.3 mmol/L    Chloride 107 94 - 109 mmol/L    Carbon Dioxide 28 20 - 32 mmol/L    Anion Gap 8 3 - 14 mmol/L    Glucose 147 (H) 70 - 99 mg/dL    Urea Nitrogen 23 7 - 30 mg/dL    Creatinine 0.83 0.66 - 1.25 mg/dL    GFR Estimate 81 >60 mL/min/[1.73_m2]    GFR Estimate If Black >90 >60 mL/min/[1.73_m2]    Calcium 8.9 8.5 - 10.1 mg/dL    Bilirubin Total 0.5 0.2 - 1.3 mg/dL    Albumin 3.4 3.4 - 5.0 g/dL    Protein Total 6.3 (L) 6.8 - 8.8 g/dL    Alkaline Phosphatase 62 40 - 150 U/L    ALT 23 0 - 70 U/L    AST 8 0 - 45 U/L   Hemoglobin A1c     Status: Abnormal   Result Value Ref Range    Hemoglobin A1C 6.4 (H) 0 - 5.6 %   Lipid Profile (Chol, Trig, HDL, LDL calc)     Status: None   Result Value Ref Range    Cholesterol 129 <200 mg/dL    Triglycerides 85 <150 mg/dL    HDL Cholesterol 45 >39 mg/dL    LDL Cholesterol Calculated 67 <100 mg/dL    Non HDL Cholesterol 84 <130 mg/dL   CBC with platelets and differential     Status: None   Result Value Ref Range    WBC 5.1 4.0 - 11.0 10e9/L    RBC Count 4.90 4.4 - 5.9 10e12/L    Hemoglobin 14.9 13.3 - 17.7 g/dL    Hematocrit 44.9 40.0 - 53.0 %    MCV 92 78 - 100 fl    MCH 30.4 26.5 - 33.0 pg    MCHC 33.2 31.5 - 36.5 g/dL    RDW 13.7 10.0 - 15.0 %    Platelet Count  183 150 - 450 10e9/L    % Neutrophils 65.8 %    % Lymphocytes 18.6 %    % Monocytes 10.5 %    % Eosinophils 4.7 %    % Basophils 0.4 %    Absolute Neutrophil 3.3 1.6 - 8.3 10e9/L    Absolute Lymphocytes 0.9 0.8 - 5.3 10e9/L    Absolute Monocytes 0.5 0.0 - 1.3 10e9/L    Absolute Eosinophils 0.2 0.0 - 0.7 10e9/L    Absolute Basophils 0.0 0.0 - 0.2 10e9/L    Diff Method Automated Method    Vitamin B12     Status: None   Result Value Ref Range    Vitamin B12 552 193 - 986 pg/mL         If you have any questions or concerns, please call the clinic at the number listed above.       Sincerely,        William Thompson MD

## 2020-01-07 NOTE — PROGRESS NOTES
"SUBJECTIVE:   Ivan Payton is a 84 year old male who presents for Preventive Visit.      Are you in the first 12 months of your Medicare coverage?  No    Healthy Habits:    In general, how would you rate your overall health?  Good    Frequency of exercise:  2-3 days/week    Duration of exercise:  30-45 minutes    Do you usually eat at least 4 servings of fruit and vegetables a day, include whole grains    & fiber and avoid regularly eating high fat or \"junk\" foods?  Yes    Taking medications regularly:  No    Barriers to taking medications:  None    Medication side effects:  None    Ability to successfully perform activities of daily living:  No assistance needed    Home Safety:  No safety concerns identified    Hearing Impairment:  Difficult to understand a speaker at a public meeting or [employer] service and need to ask people to speak up or repeat themselves    In the past 6 months, have you been bothered by leaking of urine?  No    In general, how would you rate your overall mental or emotional health?  Good      PHQ-2 Total Score:    Additional concerns today:  Yes    Do you feel safe in your environment? Yes    Have you ever done Advance Care Planning? (For example, a Health Directive, POLST, or a discussion with a medical provider or your loved ones about your wishes): Yes, patient states has an Advance Care Planning document and will bring a copy to the clinic.      Fall risk     No falls within last year  Cognitive Screening   1) Repeat 3 items (Leader, Season, Table)    2) Clock draw: NORMAL  3) 3 item recall: Recalls 3 objects  Results: 3 items recalled: COGNITIVE IMPAIRMENT LESS LIKELY    Mini-CogTM Copyright JESUS Mendenhall. Licensed by the author for use in [facility]; reprinted with permission (bernice@.[facility]). All rights reserved.      Do you have sleep apnea, excessive snoring or daytime drowsiness?: no    Reviewed and updated as needed this visit by clinical staff  Tobacco  Allergies  Meds "  Med Hx  Surg Hx  Fam Hx  Soc Hx        Reviewed and updated as needed this visit by Provider        Social History     Tobacco Use     Smoking status: Former Smoker     Packs/day: 0.50     Years: 20.00     Pack years: 10.00     Types: Cigarettes     Start date:      Last attempt to quit: 1968     Years since quittin.6     Smokeless tobacco: Former User   Substance Use Topics     Alcohol use: No     Alcohol/week: 0.0 standard drinks     If you drink alcohol do you typically have >3 drinks per day or >7 drinks per week? No    Alcohol Use 2018   Prescreen: >3 drinks/day or >7 drinks/week? No   Prescreen: >3 drinks/day or >7 drinks/week? -              No C-V sx.          PPI every day; no GI sx.                            Nocturia times one or two.              Cardiology increased lisinopril to 20 mg 4 months ago.             Current providers sharing in care for this patient include:   Patient Care Team:  William Thompson MD as PCP - General (Internal Medicine)  William Thompson MD as Assigned PCP    The following health maintenance items are reviewed in Epic and correct as of today:  Health Maintenance   Topic Date Due     PNEUMOCOCCAL IMMUNIZATION 65+ LOW/MEDIUM RISK (2 of 2 - PCV13) 2010     PHQ-2  2020     MEDICARE ANNUAL WELLNESS VISIT  2019     FALL RISK ASSESSMENT  01/15/2020     DTAP/TDAP/TD IMMUNIZATION (3 - Td) 2021     ADVANCE CARE PLANNING  2022     INFLUENZA VACCINE  Completed     ZOSTER IMMUNIZATION  Completed     IPV IMMUNIZATION  Aged Out     MENINGITIS IMMUNIZATION  Aged Out     Patient Active Problem List    Diagnosis Date Noted     Coronary artery disease involving native heart, angina presence unspecified, unspecified vessel or lesion type 2019     Priority: High     Coronary artery disease      Priority: High     Cath 2014: PTCA and KIKE to mLAD, KIKE to prox circumflex, PTCA and DESx2 to OM2         Cath 18: KIKE to PDA and OM1,  patent stents to LAD and Cfx               Pt stopped plavix 12/19       Dyspnea 07/21/2014     Priority: High     Carotid stenosis 03/26/2014     Priority: High     S/p L CEA; R ICA ok;          See CUS 12/15 and Dr Jones's consult; continue plavix.                   Stable CUS 1/19; plavix stopped by Cardiology 12/19         Essential hypertension      Priority: High     Problem list name updated by automated process. Provider to review       Cerebral infarction (H) 03/01/2014     Priority: High     2 small acute lesions noted left parietal/left posterior frontal region. Old lacunar infarct left caudate nucleus  Diagnosis updated by automated process. Provider to review and confirm.       Cerebral vascular disease 03/01/2014     Priority: High     multiple intracranial stenoses - lt post communic, lt post cerebral, rt middle cerebral, bilat porx M2 segments       Right inguinal pain 12/06/2017     Priority: Medium     Pain and swelling of left lower extremity 05/10/2017     Priority: Medium     Right-sided low back pain without sciatica: 2005 x2d& reonset mid 3-16  04/08/2016     Priority: Medium     Non morbid obesity due to excess calories 04/08/2016     Priority: Medium     Esophageal stricture 12/16/2015     Priority: Medium     EGD in 1/16 shows reflux esophagitis; esophagram shows paraesophageal hernia       IFG (impaired fasting glucose) 12/16/2015     Priority: Medium     122 in 12/15; A1C Equals 6.3 in 2/18       Hydrocele, bilateral 11/19/2014     Priority: Medium     Hyperlipidemia with target LDL less than 100 03/14/2014     Priority: Medium     Diagnosis updated by automated process. Provider to review and confirm.       Claustrophobia      Priority: Medium     Need sedation for MRI scans       RBBB (right bundle branch block) 03/01/2014     Priority: Medium     Trochanteric bursitis 04/17/2012     Priority: Medium     Family history of other cardiovascular diseases 08/31/2006     Priority: Medium      Hypertrophy of prostate without urinary obstruction 08/31/2006     Priority: Medium     Problem list name updated by automated process. Provider to review       Preventive measure 11/16/2014     Priority: Low     PSA 2.2 in 10/13                        Colonoscopy never; no reason.  FIT neg in 12/17          Advance care planning 02/08/2012     Priority: Low     Advance Care Planning 1/9/2017: ACP Facilitation Session:    Ivan Payton presented for ACP Facilitation session at a group session. He was accompanied by spouse. Hon[employer] Choices information provided and resources reviewed. He currently wishes to give additional consideration to ACP  He currently has the following questions or concerns about Advance Care Planning: none.  Confirmed/documented legally designated decision maker(s).  Follow-up meeting: not needed/applicable. Added by Allison Molina   Advance Care Planning Liaison  Advance Care Planning 2/8/2012: Discussed advance care planning with patient; information given to patient to review.          Past Surgical History:   Procedure Laterality Date     CV HEART CATHETERIZATION WITH POSSIBLE INTERVENTION N/A 12/18/2018    Procedure: Coronary Angiography;  Surgeon: Geovanni Tanner MD;  Location:  HEART CARDI[facility] CATH LAB     ENDARTERECTOMY CAROTID  3/26/2014    Procedure: ENDARTERECTOMY CAROTID;  LEFT CAROTID ENDARTERECTOMY WITH EEG;  Surgeon: Yobani Jones MD;  Location:  OR      REMOVAL OF TONSILS,<11 Y/O      Tonsils <13y.o.     HEART CATH, ANGIOPLASTY  9/9/14    Stenting of LAD,Prox LCx, and 2 stents to OM2     HERNIORRHAPHY INGUINAL      Right     HERNIORRHAPHY INGUINAL  2/10/2012    Procedure:HERNIORRHAPHY INGUINAL; LEFT OPEN INGUINAL HERNIA REPAIR WITH MESH, RIGHT HYDROCELECTOMY; Surgeon:JULI LOPES; Location:[facility]     HYDROCELECTOMY INGUINAL  2/10/2012    Procedure:HYDROCELECTOMY INGUINAL; Surgeon:JULI LOPES; Location:[facility]     HYDROCELECTOMY SCROTAL  Right 1/29/2016    Procedure: HYDROCELECTOMY SCROTAL;  Surgeon: Yobani Stevens MD;  Location:  [facility]     LAPAROSCOPIC HERNIORRHAPHY INGUINAL BILATERAL Bilateral 5/24/2016    Procedure: LAPAROSCOPIC HERNIORRHAPHY INGUINAL BILATERAL;  Surgeon: Chandler oBwen MD;  Location:  OR     MR BRAIN AND ORBITS  3/2014    6 mm area of restricted diffusion subcortical white matter left parietal lobe/questionable area of restricted diffusion left posterior frontal region - c/w recent small infarcts. Small chronic lacunar infarct left caudate nucleus     SURGICAL HISTORY OF -       tonail removal       BP Readings from Last 3 Encounters:   01/07/20 138/80   08/30/19 138/68   01/15/19 138/78    Wt Readings from Last 3 Encounters:   01/07/20 104.3 kg (230 lb)   08/30/19 103.4 kg (228 lb)   01/04/19 102.5 kg (226 lb)                  Current Outpatient Medications   Medication Sig Dispense Refill     Ascorbic Acid (VITAMIN C PO) Take 1,000 mg by mouth daily       aspirin 81 MG tablet Take by mouth daily 30 tablet      atenolol (TENORMIN) 50 MG tablet TAKE 1 TABLET BY MOUTH EVERY DAY 30 tablet 0     atorvastatin (LIPITOR) 40 MG tablet Take 1 tablet (40 mg) by mouth daily (Patient taking differently: Take 20 mg by mouth daily Patient taking 1/2 tablet daily ( 20mg )) 90 tablet 3     calcium carbonate (OS-SREEDHAR 500 MG [place]. [place]) 500 MG tablet Take 500 mg by mouth daily       glucosamine-chondroitin 500-400 MG CAPS Take 1 capsule by mouth daily       lisinopril-hydrochlorothiazide (PRINZIDE/ZESTORETIC) 20-12.5 MG tablet Take 1 tablet by mouth daily 90 tablet 3     nitroGLYcerin (NITROSTAT) 0.4 MG sublingual tablet For chest pain place 1 tablet under the tongue every 5 minutes for 3 doses. If symptoms persist 5 minutes after 1st dose call 911. 25 tablet 1     Omega-3 Fatty Acids (OMEGA-3 FISH OIL PO) Take 1 g by mouth daily        omeprazole (PRILOSEC) 20 MG DR capsule TAKE 1 CAPSULE BY MOUTH EVERY DAY 90 capsule 3     terazosin  "(HYTRIN) 2 MG capsule Take 1 capsule (2 mg) by mouth At Bedtime 90 capsule 3     vitamin  B complex with vitamin C (VITAMIN  B COMPLEX) TABS Take 1 tablet by mouth daily       VITAMIN D, CHOLECALCIFEROL, PO Take 5,000 Units by mouth daily        Allergies   Allergen Reactions     Contrast Dye Hives         Review of Systems  CONSTITUTIONAL: NEGATIVE for fever, chills, change in weight  INTEGUMENTARY/SKIN: NEGATIVE for worrisome rashes, moles or lesions  EYES: NEGATIVE for vision changes or irritation  ENT/MOUTH: NEGATIVE for ear, mouth and throat problems  RESP: NEGATIVE for significant cough or SOB  BREAST: NEGATIVE for masses, tenderness or discharge  CV: NEGATIVE for chest pain, palpitations or peripheral edema  GI: NEGATIVE for nausea, abdominal pain, heartburn, or change in bowel habits  : NEGATIVE for frequency, dysuria, or hematuria  MUSCULOSKELETAL: NEGATIVE for significant arthralgias or myalgia  NEURO: NEGATIVE for weakness, dizziness or paresthesias  ENDOCRINE: NEGATIVE for temperature intolerance, skin/hair changes  HEME: NEGATIVE for bleeding problems  PSYCHIATRIC: NEGATIVE for changes in mood or affect    OBJECTIVE:   /80   Pulse 79   Resp 20   Ht 1.702 m (5' 7\")   Wt 104.3 kg (230 lb)   SpO2 94%   BMI 36.02 kg/m   Estimated body mass index is 35.71 kg/m  as calculated from the following:    Height as of 8/30/19: 1.702 m (5' 7\").    Weight as of 8/30/19: 103.4 kg (228 lb).  Physical Exam  GENERAL: alert, no distress and over weight  EYES: Eyes grossly normal to inspection  HENT: ear canals and TM's normal, nose and mouth without ulcers or lesions  NECK: no adenopathy, no asymmetry, masses, or scars and thyroid normal to palpation  RESP: lungs clear to auscultation - no rales, rhonchi or wheezes  CV: regular rates and rhythm, normal S1 S2, no S3 or S4, no murmur, click or rub and 1+ bilateral lower extremity pitting edema     ABDOMEN: soft, nontender, without hepatosplenomegaly or " "masses   (male): normal male genitalia without lesions or urethral discharge, no hernia  RECTAL: normal sphincter tone, no rectal masses and prostate 2+ enlarged, nontender  MS: spine exam shows kyphosis  SKIN: tiny nodule distal R index finger  NEURO: Normal strength and tone, mentation intact and speech normal  PSYCH: mentation appears normal, affect normal/bright  LYMPH: no cervical, supraclavicular, axillary, or inguinal adenopathy    Diagnostic Test Results:  pending    ASSESSMENT / PLAN:   Ivan was seen today for physical.    Diagnoses and all orders for this visit:    Routine history and physical examination of adult    Essential hypertension  -     Comprehensive metabolic panel (BMP + Alb, Alk Phos, ALT, AST, Total. Bili, TP)  -     atenolol (TENORMIN) 50 MG tablet; Take 1 tablet (50 mg) by mouth daily    Gastroesophageal reflux disease without esophagitis  -     CBC with platelets and differential  -     Vitamin B12    IFG (impaired fasting glucose)  -     Comprehensive metabolic panel (BMP + Alb, Alk Phos, ALT, AST, Total. Bili, TP)  -     Hemoglobin A1c    Hyperlipidemia with target LDL less than 100  -     Lipid Profile (Chol, Trig, HDL, LDL calc)    Coronary artery disease involving native coronary artery of native heart without angina pectoris    Stenosis of carotid artery, unspecified laterality             Summary and implications:  We reviewed multiple issues.           We reviewed all of the issues on the diagnoses list.               COUNSELING:  Reviewed preventive health counseling, as reflected in patient instructions  Special attention given to:       Regular exercise       Healthy diet/nutrition    Estimated body mass index is 35.71 kg/m  as calculated from the following:    Height as of 8/30/19: 1.702 m (5' 7\").    Weight as of 8/30/19: 103.4 kg (228 lb).    Weight management plan: Discussed healthy diet and exercise guidelines     reports that he quit smoking about 51 years ago. His " smoking use included cigarettes. He started smoking about 72 years ago. He has a 10.00 pack-year smoking history. He has quit using smokeless tobacco.      Appropriate preventive services were discussed with this patient, including applicable screening as appropriate for cardiovascular disease, diabetes, osteopenia/osteoporosis, and glaucoma.  As appropriate for age/gender, discussed screening for colorectal cancer, prostate cancer, breast cancer, and cervical cancer. Checklist reviewing preventive services available has been given to the patient.    Reviewed patients plan of care and provided an AVS. The Basic Care Plan (routine screening as documented in Health Maintenance) for Ivan meets the Care Plan requirement. This Care Plan has been established and reviewed with the Patient.    Counseling Resources:  ATP IV Guidelines  Pooled Cohorts Equation Calculator  Breast Cancer Risk Calculator  FRAX Risk Assessment  ICSI Preventive Guidelines  Dietary Guidelines for Americans, 2010  [employer]'s MyPlate  ASA Prophylaxis  Lung CA Screening    William Thompson MD  [facility]    Results for orders placed or performed in visit on 01/07/20   Comprehensive metabolic panel (BMP + Alb, Alk Phos, ALT, AST, Total. Bili, TP)     Status: Abnormal   Result Value Ref Range    Sodium 143 133 - 144 mmol/L    Potassium 3.7 3.4 - 5.3 mmol/L    Chloride 107 94 - 109 mmol/L    Carbon Dioxide 28 20 - 32 mmol/L    Anion Gap 8 3 - 14 mmol/L    Glucose 147 (H) 70 - 99 mg/dL    Urea Nitrogen 23 7 - 30 mg/dL    Creatinine 0.83 0.66 - 1.25 mg/dL    GFR Estimate 81 >60 mL/min/[1.73_m2]    GFR Estimate If Black >90 >60 mL/min/[1.73_m2]    Calcium 8.9 8.5 - 10.1 mg/dL    Bilirubin Total 0.5 0.2 - 1.3 mg/dL    Albumin 3.4 3.4 - 5.0 g/dL    Protein Total 6.3 (L) 6.8 - 8.8 g/dL    Alkaline Phosphatase 62 40 - 150 U/L    ALT 23 0 - 70 U/L    AST 8 0 - 45 U/L   Hemoglobin A1c     Status: Abnormal   Result Value Ref Range     Hemoglobin A1C 6.4 (H) 0 - 5.6 %   Lipid Profile (Chol, Trig, HDL, LDL calc)     Status: None   Result Value Ref Range    Cholesterol 129 <200 mg/dL    Triglycerides 85 <150 mg/dL    HDL Cholesterol 45 >39 mg/dL    LDL Cholesterol Calculated 67 <100 mg/dL    Non HDL Cholesterol 84 <130 mg/dL   CBC with platelets and differential     Status: None   Result Value Ref Range    WBC 5.1 4.0 - 11.0 10e9/L    RBC Count 4.90 4.4 - 5.9 10e12/L    Hemoglobin 14.9 13.3 - 17.7 g/dL    Hematocrit 44.9 40.0 - 53.0 %    MCV 92 78 - 100 fl    MCH 30.4 26.5 - 33.0 pg    MCHC 33.2 31.5 - 36.5 g/dL    RDW 13.7 10.0 - 15.0 %    Platelet Count 183 150 - 450 10e9/L    % Neutrophils 65.8 %    % Lymphocytes 18.6 %    % Monocytes 10.5 %    % Eosinophils 4.7 %    % Basophils 0.4 %    Absolute Neutrophil 3.3 1.6 - 8.3 10e9/L    Absolute Lymphocytes 0.9 0.8 - 5.3 10e9/L    Absolute Monocytes 0.5 0.0 - 1.3 10e9/L    Absolute Eosinophils 0.2 0.0 - 0.7 10e9/L    Absolute Basophils 0.0 0.0 - 0.2 10e9/L    Diff Method Automated Method    Vitamin B12     Status: None   Result Value Ref Range    Vitamin B12 552 193 - 986 pg/mL     Letter sent.                Your blood sugar of 147 is in the diabetic range.       The A1C of 6.4 correlates to an average blood sugar of approximately 132.                Please work harder on restricting carbohydrates ( starches, sweets, etc).     Your other lab results are normal,including the liver,kidney, cholesterol, bone marrow, and B12 level.

## 2020-01-07 NOTE — PATIENT INSTRUCTIONS
I will let you know your lab results.        Have a good safe winter!   Call [phone] for an appointment with a hand surgeon.                   Ask for Dr Rogers or Dr Delcid.

## 2020-01-08 DIAGNOSIS — I25.10 CORONARY ARTERY DISEASE INVOLVING NATIVE CORONARY ARTERY OF NATIVE HEART, ANGINA PRESENCE UNSPECIFIED: ICD-10-CM

## 2020-01-08 RX ORDER — ATORVASTATIN CALCIUM 40 MG/1
40 TABLET, FILM COATED ORAL DAILY
Qty: 90 TABLET | Refills: 2 | Status: SHIPPED | OUTPATIENT
Start: 2020-01-08 | End: 2021-02-02

## 2020-01-13 ENCOUNTER — TRANSFERRED RECORDS (OUTPATIENT)
Dept: HEALTH INFORMATION MANAGEMENT | Facility: CLINIC | Age: 85
End: 2020-01-13

## 2020-02-10 ENCOUNTER — TELEPHONE (OUTPATIENT)
Dept: OTHER | Facility: CLINIC | Age: 85
End: 2020-02-10

## 2020-02-10 NOTE — TELEPHONE ENCOUNTER
"February 10, 2020    Called patient to reschedule his US appointment on 2/18/2020 due to US Tech availability. (Patient to be R/S'd into [facility]).    Patient became agitated and refused to change the location of his US appointment. I offered to completely reschedule patient's appointments on 2/18/2020 to another day because the patient demanded that his appointments be in the same location but due to provider availability, patient was dissatisfied with the idea of rescheduling.   (Provider availability was extending into March 2020 and patient is planning a trip to [place]).     Patient stated \"This appointment has been made for 3 months already and now just a week beforehand, you're going to change it? No. Tell your staff to get your sh** together and call me when you're ready to schedule me properly.\" and immediately hung up the phone on me.     Routing to [facility] Surg RN and [facility] management for review and advisement on how to proceed with this patient.     [facility]  Vascular [facility]    Office: [phone]  Fax: [phone]       "

## 2020-02-14 NOTE — TELEPHONE ENCOUNTER
February 14, 2020    Patient rescheduled by [facility] for US Carotid Bilat & Return Visit appointment with Dr. Jones on 4/21/2020 at [facility].     [facility]  Vascular [facility]    Office: [phone]  Fax: [phone]

## 2020-04-02 ENCOUNTER — TELEPHONE (OUTPATIENT)
Dept: OTHER | Facility: CLINIC | Age: 85
End: 2020-04-02

## 2020-04-02 NOTE — TELEPHONE ENCOUNTER
I spoke with patient inform 4/21 visit is cancel due to cornra virus. That we would call and schedule appointment when clinic opens.  Vonda Rm MA

## 2020-06-03 ENCOUNTER — HOSPITAL ENCOUNTER (OUTPATIENT)
Dept: ULTRASOUND IMAGING | Facility: CLINIC | Age: 85
Discharge: HOME | End: 2020-06-03
Attending: SURGERY | Admitting: SURGERY
Payer: COMMERCIAL

## 2020-06-03 DIAGNOSIS — I65.29 CAROTID STENOSIS: ICD-10-CM

## 2020-06-03 DIAGNOSIS — Z98.890 HISTORY OF LEFT-SIDED CAROTID ENDARTERECTOMY: ICD-10-CM

## 2020-06-03 PROCEDURE — 93880 EXTRACRANIAL BILAT STUDY: CPT

## 2020-06-10 ENCOUNTER — HOSPITAL ENCOUNTER (EMERGENCY)
Facility: CLINIC | Age: 85
Discharge: HOME | End: 2020-06-10
Attending: NURSE PRACTITIONER | Admitting: NURSE PRACTITIONER
Payer: COMMERCIAL

## 2020-06-10 ENCOUNTER — NURSE TRIAGE (OUTPATIENT)
Dept: FAMILY MEDICINE | Facility: CLINIC | Age: 85
End: 2020-06-10

## 2020-06-10 VITALS
RESPIRATION RATE: 16 BRPM | HEART RATE: 54 BPM | BODY MASS INDEX: 33.74 KG/M2 | DIASTOLIC BLOOD PRESSURE: 61 MMHG | HEIGHT: 67 IN | WEIGHT: 215 LBS | OXYGEN SATURATION: 95 % | TEMPERATURE: 98 F | SYSTOLIC BLOOD PRESSURE: 120 MMHG

## 2020-06-10 DIAGNOSIS — R10.30 GROIN PAIN: ICD-10-CM

## 2020-06-10 DIAGNOSIS — B37.2 YEAST DERMATITIS: ICD-10-CM

## 2020-06-10 DIAGNOSIS — L02.91 ABSCESS: ICD-10-CM

## 2020-06-10 PROCEDURE — 10060 I&D ABSCESS SIMPLE/SINGLE: CPT

## 2020-06-10 PROCEDURE — 99283 EMERGENCY DEPT VISIT LOW MDM: CPT | Mod: 25

## 2020-06-10 RX ORDER — NYSTATIN 100000 U/G
OINTMENT TOPICAL 2 TIMES DAILY
Qty: 30 G | Refills: 0 | Status: ON HOLD | OUTPATIENT
Start: 2020-06-10 | End: 2021-11-08

## 2020-06-10 RX ORDER — DOXYCYCLINE 100 MG/1
100 CAPSULE ORAL 2 TIMES DAILY
Qty: 20 CAPSULE | Refills: 0 | Status: SHIPPED | OUTPATIENT
Start: 2020-06-10 | End: 2020-06-20

## 2020-06-10 ASSESSMENT — ENCOUNTER SYMPTOMS
CHILLS: 0
VOMITING: 0
NAUSEA: 0
MYALGIAS: 0
FEVER: 0
DIARRHEA: 0

## 2020-06-10 ASSESSMENT — MIFFLIN-ST. JEOR: SCORE: 1628.86

## 2020-06-10 NOTE — ED AVS SNAPSHOT
Emergency Department  [address] [place] [address]  Phone:  [phone]  Fax:  [phone]                                    Ivan Payton   MRN: [id number]    Department:   Emergency Department   Date of Visit:  6/10/2020           After Visit Summary Signature Page    I have received my discharge instructions, and my questions have been answered. I have discussed any challenges I see with this plan with the nurse or doctor.    ..........................................................................................................................................  Patient/Patient Representative Signature      ..........................................................................................................................................  Patient Representative Print Name and Relationship to Patient    ..................................................               ................................................  Date                                   Time    ..........................................................................................................................................  Reviewed by Signature/Title    ...................................................              ..............................................  Date                                               Time          22EPIC Rev 08/18

## 2020-06-10 NOTE — ED PROVIDER NOTES
"  History     Chief Complaint:  No chief complaint on file.      HPI   Ivan Payton is a 84 year old male who presents with ***    Allergies:  Contrast dye    Medications:    ASA 81 mg  Tenormin  Lipitor  Prinzide  Prilosec  Hytrin    Past Medical History:    Kyphosis, acquired  GERD without esophagitis  Coronary artery disease  Right inguinal pain  Pain and swelling of left lower extremity  Right-sided lower back pain without sciatica  Esophageal stricture  Impaired fasting glucose  Hydrocele, bilateral  Dyspnea  Carotid stenosis  Hyperlipidemia  Hypertension  Claustrophobia  Cerebral infarction  Right bundle branch block  Trochanteric bursitis  Hypertrophy of prostate without urinary obstruction  Arthritis  Cerebral vascular disease  Hiatal hernia  Shortness of breath     Past Surgical History:    Heart catheterization and coronary angiography  Endarterectomy, carotid  Tonsillectomy  Heart stenting  Herniorrhaphy, bilateral inguinal x2  Hydrocelectomy, scrotal  MR brain and orbits  Toenail removal    Family History:    Father: Lung disease  Mother: Hypertension  Sister: Hypertension  Brother: CAD    Social History:  ***  Marital Status:   [2]  Social History     Tobacco Use     Smoking status: Former Smoker     Packs/day: 0.50     Years: 20.00     Pack years: 10.00     Types: Cigarettes     Start date:      Last attempt to quit: 1968     Years since quittin.0     Smokeless tobacco: Former User   Substance Use Topics     Alcohol use: No     Alcohol/week: 0.0 standard drinks     Drug use: No        Review of Systems  ***    Physical Exam   First Vitals:         Physical Exam  ***    Emergency Department Course   ECG:  ***    Imaging:  {Radiographic findings?:948650869::\" \"}  ***    Laboratory:  ***    Procedures:  ***        Interventions:  ***  {Response to meds:869254::\" \"}    Emergency Department Course:  ***       Impression & Plan      {Fouzia[employer]:889574}    {trauma " "activation?:641879::\" \"}  CMS Diagnoses: {Sepsis/Septic Shock/Stemi/Stroke:222130::\" \"}       Medical Decision Making:  ***  Critical Care time:  {none or minutes:274278::\"none\"}    Diagnosis:  No diagnosis found.    Disposition:  {discharged to home/discharged to home with.../Admitted to...:266382}    Discharge Medications:  New Prescriptions    No medications on file       Radha Rios  6/10/2020    EMERGENCY DEPARTMENT    "

## 2020-06-10 NOTE — TELEPHONE ENCOUNTER
Reason for call:  Patient reporting a symptom    Symptom or request: pain-red puffy in hernia area    Duration (how long have symptoms been present): 1 day    Have you been treated for this before? Yes    Additional comments: no available appointments feels video will not do    Phone Number patient can be reached at:  Home number on file [phone] (home)    Best Time:      Can we leave a detailed message on this number:  YES    Call taken on 6/10/2020 at 7:47 AM by ABEBA STEVENS

## 2020-06-10 NOTE — TELEPHONE ENCOUNTER
Given the above information, then consider [facility] or else the ER.          Please let him know.

## 2020-06-10 NOTE — ED PROVIDER NOTES
History     Chief Complaint:  Wound Check    HPI   Ivan Payton is a 84 year old male who presents to the emergency department today for evaluation of a wound. The patient explains that he noted a rash to his left groin multiple days ago, noting that he has experienced similar rashes though closer to his scrotum in the past. He furthers that today he noted a tender bump to the area which made him more concerned and prompted presentation. Here the patient states that aside from the above symptoms he has been feeling otherwise well and was able to garden this morning without difficulty. He reports that the area of concern is difficult for him to look at, so he is unsure of whether there have been any skin openings present. He denies any fever, chills, myalgias, nausea, vomiting, and diarrhea.     Allergies:  Contrast Dye     Medications:    Aspirin 81 mg   Tenormin  Lipitor  Glucosamine chondroitin  Prinzide  Nitrostat  Prilosec  Hytrin    Past Medical History:    Arthritis  Carotid stenosis  Cerebral vascular disease  Claustrophobia  Coronary artery disease  Cerebral infarction  Enlarged prostate  Hypertension  Gastroesophageal reflux disease  Hiatal hernia  Hydrocele  Hyperlipidemia  Right bundle branch block  Asthma   Trochanteric bursitis  Esophageal stricture  Impaired fasting glucose  Non morbid obesity  Kyphosis     Past Surgical History:    Heart catheterization with possible intervention  Endarterectomy carotid  Tonsillectomy   Heart catheterization, angioplasty  Herniorrhaphy inguinal - multiple  Hydrocelectomy inguinal  Hydrocelectomy scrotal  Toenail removal     Family History:    Brother: coronary artery disease, hypertension  Father: lung disease  Mother: hypertension   Sister: hypertension  Son: cerebrovascular disease    Social History:  The patient presented to the ED alone.  Smoking Status: Former Smoker   Packs per day: 0.5   Years: 20   Types: Cigarettes  Smokeless Tobacco: Former User  Alcohol  "Use: Negative  Drug Use: Negative  PCP: William Thompson  Marital Status:        Review of Systems   Constitutional: Negative for chills and fever.   Gastrointestinal: Negative for diarrhea, nausea and vomiting.   Musculoskeletal: Negative for myalgias.   Skin: Positive for rash (with bump).   All other systems reviewed and are negative.      Physical Exam     Patient Vitals for the past 24 hrs:   BP Temp Temp src Pulse Heart Rate Resp SpO2 Height Weight   06/10/20 1239 -- -- -- -- -- -- 95 % -- --   06/10/20 1238 120/61 -- -- 54 -- -- -- -- --   06/10/20 1151 (!) 141/102 98  F (36.7  C) Oral 63 63 16 97 % 1.702 m (5' 7\") 97.5 kg (215 lb)     Physical Exam  Constitutional:  Laying in bed comfortably.   Head: Head moves freely with normal range of motion.   ENT: Oropharynx is clear and moist.   Eyes: Conjunctivae pink. EOMs intact.   Neck: Normal range of motion.   Cardiovascular: Regular rate and rhythm. Normal heart sounds. No concerning murmur. Intact distal pulses: radial pulses 2+ on the right, 2+ on the left.   Pulmonary/Chest: No respiratory distress. No decreased breath sounds. No wheezes. No rhonchi. No rales. Lungs clear throughout.   Abdominal: Soft. Non-tender. No rebound, no guarding.   Musculoskeletal: No peripheral edema. Distal capillary refill and sensation intact.   Neurological: Oriented to person, place, and time. No focal deficits.   Skin: Well demarcated area of raised erythema in an irregular pattern with central clearance and a small, firm area central to the demarcated area with a less than 0.5 cm opening that I am able to express purulent drainage from.     Emergency Department Course     Procedures      Incision and Drainage     LOCATIONS:  Left groin.     ANESTHESIA:  Local field block using Lidocaine 1% with epinephrine, total of 2 mLs     PREPARATION:  Cleansed with Normal Saline and Sea Clens     PROCEDURE:  Area was incised with # 11 Blade ([place]) with a Single Straight " incision.  Wound treatment included Thick, Purulent Drainage.  Packing consisted of No Packing.  Appropriate dressing was applied to cover the area.    PATIENT STATUS:  Patient tolerated the procedure well. There were no complications.    Emergency Department Course:    1149 Nursing notes and vitals reviewed. I performed an exam of the patient as documented above.     1227 I performed the incision and drainage procedure as documented above.    Findings and plan explained to the patient. Patient discharged home with instructions regarding supportive care, medications, and reasons to return. The importance of close follow-up was reviewed. The patient was prescribed Nystatin and Vibramycin.     Impression & Plan      Medical Decision Making:  Ivan Payton is a 84 year old male who presents with a couple days of redness and a new palpable bump to his left groin. He has signs of yeast dermatitis and a central abscess. I&D performed and successfully expressed purulent drainage; see procedure note. Packing was not placed.  No signs of more severe infection such as necrotizing or lymphangitis.  Wound cares discussed.  Follow up in clinic or may return to ED for wound check if cannot arrange.  Prescriptions for oral antibiotics and nystatin cream were provided at the time of discharge.  Warning signs for wound given on discharge instructions and verbally. He is amenable to plan.     Diagnosis:    ICD-10-CM    1. Groin pain  R10.30    2. Abscess  L02.91    3. Yeast dermatitis  B37.2      Disposition:   The patient is discharged to home.    Discharge Medications:  New Prescriptions    DOXYCYCLINE HYCLATE (VIBRAMYCIN) 100 MG CAPSULE    Take 1 capsule (100 mg) by mouth 2 times daily for 10 days    NYSTATIN (MYCOSTATIN) 448600 UNIT/GM EXTERNAL OINTMENT    Apply topically 2 times daily     Scribe Disclosure:  Dede JIMENEZ, am serving as a scribe at 11:57 AM on 6/10/2020 to document services personally performed by Jimmy  Vickie Barr NP based on my observations and the provider's statements to me.     EMERGENCY DEPARTMENT       Vickie Marquez, JAILENE CARRILLO  06/11/20 0793

## 2020-06-10 NOTE — TELEPHONE ENCOUNTER
Pt complains of left groin pain, bulging  and discoloration since yesterday. Triage advised he see ED for further evaluation.     Reason for Disposition    Hernia is painful or tender to touch    Additional Information    Negative: [1] Swelling of scrotum AND [2] has not previously been diagnosed with a hernia    Negative: SEVERE abdominal pain    Protocols used: HERNIA-A-AH

## 2020-06-11 ENCOUNTER — VIRTUAL VISIT (OUTPATIENT)
Dept: OTHER | Facility: CLINIC | Age: 85
End: 2020-06-11
Attending: SURGERY
Payer: COMMERCIAL

## 2020-06-11 VITALS — BODY MASS INDEX: 34.53 KG/M2 | HEIGHT: 67 IN | WEIGHT: 220 LBS

## 2020-06-11 DIAGNOSIS — I65.21 ASYMPTOMATIC STENOSIS OF RIGHT CAROTID ARTERY: ICD-10-CM

## 2020-06-11 DIAGNOSIS — Z98.890 HISTORY OF LEFT-SIDED CAROTID ENDARTERECTOMY: Primary | ICD-10-CM

## 2020-06-11 DIAGNOSIS — E78.5 HYPERLIPIDEMIA LDL GOAL <70: ICD-10-CM

## 2020-06-11 PROCEDURE — 99215 OFFICE O/P EST HI 40 MIN: CPT | Mod: 95 | Performed by: SURGERY

## 2020-06-11 ASSESSMENT — MIFFLIN-ST. JEOR: SCORE: 1651.54

## 2020-06-11 NOTE — PROGRESS NOTES
"Ivan Payton is a 84 year old male who is being evaluated via a billable telephone visit.      The patient has been notified of following:     \"This telephone visit will be conducted via a call between you and your physician/provider. We have found that certain health care needs can be provided without the need for a physical exam.  This service lets us provide the care you need with a short phone conversation.  If a prescription is necessary we can send it directly to your pharmacy.  If lab work is needed we can place an order for that and you can then stop by our lab to have the test done at a later time.    Telephone visits are billed at different rates depending on your insurance coverage. During this emergency period, for some insurers they may be billed the same as an in-person visit.  Please reach out to your insurance provider with any questions.    If during the course of the call the physician/provider feels a telephone visit is not appropriate, you will not be charged for this service.\"    Patient has given verbal consent for Telephone visit?  Yes    What phone number would you like to be contacted at? [phone]    How would you like to obtain your AVS? Mail a copy    Vonda Rm MA    "

## 2020-06-11 NOTE — PROGRESS NOTES
[facility]    Ivan Payton is an 84-year-old independent gentleman who had undergone a left CEA on 3/27/2014 for high-grade asymptomatic stenosis.  Had a very tortuous distal ICA with elevated velocities but no stenosis.  Mild known stenosis of the right carotid bifurcation.    He did well following the surgery.  Last follow-up on 1/15/2019 revealed some very mild plaque in the proximal ICA with a PSV= 153 cm/s but a normal diastolic velocity and ratio.  Tortuous distal arteries noted bilaterally.  Elevated velocity at 194 cm/s in the proximal ICA but no significant narrowing.    No known significant PAD.  On last exam +3 palpable right posterior tibial pulse but left were difficult to palpate though he did have bi/triphasic bedside Dopplers in both posterior tibial arteries.    PMH: Medications: Hytrin, Prinzide/Zestoretic, Tenormin, Lipitor, aspirin,                                                   Prilosec.      1/7/2020 laboratory: K= 3.7   SCr= 0.83  A1c= 6.4   LDL= 67  Hgb= 14.9    Due to the ongoing COVID-19 pandemic were trying to avoid patient visits to the clinic especially elderly patients unless absolutely necessary and thus we had a phone visit today.    Patient reports that he is doing well.  He still lives independently.  He can do all of his projects at home including mowing the lawn and gardening.  However, he does tire more easily he does use a walker when he leaves his home primarily for he can sit down on the walker if needed.  Does not sound like he has any type of disabling claudication symptoms.    As mentioned above his recent laboratory tests are all this year looked excellent.  Very good risk factor control with him non-smoking and LDL at goal on his Lipitor.      Did repeat his carotid duplex ultrasound on 6/3/2020.  On the endarterectomy site there was a very minimal lack at the very distal portion before the tortuous segment of the distal ICA but no narrowing  noted.  It is right side but still see a calcified plaque in the proximal ICA not causing any significant luminal narrowing.    Impression: #1.  Widely patent left CEA.  Patient remains asymptomatic on aspirin.                       #2.  Stable mild calcified stenosis of proximal right ICA of no clinical concern.  Would recommend reevaluation of both carotid arteries in 2 years if his health remains stable.                         #3.   No significant PAD symptoms.                       #4.  Good risk factor control including good diet, LDL at goal on statin, and chronic non-smoker.    Spent 12 minutes on the phone today with over 50% in counseling.      Yobani Jones MD

## 2020-06-12 NOTE — PATIENT INSTRUCTIONS
[facility]        For questions or concerns regarding this visit, please call [phone].

## 2020-06-15 ENCOUNTER — OFFICE VISIT (OUTPATIENT)
Dept: FAMILY MEDICINE | Facility: CLINIC | Age: 85
End: 2020-06-15
Payer: COMMERCIAL

## 2020-06-15 VITALS
OXYGEN SATURATION: 97 % | DIASTOLIC BLOOD PRESSURE: 80 MMHG | TEMPERATURE: 97.9 F | SYSTOLIC BLOOD PRESSURE: 130 MMHG | WEIGHT: 228 LBS | BODY MASS INDEX: 35.71 KG/M2 | HEART RATE: 65 BPM | RESPIRATION RATE: 16 BRPM

## 2020-06-15 DIAGNOSIS — L02.214 ABSCESS OF LEFT GROIN: Primary | ICD-10-CM

## 2020-06-15 PROCEDURE — 99024 POSTOP FOLLOW-UP VISIT: CPT | Performed by: FAMILY MEDICINE

## 2020-06-15 NOTE — PATIENT INSTRUCTIONS
The patient will continue with daily dressing changes.  He will follow-up with the situation starts to deteriorate.  Otherwise he will continue with his medications which seem to be working very well and there is no signs of any active infection today.  I would expect complete healing of this just doing what he is doing.

## 2020-06-15 NOTE — PROGRESS NOTES
Subjective     Ivan Payton is a 84 year old male who presents to clinic today for the following health issues:    HPI   Wound check      Duration: 6/10/2020    Description (location/character/radiation): pt seen in ER for abscess in groin area    Intensity:  moderate    Accompanying signs and symptoms: none    History (similar episodes/previous evaluation): None    Precipitating or alleviating factors: None    Therapies tried and outcome: Ivan Gonzales MD          Patient Active Problem List   Diagnosis     Family history of other cardiovascular diseases     Hypertrophy of prostate without urinary obstruction     Advance care planning     Trochanteric bursitis     Essential hypertension     Cerebral infarction (H)     Claustrophobia     Hyperlipidemia with target LDL less than 100     Carotid stenosis     Cerebral vascular disease     Dyspnea     Coronary artery disease     RBBB (right bundle branch block)     Preventive measure     Hydrocele, bilateral     Esophageal stricture     IFG (impaired fasting glucose)     Right-sided low back pain without sciatica: 2005 x2d& reonset mid 3-16      Non morbid obesity due to excess calories     Pain and swelling of left lower extremity     Right inguinal pain     Coronary artery disease involving native heart, angina presence unspecified, unspecified vessel or lesion type     Kyphosis (acquired) (postural)     Gastroesophageal reflux disease without esophagitis     Past Surgical History:   Procedure Laterality Date     CV HEART CATHETERIZATION WITH POSSIBLE INTERVENTION N/A 12/18/2018    Procedure: Coronary Angiography;  Surgeon: Geovanni Tanner MD;  Location:  HEART CARDIAC CATH LAB     ENDARTERECTOMY CAROTID  3/26/2014    Procedure: ENDARTERECTOMY CAROTID;  LEFT CAROTID ENDARTERECTOMY WITH EEG;  Surgeon: Yobani Jones MD;  Location:  OR      REMOVAL OF TONSILS,<11 Y/O      Tonsils <13y.o.     HEART CATH, ANGIOPLASTY  9/9/14    Stenting of  LAD,Prox LCx, and 2 stents to OM2     HERNIORRHAPHY INGUINAL      Right     HERNIORRHAPHY INGUINAL  2/10/2012    Procedure:HERNIORRHAPHY INGUINAL; LEFT OPEN INGUINAL HERNIA REPAIR WITH MESH, RIGHT HYDROCELECTOMY; Surgeon:JULI LOPES; Location:[facility]     HYDROCELECTOMY INGUINAL  2/10/2012    Procedure:HYDROCELECTOMY INGUINAL; Surgeon:JULI LOPES; Location:[facility]     HYDROCELECTOMY SCROTAL Right 2016    Procedure: HYDROCELECTOMY SCROTAL;  Surgeon: Yobani Stevens MD;  Location: [facility]     LAPAROSCOPIC HERNIORRHAPHY INGUINAL BILATERAL Bilateral 2016    Procedure: LAPAROSCOPIC HERNIORRHAPHY INGUINAL BILATERAL;  Surgeon: Chandler Bowen MD;  Location:  OR     MR BRAIN AND ORBITS  3/2014    6 mm area of restricted diffusion subcortical white matter left parietal lobe/questionable area of restricted diffusion left posterior frontal region - c/w recent small infarcts. Small chronic lacunar infarct left caudate nucleus     SURGICAL HISTORY OF -       tonail removal       Social History     Tobacco Use     Smoking status: Former Smoker     Packs/day: 0.50     Years: 20.00     Pack years: 10.00     Types: Cigarettes     Start date:      Last attempt to quit: 1968     Years since quittin.0     Smokeless tobacco: Former User   Substance Use Topics     Alcohol use: No     Alcohol/week: 0.0 standard drinks     Family History   Problem Relation Age of Onset     C.A.D. Brother         older brother, CABG about age 56     Respiratory Father         lung disease - farmer,      Hypertension Mother      C.A.D. Brother         younger brother, angioplasty     Hypertension Sister      Hypertension Brother      Cerebrovascular Disease Son         2018             Reviewed and updated as needed this visit by Provider         Review of Systems   Constitutional, HEENT, cardiovascular, pulmonary, gi and gu systems are negative, except as otherwise noted.      Objective    BP  130/80   Pulse 65   Temp 97.9  F (36.6  C)   Resp 16   Wt 103.4 kg (228 lb)   SpO2 97%   BMI 35.71 kg/m    Body mass index is 35.71 kg/m .  Physical Exam   GENERAL APPEARANCE: healthy, alert and no distress  SKIN: The left inguinal fold does show a 1-1/2 cm incision that is clean and dry.  There is no drainage.  There is no palpable lump in the area.            Assessment & Plan       ICD-10-CM    1. Abscess of left groin  L02.214           Patient Instructions   The patient will continue with daily dressing changes.  He will follow-up with the situation starts to deteriorate.  Otherwise he will continue with his medications which seem to be working very well and there is no signs of any active infection today.  I would expect complete healing of this just doing what he is doing.      Return in about 2 weeks (around 6/29/2020) for If symptoms persist.    Ivan Gonzales MD  [facility]

## 2020-06-19 ENCOUNTER — OFFICE VISIT (OUTPATIENT)
Dept: FAMILY MEDICINE | Facility: CLINIC | Age: 85
End: 2020-06-19
Payer: COMMERCIAL

## 2020-06-19 VITALS
BODY MASS INDEX: 35.87 KG/M2 | HEART RATE: 68 BPM | WEIGHT: 229 LBS | SYSTOLIC BLOOD PRESSURE: 114 MMHG | TEMPERATURE: 97.4 F | DIASTOLIC BLOOD PRESSURE: 66 MMHG | OXYGEN SATURATION: 100 % | RESPIRATION RATE: 18 BRPM

## 2020-06-19 DIAGNOSIS — H25.9 AGE-RELATED CATARACT OF BOTH EYES, UNSPECIFIED AGE-RELATED CATARACT TYPE: ICD-10-CM

## 2020-06-19 DIAGNOSIS — K21.9 GASTROESOPHAGEAL REFLUX DISEASE WITHOUT ESOPHAGITIS: ICD-10-CM

## 2020-06-19 DIAGNOSIS — I10 ESSENTIAL HYPERTENSION: ICD-10-CM

## 2020-06-19 DIAGNOSIS — I67.9 CEREBRAL VASCULAR DISEASE: ICD-10-CM

## 2020-06-19 DIAGNOSIS — Z01.818 PREOP GENERAL PHYSICAL EXAM: Primary | ICD-10-CM

## 2020-06-19 DIAGNOSIS — E78.5 HYPERLIPIDEMIA WITH TARGET LDL LESS THAN 100: ICD-10-CM

## 2020-06-19 DIAGNOSIS — I25.10 CORONARY ARTERY DISEASE INVOLVING NATIVE HEART, ANGINA PRESENCE UNSPECIFIED, UNSPECIFIED VESSEL OR LESION TYPE: ICD-10-CM

## 2020-06-19 PROCEDURE — 99214 OFFICE O/P EST MOD 30 MIN: CPT | Performed by: FAMILY MEDICINE

## 2020-06-19 NOTE — PROGRESS NOTES
[facility]  [address]  [facility] [address]  [phone]  Dept: [phone]    PRE-OP EVALUATION:  Today's date: 2020    Ivan Payton (: 1935) presents for pre-operative evaluation assessment as requested by Dr. Cespedes.  He requires evaluation and anesthesia risk assessment prior to undergoing surgery/procedure for treatment of cataract .    Proposed Surgery/ Procedure: Cataract  Date of Surgery/ Procedure: 2020  Time of Surgery/ Procedure:   Hospital/Surgical Facility: [facility] surgery center  Fax number for surgical facility: [phone]  Primary Physician: William Thompson  Type of Anesthesia Anticipated: Local    Patient has a Health Care Directive or Living Will:  NO    1. NO - Do you have a history of heart attack, stroke, stent, bypass or surgery on an artery in the head, neck, heart or legs?  2. NO - Do you ever have any pain or discomfort in your chest?  3. NO - Do you have a history of  Heart Failure?  4. NO - Are you troubled by shortness of breath when: walking on the level, up a slight hill or at night?  5. NO - Do you currently have a cold, bronchitis or other respiratory infection?  6. NO - Do you have a cough, shortness of breath or wheezing?  7. NO - Do you sometimes get pains in the calves of your legs when you walk?  8. NO - Do you or anyone in your family have previous history of blood clots?  9. NO - Do you or does anyone in your family have a serious bleeding problem such as prolonged bleeding following surgeries or cuts?  10. NO - Have you ever had problems with anemia or been told to take iron pills?  11. NO - Have you had any abnormal blood loss such as black, tarry or bloody stools, or abnormal vaginal bleeding?  12. NO - Have you ever had a blood transfusion?  13. NO - Have you or any of your relatives ever had problems with anesthesia?  14. NO - Do you have sleep apnea, excessive snoring or  daytime drowsiness?  15. NO - Do you have any prosthetic heart valves?  16. NO - Do you have prosthetic joints?  17. NO - Is there any chance that you may be pregnant?      HPI:     HPI related to upcoming procedure: vision problems both eyes. Left eye 7/1 and right eye 7/15.      See problem list for active medical problems.  Problems all longstanding and stable, except as noted/documented.  See ROS for pertinent symptoms related to these conditions.      MEDICAL HISTORY:     Patient Active Problem List    Diagnosis Date Noted     Kyphosis (acquired) (postural) 01/07/2020     Priority: Medium     Gastroesophageal reflux disease without esophagitis 01/07/2020     Priority: Medium     Coronary artery disease involving native heart, angina presence unspecified, unspecified vessel or lesion type 02/20/2019     Priority: Medium     Right inguinal pain 12/06/2017     Priority: Medium     Pain and swelling of left lower extremity 05/10/2017     Priority: Medium     Right-sided low back pain without sciatica: 2005 x2d& reonset mid 3-16  04/08/2016     Priority: Medium     Non morbid obesity due to excess calories 04/08/2016     Priority: Medium     Esophageal stricture 12/16/2015     Priority: Medium     EGD in 1/16 shows reflux esophagitis; esophagram shows paraesophageal hernia       IFG (impaired fasting glucose) 12/16/2015     Priority: Medium     122 in 12/15; A1C  6.3 in 2/18;     147 and 6.4 in 1/20       Hydrocele, bilateral 11/19/2014     Priority: Medium     Preventive measure 11/16/2014     Priority: Medium     PSA 2.2 in 10/13                        Colonoscopy never; no reason.  FIT neg in 12/17          Coronary artery disease      Priority: Medium     Cath 9/2014: PTCA and KIKE to mLAD, KIKE to prox circumflex, PTCA and DESx2 to OM2         Cath 12/18/18: KIKE to PDA and OM1, patent stents to LAD and Cfx               Pt stopped plavix 12/19       Dyspnea 07/21/2014     Priority: Medium     Carotid stenosis  03/26/2014     Priority: Medium     S/p L CEA; R ICA ok;          See CUS 12/15 and Dr Jones's consult; continue plavix.                   Stable CUS 1/19; plavix stopped by Cardiology 12/19         Hyperlipidemia with target LDL less than 100 03/14/2014     Priority: Medium     Diagnosis updated by automated process. Provider to review and confirm.       Essential hypertension      Priority: Medium     Problem list name updated by automated process. Provider to review       Claustrophobia      Priority: Medium     Need sedation for MRI scans       Cerebral infarction (H) 03/01/2014     Priority: Medium     2 small acute lesions noted left parietal/left posterior frontal region. Old lacunar infarct left caudate nucleus  Diagnosis updated by automated process. Provider to review and confirm.       Cerebral vascular disease 03/01/2014     Priority: Medium     multiple intracranial stenoses - lt post communic, lt post cerebral, rt middle cerebral, bilat porx M2 segments       RBBB (right bundle branch block) 03/01/2014     Priority: Medium     Trochanteric bursitis 04/17/2012     Priority: Medium     Advance care planning 02/08/2012     Priority: Medium     Advance Care Planning 1/9/2017: ACP Facilitation Session:    Ivan Payton presented for ACP Facilitation session at a group session. He was accompanied by spouse. [employer] Cho[employer] information provided and resources reviewed. He currently wishes to give additional consideration to ACP  He currently has the following questions or concerns about Advance Care Planning: none.  Confirmed/documented legally designated decision maker(s).  Follow-up meeting: not needed/applicable. Added by Allison Molina   Advance Care Planning Liaison  Advance Care Planning 2/8/2012: Discussed advance care planning with patient; information given to patient to review.          Family history of other cardiovascular diseases 08/31/2006     Priority: Medium     Hypertrophy of prostate  without urinary obstruction 08/31/2006     Priority: Medium     Problem list name updated by automated process. Provider to review        Past Medical History:   Diagnosis Date     Arthritis      Carotid stenosis 3/26/2014    S/p L CEA; R ICA ok;          See CUS 12/15 and Dr Jones's consult; continue plavix      Cerebral vascular disease 3/2014    multiple intracranial stenoses - lt post communic, lt post cerebral, rt middle cerebral, bilat porx M2 segments     Claustrophobia     Need sedation for MRI scans     Coronary artery disease 2014    Cath 9/2014: PTCA and KIKE to mLAD, KIKE to prox circumflex, PTCA and DESx2 to OM2; Cath 12/18/18: KIKE to PDA and OM1, patent stents to LAD and Cfx     CVA (cerebral infarction) 3/2014    2 small acute lesions noted left parietal/left posterior frontal region. Old lacunar infarct left caudate nucleus     Dyspnea 7/21/2014     Enlarged prostate      Essential hypertension      Problem list name updated by automated process. Provider to review     GERD (gastroesophageal reflux disease)      Hiatal hernia      Hydrocele     Right hydrocelectomy 2/2012     Hyperlipidemia with target LDL less than 100 3/14/2014     Diagnosis updated by automated process. Provider to review and confirm.     RBBB (right bundle branch block) 3/2014     Shortness of breath      Stented coronary artery      Uncomplicated asthma      Past Surgical History:   Procedure Laterality Date     CV HEART CATHETERIZATION WITH POSSIBLE INTERVENTION N/A 12/18/2018    Procedure: Coronary Angiography;  Surgeon: Geovanni Tanner MD;  Location:  HEART CARDIAC CATH LAB     ENDARTERECTOMY CAROTID  3/26/2014    Procedure: ENDARTERECTOMY CAROTID;  LEFT CAROTID ENDARTERECTOMY WITH EEG;  Surgeon: Yobani Jones MD;  Location:  OR      REMOVAL OF TONSILS,<11 Y/O      Tonsils <13y.o.     HEART CATH, ANGIOPLASTY  9/9/14    Stenting of LAD,Prox LCx, and 2 stents to OM2     HERNIORRHAPHY INGUINAL      Right      HERNIORRHAPHY INGUINAL  2/10/2012    Procedure:HERNIORRHAPHY INGUINAL; LEFT OPEN INGUINAL HERNIA REPAIR WITH MESH, RIGHT HYDROCELECTOMY; Surgeon:JULI LOPES; Location:[facility]     HYDROCELECTOMY INGUINAL  2/10/2012    Procedure:HYDROCELECTOMY INGUINAL; Surgeon:JULI LOPES; Location:[facility]     HYDROCELECTOMY SCROTAL Right 1/29/2016    Procedure: HYDROCELECTOMY SCROTAL;  Surgeon: Yobani Stevens MD;  Location: [facility]     LAPAROSCOPIC HERNIORRHAPHY INGUINAL BILATERAL Bilateral 5/24/2016    Procedure: LAPAROSCOPIC HERNIORRHAPHY INGUINAL BILATERAL;  Surgeon: Chandler Bowen MD;  Location:  OR     MR BRAIN AND ORBITS  3/2014    6 mm area of restricted diffusion subcortical white matter left parietal lobe/questionable area of restricted diffusion left posterior frontal region - c/w recent small infarcts. Small chronic lacunar infarct left caudate nucleus     SURGICAL HISTORY OF -       tonail removal     Current Outpatient Medications   Medication Sig Dispense Refill     Ascorbic Acid (VITAMIN C PO) Take 1,000 mg by mouth daily       aspirin 81 MG tablet Take by mouth daily 30 tablet      atenolol (TENORMIN) 50 MG tablet Take 1 tablet (50 mg) by mouth daily 90 tablet 4     atorvastatin (LIPITOR) 40 MG tablet Take 1 tablet (40 mg) by mouth daily 90 tablet 2     calcium carbonate (OS-SREEDHAR 500 MG [place]. [place]) 500 MG tablet Take 500 mg by mouth daily       doxycycline hyclate (VIBRAMYCIN) 100 MG capsule Take 1 capsule (100 mg) by mouth 2 times daily for 10 days 20 capsule 0     glucosamine-chondroitin 500-400 MG CAPS Take 1 capsule by mouth daily       lisinopril-hydrochlorothiazide (PRINZIDE/ZESTORETIC) 20-12.5 MG tablet Take 1 tablet by mouth daily 90 tablet 3     nitroGLYcerin (NITROSTAT) 0.4 MG sublingual tablet For chest pain place 1 tablet under the tongue every 5 minutes for 3 doses. If symptoms persist 5 minutes after 1st dose call 911. 25 tablet 1     nystatin (MYCOSTATIN) 274424 UNIT/GM external  ointment Apply topically 2 times daily 30 g 0     Omega-3 Fatty Acids (OMEGA-3 FISH OIL PO) Take 1 g by mouth daily        omeprazole (PRILOSEC) 20 MG DR capsule TAKE 1 CAPSULE BY MOUTH EVERY DAY 90 capsule 3     terazosin (HYTRIN) 2 MG capsule Take 1 capsule (2 mg) by mouth At Bedtime 90 capsule 3     vitamin  B complex with vitamin C (VITAMIN  B COMPLEX) TABS Take 1 tablet by mouth daily       VITAMIN D, CHOLECALCIFEROL, PO Take 5,000 Units by mouth daily        OTC products: None, except as noted above    Allergies   Allergen Reactions     Contrast Dye Hives      Latex Allergy: NO    Social History     Tobacco Use     Smoking status: Former Smoker     Packs/day: 0.50     Years: 20.00     Pack years: 10.00     Types: Cigarettes     Start date:      Last attempt to quit: 1968     Years since quittin.0     Smokeless tobacco: Former User   Substance Use Topics     Alcohol use: No     Alcohol/week: 0.0 standard drinks     History   Drug Use No       REVIEW OF SYSTEMS:   CONSTITUTIONAL: NEGATIVE for fever, chills, change in weight  INTEGUMENTARY/SKIN: NEGATIVE for worrisome rashes, moles or lesions  EYES: NEGATIVE for vision changes or irritation  ENT/MOUTH: NEGATIVE for ear, mouth and throat problems  RESP: NEGATIVE for significant cough or SOB  BREAST: NEGATIVE for masses, tenderness or discharge  CV: NEGATIVE for chest pain, palpitations or peripheral edema  GI: NEGATIVE for nausea, abdominal pain, heartburn, or change in bowel habits  : NEGATIVE for frequency, dysuria, or hematuria  MUSCULOSKELETAL: NEGATIVE for significant arthralgias or myalgia  NEURO: NEGATIVE for weakness, dizziness or paresthesias  ENDOCRINE: NEGATIVE for temperature intolerance, skin/hair changes  HEME: NEGATIVE for bleeding problems  PSYCHIATRIC: NEGATIVE for changes in mood or affect    EXAM:   /66   Pulse 68   Temp 97.4  F (36.3  C)   Resp 18   Wt 103.9 kg (229 lb)   SpO2 100%   BMI 35.87 kg/m      GENERAL  APPEARANCE: healthy, alert and no distress     EYES: EOMI,  PERRL     HENT: ear canals and TM's normal and nose and mouth without ulcers or lesions     NECK: no adenopathy, no asymmetry, masses, or scars and thyroid normal to palpation     RESP: lungs clear to auscultation - no rales, rhonchi or wheezes     CV: regular rates and rhythm, normal S1 S2, no S3 or S4 and no murmur, click or rub     ABDOMEN:  soft, nontender, no HSM or masses and bowel sounds normal     MS: extremities normal- no gross deformities noted, no evidence of inflammation in joints, FROM in all extremities.     SKIN: healing incision left groin with no signs of infection     NEURO: Normal strength and tone, sensory exam grossly normal, mentation intact and speech normal     PSYCH: mentation appears normal. and affect normal/bright     LYMPHATICS: No cervical adenopathy    DIAGNOSTICS:   No labs or EKG required for low risk surgery (cataract, skin procedure, breast biopsy, etc)    Recent Labs   Lab Test 01/07/20  0804 08/26/19  1023 12/18/18  0700 12/11/18  0954  09/09/14  0702   HGB 14.9  --  15.7 16.3   < > 13.5     --  186 179   < > 204   INR  --   --  1.06  --   --  1.03    139 140 140   < > 142   POTASSIUM 3.7 4.0 4.0 3.7   < > 4.0   CR 0.83 0.81 0.78 0.87   < > 0.98   A1C 6.4*  --   --  6.5*   < >  --     < > = values in this interval not displayed.        IMPRESSION:   Reason for surgery/procedure: increasing vision problems due to cataracts    The proposed surgical procedure is considered LOW risk.    REVISED CARDIAC RISK INDEX  The patient has the following serious cardiovascular risks for perioperative complications such as (MI, PE, VFib and 3  AV Block):  Coronary Artery Disease (MI, positive stress test, angina, Qs on EKG)  INTERPRETATION: 1 risks: Class II (low risk - 0.9% complication rate)    The patient has the following additional risks for perioperative complications:  No identified additional risks      ICD-10-CM     1. Preop general physical exam  Z01.818    2. Age-related cataract of both eyes, unspecified age-related cataract type  H25.9    3. Coronary artery disease involving native heart, angina presence unspecified, unspecified vessel or lesion type  I25.10    4. Gastroesophageal reflux disease without esophagitis  K21.9    5. Essential hypertension  I10    6. Hyperlipidemia with target LDL less than 100  E78.5    7. Cerebral vascular disease  I67.9        RECOMMENDATIONS:             APPROVAL GIVEN to proceed with proposed procedure, without further diagnostic evaluation       Signed Electronically by: Ivan Gonzales MD    Copy of this evaluation report is provided to requesting physician.    [facility] Preop Guidelines    Revised Cardiac Risk Index

## 2020-06-30 ENCOUNTER — HOSPITAL ENCOUNTER (EMERGENCY)
Facility: CLINIC | Age: 85
Discharge: HOME | End: 2020-06-30
Attending: EMERGENCY MEDICINE | Admitting: EMERGENCY MEDICINE
Payer: COMMERCIAL

## 2020-06-30 VITALS
DIASTOLIC BLOOD PRESSURE: 93 MMHG | OXYGEN SATURATION: 97 % | SYSTOLIC BLOOD PRESSURE: 135 MMHG | RESPIRATION RATE: 18 BRPM | BODY MASS INDEX: 36.1 KG/M2 | WEIGHT: 230 LBS | TEMPERATURE: 97.8 F | HEIGHT: 67 IN | HEART RATE: 82 BPM

## 2020-06-30 DIAGNOSIS — L02.214 ABSCESS OF GROIN, LEFT: ICD-10-CM

## 2020-06-30 PROCEDURE — 87186 SC STD MICRODIL/AGAR DIL: CPT | Performed by: EMERGENCY MEDICINE

## 2020-06-30 PROCEDURE — 87070 CULTURE OTHR SPECIMN AEROBIC: CPT | Performed by: EMERGENCY MEDICINE

## 2020-06-30 PROCEDURE — 10060 I&D ABSCESS SIMPLE/SINGLE: CPT

## 2020-06-30 PROCEDURE — 87077 CULTURE AEROBIC IDENTIFY: CPT | Performed by: EMERGENCY MEDICINE

## 2020-06-30 PROCEDURE — 25000132 ZZH RX MED GY IP 250 OP 250 PS 637: Performed by: EMERGENCY MEDICINE

## 2020-06-30 PROCEDURE — 99283 EMERGENCY DEPT VISIT LOW MDM: CPT

## 2020-06-30 RX ORDER — SULFAMETHOXAZOLE/TRIMETHOPRIM 800-160 MG
1 TABLET ORAL 2 TIMES DAILY
Qty: 20 TABLET | Refills: 0 | Status: SHIPPED | OUTPATIENT
Start: 2020-06-30 | End: 2020-06-30

## 2020-06-30 RX ORDER — SULFAMETHOXAZOLE/TRIMETHOPRIM 800-160 MG
1 TABLET ORAL 2 TIMES DAILY
Qty: 20 TABLET | Refills: 0 | Status: SHIPPED | OUTPATIENT
Start: 2020-06-30 | End: 2020-07-10

## 2020-06-30 RX ORDER — SULFAMETHOXAZOLE/TRIMETHOPRIM 800-160 MG
1 TABLET ORAL ONCE
Status: COMPLETED | OUTPATIENT
Start: 2020-06-30 | End: 2020-06-30

## 2020-06-30 RX ADMIN — SULFAMETHOXAZOLE AND TRIMETHOPRIM 1 TABLET: 800; 160 TABLET ORAL at 01:55

## 2020-06-30 ASSESSMENT — MIFFLIN-ST. JEOR: SCORE: 1696.9

## 2020-06-30 NOTE — ED TRIAGE NOTES
Had abscess drained couple weeks ago and it is still draining.    Pt A&O x 3, CMS x 3, ABCD's adequate in triage

## 2020-06-30 NOTE — ED PROVIDER NOTES
History     Chief Complaint:  Wound Check     HPI   Ivan Payton is a 84 year old male who presents for a wound check. On 6/10/2020 the patient was seen in the ED by Vickie Marquez NP, for a painful area of swelling over the left side of his groin. An incision and drainage of a small abscess was performed in the ED and he was discharged with a course of Doxycycline. He followed up in his clinic regarding this on 6/15 and has since completed the course of Doxycycline, however he has continued to have discharge from the area prompting him to seek reevaluation in the ED tonight. Otherwise, he denies any recent fever and reports that he has been feeling well.  No changes to bowel or bladder.  No history of inflammatory bowel disease or chronic concerns.  He expresses frustration that his groin was not examined much at the time of his clinic follow-up.  Symptoms are not worsening but simply persistent, causing him concern.  No vomiting, cough, or trouble breathing.  No known sick contacts.    Allergies:  Contrast dye      Medications:    Vitamin C   Aspirin 81 mg   Atenolol  Lipitor  Calcium carbonate   Lisinopril-hydrochlorothiazide    Nitroglycerin   Nystatin  Omega 3 fish oil  Omeprazole  Terazosin  Vitamin B complex   Vitamin D      Past Medical History:    Arthritis  Carotid stenosis   CVA   Claustrophobia  CAD   Dyspnea  Enlarged prostate   GERD   Hypertension  Hiatal hernia   Hydrocele  Hyperlipidemia   Right bundle branch block  Stented coronary artery   Asthma     Past Surgical History:    CV heart catheterization with possible intervention   Carotid endarterectomy   Tonsillectomy  Heart cath angioplasty  Right inguinal herniorrhaphy   Inguinal hydrocelectomy  Hydrocelectomy scrotal, right   Laparoscopic herniorrhaphy inguinal bilateral      Family History:    CAD - Brother   Hypertension - Mother, sister, and brother  Lung disease - Father   Cerebrovascular disease - Son     Social History:  Tobacco use:  "   Former smoker - 0.50 packs / day for 20 years, quit 1968   Alcohol use:    Negative   Drug use:    Negative   Marital status:       Accompanied to ED by:  Alone      Review of Systems   All other systems reviewed and are negative.    Physical Exam   First Vitals:  BP: (!) 163/79  Pulse: 83  Temp: 97.8  F (36.6  C)  Resp: 18  Height: 170.2 cm (5' 7\")  Weight: 104.3 kg (230 lb)  SpO2: 96 %      Physical Exam  General: Nontoxic-appearing male sitting upright in room 24  HENT: mucous membranes moist   CV: rate as above, normal left femoral pulse  Resp: normal effort, speaks in full phrases, no stridor  GI: Abdomen soft, nontender  MSK: no bony tenderness, no CVAT  Skin: appropriately warm and dry, approximately 1.5 cm slightly tender fluctuant mass in the left inguinal crease with a punctate opening that is draining some purulence  Neuro: alert, clear speech, oriented   Psych: cooperative      Emergency Department Course      Laboratory:  Wound culture aerobic bacterial: Pending      Procedures:  Procedure: Incision and Drainage   Performed by: Trino Loo MD    LOCATION:  L inguinal crease      ANESTHESIA:  Local field block using Marcaine 0.5% with epinephrine, total of 2 mLs    PROCEDURE:  Area was incised with # 11 Blade ([place]) with a Single Straight incision.  Wound treatment included Deloculation, Purulent Drainage and Expression of Material.  Packing consisted of Plain Gauze.  Appropriate dressing was applied to cover the area.    Patient Status:  Patient tolerated the procedure moderately well. There were no complications evident    Interventions:  0155 Bactrim DS 1 tablet PO      Emergency Department Course:  Nursing notes and vitals reviewed.  0130: I performed an exam of the patient as documented above.     0138:  A incision and drainage was performed as outlined in the procedure note above.  The patient tolerated well and there were no complications.      Findings and plan explained to " the Patient. Patient discharged home with instructions regarding supportive care, medications, and reasons to return. The importance of close follow-up was reviewed. The patient was prescribed Bactrim DS.      Impression & Plan       Medical Decision Making:  He has evidence of a small draining abscess to the left inguinal crease.  Given the punctate opening, I felt it was necessary to infiltrate local anesthesia and extend the opening to increase the drainage and hopefully expedite healing.  The possibility of enterocutaneous fistula or other underlying condition was considered, but thought to be sufficiently unlikely that further testing can be reasonably deferred.  No evidence of direct vascular involvement.  He has satisfactory vitals and minimal discomfort.  Time course not suggestive of necrotizing infection.  Culture was sent and Bactrim initiated.  He should return for sudden worsening any hour and otherwise would benefit from follow-up through clinic in the next 48 hours for wound recheck.     Diagnosis:    ICD-10-CM   1. Abscess of groin, left  L02.214       Disposition:  Discharged to home with Bactrim DS.      Discharge Medications:  New Prescriptions    SULFAMETHOXAZOLE-TRIMETHOPRIM (BACTRIM DS) 800-160 MG TABLET    Take 1 tablet by mouth 2 times daily for 10 days      This record was created at least in part using electronic voice recognition software, so please excuse any typographical errors.      I, Tito Chen, am serving as a scribe at 1:30 AM on 6/30/2020 to document services personally performed by Dr. Loo, based on my observations and the provider's statements to me.      EMERGENCY DEPARTMENT       Trino Loo MD  06/30/20 0652

## 2020-06-30 NOTE — ED AVS SNAPSHOT
Emergency Department  [address] [place] [address]  Phone:  [phone]  Fax:  [phone]                                    Ivan Payton   MRN: [id number]    Department:   Emergency Department   Date of Visit:  6/30/2020           After Visit Summary Signature Page    I have received my discharge instructions, and my questions have been answered. I have discussed any challenges I see with this plan with the nurse or doctor.    ..........................................................................................................................................  Patient/Patient Representative Signature      ..........................................................................................................................................  Patient Representative Print Name and Relationship to Patient    ..................................................               ................................................  Date                                   Time    ..........................................................................................................................................  Reviewed by Signature/Title    ...................................................              ..............................................  Date                                               Time          22EPIC Rev 08/18

## 2020-07-02 ENCOUNTER — OFFICE VISIT (OUTPATIENT)
Dept: SURGERY | Facility: CLINIC | Age: 85
End: 2020-07-02
Payer: COMMERCIAL

## 2020-07-02 VITALS
DIASTOLIC BLOOD PRESSURE: 62 MMHG | WEIGHT: 230 LBS | HEIGHT: 67 IN | SYSTOLIC BLOOD PRESSURE: 142 MMHG | HEART RATE: 77 BPM | BODY MASS INDEX: 36.1 KG/M2

## 2020-07-02 DIAGNOSIS — L02.214 ABSCESS OF GROIN, LEFT: Primary | ICD-10-CM

## 2020-07-02 PROCEDURE — 99214 OFFICE O/P EST MOD 30 MIN: CPT | Performed by: SURGERY

## 2020-07-02 ASSESSMENT — MIFFLIN-ST. JEOR: SCORE: 1696.9

## 2020-07-02 NOTE — PROGRESS NOTES
[facility]  Surgery Consultation    HPI:Patient is a 84 year old male who is here for consultation requested by William Thompson [phone] for evaluation of left groin abscess.  Has had problems with it for 3 weeks.  Had initial small I&D but it reoccurred prompting a visit to the ER on Tuesday.  A larger I&D was performed and the wound was packed.  He has not had any drainage since the wound was packed.  He feels extremely better after the drainage.  He denies fevers or chills.  He has never had anything like this in the past..Patient denies fevers, chills, nausea, vomiting, SOB, chest pain, abdominal pain.    PMH:   has a past medical history of Arthritis, Carotid stenosis (3/26/2014), Cerebral vascular disease (3/2014), Claustrophobia, Coronary artery disease (2014), CVA (cerebral infarction) (3/2014), Dyspnea (7/21/2014), Enlarged prostate, Essential hypertension, GERD (gastroesophageal reflux disease), Hiatal hernia, Hydrocele, Hyperlipidemia with target LDL less than 100 (3/14/2014), RBBB (right bundle branch block) (3/2014), Shortness of breath, Stented coronary artery, and Uncomplicated asthma.  PSH:    has a past surgical history that includes REMOVAL OF TONSILS,<13 Y/O; Herniorrhaphy inguinal; surgical history of - ; Herniorrhaphy inguinal (2/10/2012); Hydrocelectomy inguinal (2/10/2012); MR Brain and Orbits (3/2014); Endarterectomy carotid (3/26/2014); Heart Cath, Angioplasty (9/9/14); Hydrocelectomy scrotal (Right, 1/29/2016); Laparoscopic herniorrhaphy inguinal bilateral (Bilateral, 5/24/2016); and Heart Catheterization with Possible Intervention (N/A, 12/18/2018).  Social History:    reports that he quit smoking about 52 years ago. His smoking use included cigarettes. He started smoking about 72 years ago. He has a 10.00 pack-year smoking history. He has quit using smokeless tobacco. He reports that he does not drink alcohol or use drugs.  Family History:   family history includes  "C.A.D. in his brother; Cerebrovascular Disease in his son; Hypertension in his brother, brother, mother, and sister; Respiratory in his father.  Medications/Allergies: Home medications and allergies reviewed.    ROS:  The 10 point Review of Systems is negative other than noted in the HPI.    Physical Exam:  BP (!) 142/62   Pulse 77   Ht 1.702 m (5' 7\")   Wt 104.3 kg (230 lb)   BMI 36.02 kg/m    General: Generally appears well.  Eyes- Sclera Clear- No icterus  Respiratory- Chest rise equal Bilateral  Groin-small opening in left groin crease.  Packing removed revealing a 1 cm deep clean pocket without any purulent fluid.    Impression and Plan:  Patient is a 84 year old male with left groin abscess    PLAN:   I described the pathophysiology of groin abscesses including further work-up and management.  He had a very adequate I&D in the emergency room and it is healing well at this time.  There are no signs of ongoing infection at this time.  We discussed options for ongoing wound care to prevent recurrence which could include daily packing, daily baths, or irrigation.  He thinks he would be able to irrigate the wound multiple times throughout the day.  I would prefer him not to have to come back for dressing changes to reduce his risk of COVID.  We will try irrigation over the next few days and have him return on Tuesday.  If things are going well it will likely heal without other intervention but if any changes need to be made we could do it at that time.  Patient is in agreement.      Thank you very much for this consult.    Danny Gilliland M.D.  [facility]  [phone]    Please route or send letter to:  Primary Care Provider (PCP) and Referring Provider  "

## 2020-07-02 NOTE — LETTER
[facility]    [address], [address]  [place], [place] [address]  Phone [phone]  Fax [phone] [address], [address]  [facility] [address]  Phone [phone]  Fax [phone]    [email or web address]     2020    Re: Ivan Payton  : 1935      [facility]  Surgery Consultation     HPI:Patient is a 84 year old male who is here for consultation requested by William Thompson [phone] for evaluation of left groin abscess.  Has had problems with it for 3 weeks.  Had initial small I&D but it reoccurred prompting a visit to the ER on Tuesday.  A larger I&D was performed and the wound was packed.  He has not had any drainage since the wound was packed.  He feels extremely better after the drainage.  He denies fevers or chills.  He has never had anything like this in the past..Patient denies fevers, chills, nausea, vomiting, SOB, chest pain, abdominal pain.     PMH:   has a past medical history of Arthritis, Carotid stenosis (3/26/2014), Cerebral vascular disease (3/2014), Claustrophobia, Coronary artery disease (), CVA (cerebral infarction) (3/2014), Dyspnea (2014), Enlarged prostate, Essential hypertension, GERD (gastroesophageal reflux disease), Hiatal hernia, Hydrocele, Hyperlipidemia with target LDL less than 100 (3/14/2014), RBBB (right bundle branch block) (3/2014), Shortness of breath, Stented coronary artery, and Uncomplicated asthma.  PSH:    has a past surgical history that includes REMOVAL OF TONSILS,<14 Y/O; Herniorrhaphy inguinal; surgical history of - ; Herniorrhaphy inguinal (2/10/2012); Hydrocelectomy inguinal (2/10/2012); MR Brain and Orbits (3/2014); Endarterectomy carotid (3/26/2014); Heart Cath, Angioplasty (14); Hydrocelectomy scrotal (Right, 2016); Laparoscopic herniorrhaphy inguinal bilateral (Bilateral, 2016); and Heart Catheterization with  "Possible Intervention (N/A, 12/18/2018).  Social History:    reports that he quit smoking about 52 years ago. His smoking use included cigarettes. He started smoking about 72 years ago. He has a 10.00 pack-year smoking history. He has quit using smokeless tobacco. He reports that he does not drink alcohol or use drugs.  Family History:   family history includes C.A.D. in his brother; Cerebrovascular Disease in his son; Hypertension in his brother, brother, mother, and sister; Respiratory in his father.  Medications/Allergies: Home medications and allergies reviewed.     ROS:  The 10 point Review of Systems is negative other than noted in the HPI.     Physical Exam:  BP (!) 142/62   Pulse 77   Ht 1.702 m (5' 7\")   Wt 104.3 kg (230 lb)   BMI 36.02 kg/m    General: Generally appears well.  Eyes- Sclera Clear- No icterus  Respiratory- Chest rise equal Bilateral  Groin-small opening in left groin crease.  Packing removed revealing a 1 cm deep clean pocket without any purulent fluid.     Impression and Plan:  Patient is a 84 year old male with left groin abscess     PLAN:   I described the pathophysiology of groin abscesses including further work-up and management.  He had a very adequate I&D in the emergency room and it is healing well at this time.  There are no signs of ongoing infection at this time.  We discussed options for ongoing wound care to prevent recurrence which could include daily packing, daily baths, or irrigation.  He thinks he would be able to irrigate the wound multiple times throughout the day.  I would prefer him not to have to come back for dressing changes to reduce his risk of COVID.  We will try irrigation over the next few days and have him return on Tuesday.  If things are going well it will likely heal without other intervention but if any changes need to be made we could do it at that time.  Patient is in agreement.        Thank you very much for this consult.     Danny Gilliland" M.D.  [facility]  [phone]

## 2020-07-03 LAB
BACTERIA SPEC CULT: ABNORMAL
BACTERIA SPEC CULT: ABNORMAL
Lab: ABNORMAL
SPECIMEN SOURCE: ABNORMAL

## 2020-07-07 ENCOUNTER — OFFICE VISIT (OUTPATIENT)
Dept: SURGERY | Facility: CLINIC | Age: 85
End: 2020-07-07
Payer: COMMERCIAL

## 2020-07-07 DIAGNOSIS — L02.214 ABSCESS OF GROIN, LEFT: Primary | ICD-10-CM

## 2020-07-07 PROCEDURE — 99212 OFFICE O/P EST SF 10 MIN: CPT | Performed by: SURGERY

## 2020-07-07 NOTE — PROGRESS NOTES
Surgery    Patient returns for follow-up regarding left groin abscess.  He had an I&D in the ER and I remove the packing last Thursday.  He has been irrigating the wound on a daily basis.  Over the last 2 days he has noted the swelling and drainage has completely stopped.  He has no pain at all at this time.  No fevers or chills.    Left groin-wound nearly completely healed.  No drainage.  No erythema.    A/P  Wound healed well.  No signs of infection or ongoing wound issues.  I advised him to come back to the office if any new symptoms develop.    Danny Gilliland M.D.  [facility]  [phone]

## 2020-08-17 DIAGNOSIS — R07.89 ATYPICAL CHEST PAIN: ICD-10-CM

## 2020-08-18 DIAGNOSIS — I10 ESSENTIAL HYPERTENSION: ICD-10-CM

## 2020-08-18 RX ORDER — TERAZOSIN 2 MG/1
2 CAPSULE ORAL AT BEDTIME
Qty: 90 CAPSULE | Refills: 0 | Status: SHIPPED | OUTPATIENT
Start: 2020-08-18 | End: 2020-11-11

## 2020-08-20 DIAGNOSIS — I10 ESSENTIAL HYPERTENSION: ICD-10-CM

## 2020-08-20 RX ORDER — LISINOPRIL AND HYDROCHLOROTHIAZIDE 12.5; 2 MG/1; MG/1
1 TABLET ORAL DAILY
Qty: 90 TABLET | Refills: 0 | Status: SHIPPED | OUTPATIENT
Start: 2020-08-20 | End: 2020-09-30

## 2020-08-31 ENCOUNTER — TELEPHONE (OUTPATIENT)
Dept: CARDIOLOGY | Facility: CLINIC | Age: 85
End: 2020-08-31

## 2020-08-31 NOTE — TELEPHONE ENCOUNTER
Wellness Screening Tool    Symptom Screening:    Do you have one of the following NEW symptoms:      Fever (subjective or >100.0)?  No    New cough? No    Shortness of breath? No    Chills? No    New loss of taste or smell? No    Generalized body aches? No    New persistent headache? No    New sore throat? No    Nausea, vomiting or diarrhea? No    Within the past 2 weeks, have you been exposed to someone with a known positive illness below?      COVID - 19 (known or suspected) No    Chicken pox?  No    Measles? No    Pertussis? No    Have you had a positive COVID-19 diagnostic test (nasal swab test) in the last 14 days or are you currently   on self-quarantine restrictions (i.e.travel restriction, exposure, etc?) No        Patient notified of visitor restriction: Yes  Patient informed to wear a mask: Yes    Patient's appointment status: Patient will be seen in clinic as scheduled on 09/01/2020

## 2020-09-01 ENCOUNTER — HOSPITAL ENCOUNTER (OUTPATIENT)
Dept: CARDIOLOGY | Facility: CLINIC | Age: 85
Discharge: HOME | End: 2020-09-01
Attending: INTERNAL MEDICINE | Admitting: INTERNAL MEDICINE
Payer: COMMERCIAL

## 2020-09-01 DIAGNOSIS — E78.5 HYPERLIPIDEMIA WITH TARGET LDL LESS THAN 100: ICD-10-CM

## 2020-09-01 DIAGNOSIS — I10 ESSENTIAL HYPERTENSION: ICD-10-CM

## 2020-09-01 DIAGNOSIS — I50.9 HEART FAILURE (H): ICD-10-CM

## 2020-09-01 LAB
ALT SERPL W P-5'-P-CCNC: <5 U/L (ref 5–30)
ANION GAP SERPL CALCULATED.3IONS-SCNC: 9.9 MMOL/L (ref 6–17)
BUN SERPL-MCNC: 20 MG/DL (ref 7–30)
CALCIUM SERPL-MCNC: 9.4 MG/DL (ref 8.5–10.5)
CHLORIDE SERPL-SCNC: 107 MMOL/L (ref 98–107)
CHOLEST SERPL-MCNC: 113 MG/DL
CO2 SERPL-SCNC: 26 MMOL/L (ref 23–29)
CREAT SERPL-MCNC: 0.98 MG/DL (ref 0.7–1.3)
GFR SERPL CREATININE-BSD FRML MDRD: 73 ML/MIN/{1.73_M2}
GLUCOSE SERPL-MCNC: 149 MG/DL (ref 70–105)
HDLC SERPL-MCNC: 43 MG/DL
LDLC SERPL CALC-MCNC: 54 MG/DL
NONHDLC SERPL-MCNC: 70 MG/DL
POTASSIUM SERPL-SCNC: 3.9 MMOL/L (ref 3.5–5.1)
SODIUM SERPL-SCNC: 139 MMOL/L (ref 136–145)
TRIGL SERPL-MCNC: 81 MG/DL

## 2020-09-01 PROCEDURE — 80061 LIPID PANEL: CPT | Performed by: INTERNAL MEDICINE

## 2020-09-01 PROCEDURE — 80048 BASIC METABOLIC PNL TOTAL CA: CPT | Performed by: INTERNAL MEDICINE

## 2020-09-01 PROCEDURE — 36415 COLL VENOUS BLD VENIPUNCTURE: CPT | Performed by: INTERNAL MEDICINE

## 2020-09-01 PROCEDURE — 93306 TTE W/DOPPLER COMPLETE: CPT

## 2020-09-01 PROCEDURE — 93306 TTE W/DOPPLER COMPLETE: CPT | Mod: 26 | Performed by: INTERNAL MEDICINE

## 2020-09-01 PROCEDURE — 84460 ALANINE AMINO (ALT) (SGPT): CPT | Performed by: INTERNAL MEDICINE

## 2020-09-29 ENCOUNTER — TELEPHONE (OUTPATIENT)
Dept: CARDIOLOGY | Facility: CLINIC | Age: 85
End: 2020-09-29

## 2020-09-30 ENCOUNTER — OFFICE VISIT (OUTPATIENT)
Dept: CARDIOLOGY | Facility: CLINIC | Age: 85
End: 2020-09-30
Payer: COMMERCIAL

## 2020-09-30 VITALS
HEART RATE: 73 BPM | OXYGEN SATURATION: 96 % | WEIGHT: 229.7 LBS | SYSTOLIC BLOOD PRESSURE: 133 MMHG | HEIGHT: 67 IN | DIASTOLIC BLOOD PRESSURE: 72 MMHG | BODY MASS INDEX: 36.05 KG/M2

## 2020-09-30 DIAGNOSIS — I10 ESSENTIAL HYPERTENSION: ICD-10-CM

## 2020-09-30 DIAGNOSIS — I25.10 CORONARY ARTERY DISEASE INVOLVING NATIVE CORONARY ARTERY OF NATIVE HEART WITHOUT ANGINA PECTORIS: Primary | ICD-10-CM

## 2020-09-30 DIAGNOSIS — E78.5 HYPERLIPIDEMIA WITH TARGET LDL LESS THAN 100: ICD-10-CM

## 2020-09-30 DIAGNOSIS — I67.9 CEREBRAL VASCULAR DISEASE: ICD-10-CM

## 2020-09-30 PROCEDURE — 99214 OFFICE O/P EST MOD 30 MIN: CPT | Performed by: INTERNAL MEDICINE

## 2020-09-30 RX ORDER — LISINOPRIL AND HYDROCHLOROTHIAZIDE 12.5; 2 MG/1; MG/1
1 TABLET ORAL DAILY
Qty: 90 TABLET | Refills: 3 | Status: SHIPPED | OUTPATIENT
Start: 2020-09-30 | End: 2021-11-02

## 2020-09-30 ASSESSMENT — MIFFLIN-ST. JEOR: SCORE: 1695.54

## 2020-09-30 NOTE — LETTER
9/30/2020      William Thompson MD  [address]  [facility] [id number]      RE: Ivan Payton       Dear Colleague,    I had the pleasure of seeing Ivan Payton in the [facility].    Service Date: 09/30/2020      HISTORY OF PRESENT ILLNESS:  I had the opportunity to see Mr. Ivan Payton in Cardiology Clinic today at [facility] in [place] for reevaluation of coronary artery disease, carotid vascular disease and cardiac risk factors including hypertension and dyslipidemia.        He is an 84-year-old gentleman with history of stenting of his obtuse marginal, proximal circumflex, and LAD in the past.  He was having recurrent chest pain and had an abnormal stress test in 11/2018 and I referred him back to the Cardiac Catheterization Laboratory.  He underwent stenting of his PDA and first obtuse marginal at that time.  His chest pain resolved and he has done well since then.  He has not experienced any recurrent anginal symptoms over the summer.  He does get a little winded when he is mowing his lawn.  He walks behind a self-propelled mower.  Otherwise, he does not experience shortness of breath with other usual daily activities.      He complains about nocturnal urination once again.  I think his diuretic program was reduced because of this issue in the past, but he does not notice any significant change.      He is followed by Dr. Jones in Vascular Surgery for carotid disease with previous carotid endarterectomy.  His last carotid ultrasound in June showed moderate bilateral disease with 50%-69% internal carotid stenosis.      He had an echocardiogram this year as well, which demonstrated normal left ventricular size and function with an ejection fraction of 60%-65% and no significant valvular disease.      His laboratory studies are excellent with perfect cholesterol numbers including an LDL of 54 inches and a normal basic metabolic panel and ALT.       PHYSICAL EXAMINATION:  Today, his blood pressure is 133/72, heart rate 73 and weight 229 pounds.  His lungs are clear.  Heart rhythm is regular.  He has no significant cardiac murmurs.  I do hear a very soft left carotid bruit.      IMPRESSIONS:  Mr. Geovani Payton is an 84-year-old gentleman with a long history of coronary artery disease and multiple stents placed to the LAD, circumflex, obtuse marginal and PDA.  His last stenting was in 2018 and he has done well since then without any recurrent cardiac symptoms.  He has had a previous left carotid endarterectomy as well.  He has moderate residual bilateral carotid disease.      His cholesterol numbers and blood pressure are under excellent control.  His echo looks good.  I will have him follow up with you to continue his discussion about nocturnal urination.  Perhaps he needs more Hytrin.      Otherwise, I will plan to see him back again in 1 year for reevaluation of his coronary artery disease issues and risk factors.      cc:      William Thompson MD   [facility]   [address] [place] [address]         KATHY BERNSTEIN MD, [facility]             D: 2020   T: 2020   MT: [place]      Name:     GEOVANI PAYTON   MRN:      [id number]        Account:      [id number]   :      1935           Service Date: 2020      Document: [id number]          Outpatient Encounter Medications as of 2020   Medication Sig Dispense Refill     Ascorbic Acid (VITAMIN C PO) Take 1,000 mg by mouth daily       aspirin 81 MG tablet Take by mouth daily 30 tablet      atenolol (TENORMIN) 50 MG tablet Take 1 tablet (50 mg) by mouth daily 90 tablet 4     atorvastatin (LIPITOR) 40 MG tablet Take 1 tablet (40 mg) by mouth daily 90 tablet 2     calcium carbonate (OS-SREEDHAR 500 MG [place]. [place]) 500 MG tablet Take 500 mg by mouth daily       glucosamine-chondroitin 500-400 MG CAPS Take 1 capsule by mouth daily       lisinopril-hydrochlorothiazide  (ZESTORETIC) 20-12.5 MG tablet Take 1 tablet by mouth daily 90 tablet 3     nitroGLYcerin (NITROSTAT) 0.4 MG sublingual tablet For chest pain place 1 tablet under the tongue every 5 minutes for 3 doses. If symptoms persist 5 minutes after 1st dose call 911. 25 tablet 1     nystatin (MYCOSTATIN) 487532 UNIT/GM external ointment Apply topically 2 times daily 30 g 0     Omega-3 Fatty Acids (OMEGA-3 FISH OIL PO) Take 1 g by mouth daily        omeprazole (PRILOSEC) 20 MG DR capsule TAKE 1 CAPSULE BY MOUTH EVERY DAY 90 capsule 2     terazosin (HYTRIN) 2 MG capsule Take 1 capsule (2 mg) by mouth At Bedtime 90 capsule 0     vitamin  B complex with vitamin C (VITAMIN  B COMPLEX) TABS Take 1 tablet by mouth daily       VITAMIN D, CHOLECALCIFEROL, PO Take 5,000 Units by mouth daily        [DISCONTINUED] lisinopril-hydrochlorothiazide (ZESTORETIC) 20-12.5 MG tablet Take 1 tablet by mouth daily 90 tablet 0     No facility-administered encounter medications on file as of 9/30/2020.        Again, thank you for allowing me to participate in the care of your patient.      Sincerely,    KATHY BERNSTEIN MD     [facility]     stated

## 2020-09-30 NOTE — PROGRESS NOTES
HPI and Plan:   See dictation    No orders of the defined types were placed in this encounter.      No orders of the defined types were placed in this encounter.      There are no discontinued medications.      Encounter Diagnoses   Name Primary?     Coronary artery disease involving native coronary artery of native heart without angina pectoris Yes     Essential hypertension      Hyperlipidemia with target LDL less than 100        CURRENT MEDICATIONS:  Current Outpatient Medications   Medication Sig Dispense Refill     Ascorbic Acid (VITAMIN C PO) Take 1,000 mg by mouth daily       aspirin 81 MG tablet Take by mouth daily 30 tablet      atenolol (TENORMIN) 50 MG tablet Take 1 tablet (50 mg) by mouth daily 90 tablet 4     atorvastatin (LIPITOR) 40 MG tablet Take 1 tablet (40 mg) by mouth daily 90 tablet 2     calcium carbonate (OS-SREEDHAR 500 MG [place]. [place]) 500 MG tablet Take 500 mg by mouth daily       glucosamine-chondroitin 500-400 MG CAPS Take 1 capsule by mouth daily       lisinopril-hydrochlorothiazide (ZESTORETIC) 20-12.5 MG tablet Take 1 tablet by mouth daily 90 tablet 0     nitroGLYcerin (NITROSTAT) 0.4 MG sublingual tablet For chest pain place 1 tablet under the tongue every 5 minutes for 3 doses. If symptoms persist 5 minutes after 1st dose call 911. 25 tablet 1     nystatin (MYCOSTATIN) 880654 UNIT/GM external ointment Apply topically 2 times daily 30 g 0     Omega-3 Fatty Acids (OMEGA-3 FISH OIL PO) Take 1 g by mouth daily        omeprazole (PRILOSEC) 20 MG DR capsule TAKE 1 CAPSULE BY MOUTH EVERY DAY 90 capsule 2     terazosin (HYTRIN) 2 MG capsule Take 1 capsule (2 mg) by mouth At Bedtime 90 capsule 0     vitamin  B complex with vitamin C (VITAMIN  B COMPLEX) TABS Take 1 tablet by mouth daily       VITAMIN D, CHOLECALCIFEROL, PO Take 5,000 Units by mouth daily          ALLERGIES     Allergies   Allergen Reactions     Contrast Dye Hives       PAST MEDICAL HISTORY:  Past Medical History:   Diagnosis Date      Arthritis      Carotid stenosis 3/26/2014    S/p L CEA; R ICA ok;          See CUS 12/15 and Dr Jones's consult; continue plavix      Cerebral vascular disease 3/2014    multiple intracranial stenoses - lt post communic, lt post cerebral, rt middle cerebral, bilat porx M2 segments     Claustrophobia     Need sedation for MRI scans     Coronary artery disease 2014    Cath 9/2014: PTCA and KIKE to mLAD, KIKE to prox circumflex, PTCA and DESx2 to OM2; Cath 12/18/18: KIKE to PDA and OM1, patent stents to LAD and Cfx     CVA (cerebral infarction) 3/2014    2 small acute lesions noted left parietal/left posterior frontal region. Old lacunar infarct left caudate nucleus     Dyspnea 7/21/2014     Enlarged prostate      Essential hypertension      Problem list name updated by automated process. Provider to review     GERD (gastroesophageal reflux disease)      Hiatal hernia      Hydrocele     Right hydrocelectomy 2/2012     Hyperlipidemia with target LDL less than 100 3/14/2014     Diagnosis updated by automated process. Provider to review and confirm.     RBBB (right bundle branch block) 3/2014     Shortness of breath      Stented coronary artery      Uncomplicated asthma        PAST SURGICAL HISTORY:  Past Surgical History:   Procedure Laterality Date     CV HEART CATHETERIZATION WITH POSSIBLE INTERVENTION N/A 12/18/2018    Procedure: Coronary Angiography;  Surgeon: Geovanni Tanner MD;  Location:  HEART CARDIAC CATH LAB     ENDARTERECTOMY CAROTID  3/26/2014    Procedure: ENDARTERECTOMY CAROTID;  LEFT CAROTID ENDARTERECTOMY WITH EEG;  Surgeon: Yobani Jones MD;  Location:  OR      REMOVAL OF TONSILS,<11 Y/O      Tonsils <13y.o.     HEART CATH, ANGIOPLASTY  9/9/14    Stenting of LAD,Prox LCx, and 2 stents to OM2     HERNIORRHAPHY INGUINAL      Right     HERNIORRHAPHY INGUINAL  2/10/2012    Procedure:HERNIORRHAPHY INGUINAL; LEFT OPEN INGUINAL HERNIA REPAIR WITH MESH, RIGHT HYDROCELECTOMY; Surgeon:MARIANNE  JULI ODOM; Location:[facility]     HYDROCELECTOMY INGUINAL  2/10/2012    Procedure:HYDROCELECTOMY INGUINAL; Surgeon:JULI LOPES; Location:[facility]     HYDROCELECTOMY SCROTAL Right 2016    Procedure: HYDROCELECTOMY SCROTAL;  Surgeon: Yobani Stevens MD;  Location: [facility]     LAPAROSCOPIC HERNIORRHAPHY INGUINAL BILATERAL Bilateral 2016    Procedure: LAPAROSCOPIC HERNIORRHAPHY INGUINAL BILATERAL;  Surgeon: Chandler Bowen MD;  Location:  OR     MR BRAIN AND ORBITS  3/2014    6 mm area of restricted diffusion subcortical white matter left parietal lobe/questionable area of restricted diffusion left posterior frontal region - c/w recent small infarcts. Small chronic lacunar infarct left caudate nucleus     SURGICAL HISTORY OF -       tonail removal       FAMILY HISTORY:  Family History   Problem Relation Age of Onset     C.A.D. Brother         older brother, CABG about age 56     Hypertension Brother      Respiratory Father         lung disease - farmer,      Hypertension Mother      Hypertension Sister      Hypertension Brother      Cerebrovascular Disease Son         2018       SOCIAL HISTORY:  Social History     Socioeconomic History     Marital status:      Spouse name: madhavi     Number of children: 2     Years of education: None     Highest education level: None   Occupational History     Occupation: part time, building maintenance     Employer: RETIRED   Social Needs     Financial resource strain: None     Food insecurity     Worry: None     Inability: None     Transportation needs     Medical: None     Non-medical: None   Tobacco Use     Smoking status: Former Smoker     Packs/day: 0.50     Years: 20.00     Pack years: 10.00     Types: Cigarettes     Start date:      Last attempt to quit: 1968     Years since quittin.3     Smokeless tobacco: Former User   Substance and Sexual Activity     Alcohol use: No     Alcohol/week: 0.0 standard drinks      "Drug use: No     Sexual activity: Not Currently     Comment:    Lifestyle     Physical activity     Days per week: None     Minutes per session: None     Stress: None   Relationships     Social connections     Talks on phone: None     Gets together: None     Attends Mosque service: None     Active member of club or organization: None     Attends meetings of clubs or organizations: None     Relationship status: None     Intimate partner violence     Fear of current or ex partner: None     Emotionally abused: None     Physically abused: None     Forced sexual activity: None   Other Topics Concern      Service Not Asked     Blood Transfusions No     Caffeine Concern Yes     Comment: 1-2 cups per day     Occupational Exposure No     Hobby Hazards No     Sleep Concern No     Stress Concern No     Weight Concern No     Special Diet No     Back Care No     Exercise Yes     Comment: gym 4 days week, treadmill, 1.5 miles,  weights     Bike Helmet No     Seat Belt Yes     Self-Exams No     Parent/sibling w/ CABG, MI or angioplasty before 65F 55M? Yes     Comment: brothers - 1 had CABG 1 had MI pt unsure of age   Social History Narrative    3 GC; 2 children.                        Review of Systems:  Skin:  Negative       Eyes:  Positive for glasses    ENT:  Negative      Respiratory:  Negative       Cardiovascular:    Positive for;edema    Gastroenterology: Positive for heartburn treated  Genitourinary:  Positive for urinary frequency;nocturia(disruptive to sleep)    Musculoskeletal:  Positive for arthritis    Neurologic:  Negative      Psychiatric:  Negative      Heme/Lymph/Imm:  Negative      Endocrine:  Negative (borderline)      Physical Exam:  Vitals: /72   Pulse 73   Ht 1.702 m (5' 7\")   Wt 104.2 kg (229 lb 11.2 oz)   SpO2 96%   BMI 35.98 kg/m      Constitutional:  cooperative, alert and oriented, well developed, well nourished, in no acute distress        Skin:  warm and dry to the touch, " no apparent skin lesions or masses noted          Head:  normocephalic, no masses or lesions        Eyes:  pupils equal and round, conjunctivae and lids unremarkable, sclera white, no xanthalasma, EOMS intact, no nystagmus        Lymph:      ENT:  no pallor or cyanosis, dentition good        Neck:    left carotid bruit      Respiratory:  normal breath sounds, clear to auscultation, normal A-P diameter, normal symmetry, normal respiratory excursion, no use of accessory muscles         Cardiac: regular rhythm, normal S1/S2, no S3 or S4, apical impulse not displaced, no murmurs, gallops or rubs                    (hematoma 2 x 2 cm)                                    GI:  abdomen soft;BS normoactive        Extremities and Muscular Skeletal:  no deformities, clubbing, cyanosis, erythema observed   bilateral LE edema;trace          Neurological:  no gross motor deficits;affect appropriate        Psych:  oriented to time, person and place        CC  No referring provider defined for this encounter.

## 2020-09-30 NOTE — LETTER
9/30/2020    William Thompson MD  [address]  [facility] [place] [address]    RE: Ivan Payton       Dear Colleague,    I had the pleasure of seeing Ivan Payton in the [facility].    HPI and Plan:   See dictation    No orders of the defined types were placed in this encounter.      No orders of the defined types were placed in this encounter.      There are no discontinued medications.      Encounter Diagnoses   Name Primary?     Coronary artery disease involving native coronary artery of native heart without angina pectoris Yes     Essential hypertension      Hyperlipidemia with target LDL less than 100        CURRENT MEDICATIONS:  Current Outpatient Medications   Medication Sig Dispense Refill     Ascorbic Acid (VITAMIN C PO) Take 1,000 mg by mouth daily       aspirin 81 MG tablet Take by mouth daily 30 tablet      atenolol (TENORMIN) 50 MG tablet Take 1 tablet (50 mg) by mouth daily 90 tablet 4     atorvastatin (LIPITOR) 40 MG tablet Take 1 tablet (40 mg) by mouth daily 90 tablet 2     calcium carbonate (OS-SREEDHAR 500 MG [place]. CA) 500 MG tablet Take 500 mg by mouth daily       glucosamine-chondroitin 500-400 MG CAPS Take 1 capsule by mouth daily       lisinopril-hydrochlorothiazide (ZESTORETIC) 20-12.5 MG tablet Take 1 tablet by mouth daily 90 tablet 0     nitroGLYcerin (NITROSTAT) 0.4 MG sublingual tablet For chest pain place 1 tablet under the tongue every 5 minutes for 3 doses. If symptoms persist 5 minutes after 1st dose call 911. 25 tablet 1     nystatin (MYCOSTATIN) 602265 UNIT/GM external ointment Apply topically 2 times daily 30 g 0     Omega-3 Fatty Acids (OMEGA-3 FISH OIL PO) Take 1 g by mouth daily        omeprazole (PRILOSEC) 20 MG DR capsule TAKE 1 CAPSULE BY MOUTH EVERY DAY 90 capsule 2     terazosin (HYTRIN) 2 MG capsule Take 1 capsule (2 mg) by mouth At Bedtime 90 capsule 0     vitamin  B complex with vitamin C (VITAMIN  B COMPLEX) TABS Take 1 tablet by mouth  daily       VITAMIN D, CHOLECALCIFEROL, PO Take 5,000 Units by mouth daily          ALLERGIES     Allergies   Allergen Reactions     Contrast Dye Hives       PAST MEDICAL HISTORY:  Past Medical History:   Diagnosis Date     Arthritis      Carotid stenosis 3/26/2014    S/p L CEA; R ICA ok;          See CUS 12/15 and Dr Jones's consult; continue plavix      Cerebral vascular disease 3/2014    multiple intracranial stenoses - lt post communic, lt post cerebral, rt middle cerebral, bilat porx M2 segments     Claustrophobia     Need sedation for MRI scans     Coronary artery disease 2014    Cath 9/2014: PTCA and KIKE to mLAD, KIKE to prox circumflex, PTCA and DESx2 to OM2; Cath 12/18/18: KIKE to PDA and OM1, patent stents to LAD and Cfx     CVA (cerebral infarction) 3/2014    2 small acute lesions noted left parietal/left posterior frontal region. Old lacunar infarct left caudate nucleus     Dyspnea 7/21/2014     Enlarged prostate      Essential hypertension      Problem list name updated by automated process. Provider to review     GERD (gastroesophageal reflux disease)      Hiatal hernia      Hydrocele     Right hydrocelectomy 2/2012     Hyperlipidemia with target LDL less than 100 3/14/2014     Diagnosis updated by automated process. Provider to review and confirm.     RBBB (right bundle branch block) 3/2014     Shortness of breath      Stented coronary artery      Uncomplicated asthma        PAST SURGICAL HISTORY:  Past Surgical History:   Procedure Laterality Date     CV HEART CATHETERIZATION WITH POSSIBLE INTERVENTION N/A 12/18/2018    Procedure: Coronary Angiography;  Surgeon: Geovanni Tanner MD;  Location:  HEART CARDIAC CATH LAB     ENDARTERECTOMY CAROTID  3/26/2014    Procedure: ENDARTERECTOMY CAROTID;  LEFT CAROTID ENDARTERECTOMY WITH EEG;  Surgeon: Yobani Jones MD;  Location:  OR      REMOVAL OF TONSILS,<12 Y/O      Tonsils <13y.o.     HEART CATH, ANGIOPLASTY  9/9/14    Stenting of LAD,Prox  LCx, and 2 stents to OM2     HERNIORRHAPHY INGUINAL      Right     HERNIORRHAPHY INGUINAL  2/10/2012    Procedure:HERNIORRHAPHY INGUINAL; LEFT OPEN INGUINAL HERNIA REPAIR WITH MESH, RIGHT HYDROCELECTOMY; Surgeon:JULI LOPES; Location:[facility]     HYDROCELECTOMY INGUINAL  2/10/2012    Procedure:HYDROCELECTOMY INGUINAL; Surgeon:JULI LOPES; Location:[facility]     HYDROCELECTOMY SCROTAL Right 1/29/2016    Procedure: HYDROCELECTOMY SCROTAL;  Surgeon: Yobani Stevens MD;  Location: [facility]     LAPAROSCOPIC HERNIORRHAPHY INGUINAL BILATERAL Bilateral 5/24/2016    Procedure: LAPAROSCOPIC HERNIORRHAPHY INGUINAL BILATERAL;  Surgeon: Chandler Bowen MD;  Location:  OR     MR BRAIN AND ORBITS  3/2014    6 mm area of restricted diffusion subcortical white matter left parietal lobe/questionable area of restricted diffusion left posterior frontal region - c/w recent small infarcts. Small chronic lacunar infarct left caudate nucleus     SURGICAL HISTORY OF -       tonail removal       FAMILY HISTORY:  Family History   Problem Relation Age of Onset     C.A.D. Brother         older brother, CABG about age 56     Hypertension Brother      Respiratory Father         lung disease - farmer,      Hypertension Mother      Hypertension Sister      Hypertension Brother      Cerebrovascular Disease Son         12/26/2018       SOCIAL HISTORY:  Social History     Socioeconomic History     Marital status:      Spouse name: madhavi     Number of children: 2     Years of education: None     Highest education level: None   Occupational History     Occupation: part time, building maintenance     Employer: RETIRED   Social Needs     Financial resource strain: None     Food insecurity     Worry: None     Inability: None     Transportation needs     Medical: None     Non-medical: None   Tobacco Use     Smoking status: Former Smoker     Packs/day: 0.50     Years: 20.00     Pack years: 10.00     Types: Cigarettes      Start date:      Last attempt to quit: 1968     Years since quittin.3     Smokeless tobacco: Former User   Substance and Sexual Activity     Alcohol use: No     Alcohol/week: 0.0 standard drinks     Drug use: No     Sexual activity: Not Currently     Comment:    Lifestyle     Physical activity     Days per week: None     Minutes per session: None     Stress: None   Relationships     Social connections     Talks on phone: None     Gets together: None     Attends Sikh service: None     Active member of club or organization: None     Attends meetings of clubs or organizations: None     Relationship status: None     Intimate partner violence     Fear of current or ex partner: None     Emotionally abused: None     Physically abused: None     Forced sexual activity: None   Other Topics Concern      Service Not Asked     Blood Transfusions No     Caffeine Concern Yes     Comment: 1-2 cups per day     Occupational Exposure No     Hobby Hazards No     Sleep Concern No     Stress Concern No     Weight Concern No     Special Diet No     Back Care No     Exercise Yes     Comment: gym 4 days week, treadmill, 1.5 miles,  weights     Bike Helmet No     Seat Belt Yes     Self-Exams No     Parent/sibling w/ CABG, MI or angioplasty before 65F 55M? Yes     Comment: brothers - 1 had CABG 1 had MI pt unsure of age   Social History Narrative    3 GC; 2 children.                        Review of Systems:  Skin:  Negative       Eyes:  Positive for glasses    ENT:  Negative      Respiratory:  Negative       Cardiovascular:    Positive for;edema    Gastroenterology: Positive for heartburn treated  Genitourinary:  Positive for urinary frequency;nocturia(disruptive to sleep)    Musculoskeletal:  Positive for arthritis    Neurologic:  Negative      Psychiatric:  Negative      Heme/Lymph/Imm:  Negative      Endocrine:  Negative (borderline)      Physical Exam:  Vitals: /72   Pulse 73   Ht 1.702 m (5'  "7\")   Wt 104.2 kg (229 lb 11.2 oz)   SpO2 96%   BMI 35.98 kg/m      Constitutional:  cooperative, alert and oriented, well developed, well nourished, in no acute distress        Skin:  warm and dry to the touch, no apparent skin lesions or masses noted          Head:  normocephalic, no masses or lesions        Eyes:  pupils equal and round, conjunctivae and lids unremarkable, sclera white, no xanthalasma, EOMS intact, no nystagmus        Lymph:      ENT:  no pallor or cyanosis, dentition good        Neck:    left carotid bruit      Respiratory:  normal breath sounds, clear to auscultation, normal A-P diameter, normal symmetry, normal respiratory excursion, no use of accessory muscles         Cardiac: regular rhythm, normal S1/S2, no S3 or S4, apical impulse not displaced, no murmurs, gallops or rubs                    (hematoma 2 x 2 cm)                                    GI:  abdomen soft;BS normoactive        Extremities and Muscular Skeletal:  no deformities, clubbing, cyanosis, erythema observed   bilateral LE edema;trace          Neurological:  no gross motor deficits;affect appropriate        Psych:  oriented to time, person and place        CC  No referring provider defined for this encounter.                Thank you for allowing me to participate in the care of your patient.      Sincerely,     KATHY BERNSTEIN MD     [facility]    cc:   No referring provider defined for this encounter.        "

## 2020-09-30 NOTE — PROGRESS NOTES
Service Date: 09/30/2020      HISTORY OF PRESENT ILLNESS:  I had the opportunity to see Mr. Ivan Payton in Cardiology Clinic today at [facility] in [place] for reevaluation of coronary artery disease, carotid vascular disease and cardiac risk factors including hypertension and dyslipidemia.        He is an 84-year-old gentleman with history of stenting of his obtuse marginal, proximal circumflex, and LAD in the past.  He was having recurrent chest pain and had an abnormal stress test in 11/2018 and I referred him back to the Cardiac Catheterization Laboratory.  He underwent stenting of his PDA and first obtuse marginal at that time.  His chest pain resolved and he has done well since then.  He has not experienced any recurrent anginal symptoms over the summer.  He does get a little winded when he is mowing his lawn.  He walks behind a self-propelled mower.  Otherwise, he does not experience shortness of breath with other usual daily activities.      He complains about nocturnal urination once again.  I think his diuretic program was reduced because of this issue in the past, but he does not notice any significant change.      He is followed by Dr. Jones in Vascular Surgery for carotid disease with previous carotid endarterectomy.  His last carotid ultrasound in June showed moderate bilateral disease with 50%-69% internal carotid stenosis.      He had an echocardiogram this year as well, which demonstrated normal left ventricular size and function with an ejection fraction of 60%-65% and no significant valvular disease.      His laboratory studies are excellent with perfect cholesterol numbers including an LDL of 54 inches and a normal basic metabolic panel and ALT.      PHYSICAL EXAMINATION:  Today, his blood pressure is 133/72, heart rate 73 and weight 229 pounds.  His lungs are clear.  Heart rhythm is regular.  He has no significant cardiac murmurs.  I do hear a very soft left carotid bruit.       IMPRESSIONS:  Mr. Geovani Payton is an 84-year-old gentleman with a long history of coronary artery disease and multiple stents placed to the LAD, circumflex, obtuse marginal and PDA.  His last stenting was in 2018 and he has done well since then without any recurrent cardiac symptoms.  He has had a previous left carotid endarterectomy as well.  He has moderate residual bilateral carotid disease.      His cholesterol numbers and blood pressure are under excellent control.  His echo looks good.  I will have him follow up with you to continue his discussion about nocturnal urination.  Perhaps he needs more Hytrin.      Otherwise, I will plan to see him back again in 1 year for reevaluation of his coronary artery disease issues and risk factors.      cc:      William Thompson MD   [facility]   [address] [place] [address]         KATHY BERNSTEIN MD, [facility]             D: 2020   T: 2020   MT: [place]      Name:     GEOVANI PAYTON   MRN:      [id number]        Account:      [id number]   :      1935           Service Date: 2020      Document: [id number]

## 2020-11-11 DIAGNOSIS — I10 ESSENTIAL HYPERTENSION: ICD-10-CM

## 2020-11-11 RX ORDER — TERAZOSIN 2 MG/1
2 CAPSULE ORAL AT BEDTIME
Qty: 90 CAPSULE | Refills: 3 | Status: SHIPPED | OUTPATIENT
Start: 2020-11-11 | End: 2021-11-02

## 2021-01-05 DIAGNOSIS — I10 ESSENTIAL HYPERTENSION: ICD-10-CM

## 2021-01-05 RX ORDER — ATENOLOL 50 MG/1
TABLET ORAL
Qty: 90 TABLET | Refills: 0 | Status: SHIPPED | OUTPATIENT
Start: 2021-01-05 | End: 2021-01-06

## 2021-01-06 RX ORDER — ATENOLOL 50 MG/1
TABLET ORAL
Qty: 90 TABLET | Refills: 1 | Status: SHIPPED | OUTPATIENT
Start: 2021-01-06 | End: 2021-10-15

## 2021-02-02 DIAGNOSIS — I25.10 CORONARY ARTERY DISEASE INVOLVING NATIVE CORONARY ARTERY OF NATIVE HEART, ANGINA PRESENCE UNSPECIFIED: ICD-10-CM

## 2021-02-02 RX ORDER — ATORVASTATIN CALCIUM 40 MG/1
40 TABLET, FILM COATED ORAL DAILY
Qty: 90 TABLET | Refills: 2 | Status: SHIPPED | OUTPATIENT
Start: 2021-02-02 | End: 2022-09-19

## 2021-02-13 ENCOUNTER — HEALTH MAINTENANCE LETTER (OUTPATIENT)
Age: 86
End: 2021-02-13

## 2021-02-22 ENCOUNTER — IMMUNIZATION (OUTPATIENT)
Dept: NURSING | Facility: CLINIC | Age: 86
End: 2021-02-22
Payer: COMMERCIAL

## 2021-02-22 PROCEDURE — 0001A PR COVID VAC PFIZER DIL RECON 30 MCG/0.3 ML IM: CPT

## 2021-02-22 PROCEDURE — 91300 PR COVID VAC PFIZER DIL RECON 30 MCG/0.3 ML IM: CPT

## 2021-03-15 ENCOUNTER — IMMUNIZATION (OUTPATIENT)
Dept: NURSING | Facility: CLINIC | Age: 86
End: 2021-03-15
Attending: INTERNAL MEDICINE
Payer: COMMERCIAL

## 2021-03-15 PROCEDURE — 91300 PR COVID VAC PFIZER DIL RECON 30 MCG/0.3 ML IM: CPT

## 2021-03-15 PROCEDURE — 0002A PR COVID VAC PFIZER DIL RECON 30 MCG/0.3 ML IM: CPT

## 2021-03-16 NOTE — PATIENT INSTRUCTIONS
I will let you know your lab results.                 In the cool weather consider wearing some knee high,mild compression socks.             You can get these over the counter.

## 2021-03-17 ENCOUNTER — OFFICE VISIT (OUTPATIENT)
Dept: INTERNAL MEDICINE | Facility: CLINIC | Age: 86
End: 2021-03-17
Payer: COMMERCIAL

## 2021-03-17 VITALS
OXYGEN SATURATION: 98 % | BODY MASS INDEX: 36.81 KG/M2 | SYSTOLIC BLOOD PRESSURE: 114 MMHG | WEIGHT: 235 LBS | HEART RATE: 63 BPM | DIASTOLIC BLOOD PRESSURE: 66 MMHG | TEMPERATURE: 97.5 F

## 2021-03-17 DIAGNOSIS — I10 ESSENTIAL HYPERTENSION: ICD-10-CM

## 2021-03-17 DIAGNOSIS — E66.01 CLASS 2 SEVERE OBESITY DUE TO EXCESS CALORIES WITH SERIOUS COMORBIDITY AND BODY MASS INDEX (BMI) OF 36.0 TO 36.9 IN ADULT (H): ICD-10-CM

## 2021-03-17 DIAGNOSIS — E66.812 CLASS 2 SEVERE OBESITY DUE TO EXCESS CALORIES WITH SERIOUS COMORBIDITY AND BODY MASS INDEX (BMI) OF 36.0 TO 36.9 IN ADULT (H): ICD-10-CM

## 2021-03-17 DIAGNOSIS — K21.9 GASTROESOPHAGEAL REFLUX DISEASE WITHOUT ESOPHAGITIS: ICD-10-CM

## 2021-03-17 DIAGNOSIS — R73.01 IFG (IMPAIRED FASTING GLUCOSE): Primary | ICD-10-CM

## 2021-03-17 LAB
ALBUMIN SERPL-MCNC: 3.2 G/DL (ref 3.4–5)
ALP SERPL-CCNC: 62 U/L (ref 40–150)
ALT SERPL W P-5'-P-CCNC: 40 U/L (ref 0–70)
ANION GAP SERPL CALCULATED.3IONS-SCNC: <1 MMOL/L (ref 3–14)
AST SERPL W P-5'-P-CCNC: 12 U/L (ref 0–45)
BILIRUB SERPL-MCNC: 0.3 MG/DL (ref 0.2–1.3)
BUN SERPL-MCNC: 21 MG/DL (ref 7–30)
CALCIUM SERPL-MCNC: 8.6 MG/DL (ref 8.5–10.1)
CHLORIDE SERPL-SCNC: 109 MMOL/L (ref 94–109)
CO2 SERPL-SCNC: 29 MMOL/L (ref 20–32)
CREAT SERPL-MCNC: 0.8 MG/DL (ref 0.66–1.25)
GFR SERPL CREATININE-BSD FRML MDRD: 82 ML/MIN/{1.73_M2}
GLUCOSE SERPL-MCNC: 152 MG/DL (ref 70–99)
HBA1C MFR BLD: 6.2 % (ref 0–5.6)
POTASSIUM SERPL-SCNC: 4.4 MMOL/L (ref 3.4–5.3)
PROT SERPL-MCNC: 6.2 G/DL (ref 6.8–8.8)
SODIUM SERPL-SCNC: 138 MMOL/L (ref 133–144)

## 2021-03-17 PROCEDURE — 80053 COMPREHEN METABOLIC PANEL: CPT | Performed by: INTERNAL MEDICINE

## 2021-03-17 PROCEDURE — 99214 OFFICE O/P EST MOD 30 MIN: CPT | Performed by: INTERNAL MEDICINE

## 2021-03-17 PROCEDURE — 83036 HEMOGLOBIN GLYCOSYLATED A1C: CPT | Performed by: INTERNAL MEDICINE

## 2021-03-17 PROCEDURE — 36415 COLL VENOUS BLD VENIPUNCTURE: CPT | Performed by: INTERNAL MEDICINE

## 2021-03-17 NOTE — LETTER
March 17, 2021      Ivan Payton  [address] [place] [address]        Dear ,    We are writing to inform you of your test results.        Your blood sugar control is stable.       The A1C of 6.2 correlates to an average blood sugar of approximately 126.           Continue to work on restricting carbohydrates ( starches, sweets, etc).            Your other lab results are normal,including the liver,kidney,and potassium levels.      Results for orders placed or performed in visit on 03/17/21   Comprehensive metabolic panel (BMP + Alb, Alk Phos, ALT, AST, Total. Bili, TP)     Status: Abnormal   Result Value Ref Range    Sodium 138 133 - 144 mmol/L    Potassium 4.4 3.4 - 5.3 mmol/L    Chloride 109 94 - 109 mmol/L    Carbon Dioxide 29 20 - 32 mmol/L    Anion Gap <1 (L) 3 - 14 mmol/L    Glucose 152 (H) 70 - 99 mg/dL    Urea Nitrogen 21 7 - 30 mg/dL    Creatinine 0.80 0.66 - 1.25 mg/dL    GFR Estimate 82 >60 mL/min/[1.73_m2]    GFR Estimate If Black >90 >60 mL/min/[1.73_m2]    Calcium 8.6 8.5 - 10.1 mg/dL    Bilirubin Total 0.3 0.2 - 1.3 mg/dL    Albumin 3.2 (L) 3.4 - 5.0 g/dL    Protein Total 6.2 (L) 6.8 - 8.8 g/dL    Alkaline Phosphatase 62 40 - 150 U/L    ALT 40 0 - 70 U/L    AST 12 0 - 45 U/L   Hemoglobin A1c     Status: Abnormal   Result Value Ref Range    Hemoglobin A1C 6.2 (H) 0 - 5.6 %         If you have any questions or concerns, please call the clinic at the number listed above.       Sincerely,    William Thompson MD

## 2021-03-17 NOTE — PROGRESS NOTES
"    Assessment & Plan     IFG (impaired fasting glucose)  Due for labs.   - Comprehensive metabolic panel (BMP + Alb, Alk Phos, ALT, AST, Total. Bili, TP)  - Hemoglobin A1c    Essential hypertension  Good control. meds refilled.   - Comprehensive metabolic panel (BMP + Alb, Alk Phos, ALT, AST, Total. Bili, TP)    Gastroesophageal reflux disease without esophagitis  Continue PPI.      B12 level was good last year.      Class 2 severe obesity due to excess calories with serious comorbidity and body mass index (BMI) of 36.0 to 36.9 in adult (H)  He denies having BERKLEY.  He is at risk.   BERKLEY can lead to nocturia.                      BMI:   Estimated body mass index is 36.81 kg/m  as calculated from the following:    Height as of 9/30/20: 1.702 m (5' 7\").    Weight as of this encounter: 106.6 kg (235 lb).   Weight management plan: Discussed healthy diet and exercise guidelines    Patient Instructions               I will let you know your lab results.                 In the cool weather consider wearing some knee high,mild compression socks.             You can get these over the counter.               Return in about 6 months (around 9/17/2021) for follow up of several issues.    William Thompson MD  [facility]    Karen Love is a 85 year old who presents for the following health issues     HPI     Hypertension Follow-up      Do you check your blood pressure regularly outside of the clinic? No     Are you following a low salt diet? No    Are your blood pressures ever more than 140 on the top number (systolic) OR more   than 90 on the bottom number (diastolic), for example 140/90? No      How many servings of fruits and vegetables do you eat daily?  0-1    On average, how many sweetened beverages do you drink each day (Examples: soda, juice, sweet tea, etc.  Do NOT count diet or artificially sweetened beverages)?   1    How many days per week do you exercise enough to make your heart " beat faster? 3 or less    How many minutes a day do you exercise enough to make your heart beat faster? 9 or less    How many days per week do you miss taking your medication? 0    He saw Cardiology in 9/20.           Stable.     Lipids were good.          He reports no new sx.         Has nocturia times 2; yet he has good flow. Takes terazosin hs.                              Ongoing PPI Rx; no dysphagia.                                        Chronic leg edema; improves overnight.    No excess salt intake.       Review of Systems   CV: POSITIVE for lower extremity edema and NEGATIVE for chest pain/chest pressure and palpitations  GI: NEGATIVE for dysphagia  : negative for dysuria, hematuria, decreased urinary stream      Current Outpatient Medications   Medication Sig Dispense Refill     Ascorbic Acid (VITAMIN C PO) Take 1,000 mg by mouth daily       aspirin 81 MG tablet Take by mouth daily 30 tablet      atenolol (TENORMIN) 50 MG tablet TAKE 1 TABLET BY MOUTH EVERY DAY 90 tablet 1     atorvastatin (LIPITOR) 40 MG tablet Take 1 tablet (40 mg) by mouth daily 90 tablet 2     calcium carbonate (OS-SREEDHAR 500 MG [place]. [place]) 500 MG tablet Take 500 mg by mouth daily       glucosamine-chondroitin 500-400 MG CAPS Take 1 capsule by mouth daily       lisinopril-hydrochlorothiazide (ZESTORETIC) 20-12.5 MG tablet Take 1 tablet by mouth daily 90 tablet 3     nitroGLYcerin (NITROSTAT) 0.4 MG sublingual tablet For chest pain place 1 tablet under the tongue every 5 minutes for 3 doses. If symptoms persist 5 minutes after 1st dose call 911. 25 tablet 1     nystatin (MYCOSTATIN) 840729 UNIT/GM external ointment Apply topically 2 times daily 30 g 0     Omega-3 Fatty Acids (OMEGA-3 FISH OIL PO) Take 1 g by mouth daily        omeprazole (PRILOSEC) 20 MG DR capsule TAKE 1 CAPSULE BY MOUTH EVERY DAY 90 capsule 2     terazosin (HYTRIN) 2 MG capsule Take 1 capsule (2 mg) by mouth At Bedtime 90 capsule 3     vitamin  B complex with vitamin C  (VITAMIN  B COMPLEX) TABS Take 1 tablet by mouth daily       VITAMIN D, CHOLECALCIFEROL, PO Take 5,000 Units by mouth daily        Wt Readings from Last 4 Encounters:   03/17/21 106.6 kg (235 lb)   09/30/20 104.2 kg (229 lb 11.2 oz)   07/02/20 104.3 kg (230 lb)   06/30/20 104.3 kg (230 lb)       Objective    /66   Pulse 63   Temp 97.5  F (36.4  C) (Oral)   Wt 106.6 kg (235 lb)   SpO2 98%   BMI 36.81 kg/m    Body mass index is 36.81 kg/m .  Physical Exam   GENERAL APPEARANCE: alert, no distress and over weight  RESP: no rales or rhonchi  CV: regular rates and rhythm, normal S1 S2, no S3 or S4, no murmur, click or rub and pitting B/L LE edema to 2+      Results for orders placed or performed in visit on 03/17/21   Comprehensive metabolic panel (BMP + Alb, Alk Phos, ALT, AST, Total. Bili, TP)     Status: Abnormal   Result Value Ref Range    Sodium 138 133 - 144 mmol/L    Potassium 4.4 3.4 - 5.3 mmol/L    Chloride 109 94 - 109 mmol/L    Carbon Dioxide 29 20 - 32 mmol/L    Anion Gap <1 (L) 3 - 14 mmol/L    Glucose 152 (H) 70 - 99 mg/dL    Urea Nitrogen 21 7 - 30 mg/dL    Creatinine 0.80 0.66 - 1.25 mg/dL    GFR Estimate 82 >60 mL/min/[1.73_m2]    GFR Estimate If Black >90 >60 mL/min/[1.73_m2]    Calcium 8.6 8.5 - 10.1 mg/dL    Bilirubin Total 0.3 0.2 - 1.3 mg/dL    Albumin 3.2 (L) 3.4 - 5.0 g/dL    Protein Total 6.2 (L) 6.8 - 8.8 g/dL    Alkaline Phosphatase 62 40 - 150 U/L    ALT 40 0 - 70 U/L    AST 12 0 - 45 U/L   Hemoglobin A1c     Status: Abnormal   Result Value Ref Range    Hemoglobin A1C 6.2 (H) 0 - 5.6 %     Letter sent.                     Your blood sugar control is stable.       The A1C of 6.2 correlates to an average blood sugar of approximately 126.           Continue to work on restricting carbohydrates ( starches, sweets, etc).            Your other lab results are normal,including the liver,kidney,and potassium levels.

## 2021-07-06 NOTE — PATIENT INSTRUCTIONS
Call  [phone] for an ENT appointment regarding your left neck swelling, and to evaluate the lesion on your left ear.                 You are planning to go to [facility] regarding your left knee.                                                                                            FYI: new [facility] date of 12/31/21.       This date is definite assuming no unusual circumstance.

## 2021-07-07 ENCOUNTER — OFFICE VISIT (OUTPATIENT)
Dept: INTERNAL MEDICINE | Facility: CLINIC | Age: 86
End: 2021-07-07
Payer: COMMERCIAL

## 2021-07-07 VITALS
RESPIRATION RATE: 16 BRPM | SYSTOLIC BLOOD PRESSURE: 134 MMHG | OXYGEN SATURATION: 96 % | BODY MASS INDEX: 34.77 KG/M2 | TEMPERATURE: 97.7 F | WEIGHT: 222 LBS | DIASTOLIC BLOOD PRESSURE: 80 MMHG | HEART RATE: 57 BPM

## 2021-07-07 DIAGNOSIS — L98.9 SKIN LESION: ICD-10-CM

## 2021-07-07 DIAGNOSIS — M17.12 ARTHRITIS OF LEFT KNEE: ICD-10-CM

## 2021-07-07 DIAGNOSIS — R22.1 MASS OF LEFT SIDE OF NECK: Primary | ICD-10-CM

## 2021-07-07 PROCEDURE — 99213 OFFICE O/P EST LOW 20 MIN: CPT | Performed by: INTERNAL MEDICINE

## 2021-07-07 NOTE — PROGRESS NOTES
Assessment & Plan     Mass of left side of neck  Fairly abrupt onset suggest this is possibly poorly draining left submandibular gland.  ENT consult advised.  - OTOLARYNGOLOGY REFERRAL    Arthritis of left knee  See orthopedics.    Skin lesion  Probably does not need a biopsy but will have ENT check this also.  - OTOLARYNGOLOGY REFERRAL               Patient Instructions                                        Call  [phone] for an ENT appointment regarding your left neck swelling, and to evaluate the lesion on your left ear.                 You are planning to go to [facility] regarding your left knee.                                                                                            FYI: new [facility] date of 12/31/21.       This date is definite assuming no unusual circumstance.         Return in about 4 months (around 11/7/2021) for yearly wellness visit.    William Thompson MD  [place] [address]  Ivan is a 85 year old who presents for the following health issues     HPI     Chief Complaint   Patient presents with     Mass     Patient c/o lump under chin on left side     Ear Problem     Patient c/o left ear soreness      Knee Pain     Patient c/o Left knee discomfort                 This man is noted fairly abrupt development of submandibular swelling on the left.  Painless.                   Has not smoked in over 50 years.                                 Also has recurrent left knee pain and swelling.  He had arthrocentesis with corticosteroid injection few years ago.                      He also had a skin cancer left pinna years ago, and he notes a crusted area superiorly.    Review of Systems         Objective    /80   Pulse 57   Temp 97.7  F (36.5  C) (Temporal)   Resp 16   Wt 100.7 kg (222 lb)   SpO2 96%   BMI 34.77 kg/m    Body mass index is 34.77 kg/m .  Physical Exam   GENERAL APPEARANCE: alert and no distress  NECK:  Submandibular localized nontender swelling on the left side  MS: decreased range of motion left knee, with an effusion, no warmth or redness  SKIN: Tiny crusted area superior aspect left pinna

## 2021-07-12 ENCOUNTER — TRANSFERRED RECORDS (OUTPATIENT)
Dept: HEALTH INFORMATION MANAGEMENT | Facility: CLINIC | Age: 86
End: 2021-07-12

## 2021-09-03 ENCOUNTER — OFFICE VISIT (OUTPATIENT)
Dept: INTERNAL MEDICINE | Facility: CLINIC | Age: 86
End: 2021-09-03
Payer: COMMERCIAL

## 2021-09-03 ENCOUNTER — NURSE TRIAGE (OUTPATIENT)
Dept: INTERNAL MEDICINE | Facility: CLINIC | Age: 86
End: 2021-09-03

## 2021-09-03 VITALS
WEIGHT: 220.4 LBS | BODY MASS INDEX: 34.52 KG/M2 | HEART RATE: 56 BPM | TEMPERATURE: 95.8 F | OXYGEN SATURATION: 95 % | DIASTOLIC BLOOD PRESSURE: 76 MMHG | SYSTOLIC BLOOD PRESSURE: 136 MMHG

## 2021-09-03 DIAGNOSIS — I87.8 VENOUS STASIS: ICD-10-CM

## 2021-09-03 DIAGNOSIS — L03.116 CELLULITIS OF LEFT LOWER LEG: Primary | ICD-10-CM

## 2021-09-03 PROCEDURE — 99214 OFFICE O/P EST MOD 30 MIN: CPT | Performed by: PHYSICIAN ASSISTANT

## 2021-09-03 RX ORDER — CEPHALEXIN 500 MG/1
500 CAPSULE ORAL 3 TIMES DAILY
Qty: 30 CAPSULE | Refills: 0 | Status: SHIPPED | OUTPATIENT
Start: 2021-09-03 | End: 2021-09-13

## 2021-09-03 NOTE — TELEPHONE ENCOUNTER
"Left shin has right wound puncture/scrape- happened on [address] [place]. Below the area is red/swollen/warm to touch, no streaking, no chills/fever, no discharge coming from puncture site.     Appointment made for today 9/3/21 @ 3pm to be evaluated.     Candace Fonseca RN          Reason for Disposition    Injury is a puncture wound    Last tetanus shot > 5 years ago    Patient wants to be seen    Looks infected (e.g., spreading redness, pus, red streak)    Additional Information    Negative: Shock suspected (e.g., cold/pale/clammy skin, too weak to stand, low BP, rapid pulse)    Negative: Cut on the neck, chest, back, or abdomen that may go deep (e.g., stab wound or other suspicious penetrating injury)    Negative: Major bleeding (actively dripping or spurting) that can't be stopped    Negative: Amputation    Negative: Sounds like a life-threatening emergency to the triager    Negative: Animal bite and broken skin    Negative: Shock suspected (e.g., cold/pale/clammy skin, too weak to stand, low BP, rapid pulse)    Negative: Puncture wound of head, neck, chest, back, or abdomen that sounds life-threatening to triager    Negative: Sounds like a life-threatening emergency to the triager    Negative: Caused by an animal bite    Negative: Skin is cut or scraped, not punctured    Negative: Puncture wound of eye or eyelid    Negative: Foreign body is still in the skin (e.g., splinter, sliver, fishhook)    Negative: Puncture wound of head, neck, chest, abdomen, or overlying a joint and it could be deep    Negative: Needlestick from used or discarded injection needle (Exception: clean, unused needle)    Negative: High pressure injection injury (e.g., from grease gun or paint gun, usually work-related)    Negative: Sounds like a serious injury to the triager    Answer Assessment - Initial Assessment Questions  1. APPEARANCE of INJURY: \"What does the injury look like?\"          2. SIZE: \"How large is the cut?\"    " "    Smaller then josselin- puncture site   3. BLEEDING: \"Is it bleeding now?\" If so, ask: \"Is it difficult to stop?\"        It was bleeding- not actively bleeding.   4. LOCATION: \"Where is the injury located?\"       Left shin-   5. ONSET: \"How long ago did the injury occur?\"       August 31st.   6. MECHANISM: \"Tell me how it happened.\"       Outside in yard, mowing lawn.   7. TETANUS: \"When was the last tetanus booster?\"      Nursing to check - 2005   8. PREGNANCY: \"Is there any anna you are pregnant?\" \"When was your last menstrual period?\"      Denies    Protocols used: SKIN INJURY-A-OH, PUNCTURE WOUND-A-OH      "

## 2021-09-03 NOTE — PROGRESS NOTES
Assessment & Plan     Cellulitis of left lower leg    - cephALEXin (KEFLEX) 500 MG capsule; Take 1 capsule (500 mg) by mouth 3 times daily for 10 days    Venous stasis          I spent a total of 30 minutes on the day of the visit.   Time spent doing chart review, history and exam, documentation and further activities per the note       Patient Instructions   Warm compress to the left lower leg  Elevate the left leg as much as you can when resting.    If worsening redness/sweling or fever over the weekend then to ED       Return in about 2 weeks (around 9/17/2021) for recheck if not improving, regular primary provider.    Sanjana Huang PA-C  [facility]    Karen Love is a 85 year old who presents for the following health issues  accompanied by his spouse:    HPI     Concern - Infection wound  Onset: Pt has wound on left lower leg  Description: Pt was cutting grass and something cut him.   Intensity: mild  Progression of Symptoms:  same  Accompanying Signs & Symptoms: No  Previous history of similar problem: No  Precipitating factors:        Worsened by: No  Alleviating factors:        Improved by: Nothing  Therapies tried and outcome: None    Hx of bilateral lower extremity swelling.   Worse swelling now left lower leg, warmth and redness noted.   Injury about 1 week ago with something flying up and hitting the leg while mowing grass     Review of Systems   Constitutional, HEENT, cardiovascular, pulmonary, systems are negative, except as otherwise noted.      Objective    There were no vitals taken for this visit.  There is no height or weight on file to calculate BMI.  Physical Exam   GENERAL: healthy, alert and no distress  CV: regular rates and rhythm and 1- 2+ bilateral lower extremity pitting edema to mid shin  L > R    ABDOMEN: obese  MS: redness noted left lower leg  Swelling- pitting edema to mid shin  Warm to touch  No purulence in the area of abrasion  some clear serous drainage noted   SKIN: no suspicious lesions or rashes

## 2021-09-03 NOTE — PATIENT INSTRUCTIONS
Warm compress to the left lower leg  Elevate the left leg as much as you can when resting.    If worsening redness/sweling or fever over the weekend then to ED

## 2021-09-17 ENCOUNTER — APPOINTMENT (OUTPATIENT)
Dept: URBAN - METROPOLITAN AREA CLINIC 255 | Age: 86
Setting detail: DERMATOLOGY
End: 2021-09-20

## 2021-09-17 VITALS — WEIGHT: 215 LBS | HEIGHT: 67 IN | RESPIRATION RATE: 14 BRPM

## 2021-09-17 DIAGNOSIS — Z71.89 OTHER SPECIFIED COUNSELING: ICD-10-CM

## 2021-09-17 PROBLEM — D48.5 NEOPLASM OF UNCERTAIN BEHAVIOR OF SKIN: Status: ACTIVE | Noted: 2021-09-17

## 2021-09-17 PROCEDURE — 99202 OFFICE O/P NEW SF 15 MIN: CPT | Mod: 25

## 2021-09-17 PROCEDURE — OTHER BIOPSY BY SHAVE METHOD: OTHER

## 2021-09-17 PROCEDURE — OTHER COUNSELING: OTHER

## 2021-09-17 PROCEDURE — 69100 BIOPSY OF EXTERNAL EAR: CPT

## 2021-09-17 PROCEDURE — OTHER MIPS QUALITY: OTHER

## 2021-09-17 ASSESSMENT — LOCATION DETAILED DESCRIPTION DERM: LOCATION DETAILED: RIGHT SUPERIOR MEDIAL UPPER BACK

## 2021-09-17 ASSESSMENT — LOCATION SIMPLE DESCRIPTION DERM: LOCATION SIMPLE: RIGHT UPPER BACK

## 2021-09-17 ASSESSMENT — LOCATION ZONE DERM: LOCATION ZONE: TRUNK

## 2021-09-17 NOTE — PROCEDURE: BIOPSY BY SHAVE METHOD
Validate That Anesthesia Is Not 0: No
Anesthesia Volume In Cc (Will Not Render If 0): 0.5
Biopsy Type: H and E
Information: Selecting Yes will display possible errors in your note based on the variables you have selected. This validation is only offered as a suggestion for you. PLEASE NOTE THAT THE VALIDATION TEXT WILL BE REMOVED WHEN YOU FINALIZE YOUR NOTE. IF YOU WANT TO FAX A PRELIMINARY NOTE YOU WILL NEED TO TOGGLE THIS TO 'NO' IF YOU DO NOT WANT IT IN YOUR FAXED NOTE.
Hide Topical Anesthesia?: Yes
Biopsy Method: Dermablade
Electrodesiccation Text: The wound bed was treated with electrodesiccation after the biopsy was performed.
X Size Of Lesion In Cm: 0
Hemostasis: Aluminum Chloride
Consent: - Verbal and written consent was obtained and risks were reviewed prior to procedure today. \\n- Risks discussed include but are not limited to scarring, infection, bleeding, scabbing, incomplete removal, nerve damage, pain, and allergy to anesthesia.
Post-Care Instructions: WOUND CARE:\\nDo NOT submerge wound in a bath, swimming pool, or hot tub for at least 1 week or for as long as there is an open wound. Gently cleanse the site daily with mild soap and water. Be careful NOT to allow a forceful stream of water to hit the biopsy site. After cleaning/showering, apply a thin layer of petrolatum ointment or Aquaphor in the wound followed by an adhesive bandage. Continue this process daily until healed. \\n\\nBLEEDING:\\nIf you develop persistent bleeding, apply firm and steady pressure over the dressing with gauze for 15 minutes. If bleeding persists, reapply pressure with an ice pack over the gauze for 15 minutes. NEVER APPLY ICE DIRECTLY TO THE SKIN. Do NOT peek under the gauze during these 15 minutes of pressure.  Call our office at [phone] if you cannot get the bleeding to stop. \\n\\nINFECTION:\\nSigns of infection may include increasing rather than decreasing pain (after the first few days), increasing redness/swelling/heat, draining pus, pink/red streaks around the wound, and fever or chills.  Please call our office immediately at [phone] if infection is suspected. It is normal to have some mild redness on or around the wound edges; this will lighten day by day and will become less tender.\\n\\nPAIN:\\nPain is usually minimal, but if needed you may take acetaminophen (Tylenol) every four hours as needed. Applying an ice pack over the dressing for 15-20 minutes every 2-3 hours will relieve swelling, lessen pain, and help minimize bruising. NEVER APPLY ICE DIRECTLY TO THE SKIN. Avoid ibuprofen (Advil, Motrin) and naproxen (Aleve) for the first 48 hours as these can increase bleeding.\\n\\nSWELLIG AND BRUISING:\\nSwelling and bruising are common and temporary, usually lasting 1 - 2 weeks. It is more common in areas treated around the eyes, hands, and feet. In the days following the procedure, swelling and bruising can be minimized by keeping the affected areas elevated when possible, reducing salty foods, and applying ice packs over the dressing for 15-20 minutes every 2-3 hours. NEVER APPLY ICE DIRECTLY TO THE SKIN.\\n\\nITCHING:\\nItchiness on and around the wound is very common and can last several days to weeks after surgery. Mild itch is normal as the wound is healing. \\n\\nNERVE CHANGES:\\nNumbness is usually temporary, but it may last for several weeks to months. You may also experience sharp pains at the wound sight as it heals.  Mild pain is normal and will gradually improve with time.\\n \\nNO SMOKING:\\nSmoking will delay the healing process. If you smoke, we recommend trying to quit or at minimum reduce how much you smoke following a procedure.
Curettage Text: The wound bed was treated with curettage after the biopsy was performed.
Cryotherapy Text: The wound bed was treated with cryotherapy after the biopsy was performed.
Type Of Destruction Used: Electrodesiccation
Wound Care: Petrolatum
Anesthesia Type: 2% lidocaine with epinephrine
Notification Instructions: - It can take up to 2 weeks to get a biopsy result. \\n- Please refrain from calling to request results until after 2 weeks.
Depth Of Biopsy: dermis
Dressing: bandage
Electrodesiccation And Curettage Text: The wound bed was treated with electrodesiccation and curettage after the biopsy was performed.
Billing Type: Third-Party Bill
Detail Level: Detailed
Silver Nitrate Text: The wound bed was treated with silver nitrate after the biopsy was performed.

## 2021-09-17 NOTE — HPI: SKIN LESIONS
Additional History: Pt has a hx of BCC treated by Mohs in same location as lesion. Pt is unsure of what skin cancer was previously there but reports BCC sounds more familiar. Hx of osteoarthritis, no previous or current malignancies or medical complexities.

## 2021-09-17 NOTE — PROCEDURE: COUNSELING
Patient Specific Counseling (Will Not Stick From Patient To Patient): - Advised that this lesion has features that suggest a skin cancer so a biopsy is necessary to confirm the diagnosis and guide treatment.\\n- Patient expressed understanding.\\n- If skin cancer is confirmed, further treatment will be necessary.
Detail Level: Detailed
Detail Level: Generalized

## 2021-09-19 ENCOUNTER — HEALTH MAINTENANCE LETTER (OUTPATIENT)
Age: 86
End: 2021-09-19

## 2021-10-15 DIAGNOSIS — I10 ESSENTIAL HYPERTENSION: ICD-10-CM

## 2021-10-15 RX ORDER — ATENOLOL 50 MG/1
TABLET ORAL
Qty: 90 TABLET | Refills: 1 | Status: ON HOLD | OUTPATIENT
Start: 2021-10-15 | End: 2021-11-05

## 2021-10-22 ENCOUNTER — NURSE TRIAGE (OUTPATIENT)
Dept: INTERNAL MEDICINE | Facility: CLINIC | Age: 86
End: 2021-10-22

## 2021-10-22 NOTE — TELEPHONE ENCOUNTER
"Pt calling with neck pain/stiffness that started on Wednesday 10/20 and is constant.  Rates the pain 6-7/10 but no pain if does not move.  Is not able to turn head side to side or touching chin to chest with no pain.  Denies fever or numbness/weakness in arms or legs.  Pain does not radiate down arms or legs.  Has iced 2 times today and tried heat x1 last night with not much relief.  Wondering if can take aleve?  Tried tylenol with no relief.    Advised ok to take aleve following package directions.  Recommend alternating between ice and heat 4 times per day.  Also to sleep with a small towel rolled the long way and place under neck at night.  If pain gets worse or no improvement should go to  over the weekend.  Call on Monday morning to schedule an in office visit.    Jessa DAILEY RN  EP Triage            .Reason for Disposition    MODERATE neck pain (e.g., interferes with normal activities like work or school)    Additional Information    Negative: Shock suspected (e.g., cold/pale/clammy skin, too weak to stand, low BP, rapid pulse)    Negative: Similar pain previously and it was from 'heart attack'    Negative: Similar pain previously from 'angina' and not relieved by nitroglycerin    Negative: Difficult to awaken or acting confused (e.g., disoriented, slurred speech)    Negative: Sounds like a life-threatening emergency to the triager    Negative: Followed an injury to neck (e.g., MVA, sports, impact or collision)    Negative: Chest pain    Negative: Lymph node in the neck is swollen or painful to the touch    Negative: Sore throat is the main symptom    Answer Assessment - Initial Assessment Questions  1. ONSET: \"When did the pain begin?\"       Wednesday 10/20  2. LOCATION: \"Where does it hurt?\"       Base of skull back of head  3. PATTERN \"Does the pain come and go, or has it been constant since it started?\"       constant  4. SEVERITY: \"How bad is the pain?\"  (Scale 1-10; or mild, moderate, severe)    - MILD " "(1-3): doesn't interfere with normal activities     - MODERATE (4-7): interferes with normal activities or awakens from sleep     - SEVERE (8-10):  excruciating pain, unable to do any normal activities       Moderate 6-7/10.  If does not move no pain  5. RADIATION: \"Does the pain go anywhere else, shoot into your arms?\"      no  6. CORD SYMPTOMS: \"Any weakness or numbness of the arms or legs?\"      no  7. CAUSE: \"What do you think is causing the neck pain?\"      Not sure  8. NECK OVERUSE: \"Any recent activities that involved turning or twisting the neck?\"      no  9. OTHER SYMPTOMS: \"Do you have any other symptoms?\" (e.g., headache, fever, chest pain, difficulty breathing, neck swelling)      No other sxs  10. PREGNANCY: \"Is there any chance you are pregnant?\" \"When was your last menstrual period?\"        NA    Protocols used: NECK PAIN OR [id number]        "

## 2021-11-01 DIAGNOSIS — R07.89 ATYPICAL CHEST PAIN: ICD-10-CM

## 2021-11-02 ENCOUNTER — HOSPITAL ENCOUNTER (INPATIENT)
Facility: CLINIC | Age: 86
LOS: 3 days | Discharge: HOME | DRG: 177 | End: 2021-11-05
Attending: EMERGENCY MEDICINE | Admitting: INTERNAL MEDICINE
Payer: COMMERCIAL

## 2021-11-02 ENCOUNTER — APPOINTMENT (OUTPATIENT)
Dept: GENERAL RADIOLOGY | Facility: CLINIC | Age: 86
DRG: 177 | End: 2021-11-02
Attending: EMERGENCY MEDICINE
Payer: COMMERCIAL

## 2021-11-02 DIAGNOSIS — I10 ESSENTIAL HYPERTENSION: ICD-10-CM

## 2021-11-02 DIAGNOSIS — J12.82 PNEUMONIA DUE TO 2019 NOVEL CORONAVIRUS: ICD-10-CM

## 2021-11-02 DIAGNOSIS — J96.01 ACUTE RESPIRATORY FAILURE WITH HYPOXIA (H): ICD-10-CM

## 2021-11-02 DIAGNOSIS — U07.1 PNEUMONIA DUE TO 2019 NOVEL CORONAVIRUS: ICD-10-CM

## 2021-11-02 LAB
ANION GAP SERPL CALCULATED.3IONS-SCNC: 6 MMOL/L (ref 3–14)
BASOPHILS # BLD AUTO: 0 10E3/UL (ref 0–0.2)
BASOPHILS NFR BLD AUTO: 0 %
BUN SERPL-MCNC: 18 MG/DL (ref 7–30)
CALCIUM SERPL-MCNC: 8 MG/DL (ref 8.5–10.1)
CHLORIDE BLD-SCNC: 108 MMOL/L (ref 94–109)
CO2 SERPL-SCNC: 26 MMOL/L (ref 20–32)
CREAT SERPL-MCNC: 0.72 MG/DL (ref 0.66–1.25)
EOSINOPHIL # BLD AUTO: 0 10E3/UL (ref 0–0.7)
EOSINOPHIL NFR BLD AUTO: 0 %
ERYTHROCYTE [DISTWIDTH] IN BLOOD BY AUTOMATED COUNT: 13 % (ref 10–15)
FLUAV RNA SPEC QL NAA+PROBE: NEGATIVE
FLUBV RNA RESP QL NAA+PROBE: NEGATIVE
GFR SERPL CREATININE-BSD FRML MDRD: 85 ML/MIN/1.73M2
GLUCOSE BLD-MCNC: 179 MG/DL (ref 70–99)
HCT VFR BLD AUTO: 41.3 % (ref 40–53)
HGB BLD-MCNC: 13.8 G/DL (ref 13.3–17.7)
HOLD SPECIMEN: NORMAL
HOLD SPECIMEN: NORMAL
IMM GRANULOCYTES # BLD: 0 10E3/UL
IMM GRANULOCYTES NFR BLD: 0 %
LYMPHOCYTES # BLD AUTO: 0.5 10E3/UL (ref 0.8–5.3)
LYMPHOCYTES NFR BLD AUTO: 9 %
MCH RBC QN AUTO: 30.3 PG (ref 26.5–33)
MCHC RBC AUTO-ENTMCNC: 33.4 G/DL (ref 31.5–36.5)
MCV RBC AUTO: 91 FL (ref 78–100)
MONOCYTES # BLD AUTO: 0.4 10E3/UL (ref 0–1.3)
MONOCYTES NFR BLD AUTO: 8 %
NEUTROPHILS # BLD AUTO: 4.2 10E3/UL (ref 1.6–8.3)
NEUTROPHILS NFR BLD AUTO: 83 %
NRBC # BLD AUTO: 0 10E3/UL
NRBC BLD AUTO-RTO: 0 /100
NT-PROBNP SERPL-MCNC: 361 PG/ML (ref 0–1800)
PLATELET # BLD AUTO: 156 10E3/UL (ref 150–450)
POTASSIUM BLD-SCNC: 3.7 MMOL/L (ref 3.4–5.3)
RBC # BLD AUTO: 4.55 10E6/UL (ref 4.4–5.9)
SARS-COV-2 RNA RESP QL NAA+PROBE: POSITIVE
SODIUM SERPL-SCNC: 140 MMOL/L (ref 133–144)
TROPONIN I SERPL-MCNC: <0.015 UG/L (ref 0–0.04)
WBC # BLD AUTO: 5.2 10E3/UL (ref 4–11)

## 2021-11-02 PROCEDURE — 83615 LACTATE (LD) (LDH) ENZYME: CPT | Performed by: INTERNAL MEDICINE

## 2021-11-02 PROCEDURE — 36415 COLL VENOUS BLD VENIPUNCTURE: CPT | Performed by: EMERGENCY MEDICINE

## 2021-11-02 PROCEDURE — C9803 HOPD COVID-19 SPEC COLLECT: HCPCS

## 2021-11-02 PROCEDURE — 120N000001 HC R&B MED SURG/OB

## 2021-11-02 PROCEDURE — 71045 X-RAY EXAM CHEST 1 VIEW: CPT

## 2021-11-02 PROCEDURE — 84484 ASSAY OF TROPONIN QUANT: CPT | Performed by: EMERGENCY MEDICINE

## 2021-11-02 PROCEDURE — 85025 COMPLETE CBC W/AUTO DIFF WBC: CPT | Performed by: EMERGENCY MEDICINE

## 2021-11-02 PROCEDURE — 250N000013 HC RX MED GY IP 250 OP 250 PS 637: Performed by: EMERGENCY MEDICINE

## 2021-11-02 PROCEDURE — 83880 ASSAY OF NATRIURETIC PEPTIDE: CPT | Performed by: EMERGENCY MEDICINE

## 2021-11-02 PROCEDURE — 99285 EMERGENCY DEPT VISIT HI MDM: CPT

## 2021-11-02 PROCEDURE — 93005 ELECTROCARDIOGRAM TRACING: CPT

## 2021-11-02 PROCEDURE — XW043E5 INTRODUCTION OF REMDESIVIR ANTI-INFECTIVE INTO CENTRAL VEIN, PERCUTANEOUS APPROACH, NEW TECHNOLOGY GROUP 5: ICD-10-PCS | Performed by: EMERGENCY MEDICINE

## 2021-11-02 PROCEDURE — 86140 C-REACTIVE PROTEIN: CPT | Performed by: INTERNAL MEDICINE

## 2021-11-02 PROCEDURE — 99223 1ST HOSP IP/OBS HIGH 75: CPT | Mod: AI | Performed by: INTERNAL MEDICINE

## 2021-11-02 PROCEDURE — 87636 SARSCOV2 & INF A&B AMP PRB: CPT | Performed by: EMERGENCY MEDICINE

## 2021-11-02 PROCEDURE — 250N000011 HC RX IP 250 OP 636: Performed by: EMERGENCY MEDICINE

## 2021-11-02 PROCEDURE — 80048 BASIC METABOLIC PNL TOTAL CA: CPT | Performed by: EMERGENCY MEDICINE

## 2021-11-02 RX ORDER — DEXAMETHASONE SODIUM PHOSPHATE 4 MG/ML
4 INJECTION, SOLUTION INTRA-ARTICULAR; INTRALESIONAL; INTRAMUSCULAR; INTRAVENOUS; SOFT TISSUE ONCE
Status: COMPLETED | OUTPATIENT
Start: 2021-11-02 | End: 2021-11-02

## 2021-11-02 RX ORDER — ASPIRIN 81 MG/1
324 TABLET, CHEWABLE ORAL ONCE
Status: COMPLETED | OUTPATIENT
Start: 2021-11-02 | End: 2021-11-02

## 2021-11-02 RX ORDER — LISINOPRIL AND HYDROCHLOROTHIAZIDE 12.5; 2 MG/1; MG/1
1 TABLET ORAL DAILY
Qty: 90 TABLET | Refills: 1 | Status: SHIPPED | OUTPATIENT
Start: 2021-11-02 | End: 2022-06-20

## 2021-11-02 RX ORDER — TERAZOSIN 2 MG/1
2 CAPSULE ORAL AT BEDTIME
Qty: 90 CAPSULE | Refills: 1 | Status: SHIPPED | OUTPATIENT
Start: 2021-11-02 | End: 2022-03-21

## 2021-11-02 RX ADMIN — ASPIRIN 81 MG CHEWABLE TABLET 324 MG: 81 TABLET CHEWABLE at 20:59

## 2021-11-02 RX ADMIN — DEXAMETHASONE SODIUM PHOSPHATE 4 MG: 4 INJECTION, SOLUTION INTRAMUSCULAR; INTRAVENOUS at 22:19

## 2021-11-02 ASSESSMENT — ENCOUNTER SYMPTOMS
COUGH: 0
ABDOMINAL PAIN: 1
SHORTNESS OF BREATH: 1
RHINORRHEA: 0
APPETITE CHANGE: 1
UNEXPECTED WEIGHT CHANGE: 0

## 2021-11-02 ASSESSMENT — MIFFLIN-ST. JEOR: SCORE: 1689.16

## 2021-11-02 ASSESSMENT — ACTIVITIES OF DAILY LIVING (ADL): ADLS_ACUITY_SCORE: 7

## 2021-11-03 LAB
ALBUMIN SERPL-MCNC: 2.5 G/DL (ref 3.4–5)
ALP SERPL-CCNC: 57 U/L (ref 40–150)
ALT SERPL W P-5'-P-CCNC: 27 U/L (ref 0–70)
ANION GAP SERPL CALCULATED.3IONS-SCNC: 4 MMOL/L (ref 3–14)
APTT PPP: 40 SECONDS (ref 22–38)
AST SERPL W P-5'-P-CCNC: 21 U/L (ref 0–45)
BASOPHILS # BLD AUTO: 0 10E3/UL (ref 0–0.2)
BASOPHILS NFR BLD AUTO: 0 %
BILIRUB SERPL-MCNC: 0.4 MG/DL (ref 0.2–1.3)
BUN SERPL-MCNC: 19 MG/DL (ref 7–30)
CALCIUM SERPL-MCNC: 7.8 MG/DL (ref 8.5–10.1)
CHLORIDE BLD-SCNC: 106 MMOL/L (ref 94–109)
CO2 SERPL-SCNC: 28 MMOL/L (ref 20–32)
CREAT SERPL-MCNC: 0.79 MG/DL (ref 0.66–1.25)
CRP SERPL-MCNC: 92.3 MG/L (ref 0–8)
D DIMER PPP FEU-MCNC: 0.52 UG/ML FEU (ref 0–0.5)
EOSINOPHIL # BLD AUTO: 0 10E3/UL (ref 0–0.7)
EOSINOPHIL NFR BLD AUTO: 0 %
ERYTHROCYTE [DISTWIDTH] IN BLOOD BY AUTOMATED COUNT: 13 % (ref 10–15)
FIBRINOGEN PPP-MCNC: 546 MG/DL (ref 170–490)
GFR SERPL CREATININE-BSD FRML MDRD: 82 ML/MIN/1.73M2
GLUCOSE BLD-MCNC: 174 MG/DL (ref 70–99)
HCT VFR BLD AUTO: 44 % (ref 40–53)
HGB BLD-MCNC: 14.6 G/DL (ref 13.3–17.7)
HOLD SPECIMEN: NORMAL
IMM GRANULOCYTES # BLD: 0 10E3/UL
IMM GRANULOCYTES NFR BLD: 0 %
INR PPP: 1.1 (ref 0.85–1.15)
LDH SERPL L TO P-CCNC: 251 U/L (ref 85–227)
LYMPHOCYTES # BLD AUTO: 0.3 10E3/UL (ref 0.8–5.3)
LYMPHOCYTES NFR BLD AUTO: 11 %
MCH RBC QN AUTO: 30.2 PG (ref 26.5–33)
MCHC RBC AUTO-ENTMCNC: 33.2 G/DL (ref 31.5–36.5)
MCV RBC AUTO: 91 FL (ref 78–100)
MONOCYTES # BLD AUTO: 0.2 10E3/UL (ref 0–1.3)
MONOCYTES NFR BLD AUTO: 5 %
NEUTROPHILS # BLD AUTO: 2.5 10E3/UL (ref 1.6–8.3)
NEUTROPHILS NFR BLD AUTO: 84 %
NRBC # BLD AUTO: 0 10E3/UL
NRBC BLD AUTO-RTO: 0 /100
PLATELET # BLD AUTO: 144 10E3/UL (ref 150–450)
POTASSIUM BLD-SCNC: 3.8 MMOL/L (ref 3.4–5.3)
PROT SERPL-MCNC: 5.9 G/DL (ref 6.8–8.8)
RBC # BLD AUTO: 4.84 10E6/UL (ref 4.4–5.9)
SODIUM SERPL-SCNC: 138 MMOL/L (ref 133–144)
WBC # BLD AUTO: 3 10E3/UL (ref 4–11)

## 2021-11-03 PROCEDURE — 99233 SBSQ HOSP IP/OBS HIGH 50: CPT | Performed by: HOSPITALIST

## 2021-11-03 PROCEDURE — 120N000001 HC R&B MED SURG/OB

## 2021-11-03 PROCEDURE — 85379 FIBRIN DEGRADATION QUANT: CPT | Performed by: INTERNAL MEDICINE

## 2021-11-03 PROCEDURE — 250N000012 HC RX MED GY IP 250 OP 636 PS 637: Performed by: INTERNAL MEDICINE

## 2021-11-03 PROCEDURE — 250N000011 HC RX IP 250 OP 636: Performed by: HOSPITALIST

## 2021-11-03 PROCEDURE — 258N000003 HC RX IP 258 OP 636: Performed by: INTERNAL MEDICINE

## 2021-11-03 PROCEDURE — 250N000013 HC RX MED GY IP 250 OP 250 PS 637: Performed by: HOSPITALIST

## 2021-11-03 PROCEDURE — 36415 COLL VENOUS BLD VENIPUNCTURE: CPT | Performed by: INTERNAL MEDICINE

## 2021-11-03 PROCEDURE — 85730 THROMBOPLASTIN TIME PARTIAL: CPT | Performed by: INTERNAL MEDICINE

## 2021-11-03 PROCEDURE — 85384 FIBRINOGEN ACTIVITY: CPT | Performed by: INTERNAL MEDICINE

## 2021-11-03 PROCEDURE — 250N000009 HC RX 250: Performed by: INTERNAL MEDICINE

## 2021-11-03 PROCEDURE — 85610 PROTHROMBIN TIME: CPT | Performed by: INTERNAL MEDICINE

## 2021-11-03 PROCEDURE — 85025 COMPLETE CBC W/AUTO DIFF WBC: CPT | Performed by: INTERNAL MEDICINE

## 2021-11-03 PROCEDURE — 250N000013 HC RX MED GY IP 250 OP 250 PS 637: Performed by: INTERNAL MEDICINE

## 2021-11-03 PROCEDURE — 250N000011 HC RX IP 250 OP 636: Performed by: INTERNAL MEDICINE

## 2021-11-03 RX ORDER — ONDANSETRON 2 MG/ML
4 INJECTION INTRAMUSCULAR; INTRAVENOUS EVERY 6 HOURS PRN
Status: DISCONTINUED | OUTPATIENT
Start: 2021-11-03 | End: 2021-11-05 | Stop reason: HOSPADM

## 2021-11-03 RX ORDER — AMOXICILLIN 250 MG
2 CAPSULE ORAL 2 TIMES DAILY PRN
Status: DISCONTINUED | OUTPATIENT
Start: 2021-11-03 | End: 2021-11-05 | Stop reason: HOSPADM

## 2021-11-03 RX ORDER — FUROSEMIDE 10 MG/ML
40 INJECTION INTRAMUSCULAR; INTRAVENOUS ONCE
Status: COMPLETED | OUTPATIENT
Start: 2021-11-03 | End: 2021-11-03

## 2021-11-03 RX ORDER — TERAZOSIN 2 MG/1
2 CAPSULE ORAL AT BEDTIME
Status: DISCONTINUED | OUTPATIENT
Start: 2021-11-03 | End: 2021-11-05 | Stop reason: HOSPADM

## 2021-11-03 RX ORDER — PANTOPRAZOLE SODIUM 40 MG/1
40 TABLET, DELAYED RELEASE ORAL
Status: DISCONTINUED | OUTPATIENT
Start: 2021-11-03 | End: 2021-11-05 | Stop reason: HOSPADM

## 2021-11-03 RX ORDER — AMOXICILLIN 250 MG
1 CAPSULE ORAL 2 TIMES DAILY PRN
Status: DISCONTINUED | OUTPATIENT
Start: 2021-11-03 | End: 2021-11-05 | Stop reason: HOSPADM

## 2021-11-03 RX ORDER — ALBUTEROL SULFATE 90 UG/1
2 AEROSOL, METERED RESPIRATORY (INHALATION) EVERY 4 HOURS PRN
Status: DISCONTINUED | OUTPATIENT
Start: 2021-11-03 | End: 2021-11-05 | Stop reason: HOSPADM

## 2021-11-03 RX ORDER — POLYETHYLENE GLYCOL 3350 17 G/17G
17 POWDER, FOR SOLUTION ORAL DAILY PRN
Status: DISCONTINUED | OUTPATIENT
Start: 2021-11-03 | End: 2021-11-05 | Stop reason: HOSPADM

## 2021-11-03 RX ORDER — LISINOPRIL AND HYDROCHLOROTHIAZIDE 12.5; 2 MG/1; MG/1
1 TABLET ORAL DAILY
Status: DISCONTINUED | OUTPATIENT
Start: 2021-11-03 | End: 2021-11-05 | Stop reason: HOSPADM

## 2021-11-03 RX ORDER — ASCORBIC ACID 500 MG
1000 TABLET ORAL DAILY
Status: DISCONTINUED | OUTPATIENT
Start: 2021-11-03 | End: 2021-11-05 | Stop reason: HOSPADM

## 2021-11-03 RX ORDER — ATENOLOL 50 MG/1
50 TABLET ORAL DAILY
Status: DISCONTINUED | OUTPATIENT
Start: 2021-11-03 | End: 2021-11-05

## 2021-11-03 RX ORDER — PROCHLORPERAZINE MALEATE 5 MG
5 TABLET ORAL EVERY 6 HOURS PRN
Status: DISCONTINUED | OUTPATIENT
Start: 2021-11-03 | End: 2021-11-05 | Stop reason: HOSPADM

## 2021-11-03 RX ORDER — PROCHLORPERAZINE 25 MG
12.5 SUPPOSITORY, RECTAL RECTAL EVERY 12 HOURS PRN
Status: DISCONTINUED | OUTPATIENT
Start: 2021-11-03 | End: 2021-11-05 | Stop reason: HOSPADM

## 2021-11-03 RX ORDER — ASCORBIC ACID 250 MG
1000 TABLET,CHEWABLE ORAL DAILY
Status: DISCONTINUED | OUTPATIENT
Start: 2021-11-03 | End: 2021-11-03 | Stop reason: RX

## 2021-11-03 RX ORDER — ASPIRIN 81 MG/1
81 TABLET ORAL DAILY
Status: DISCONTINUED | OUTPATIENT
Start: 2021-11-03 | End: 2021-11-05 | Stop reason: HOSPADM

## 2021-11-03 RX ORDER — ONDANSETRON 4 MG/1
4 TABLET, ORALLY DISINTEGRATING ORAL EVERY 6 HOURS PRN
Status: DISCONTINUED | OUTPATIENT
Start: 2021-11-03 | End: 2021-11-05 | Stop reason: HOSPADM

## 2021-11-03 RX ORDER — ATORVASTATIN CALCIUM 40 MG/1
40 TABLET, FILM COATED ORAL DAILY
Status: DISCONTINUED | OUTPATIENT
Start: 2021-11-03 | End: 2021-11-05 | Stop reason: HOSPADM

## 2021-11-03 RX ORDER — ACETAMINOPHEN 650 MG/1
650 SUPPOSITORY RECTAL EVERY 6 HOURS PRN
Status: DISCONTINUED | OUTPATIENT
Start: 2021-11-03 | End: 2021-11-05 | Stop reason: HOSPADM

## 2021-11-03 RX ORDER — ACETAMINOPHEN 325 MG/1
650 TABLET ORAL EVERY 6 HOURS PRN
Status: DISCONTINUED | OUTPATIENT
Start: 2021-11-03 | End: 2021-11-05 | Stop reason: HOSPADM

## 2021-11-03 RX ORDER — LIDOCAINE 40 MG/G
CREAM TOPICAL
Status: DISCONTINUED | OUTPATIENT
Start: 2021-11-03 | End: 2021-11-05 | Stop reason: HOSPADM

## 2021-11-03 RX ADMIN — ATORVASTATIN CALCIUM 40 MG: 40 TABLET, FILM COATED ORAL at 22:05

## 2021-11-03 RX ADMIN — REMDESIVIR 200 MG: 100 INJECTION, POWDER, LYOPHILIZED, FOR SOLUTION INTRAVENOUS at 05:06

## 2021-11-03 RX ADMIN — LISINOPRIL AND HYDROCHLOROTHIAZIDE 1 TABLET: 12.5; 2 TABLET ORAL at 11:53

## 2021-11-03 RX ADMIN — OXYCODONE HYDROCHLORIDE AND ACETAMINOPHEN 1000 MG: 500 TABLET ORAL at 10:18

## 2021-11-03 RX ADMIN — ATORVASTATIN CALCIUM 40 MG: 40 TABLET, FILM COATED ORAL at 10:18

## 2021-11-03 RX ADMIN — ASPIRIN 81 MG: 81 TABLET, COATED ORAL at 10:18

## 2021-11-03 RX ADMIN — FUROSEMIDE 40 MG: 10 INJECTION, SOLUTION INTRAVENOUS at 11:53

## 2021-11-03 RX ADMIN — ACETAMINOPHEN 650 MG: 325 TABLET, FILM COATED ORAL at 05:07

## 2021-11-03 RX ADMIN — DEXAMETHASONE 6 MG: 2 TABLET ORAL at 11:53

## 2021-11-03 RX ADMIN — ENOXAPARIN SODIUM 40 MG: 40 INJECTION SUBCUTANEOUS at 05:09

## 2021-11-03 RX ADMIN — TERAZOSIN HYDROCHLORIDE 2 MG: 2 CAPSULE ORAL at 22:05

## 2021-11-03 RX ADMIN — PANTOPRAZOLE SODIUM 40 MG: 40 TABLET, DELAYED RELEASE ORAL at 10:18

## 2021-11-03 RX ADMIN — ATENOLOL 50 MG: 50 TABLET ORAL at 10:18

## 2021-11-03 RX ADMIN — SODIUM CHLORIDE 50 ML: 9 INJECTION, SOLUTION INTRAVENOUS at 05:36

## 2021-11-03 ASSESSMENT — ACTIVITIES OF DAILY LIVING (ADL)
ADLS_ACUITY_SCORE: 3
ADLS_ACUITY_SCORE: 7
CONCENTRATING,_REMEMBERING_OR_MAKING_DECISIONS_DIFFICULTY: NO
DIFFICULTY_EATING/SWALLOWING: NO
ADLS_ACUITY_SCORE: 3
ADLS_ACUITY_SCORE: 7
ADLS_ACUITY_SCORE: 7
TOILETING_ISSUES: NO
ADLS_ACUITY_SCORE: 7
ADLS_ACUITY_SCORE: 7
ADLS_ACUITY_SCORE: 3
ADLS_ACUITY_SCORE: 7
ADLS_ACUITY_SCORE: 3
DRESSING/BATHING_DIFFICULTY: NO
ADLS_ACUITY_SCORE: 3
ADLS_ACUITY_SCORE: 7
ADLS_ACUITY_SCORE: 3
ADLS_ACUITY_SCORE: 3
WALKING_OR_CLIMBING_STAIRS_DIFFICULTY: NO
ADLS_ACUITY_SCORE: 7
ADLS_ACUITY_SCORE: 3
ADLS_ACUITY_SCORE: 7
HEARING_DIFFICULTY_OR_DEAF: NO
FALL_HISTORY_WITHIN_LAST_SIX_MONTHS: NO
DIFFICULTY_COMMUNICATING: NO
WEAR_GLASSES_OR_BLIND: NO
ADLS_ACUITY_SCORE: 3
DOING_ERRANDS_INDEPENDENTLY_DIFFICULTY: NO
ADLS_ACUITY_SCORE: 7
ADLS_ACUITY_SCORE: 3
ADLS_ACUITY_SCORE: 7
ADLS_ACUITY_SCORE: 3

## 2021-11-03 NOTE — H&P
[facility]  History and Physical  Hospitalist       Date of Admission:  11/2/2021    Assessment & Plan   Ivan Payton is a very pleasant 86 year old male with hypertension, CAD, h/o CVA, dyslipidemia, GERD,  BPH, CKD stage 2 who was admitted on 11/2/2021 with a  breakthrough covid-19 pneumonia infection.     COVID-19 pneumonia  Vaccinated x 2 plus booster  Presented with several day history of shortness of breath, diarrhea, decreased appetite, This patient was evaluated during a global COVID-19 pandemic, which necessitated consideration that the patient might be at risk for infection with the SARS-CoV-2 virus that causes COVID-19. Applicable protocols for evaluation were followed during the patient's care. Vaccination status: fully vaccinated. On presentation had respiratory rate 32, hypoxia en route to hospital in the low 80% range.    -admit to inpatient  -contact, droplet precautions  -supplemental oxygen as needed, wean as tolerated. Current oxygen requirement: 2-3 L  -inhaler regimen  -encourage incentive spirometer, flutter valve  -IV Remdesivir, first dose given in an emergency department  -po dexamethasone, 1st dose given IV in Emergency Department  -thromboembolism prophylaxis: enoxaparin    Hypertension  Dyslipidemia  H/o CVA  Managed PTA with aspirin, atorvastatin, lisinopril, hydrochlorothiazide, SL nitroglycerin.   -resume once verified    Chronic, stable problems:  GERD: PPI once verified  BPH: terazosin once verified  CKD stage 2    DVT prophylaxis: Enoxaparin (Lovenox) SQ  Code Status: Full  Clinically Significant Risk Factors Present on Admission              # Platelet Defect: home medication list includes an antiplatelet medication      Disposition: Expected discharge in >2 days.    Linda Rueda MD  Text Page    ~~~~~~~~~~~~~~~~~~~~~~~~~~~~~~~~~~~~~~~~~~~~~~~~~~~~~  Primary Care Physician   William Thompson    Chief Complaint   Shortness of breath, hypoxia,  diarrhea, decreased appetite    History is obtained from the patient and medical records.    History of Present Illness   Ivan Payton is a very pleasant 86 year old male with hypertension, CAD s/p stent placements x 6, h/o CVA, dyslipidemia, GERD,  BPH, CKD stage 2  who presents with shortness of breath and decreased appetite. Presents with several day history of shortness of breath, diarrhea, decreased appetite. Initially went to urgent care but was sent to the ED due to respiratory distress concern. Hasn't had to take any OTC medications for the diarrhea nd it is non-bloody. Denies fevers but has felt feverish and chilled. Hasn't noted a change in taste or smell but has felt lack of appetite due to food not tasting good. At home noted to have oxygen saturation low 80% up to 91%. Vaccination status: fully vaccinated (in Feb/March 2021). No known ill contacts but did attend a [facility] service for his brother within the past 2 weeks. Denies chest pain or pressure, palpitations, lightheadedness, abdominal pain or cramping, headache, nausea.  Currently on 2 L with sat 94%.    Case reviewed with Dr. Trierweiler from the Emergency Department. Vital signs, labs and available imaging reviewed. Pertinent results include: Oxygen saturation: 80-90%, Chest xray: increased pulmonary vascularity with no focal consolidation.     Past Medical History    I have reviewed this patient's medical history and updated it with pertinent information if needed.   Past Medical History:   Diagnosis Date     Arthritis      Carotid stenosis 3/26/2014    S/p L CEA; R ICA ok;          See CUS 12/15 and Dr Jones's consult; continue plavix      Cerebral vascular disease 3/2014    multiple intracranial stenoses - lt post communic, lt post cerebral, rt middle cerebral, bilat porx M2 segments     Claustrophobia     Need sedation for MRI scans     Coronary artery disease 2014    Cath 9/2014: PTCA and KIKE to mLAD, KIKE to prox circumflex, PTCA and  DESx2 to OM2; Cath 12/18/18: KIKE to PDA and OM1, patent stents to LAD and Cfx     CVA (cerebral infarction) 3/2014    2 small acute lesions noted left parietal/left posterior frontal region. Old lacunar infarct left caudate nucleus     Dyspnea 7/21/2014     Enlarged prostate      Essential hypertension      Problem list name updated by automated process. Provider to review     GERD (gastroesophageal reflux disease)      Hiatal hernia      Hydrocele     Right hydrocelectomy 2/2012     Hyperlipidemia with target LDL less than 100 3/14/2014     Diagnosis updated by automated process. Provider to review and confirm.     RBBB (right bundle branch block) 3/2014     Shortness of breath      Stented coronary artery      Uncomplicated asthma      Past Surgical History   I have reviewed this patient's surgical history and updated it with pertinent information if needed.  Past Surgical History:   Procedure Laterality Date     CV HEART CATHETERIZATION WITH POSSIBLE INTERVENTION N/A 12/18/2018    Procedure: Coronary Angiography;  Surgeon: Geovanni Tanner MD;  Location: [facility] CARDIAC CATH LAB     ENDARTERECTOMY CAROTID  3/26/2014    Procedure: ENDARTERECTOMY CAROTID;  LEFT CAROTID ENDARTERECTOMY WITH EEG;  Surgeon: Yobani Jones MD;  Location:  OR      REMOVAL OF TONSILS,<12 Y/O      Tonsils <13y.o.     HEART CATH, ANGIOPLASTY  9/9/14    Stenting of LAD,Prox LCx, and 2 stents to OM2     HERNIORRHAPHY INGUINAL      Right     HERNIORRHAPHY INGUINAL  2/10/2012    Procedure:HERNIORRHAPHY INGUINAL; LEFT OPEN INGUINAL HERNIA REPAIR WITH MESH, RIGHT HYDROCELECTOMY; Surgeon:JULI LOPES; Location:[facility]     HYDROCELECTOMY INGUINAL  2/10/2012    Procedure:HYDROCELECTOMY INGUINAL; Surgeon:JULI LOPES; Location:[facility]     HYDROCELECTOMY SCROTAL Right 1/29/2016    Procedure: HYDROCELECTOMY SCROTAL;  Surgeon: Yobani Stevens MD;  Location: [facility]     LAPAROSCOPIC HERNIORRHAPHY INGUINAL BILATERAL  Bilateral 5/24/2016    Procedure: LAPAROSCOPIC HERNIORRHAPHY INGUINAL BILATERAL;  Surgeon: Chandler Bowen MD;  Location: [facility] OR     MR BRAIN AND ORBITS  3/2014    6 mm area of restricted diffusion subcortical white matter left parietal lobe/questionable area of restricted diffusion left posterior frontal region - c/w recent small infarcts. Small chronic lacunar infarct left caudate nucleus     SURGICAL HISTORY OF -       tonail removal     Prior to Admission Medications   Prior to Admission Medications   Prescriptions Last Dose Informant Patient Reported? Taking?   Ascorbic Acid (VITAMIN C PO) 11/2/2021 at Unknown time  Yes Yes   Sig: Take 1,000 mg by mouth daily   Omega-3 Fatty Acids (OMEGA-3 FISH OIL PO) 11/2/2021 at Unknown time  Yes Yes   Sig: Take 1 g by mouth daily    VITAMIN D, CHOLECALCIFEROL, PO 11/2/2021 at Unknown time  Yes Yes   Sig: Take 5,000 Units by mouth daily    aspirin 81 MG tablet 11/1/2021 at PM  Yes Yes   Sig: Take by mouth daily   atenolol (TENORMIN) 50 MG tablet 11/2/2021 at AM  No Yes   Sig: TAKE 1 TABLET BY MOUTH EVERY DAY   atorvastatin (LIPITOR) 40 MG tablet 11/1/2021 at PM  No Yes   Sig: Take 1 tablet (40 mg) by mouth daily   calcium carbonate (OS-SREEDHAR 500 MG [place]. [place]) 500 MG tablet 11/2/2021 at Unknown time  Yes Yes   Sig: Take 500 mg by mouth daily   glucosamine-chondroitin 500-400 MG CAPS 11/2/2021 at Unknown time  Yes Yes   Sig: Take 1 capsule by mouth daily   lisinopril-hydrochlorothiazide (ZESTORETIC) 20-12.5 MG tablet 11/2/2021 at AM  No Yes   Sig: Take 1 tablet by mouth daily   nitroGLYcerin (NITROSTAT) 0.4 MG sublingual tablet   No Yes   Sig: For chest pain place 1 tablet under the tongue every 5 minutes for 3 doses. If symptoms persist 5 minutes after 1st dose call 911.   nystatin (MYCOSTATIN) 254704 UNIT/GM external ointment   No Yes   Sig: Apply topically 2 times daily   omeprazole (PRILOSEC) 20 MG DR capsule 11/2/2021 at AM  No Yes   Sig: TAKE 1 CAPSULE BY MOUTH EVERY DAY    terazosin (HYTRIN) 2 MG capsule 11/1/2021 at PM  No Yes   Sig: Take 1 capsule (2 mg) by mouth At Bedtime   vitamin  B complex with vitamin C (VITAMIN  B COMPLEX) TABS 11/2/2021 at Unknown time  Yes Yes   Sig: Take 1 tablet by mouth daily      Facility-Administered Medications: None     Allergies   Allergies   Allergen Reactions     Contrast Dye Hives     Social History   I have reviewed this patient's social history and updated it with pertinent information if needed. Ivan Payton  reports that he quit smoking about 53 years ago. His smoking use included cigarettes. He started smoking about 73 years ago. He has a 10.00 pack-year smoking history. He has quit using smokeless tobacco. He reports that he does not drink alcohol and does not use drugs.    Family History   I have reviewed this patient's family history and updated it with pertinent information if needed.   Family History   Problem Relation Age of Onset     C.A.D. Brother         older brother, CABG about age 56     Hypertension Brother      Respiratory Father         lung disease - farmer,      Hypertension Mother      Hypertension Sister      Hypertension Brother      Cerebrovascular Disease Son         12/26/2018       Review of Systems   The 10 point Review of Systems is negative other than noted in the HPI or here.    Physical Exam   Temp: 98.1  F (36.7  C) Temp src: Temporal BP: (!) 143/74 Pulse: 80   Resp: (!) 32 SpO2: 94 % O2 Device: None (Room air)    Vital Signs with Ranges  Temp:  [98.1  F (36.7  C)] 98.1  F (36.7  C)  Pulse:  [80] 80  Resp:  [32] 32  BP: (143)/(4-74) 143/74  SpO2:  [94 %] 94 %  220 lbs 0 oz    Constitutional: Alert, NAD, pleasant and interactive  Eyes: PERRL, sclerae anicteric  HEENT: mmm, atraumatic  Respiratory: Lungs CTAB, no wheezes or crackles  No chest wall tenderness to palpation  Cardiovascular: RRR, no murmurs  3+ symmetric bilateral LE edema  GI: soft, non-tender, nondistended  Skin/Integument: warm,  dry, no acute rashes  Genitourinary: not examined  Musc: BOYER, normal limb strength x 4  Neuro: AOx3, no focal deficits, no tremors  Psych: friendly affect, not anxious, not confused      Data   Data reviewed today:  I personally reviewed: chest xray with increased pulmonary vascularity but no focal consolidation.   Recent Labs   Lab 11/02/21 2008   WBC 5.2   HGB 13.8   MCV 91         POTASSIUM 3.7   CHLORIDE 108   CO2 26   BUN 18   CR 0.72   ANIONGAP 6   SREEDHAR 8.0*   *   TROPONIN <0.015       Imaging:  Recent Results (from the past 24 hour(s))   XR Chest Port 1 View    Narrative    EXAM: XR CHEST PORT 1 VIEW  LOCATION: [facility]  DATE/TIME: 11/2/2021 8:45 PM    INDICATION: Shortness of breath.  COMPARISON: None.      Impression    IMPRESSION: Shallow inspiration accentuates heart size and pulmonary vascularity. There is likely pulmonary venous congestion. Elevation of the left hemidiaphragm. The lungs are otherwise clear. No pneumothorax.

## 2021-11-03 NOTE — ED PROVIDER NOTES
"  History   Chief Complaint:  Shortness of Breath       The history is provided by the patient.      Ivan Payton is a 86 year old male with history of hypertension, right bundle branch block, class 2 severe obesity, and CAD who presents with his wife for shortness of breath. For the past three days, the patient had experienced worsening shortness of breath and diarrhea. His shortness of breath has worsened to the point that he's noted that his O2 sats have decreased while walking between rooms. He has been taking his diuretics as prescribed. Today, his wife noticed that his O2 sats were around 91%, which concerned her enough to bring him into the emergency department. Here, the patient describes a decreased appetite and abdominal discomfort. He has only been drinking pop and snacks. His last full meal was on 10/27/21 and that \"food hasn't tasted good\". He denies any cough, rhinorrhea, chest pain, or similar past shortness of breath. The patient also denies any difficulty breathing when lying flat or noticeable weight gain. In addition, the patient's brother recently passed away and the [facility] service was a few weeks ago.    Of note, the patient is fully vaccinated against COVID-19 and he has not received a booster shot.     Review of Systems   Constitutional: Positive for appetite change (decreased). Negative for unexpected weight change.   HENT: Negative for rhinorrhea.    Respiratory: Positive for shortness of breath. Negative for cough.         No difficulty with breathing when lying flat   Cardiovascular: Negative for chest pain.   Gastrointestinal: Positive for abdominal pain (discomfort).   All other systems reviewed and are negative.    Allergies:  Contrast Dye    Medications:  Ascorbic Acid (VITAMIN C PO)  aspirin 81 MG tablet  atenolol (TENORMIN) 50 MG tablet  atorvastatin (LIPITOR) 40 MG tablet  calcium carbonate (OS-SREEDHAR 500 MG [place]. [place]) 500 MG tablet  glucosamine-chondroitin 500-400 MG " CAPS  lisinopril-hydrochlorothiazide (ZESTORETIC) 20-12.5 MG tablet  nitroGLYcerin (NITROSTAT) 0.4 MG sublingual tablet  nystatin (MYCOSTATIN) 860827 UNIT/GM external ointment  Omega-3 Fatty Acids (OMEGA-3 FISH OIL PO)  omeprazole (PRILOSEC) 20 MG DR capsule  terazosin (HYTRIN) 2 MG capsule  vitamin  B complex with vitamin C (VITAMIN  B COMPLEX) TABS  VITAMIN D, CHOLECALCIFEROL, PO    Past Medical History:     Arthritis   Carotid stenosis   Cerebral vascular disease   Claustrophobia   CVA (cerebral infarction)    Enlarged prostate   Essential hypertension   GERD (gastroesophageal reflux disease)   Hiatal hernia   Hyperlipidemia with target LDL less than 100   RBBB (right bundle branch block)   Stented coronary artery   Uncomplicated asthma   Family history of other cardiovascular diseases  Hypertrophy of prostate without urinary obstruction  Advance care planning  Trochanteric bursitis  Cerebral infarction   Claustrophobia  Hyperlipidemia with target LDL less than 100  Preventive measure  Hydrocele, bilateral  Esophageal stricture  IFG (impaired fasting glucose)  Right-sided low back pain without sciatica: 2005 x2d& reonset mid 3-16   Class 2 severe obesity due to excess calories with serious comorbidity and body mass index (BMI) of 36.0 to 36.9 in adult   Right inguinal pain  Coronary artery disease involving native heart, angina presence unspecified, unspecified vessel or lesion type  Kyphosis (acquired) (postural)  Gastroesophageal reflux disease without esophagitis  Mass of left side of neck    Past Surgical History:    Cardiovascular heart catheterization with possible interverntion   Endarterectomy carotid   Removal of tonsils  Heart catheter, angioplasty   Herniorrhaphy inguinal x2  Hydrocelectomy scrotal, right   Laparoscopic herniorrhaphy inguinal bilateral   MR brain and orbits     Family History:    CAD (brother)   Hypertension (brother, mother, sister, brother)   Lung disease (father)     Social  "History:  The patient is .    Physical Exam     Patient Vitals for the past 24 hrs:   BP Temp Temp src Pulse Resp SpO2 Height Weight   11/02/21 2013 (!) 143/74 -- -- -- -- -- -- --   11/02/21 1959 (!) 143/4 98.1  F (36.7  C) Temporal 80 (!) 32 94 % 1.778 m (5' 10\") 99.8 kg (220 lb)       Physical Exam    Eye:  Pupils are equal, round, and reactive.  Extraocular movements intact.    ENT:  No rhinorrhea.  Moist mucus membranes.  Normal tongue and tonsil.    Cardiac:  Regular rate and rhythm.  No murmurs, gallops, or rubs.    Pulmonary:  Clear to auscultation bilaterally.  No wheezes, rales, or rhonchi. Patient is tachypneic at 24-26 with supplemental oxygen.     Abdomen:  Positive bowel sounds.  Abdomen is soft and non-distended, without focal tenderness.    Musculoskeletal:  Normal movement of all extremities without evidence for deficit. 3+ edema bilateral and symmetric.     Skin:  Warm and dry without rashes.    Neurologic:  Non-focal exam without asymmetric weakness or numbness.     Psychiatric:  Normal affect with appropriate interaction with examiner.    Emergency Department Course   ECG  ECG obtained at 2005, ECG read at 2026  Normal sinus rhythm  Right bundle branch block  Abnormal ECG    Rate 80 bpm. ID interval 154 ms. QRS duration 138 ms. QT/QTc 418/482 ms. P-R-T axes 48 -9 28.     Imaging:  XR Chest Port 1 View   Final Result   IMPRESSION: Shallow inspiration accentuates heart size and pulmonary vascularity. There is likely pulmonary venous congestion. Elevation of the left hemidiaphragm. The lungs are otherwise clear. No pneumothorax.        Report per radiology    Laboratory:  Labs Ordered and Resulted from Time of ED Arrival to Time of ED Departure   BASIC METABOLIC PANEL - Abnormal       Result Value    Sodium 140      Potassium 3.7      Chloride 108      Carbon Dioxide (CO2) 26      Anion Gap 6      Urea Nitrogen 18      Creatinine 0.72      Calcium 8.0 (*)     Glucose 179 (*)     GFR Estimate " 85     INFLUENZA A/B & SARS-COV2 PCR MULTIPLEX - Abnormal    Influenza A target Negative      Influenza B target Negative      SARS CoV2 PCR Positive (*)    CBC WITH PLATELETS AND DIFFERENTIAL - Abnormal    WBC Count 5.2      RBC Count 4.55      Hemoglobin 13.8      Hematocrit 41.3      MCV 91      MCH 30.3      MCHC 33.4      RDW 13.0      Platelet Count 156      % Neutrophils 83      % Lymphocytes 9      % Monocytes 8      % Eosinophils 0      % Basophils 0      % Immature Granulocytes 0      NRBCs per 100 WBC 0      Absolute Neutrophils 4.2      Absolute Lymphocytes 0.5 (*)     Absolute Monocytes 0.4      Absolute Eosinophils 0.0      Absolute Basophils 0.0      Absolute Immature Granulocytes 0.0      Absolute NRBCs 0.0     TROPONIN I - Normal    Troponin I <0.015     NT PROBNP INPATIENT - Normal    N terminal Pro BNP Inpatient 361          Emergency Department Course:  Reviewed:  I reviewed nursing notes, vitals, past medical history, Care Everywhere and [facility]    Assessments:  2027 I obtained history and examined the patient as noted above.   2215 I rechecked the patient and explained findings.     Consults:  2205 I discussed the case with Dr. Rueda of the hospitalist service.     Interventions:  2059: aspirin (ASA) tablet 325 mg, PO   2219: dexamethasone (DECADRON) injection 4 mg, IV     Disposition:  The patient was admitted to the hospital under the care of Dr. Rueda.     Impression & Plan     Medical Decision Making:  This delightful 86-year-old man with a history of chronic edema presents to us with progressive shortness of breath of the last 2 days along with a history of lack of appetite, diarrhea, and fatigue over the past 5 days.  While he is vaccinated for Covid, his Covid test returned positive which is consistent with his evaluation here today.  Otherwise, he may have some mild fluid overload and can be gently diuresed.  Otherwise, he requires admission for hypoxia along with his advanced age and  weakness.  I gave him his first dose of dexamethasone and antivirals will be initiated by the admitting team.  I spoke with Dr. Rueda to the hospitalist service who agrees to admit the patient.    Diagnosis:    ICD-10-CM    1. Acute respiratory failure with hypoxia (H)  J96.01    2. Pneumonia due to 2019 novel coronavirus  U07.1     J12.82        Discharge Medications:  New Prescriptions    No medications on file       Scribe Disclosure:  I, Elisha Gibbs, am serving as a scribe at 8:27 PM on 11/2/2021 to document services personally performed by Trierweiler, Chad A, MD based on my observations and the provider's statements to me.      Trierweiler, Chad A, MD  11/03/21 0059

## 2021-11-03 NOTE — PHARMACY-ADMISSION MEDICATION HISTORY
Pharmacy Medication History  Admission medication history interview status for the 11/2/2021 admission is complete. See EPIC admission navigator for prior to admission medications     Location of Interview: Patient room  Medication history sources: Patient and Surescripts    Significant changes made to the medication list:  None    In the past week, patient estimated taking medication this percent of the time: greater than 90%    Additional medication history information:   None     Medication reconciliation completed by provider prior to medication history? No    Time spent in this activity: 15 minutes    Prior to Admission medications    Medication Sig Last Dose Taking? Auth Provider   Ascorbic Acid (VITAMIN C PO) Take 1,000 mg by mouth daily 11/2/2021 at Unknown time Yes Reported, Patient   aspirin 81 MG tablet Take by mouth daily 11/1/2021 at PM Yes William Thompson MD   atenolol (TENORMIN) 50 MG tablet TAKE 1 TABLET BY MOUTH EVERY DAY 11/2/2021 at AM Yes William Thompson MD   atorvastatin (LIPITOR) 40 MG tablet Take 1 tablet (40 mg) by mouth daily 11/1/2021 at PM Yes Willy Johansen MD   calcium carbonate (OS-SREEDHAR 500 MG [place]. [place]) 500 MG tablet Take 500 mg by mouth daily 11/2/2021 at Unknown time Yes Reported, Patient   glucosamine-chondroitin 500-400 MG CAPS Take 1 capsule by mouth daily 11/2/2021 at Unknown time Yes Reported, Patient   lisinopril-hydrochlorothiazide (ZESTORETIC) 20-12.5 MG tablet Take 1 tablet by mouth daily 11/2/2021 at AM Yes Willy Johansen MD   nitroGLYcerin (NITROSTAT) 0.4 MG sublingual tablet For chest pain place 1 tablet under the tongue every 5 minutes for 3 doses. If symptoms persist 5 minutes after 1st dose call 911.  Yes Justine Otero PA-C   nystatin (MYCOSTATIN) 780089 UNIT/GM external ointment Apply topically 2 times daily  Yes Vickie Marquez APRN CNP   Omega-3 Fatty Acids (OMEGA-3 FISH OIL PO) Take 1 g by mouth daily  11/2/2021 at Unknown time Yes  Reported, Patient   omeprazole (PRILOSEC) 20 MG DR capsule TAKE 1 CAPSULE BY MOUTH EVERY DAY 11/2/2021 at AM Yes William Thompson MD   terazosin (HYTRIN) 2 MG capsule Take 1 capsule (2 mg) by mouth At Bedtime 11/1/2021 at PM Yes Willy Johansen MD   vitamin  B complex with vitamin C (VITAMIN  B COMPLEX) TABS Take 1 tablet by mouth daily 11/2/2021 at Unknown time Yes Reported, Patient   VITAMIN D, CHOLECALCIFEROL, PO Take 5,000 Units by mouth daily  11/2/2021 at Unknown time Yes Reported, Patient       The information provided in this note is only as accurate as the sources available at the time of update(s)

## 2021-11-03 NOTE — PROGRESS NOTES
"[facility]  Hospitalist Progress Note        Angel Dasilva MD   11/03/2021        Interval History:        - afebrile on admission, wbc 5---3; CRP 92; CXR with mild pulmonary venous congestion  - reports feeling better \"the best I have felt in 5 days\"         Assessment and Plan:        Ivan Payton is a very pleasant 86 year old male with hypertension, CAD, h/o CVA, dyslipidemia, GERD,  BPH, CKD stage 2 who was admitted on 11/2/2021 with a  breakthrough covid-19 pneumonia infection.      COVID-19 pneumonia  Vaccinated x 2, has not got a booster  - afebrile on admission, wbc 5---3; CRP 92, d-dimer 0.52; CXR with mild pulmonary venous congestion  - hypoxia en route to hospital in the low 80% range, clinically feels better on 1-2 lts NC oxygen  - will wean oxygen as able; encourage incentive spirometry, inhalers prn  - continue IV Remdesivir (for 5 days or until discharged, started 11/2), PO dexamethasone (for 10 days or until discharged, started 11/2)  -thromboembolism prophylaxis with enoxaparin     Hypertension  Dyslipidemia  H/o CVA  Chronic LE edema  - will resume PTA aspirin, atorvastatin, lisinopril- hydrochlorothiazide, SL nitroglycerin  - does have significant b/l LE edema and states not being very \"faithful\" with his moises-hydrochlorothiazide  - will order 40 mg IV Lasix X 1 and monitor volume status     Chronic, stable problems:  GERD:  continue PTA Protonix  BPH:  continue PTA terazosin      DVT prophylaxis: Enoxaparin (Lovenox) SQ  Code Status: Full     Disposition: Expected discharge in 2-3 days pending clinical stability    Clinically Significant Risk Factors Present on Admission              # Platelet Defect: home medication list includes an antiplatelet medication      Page Me (7 am to 6 pm)              Physical Exam:      Blood pressure (!) 168/85, pulse 80, temperature 98.1  F (36.7  C), temperature source Temporal, resp. rate (!) 32, height 1.778 m (5' 10\"), weight 99.8 " kg (220 lb), SpO2 94 %.  Vitals:    11/02/21 1959   Weight: 99.8 kg (220 lb)     Vital Signs with Ranges  Temp:  [98.1  F (36.7  C)] 98.1  F (36.7  C)  Pulse:  [80] 80  Resp:  [32] 32  BP: (143-168)/(4-85) 168/85  SpO2:  [94 %-98 %] 94 %  I/O's Last 24 hours  No intake/output data recorded.    Constitutional: Alert, awake and oriented X 3; resting comfortably in no apparent distress   HEENT: Pupils equal and reactive to light and accomodation, neck supple    Oral cavity: Moist mucosa   Cardiovascular: Normal s1 s2, regular rate and rhythm, no murmur   Lungs: B/l clear to auscultation, no wheezes or crepitations   Abdomen: Soft, nt, nd, no guarding, rigidity or rebound; BS +   LE : Marked b/l edema ++   Musculoskeletal: Power 5/5 in all extremities   Neuro: No focal neurological deficits noted   Psychiatry: normal mood and affect                Medications:          [START ON 11/4/2021] remdesivir  100 mg Intravenous Q24H    And     [START ON 11/4/2021] sodium chloride 0.9%  50 mL Intravenous Q24H     dexamethasone  6 mg Oral Daily     enoxaparin ANTICOAGULANT  40 mg Subcutaneous Q24H     PRN Meds: acetaminophen **OR** acetaminophen, albuterol, ondansetron **OR** ondansetron         Data:      All new lab and imaging data was reviewed.   Recent Labs   Lab Test 11/03/21  0506 11/02/21 2008 01/07/20  0804 12/18/18  0700 12/18/18  0700 12/16/15  0830 09/09/14  0702   WBC 3.0* 5.2 5.1   < > 4.9   < > 5.3   HGB 14.6 13.8 14.9   < > 15.7   < > 13.5   MCV 91 91 92   < > 89   < > 82   * 156 183   < > 186   < > 204   INR 1.10  --   --   --  1.06  --  1.03    < > = values in this interval not displayed.      Recent Labs   Lab Test 11/02/21 2008 03/17/21  1207 09/01/20  1038     140 138 139   POTASSIUM 3.8  3.7 4.4 3.9   CHLORIDE 106  108 109 107   CO2 28  26 29 26   BUN 19  18 21 20   CR 0.79  0.72 0.80 0.98   ANIONGAP 4  6 <1* 9.9   SREEDHAR 7.8*  8.0* 8.6 9.4   *  179* 152* 149*     Recent Labs    Lab Test 11/02/21 2008   TROPONIN <0.015

## 2021-11-03 NOTE — ED NOTES
"[facility]  ED Nurse Handoff Report    ED Chief complaint: Shortness of Breath      ED Diagnosis:   Final diagnoses:   Acute respiratory failure with hypoxia (H)   Pneumonia due to 2019 novel coronavirus       Code Status: Full Code    Allergies:   Allergies   Allergen Reactions     Contrast Dye Hives       Patient Story: Patient was sent her from urgent care where he was seen with complaints of shortness of breath and noted to have low SpO2 but the patient stated that they did not take his vital signs there, they sent him right to the ED. Patient has rapid respirations on arrival here and says that his shortness of breath began about three days ago. He denied chest pain, dizziness, and nausea. He added that he has been feeling sluggish, has had a lack of ambition, and decreased appetite for the past 3-5 days as well. He has had cardiac stents placed twice, two years apart, and says he currently has 6 stents in his heart.  Focused Assessment:  See flowsheets    Treatments and/or interventions provided: see MAR  Patient's response to treatments and/or interventions: See Flowsheets    To be done/followed up on inpatient unit:      Does this patient have any cognitive concerns?: none    Activity level - Baseline/Home:  Independent  Activity Level - Current:   Independent    Patient's Preferred language: English   Needed?: No    Isolation: None and COVID r/o and special precautions  Infection: Not Applicable  COVID r/o and special precautions  Patient tested for COVID 19 prior to admission: YES  Bariatric?: No    Vital Signs:   Vitals:    11/02/21 1959 11/02/21 2013   BP: (!) 143/4 (!) 143/74   Pulse: 80    Resp: (!) 32    Temp: 98.1  F (36.7  C)    TempSrc: Temporal    SpO2: 94%    Weight: 99.8 kg (220 lb)    Height: 1.778 m (5' 10\")        Cardiac Rhythm:     Was the PSS-3 completed:   Yes  What interventions are required if any?               Family Comments:   OBS brochure/video " discussed/provided to patient/family: Yes              Name of person given brochure if not patient:               Relationship to patient:     For the majority of the shift this patient's behavior was Green.   Behavioral interventions performed were none.    ED NURSE PHONE NUMBER:

## 2021-11-03 NOTE — PLAN OF CARE
DATE & TIME: 11/3/2021 [id number] ED admit.     Cognitive Concerns/ Orientation : A&O x4, hard of hearing.    BEHAVIOR & AGGRESSION TOOL COLOR: green   CIWA SCORE: na    ABNL VS/O2: HTN, bradycardic after am BP meds. On 1 LPM NC. Lungs clear. Unable to wean off O2, desat to 88% when sleeping.   MOBILITY: IND/SBA (may need assistance with equipment), steady on feet.   PAIN MANAGMENT: denied pain.   DIET: regular, poor appetite.   BOWEL/BLADDER: continent  ABNL LAB/BG: D-dimer 0.52.   DRAIN/DEVICES: PIV, SL.   TELEMETRY RHYTHM: SB with PAC and BBB  SKIN: pale, intact, 2+ edema on BLE.   TESTS/PROCEDURES: na   D/C DATE: 2-3 days   Discharge Barriers: O2 need.   OTHER IMPORTANT INFO:   MD/RN ROUNDING SIGNED OFF D/E SHIFT:   COMMIT TO SIT DONE AND SIGNED OFF   IS THE PATIENT ON REMDESIVIR? DATE OF LAST SCHEDULED DOSE

## 2021-11-03 NOTE — ED TRIAGE NOTES
Patient was sent her from urgent care where he was seen with complaints of shortness of breath and noted to have low SpO2 but the patient stated that they did not take his vital signs there, they sent him right to the ED. Patient has rapid respirations on arrival here and says that his shortness of breath began about three days ago. He denied chest pain, dizziness, and nausea. He added that he has been feeling sluggish, has had a lack of ambition, and decreased appetite for the past 3-5 days as well. He has had cardiac stents placed twice, two years apart, and says he currently has 6 stents in his heart.

## 2021-11-04 LAB
ALBUMIN SERPL-MCNC: 2.5 G/DL (ref 3.4–5)
ALP SERPL-CCNC: 57 U/L (ref 40–150)
ALT SERPL W P-5'-P-CCNC: 29 U/L (ref 0–70)
AST SERPL W P-5'-P-CCNC: 18 U/L (ref 0–45)
ATRIAL RATE - MUSE: 80 BPM
BILIRUB DIRECT SERPL-MCNC: 0.2 MG/DL (ref 0–0.2)
BILIRUB SERPL-MCNC: 0.4 MG/DL (ref 0.2–1.3)
CRP SERPL-MCNC: 41.4 MG/L (ref 0–8)
D DIMER PPP FEU-MCNC: 0.45 UG/ML FEU (ref 0–0.5)
DIASTOLIC BLOOD PRESSURE - MUSE: NORMAL MMHG
ERYTHROCYTE [DISTWIDTH] IN BLOOD BY AUTOMATED COUNT: 12.8 % (ref 10–15)
HCT VFR BLD AUTO: 43.8 % (ref 40–53)
HGB BLD-MCNC: 14.7 G/DL (ref 13.3–17.7)
INTERPRETATION ECG - MUSE: NORMAL
MCH RBC QN AUTO: 29.6 PG (ref 26.5–33)
MCHC RBC AUTO-ENTMCNC: 33.6 G/DL (ref 31.5–36.5)
MCV RBC AUTO: 88 FL (ref 78–100)
P AXIS - MUSE: 48 DEGREES
PLATELET # BLD AUTO: 208 10E3/UL (ref 150–450)
PR INTERVAL - MUSE: 154 MS
PROT SERPL-MCNC: 6.3 G/DL (ref 6.8–8.8)
QRS DURATION - MUSE: 138 MS
QT - MUSE: 418 MS
QTC - MUSE: 482 MS
R AXIS - MUSE: -9 DEGREES
RBC # BLD AUTO: 4.97 10E6/UL (ref 4.4–5.9)
SYSTOLIC BLOOD PRESSURE - MUSE: NORMAL MMHG
T AXIS - MUSE: 28 DEGREES
VENTRICULAR RATE- MUSE: 80 BPM
WBC # BLD AUTO: 4 10E3/UL (ref 4–11)

## 2021-11-04 PROCEDURE — 250N000013 HC RX MED GY IP 250 OP 250 PS 637: Performed by: HOSPITALIST

## 2021-11-04 PROCEDURE — 250N000011 HC RX IP 250 OP 636: Performed by: INTERNAL MEDICINE

## 2021-11-04 PROCEDURE — 250N000011 HC RX IP 250 OP 636: Performed by: HOSPITALIST

## 2021-11-04 PROCEDURE — 80076 HEPATIC FUNCTION PANEL: CPT | Performed by: HOSPITALIST

## 2021-11-04 PROCEDURE — 86140 C-REACTIVE PROTEIN: CPT | Performed by: INTERNAL MEDICINE

## 2021-11-04 PROCEDURE — 99233 SBSQ HOSP IP/OBS HIGH 50: CPT | Performed by: HOSPITALIST

## 2021-11-04 PROCEDURE — 258N000003 HC RX IP 258 OP 636: Performed by: INTERNAL MEDICINE

## 2021-11-04 PROCEDURE — 120N000001 HC R&B MED SURG/OB

## 2021-11-04 PROCEDURE — 250N000012 HC RX MED GY IP 250 OP 636 PS 637: Performed by: INTERNAL MEDICINE

## 2021-11-04 PROCEDURE — 85027 COMPLETE CBC AUTOMATED: CPT | Performed by: INTERNAL MEDICINE

## 2021-11-04 PROCEDURE — 250N000013 HC RX MED GY IP 250 OP 250 PS 637: Performed by: INTERNAL MEDICINE

## 2021-11-04 PROCEDURE — 36415 COLL VENOUS BLD VENIPUNCTURE: CPT | Performed by: INTERNAL MEDICINE

## 2021-11-04 PROCEDURE — 250N000009 HC RX 250: Performed by: INTERNAL MEDICINE

## 2021-11-04 PROCEDURE — 85379 FIBRIN DEGRADATION QUANT: CPT | Performed by: INTERNAL MEDICINE

## 2021-11-04 RX ORDER — FUROSEMIDE 10 MG/ML
20 INJECTION INTRAMUSCULAR; INTRAVENOUS ONCE
Status: COMPLETED | OUTPATIENT
Start: 2021-11-04 | End: 2021-11-04

## 2021-11-04 RX ADMIN — SENNOSIDES AND DOCUSATE SODIUM 1 TABLET: 8.6; 5 TABLET ORAL at 08:39

## 2021-11-04 RX ADMIN — OXYCODONE HYDROCHLORIDE AND ACETAMINOPHEN 1000 MG: 500 TABLET ORAL at 08:39

## 2021-11-04 RX ADMIN — LISINOPRIL AND HYDROCHLOROTHIAZIDE 1 TABLET: 12.5; 2 TABLET ORAL at 08:40

## 2021-11-04 RX ADMIN — DEXAMETHASONE 6 MG: 2 TABLET ORAL at 12:22

## 2021-11-04 RX ADMIN — SODIUM CHLORIDE 50 ML: 9 INJECTION, SOLUTION INTRAVENOUS at 03:00

## 2021-11-04 RX ADMIN — PANTOPRAZOLE SODIUM 40 MG: 40 TABLET, DELAYED RELEASE ORAL at 06:35

## 2021-11-04 RX ADMIN — ENOXAPARIN SODIUM 40 MG: 40 INJECTION SUBCUTANEOUS at 04:33

## 2021-11-04 RX ADMIN — ATENOLOL 50 MG: 50 TABLET ORAL at 08:39

## 2021-11-04 RX ADMIN — ATORVASTATIN CALCIUM 40 MG: 40 TABLET, FILM COATED ORAL at 21:35

## 2021-11-04 RX ADMIN — FUROSEMIDE 20 MG: 10 INJECTION, SOLUTION INTRAVENOUS at 15:47

## 2021-11-04 RX ADMIN — REMDESIVIR 100 MG: 100 INJECTION, POWDER, LYOPHILIZED, FOR SOLUTION INTRAVENOUS at 02:58

## 2021-11-04 RX ADMIN — ASPIRIN 81 MG: 81 TABLET, COATED ORAL at 08:40

## 2021-11-04 ASSESSMENT — ACTIVITIES OF DAILY LIVING (ADL)
ADLS_ACUITY_SCORE: 5
ADLS_ACUITY_SCORE: 5
ADLS_ACUITY_SCORE: 7
ADLS_ACUITY_SCORE: 3
ADLS_ACUITY_SCORE: 7
ADLS_ACUITY_SCORE: 3
ADLS_ACUITY_SCORE: 3
ADLS_ACUITY_SCORE: 7
ADLS_ACUITY_SCORE: 3
ADLS_ACUITY_SCORE: 5
ADLS_ACUITY_SCORE: 3
ADLS_ACUITY_SCORE: 7
ADLS_ACUITY_SCORE: 5
ADLS_ACUITY_SCORE: 3
ADLS_ACUITY_SCORE: 7
ADLS_ACUITY_SCORE: 5
ADLS_ACUITY_SCORE: 7
ADLS_ACUITY_SCORE: 7

## 2021-11-04 NOTE — PLAN OF CARE
DATE & TIME: 11/03/2021 1900- 11/04/21 0730  Cognitive Concerns/ Orientation : A&O x4, forgetful; hard of hearing.    BEHAVIOR & AGGRESSION TOOL COLOR: green   CIWA SCORE: na    ABNL VS/O2: VSS 2-3 L O2 via NC, desats while sleeping; O2 sats 92-96%  MOBILITY: SBA, needs help with tubing  PAIN MANAGMENT: denied pain.   DIET: regular, poor appetite.   BOWEL/BLADDER: continent  ABNL LAB/BG: None new  DRAIN/DEVICES: PIV SL   TELEMETRY RHYTHM: NSR  SKIN: pale, intact, 2+ edema on BLE.   TESTS/PROCEDURES: na   D/C DATE: 2-3 days   Discharge Barriers: O2 needs.   OTHER IMPORTANT INFO: Special precautions maintained for COVID.  LS diminished/clear.  IS THE PATIENT ON REMDESIVIR? DATE OF LAST SCHEDULED DOSE:  11/08/21

## 2021-11-04 NOTE — PROGRESS NOTES
"[facility]  Hospitalist Progress Note        Angel Dasilva MD   11/04/2021        Interval History:        - patient reports feeling fine but was requiring slightly increased oxygen at 3 lts this morning--later weaned down to 1 lt  - diuresed well with improving leg swelling; I/O net negative 1385 although several unmeasured outputs  - seems slightly forgetful         Assessment and Plan:        Ivan Payton is a very pleasant 86 year old male with hypertension, CAD, h/o CVA, dyslipidemia, GERD,  BPH, CKD stage 2 who was admitted on 11/2/2021 with a  breakthrough covid-19 pneumonia infection.      COVID-19 pneumonia  Vaccinated x 2, has not got a booster  - afebrile , wbc 5---3; CRP 92---41, d-dimer 0.52---0.45  - on admission CXR with mild pulmonary venous congestion  - hypoxia en route to hospital in the low 80% range, on admission requiring 1-2 lts;   - will wean oxygen as able; encourage incentive spirometry, inhalers prn  - continue IV Remdesivir (for 5 days or until discharged, started 11/2), PO dexamethasone (for 10 days or until discharged, started 11/2)  - thromboembolism prophylaxis with enoxaparin     Hypertension  Dyslipidemia  H/o CVA  Chronic LE edema  - continue PTA aspirin, atorvastatin, lisinopril- hydrochlorothiazide, SL nitroglycerin  - on presentation noted significant b/l LE edema and states not being very \"faithful\" with his moises-hydrochlorothiazide  - got 40 mg IV Lasix on 11/3, diuresed well with improving leg swelling; I/O net negative 1385 although several unmeasured outputs; will give additional 20 mg IV Lasix X 1 (11/4); monitor volume status; monitor BMP     Chronic, stable problems:  GERD:  continue PTA Protonix  BPH:  continue PTA terazosin      DVT prophylaxis: Enoxaparin (Lovenox) SQ  Code Status: Full     Disposition: Expected discharge in 1-2 days pending clinical stability; patient seems slightly forgetful; will get PT/OT eval and SW for " "disposition    Clinically Significant Risk Factors Present on Admission                Page Me (7 am to 6 pm)    Care plan discussed with patient and his wife madhavi over the phone    > 35 minutes spent today, >50 % facetime discussing treatment care plan and also updating the family              Physical Exam:      Blood pressure (!) 154/81, pulse 75, temperature 97.2  F (36.2  C), temperature source Axillary, resp. rate 20, height 1.778 m (5' 10\"), weight 99.8 kg (220 lb), SpO2 95 %.  Vitals:    11/02/21 1959   Weight: 99.8 kg (220 lb)     Vital Signs with Ranges  Temp:  [97.2  F (36.2  C)-97.7  F (36.5  C)] 97.2  F (36.2  C)  Pulse:  [55-75] 75  Resp:  [18-20] 20  BP: (112-168)/(65-88) 154/81  SpO2:  [88 %-96 %] 95 %  I/O's Last 24 hours  I/O last 3 completed shifts:  In: 240 [P.O.:240]  Out: 1625 [Urine:1625]    Constitutional: Alert, awake and oriented X 2-3, seems forgetful and slightly confused with dates; resting comfortably in no apparent distress   HEENT: Pupils equal and reactive to light and accomodation, neck supple    Oral cavity: Moist mucosa   Cardiovascular: Normal s1 s2, regular rate and rhythm, no murmur   Lungs: B/l clear to auscultation, no wheezes or crepitations   Abdomen: Soft, nt, nd, no guarding, rigidity or rebound; BS +   LE : b/l edema +, improving   Musculoskeletal: Power 5/5 in all extremities   Neuro: No focal neurological deficits noted   Psychiatry: normal mood and affect                Medications:          remdesivir  100 mg Intravenous Q24H    And     sodium chloride 0.9%  50 mL Intravenous Q24H     aspirin  81 mg Oral Daily     atenolol  50 mg Oral Daily     atorvastatin  40 mg Oral Daily     dexamethasone  6 mg Oral Daily     enoxaparin ANTICOAGULANT  40 mg Subcutaneous Q24H     lisinopril-hydrochlorothiazide  1 tablet Oral Daily     pantoprazole  40 mg Oral QAM AC     sodium chloride (PF)  3 mL Intracatheter Q8H     terazosin  2 mg Oral At Bedtime     vitamin C  1,000 mg Oral " Daily     PRN Meds: acetaminophen **OR** acetaminophen, albuterol, lidocaine 4%, lidocaine (buffered or not buffered), - MEDICATION INSTRUCTIONS -, ondansetron **OR** ondansetron, polyethylene glycol, prochlorperazine **OR** prochlorperazine **OR** prochlorperazine, senna-docusate **OR** senna-docusate, sodium chloride (PF)         Data:      All new lab and imaging data was reviewed.   Recent Labs   Lab Test 11/03/21  0506 11/02/21 2008 01/07/20  0804 12/18/18  0700 12/18/18  0700 12/16/15  0830 09/09/14  0702   WBC 3.0* 5.2 5.1   < > 4.9   < > 5.3   HGB 14.6 13.8 14.9   < > 15.7   < > 13.5   MCV 91 91 92   < > 89   < > 82   * 156 183   < > 186   < > 204   INR 1.10  --   --   --  1.06  --  1.03    < > = values in this interval not displayed.      Recent Labs   Lab Test 11/02/21 2008 03/17/21  1207 09/01/20  1038     140 138 139   POTASSIUM 3.8  3.7 4.4 3.9   CHLORIDE 106  108 109 107   CO2 28  26 29 26   BUN 19  18 21 20   CR 0.79  0.72 0.80 0.98   ANIONGAP 4  6 <1* 9.9   SREEDHAR 7.8*  8.0* 8.6 9.4   *  179* 152* 149*     Recent Labs   Lab Test 11/02/21 2008   TROPONIN <0.015

## 2021-11-04 NOTE — PLAN OF CARE
DATE & TIME: 11/04/21 4718=3952  Cognitive Concerns/ Orientation : A&O x4 but forgetful, hard of hearing.    BEHAVIOR & AGGRESSION TOOL COLOR: Green   CIWA SCORE:NA    ABNL VS/O2: VSS except eusebia at times; desats at times to high-80's otherwise 90's on 1L (from 3L)  MOBILITY: SBA - steady on feet but needs help with tubing  PAIN MANAGMENT: Denies  DIET: Regular  BOWEL/BLADDER: Continent, last BM 11/2 - Senna given with no results thus far  ABNL LAB/BG: CRP 92.8 to 41.4  DRAIN/DEVICES: Right PIV SL   TELEMETRY RHYTHM: NSR  SKIN: Pale; 2+ edema on BLE.   TESTS/PROCEDURES: NA  D/C DATE: Expected discharge in 1-2 days pending clinical stability  Discharge Barriers: O2 needs.   OTHER IMPORTANT INFO: Special precautions maintained for COVID.  LSC but diminished. IS encouraged. PT/OT eval and SW for disposition.

## 2021-11-05 ENCOUNTER — APPOINTMENT (OUTPATIENT)
Dept: PHYSICAL THERAPY | Facility: CLINIC | Age: 86
DRG: 177 | End: 2021-11-05
Attending: HOSPITALIST
Payer: COMMERCIAL

## 2021-11-05 VITALS
WEIGHT: 220 LBS | DIASTOLIC BLOOD PRESSURE: 73 MMHG | OXYGEN SATURATION: 92 % | SYSTOLIC BLOOD PRESSURE: 132 MMHG | RESPIRATION RATE: 22 BRPM | TEMPERATURE: 97.5 F | HEART RATE: 62 BPM | BODY MASS INDEX: 31.5 KG/M2 | HEIGHT: 70 IN

## 2021-11-05 LAB
ANION GAP SERPL CALCULATED.3IONS-SCNC: 6 MMOL/L (ref 3–14)
BUN SERPL-MCNC: 31 MG/DL (ref 7–30)
CALCIUM SERPL-MCNC: 8.6 MG/DL (ref 8.5–10.1)
CHLORIDE BLD-SCNC: 108 MMOL/L (ref 94–109)
CO2 SERPL-SCNC: 27 MMOL/L (ref 20–32)
CREAT SERPL-MCNC: 0.69 MG/DL (ref 0.66–1.25)
CRP SERPL-MCNC: 15.8 MG/L (ref 0–8)
D DIMER PPP FEU-MCNC: <0.27 UG/ML FEU (ref 0–0.5)
ERYTHROCYTE [DISTWIDTH] IN BLOOD BY AUTOMATED COUNT: 12.6 % (ref 10–15)
GFR SERPL CREATININE-BSD FRML MDRD: 87 ML/MIN/1.73M2
GLUCOSE BLD-MCNC: 174 MG/DL (ref 70–99)
HCT VFR BLD AUTO: 47.7 % (ref 40–53)
HGB BLD-MCNC: 15.4 G/DL (ref 13.3–17.7)
MCH RBC QN AUTO: 29.4 PG (ref 26.5–33)
MCHC RBC AUTO-ENTMCNC: 32.3 G/DL (ref 31.5–36.5)
MCV RBC AUTO: 91 FL (ref 78–100)
PLATELET # BLD AUTO: 222 10E3/UL (ref 150–450)
POTASSIUM BLD-SCNC: 3.6 MMOL/L (ref 3.4–5.3)
RBC # BLD AUTO: 5.24 10E6/UL (ref 4.4–5.9)
SODIUM SERPL-SCNC: 141 MMOL/L (ref 133–144)
WBC # BLD AUTO: 6.4 10E3/UL (ref 4–11)

## 2021-11-05 PROCEDURE — 85379 FIBRIN DEGRADATION QUANT: CPT | Performed by: INTERNAL MEDICINE

## 2021-11-05 PROCEDURE — 86140 C-REACTIVE PROTEIN: CPT | Performed by: INTERNAL MEDICINE

## 2021-11-05 PROCEDURE — 250N000009 HC RX 250: Performed by: INTERNAL MEDICINE

## 2021-11-05 PROCEDURE — 250N000013 HC RX MED GY IP 250 OP 250 PS 637: Performed by: HOSPITALIST

## 2021-11-05 PROCEDURE — 258N000003 HC RX IP 258 OP 636: Performed by: INTERNAL MEDICINE

## 2021-11-05 PROCEDURE — 250N000012 HC RX MED GY IP 250 OP 636 PS 637: Performed by: INTERNAL MEDICINE

## 2021-11-05 PROCEDURE — 80048 BASIC METABOLIC PNL TOTAL CA: CPT | Performed by: HOSPITALIST

## 2021-11-05 PROCEDURE — 99239 HOSP IP/OBS DSCHRG MGMT >30: CPT | Performed by: HOSPITALIST

## 2021-11-05 PROCEDURE — 97530 THERAPEUTIC ACTIVITIES: CPT | Mod: GP

## 2021-11-05 PROCEDURE — 97116 GAIT TRAINING THERAPY: CPT | Mod: GP

## 2021-11-05 PROCEDURE — 36415 COLL VENOUS BLD VENIPUNCTURE: CPT | Performed by: INTERNAL MEDICINE

## 2021-11-05 PROCEDURE — 250N000013 HC RX MED GY IP 250 OP 250 PS 637: Performed by: INTERNAL MEDICINE

## 2021-11-05 PROCEDURE — 85027 COMPLETE CBC AUTOMATED: CPT | Performed by: INTERNAL MEDICINE

## 2021-11-05 PROCEDURE — 250N000011 HC RX IP 250 OP 636: Performed by: INTERNAL MEDICINE

## 2021-11-05 PROCEDURE — 97162 PT EVAL MOD COMPLEX 30 MIN: CPT | Mod: GP

## 2021-11-05 RX ORDER — ATENOLOL 50 MG/1
25 TABLET ORAL DAILY
Qty: 90 TABLET | Refills: 1
Start: 2021-11-05 | End: 2022-03-02

## 2021-11-05 RX ORDER — ATENOLOL 25 MG/1
25 TABLET ORAL DAILY
Status: DISCONTINUED | OUTPATIENT
Start: 2021-11-05 | End: 2021-11-05 | Stop reason: HOSPADM

## 2021-11-05 RX ADMIN — LISINOPRIL AND HYDROCHLOROTHIAZIDE 1 TABLET: 12.5; 2 TABLET ORAL at 08:59

## 2021-11-05 RX ADMIN — OXYCODONE HYDROCHLORIDE AND ACETAMINOPHEN 1000 MG: 500 TABLET ORAL at 08:58

## 2021-11-05 RX ADMIN — PANTOPRAZOLE SODIUM 40 MG: 40 TABLET, DELAYED RELEASE ORAL at 06:48

## 2021-11-05 RX ADMIN — SODIUM CHLORIDE 50 ML: 9 INJECTION, SOLUTION INTRAVENOUS at 04:31

## 2021-11-05 RX ADMIN — ENOXAPARIN SODIUM 40 MG: 40 INJECTION SUBCUTANEOUS at 04:32

## 2021-11-05 RX ADMIN — REMDESIVIR 100 MG: 100 INJECTION, POWDER, LYOPHILIZED, FOR SOLUTION INTRAVENOUS at 04:26

## 2021-11-05 RX ADMIN — ATENOLOL 25 MG: 25 TABLET ORAL at 08:59

## 2021-11-05 RX ADMIN — DEXAMETHASONE 6 MG: 2 TABLET ORAL at 12:20

## 2021-11-05 RX ADMIN — ASPIRIN 81 MG: 81 TABLET, COATED ORAL at 08:58

## 2021-11-05 ASSESSMENT — ACTIVITIES OF DAILY LIVING (ADL)
ADLS_ACUITY_SCORE: 5
ADLS_ACUITY_SCORE: 8
ADLS_ACUITY_SCORE: 5
ADLS_ACUITY_SCORE: 8
ADLS_ACUITY_SCORE: 5
ADLS_ACUITY_SCORE: 8
ADLS_ACUITY_SCORE: 5
ADLS_ACUITY_SCORE: 8
ADLS_ACUITY_SCORE: 5
ADLS_ACUITY_SCORE: 5

## 2021-11-05 NOTE — PLAN OF CARE
DATE & TIME: 11/04/21-11/05/21 1524-1396  Cognitive Concerns/ Orientation : A&O x4  hard of hearing.    BEHAVIOR & AGGRESSION TOOL COLOR: Green   CIWA SCORE:NA    ABNL VS/O2: VSS - sats mid-90s on 2L (down from 3L). Pt bradycardic, notify provider if HR <40bpm.  MOBILITY: SBA - steady on feet but needs help with tubing  PAIN MANAGMENT: Denies  DIET: Regular  BOWEL/BLADDER: Continent, pt stated last BM 11/1 - Received senna in am - would like to try Miralax if no improvement  ABNL LAB/BG: CRP 92.8 to 41.4  DRAIN/DEVICES: Right PIV SL   TELEMETRY RHYTHM: Sinus eusebia w/BBB  SKIN: Pale; 2-3+ edema on BLE.   TESTS/PROCEDURES: NA  D/C DATE: Expected discharge in 2-3 days pending clinical stability  Discharge Barriers: O2 needs.   OTHER IMPORTANT INFO: Special precautions maintained for COVID.  LSC but diminished. Pt on remdesivir until 11/08/21.

## 2021-11-05 NOTE — DISCHARGE SUMMARY
"[facility]  Discharge Summary        Ivan Payton MRN# [id number]   YOB: 1935 Age: 86 year old     Date of Admission:  11/2/2021  Date of Discharge:  11/5/2021  Admitting Physician:  Linda Rueda MD  Discharge Physician: Angel Dasilva MD  Discharging Service: Hospitalist     Primary Provider: William Thompson  Primary Care Physician Phone Number: [phone]         Discharge Diagnoses/Problem Oriented Hospital Course (Providers):    Ivan Payton was admitted on 11/2/2021 by Linda Rueda MD and I would refer you to their history and physical.  The following problems were addressed during his hospitalization:    Ivan Payton is a very pleasant 86 year old male with hypertension, CAD, h/o CVA, dyslipidemia, GERD,  BPH, CKD stage 2 who was admitted on 11/2/2021 with a  breakthrough COVID-19 pneumonia infection.      COVID-19 pneumonia  Vaccinated x 2, has not got a booster  - remained afebrile , wbc 5---3; CRP 92---41---15, d-dimer 0.52---0.45--<0.27  - on admission CXR with mild pulmonary venous congestion  - hypoxia en route to hospital in the low 80% range, on admission requiring 1-2 lts;   - oxygen weaned down to room air; encourage incentive spirometry  - was started on IV Remdesivir (11/2 to 11/5), PO dexamethasone (11/2 to 11/5)  - thromboembolism prophylaxis with enoxaparin while in hospital and started on Po Eliquis for discharge per COVID anticoagulation guidelines     Hypertension  Dyslipidemia  H/o CVA  Chronic LE edema  - continue PTA aspirin, atorvastatin, lisinopril- hydrochlorothiazide, atenolol, SL nitroglycerin  - on presentation noted significant b/l LE edema and states not being very \"faithful\" with his moises-hydrochlorothiazide  - got 40 mg IV Lasix on 11/3 and additional 20 mg IV Lasix X 1 (11/4); diuresed well and looks euvolumic with much improvement in LE edema  - noted bradycardic to 47 during sleep, asymptomatic; HR on day " of discharge in 50s; will decrease PTA atenolol to 12.5 mg daily     Chronic, stable problems:  GERD:  continue PTA Protonix  BPH:  continue PTA terazosin     Code Status: Full     Disposition: to home with outpatient PT; was evaluated by PT     Clinically Significant Risk Factors Present on Admission[]Collapse by Default                   Brief Hospital Stay Summary Sent Home With Patient in AVS:        Reason for your hospital stay      You were admitted with shortness of breath and low oxygen status due to   COVID.                 Pending Results:        Unresulted Labs Ordered in the Past 30 Days of this Admission     No orders found from 10/3/2021 to 11/3/2021.            Discharge Instructions and Follow-Up:      Follow-up Appointments     Follow-up and recommended labs and tests       Follow up with primary care provider, William Thompson, within 7 days for   hospital follow- up.  The following labs/tests are recommended: repeat   BMP.                 Discharge Disposition:      Discharged to home        Discharge Medications:        Current Discharge Medication List      START taking these medications    Details   apixaban ANTICOAGULANT (ELIQUIS) 2.5 MG tablet Take 1 tablet (2.5 mg) by mouth 2 times daily  Qty: 60 tablet, Refills: 0    Associated Diagnoses: Pneumonia due to 2019 novel coronavirus         CONTINUE these medications which have CHANGED    Details   atenolol (TENORMIN) 50 MG tablet Take 0.5 tablets (25 mg) by mouth daily  Qty: 90 tablet, Refills: 1    Associated Diagnoses: Essential hypertension         CONTINUE these medications which have NOT CHANGED    Details   Ascorbic Acid (VITAMIN C PO) Take 1,000 mg by mouth daily      aspirin 81 MG tablet Take by mouth daily  Qty: 30 tablet      atorvastatin (LIPITOR) 40 MG tablet Take 1 tablet (40 mg) by mouth daily  Qty: 90 tablet, Refills: 2    Associated Diagnoses: Coronary artery disease involving native coronary artery of native heart, angina  "presence unspecified      calcium carbonate (OS-SREEDHAR 500 MG [place]. [place]) 500 MG tablet Take 500 mg by mouth daily      glucosamine-chondroitin 500-400 MG CAPS Take 1 capsule by mouth daily      lisinopril-hydrochlorothiazide (ZESTORETIC) 20-12.5 MG tablet Take 1 tablet by mouth daily  Qty: 90 tablet, Refills: 1    Associated Diagnoses: Essential hypertension      nitroGLYcerin (NITROSTAT) 0.4 MG sublingual tablet For chest pain place 1 tablet under the tongue every 5 minutes for 3 doses. If symptoms persist 5 minutes after 1st dose call 911.  Qty: 25 tablet, Refills: 1    Associated Diagnoses: Essential hypertension; Coronary artery disease of native artery of native heart with stable angina pectoris (H)      nystatin (MYCOSTATIN) 844665 UNIT/GM external ointment Apply topically 2 times daily  Qty: 30 g, Refills: 0      Omega-3 Fatty Acids (OMEGA-3 FISH OIL PO) Take 1 g by mouth daily       omeprazole (PRILOSEC) 20 MG DR capsule TAKE 1 CAPSULE BY MOUTH EVERY DAY  Qty: 90 capsule, Refills: 1    Associated Diagnoses: Atypical chest pain      terazosin (HYTRIN) 2 MG capsule Take 1 capsule (2 mg) by mouth At Bedtime  Qty: 90 capsule, Refills: 1    Associated Diagnoses: Essential hypertension      vitamin  B complex with vitamin C (VITAMIN  B COMPLEX) TABS Take 1 tablet by mouth daily      VITAMIN D, CHOLECALCIFEROL, PO Take 5,000 Units by mouth daily                Allergies:         Allergies   Allergen Reactions     Contrast Dye Hives           Consultations This Hospital Stay:      No consultations were requested during this admission        Condition and Physical on Discharge:      Discharge condition: Stable   Vitals: Blood pressure 128/69, pulse 65, temperature 97.7  F (36.5  C), temperature source Oral, resp. rate (!) 77, height 1.778 m (5' 10\"), weight 99.8 kg (220 lb), SpO2 92 %.     Constitutional: Alert, awake and oriented X 3; resting comfortably in no apparent distress   HEENT: Pupils equal and reactive to " light and accomodation, neck supple    Oral cavity: Moist mucosa   Cardiovascular: Normal s1 s2, regular rate and rhythm, no murmur   Lungs: B/l clear to auscultation, no wheezes or crepitations   Abdomen: Soft, nt, nd, no guarding, rigidity or rebound; BS +   LE : b/l edema +, improving   Musculoskeletal: Power 5/5 in all extremities   Neuro: No focal neurological deficits noted   Psychiatry: normal mood and affect               Discharge Time:      Greater than 30 minutes.        Image Results From This Hospital Stay (For Non-EPIC Providers):        Results for orders placed or performed during the hospital encounter of 11/02/21   XR Chest Port 1 View    Narrative    EXAM: XR CHEST PORT 1 VIEW  LOCATION: [facility]  DATE/TIME: 11/2/2021 8:45 PM    INDICATION: Shortness of breath.  COMPARISON: None.      Impression    IMPRESSION: Shallow inspiration accentuates heart size and pulmonary vascularity. There is likely pulmonary venous congestion. Elevation of the left hemidiaphragm. The lungs are otherwise clear. No pneumothorax.           Most Recent Lab Results In EPIC (For Non-EPIC Providers):    Most Recent 3 CBC's:  Recent Labs   Lab Test 11/05/21  0943 11/04/21  0858 11/03/21  0506   WBC 6.4 4.0 3.0*   HGB 15.4 14.7 14.6   MCV 91 88 91    208 144*      Most Recent 3 BMP's:  Recent Labs   Lab Test 11/05/21  0943 11/02/21 2008 03/17/21  1207    138  140 138   POTASSIUM 3.6 3.8  3.7 4.4   CHLORIDE 108 106  108 109   CO2 27 28  26 29   BUN 31* 19  18 21   CR 0.69 0.79  0.72 0.80   ANIONGAP 6 4  6 <1*   SREEDHAR 8.6 7.8*  8.0* 8.6   * 174*  179* 152*     Most Recent 3 Troponin's:  Recent Labs   Lab Test 11/02/21 2008   TROPONIN <0.015     Most Recent 3 INR's:  Recent Labs   Lab Test 11/03/21  0506 12/18/18  0700 09/09/14  0702   INR 1.10 1.06 1.03     Most Recent 2 LFT's:  Recent Labs   Lab Test 11/04/21  0858 11/02/21 2008   AST 18 21   ALT 29 27   ALKPHOS 57 57    BILITOTAL 0.4 0.4     Most Recent Cholesterol Panel:  Recent Labs   Lab Test 09/01/20  1038   CHOL 113   LDL 54   HDL 43   TRIG 81     Most Recent 6 Bacteria Isolates From Any Culture (See EPIC Reports for Culture Details):  Recent Labs   Lab Test 06/30/20  0138   CULT Moderate growth  Streptococcus intermedius  *  Light growth  Coagulase negative Staphylococcus  Susceptibility testing not routinely done  *     Most Recent TSH, T4 and HgbA1c:   Recent Labs   Lab Test 03/17/21  1207   A1C 6.2*

## 2021-11-05 NOTE — PLAN OF CARE
Discharge    Patient discharged to Home  with wife  Care plan note done    Listed belongings gathered and given to patient (including from security/pharmacy). yes  Care Plan and Patient education resolved: yes  Prescriptions if needed, hard copies sent with patient  yes  Medication Bin checked and emptied on discharge yes  SW/care coordinator/charge RN aware of discharge: yes      Vss , on RA  and  patient was  comfortable at the time of discharge, given discharge instructions to patient and wife via telephone .

## 2021-11-05 NOTE — PROGRESS NOTES
Patient seen and examined    - reports feeling better, wants to go home  - oxygen weaned down to room air, 92 % on RA and doesnot desaturate with activity  - evaluated by PT and rec home with outpatient PT  - overnight was noted bradycardic to 47, asymptomatic; HR this morning in 50s; will decrease PTA atenolol    Discharge home today with outpatient PT    Please see discharge summary for details

## 2021-11-05 NOTE — PROGRESS NOTES
"   11/05/21 0923   Quick Adds   Type of Visit Initial PT Evaluation   Living Environment   People in home spouse   Current Living Arrangements house   Home Accessibility stairs to enter home;stairs within home   Number of Stairs, Main Entrance 2   Stair Railings, Main Entrance railings safe and in good condition;railing on right side (ascending)   Number of Stairs, Within Home, Primary greater than 10 stairs  (13)   Stair Railings, Within Home, Primary railing on left side (ascending)   Transportation Anticipated family or friend will provide  (spouse)   Living Environment Comments Staircase to basement, doesn't need to do   Self-Care   Usual Activity Tolerance good   Current Activity Tolerance fair   Equipment Currently Used at Home walker, rolling  (4WW for longer community)   Disability/Function   Hearing Difficulty or Deaf yes   Hearing Management does not have aids   Wear Glasses or Blind yes   Vision Management glasses   Concentrating, Remembering or Making Decisions Difficulty no   Difficulty Communicating no   Difficulty Eating/Swallowing no   Walking or Climbing Stairs Difficulty yes   Walking or Climbing Stairs ambulation difficulty, requires equipment   Mobility Management 4WW community   Dressing/Bathing Difficulty no   Toileting issues no   Doing Errands Independently Difficulty (such as shopping) no   Fall history within last six months no   General Information   Onset of Illness/Injury or Date of Surgery 11/02/21   Referring Physician Angel Dasilva MD   Patient/Family Therapy Goals Statement (PT) \"to go home, the sooner the better\"   Pertinent History of Current Problem (include personal factors and/or comorbidities that impact the POC) Patient admitted on 11/2/2021 with a  breakthrough covid-19 pneumonia infection. PMH hypertension, CAD, h/o CVA, dyslipidemia, GERD,  BPH, CKD stage 2   Existing Precautions/Restrictions fall   Weight-Bearing Status - LLE full weight-bearing   Weight-Bearing " Status - RLE full weight-bearing   Cognition   Orientation Status (Cognition) oriented x 4   Pain Assessment   Patient Currently in Pain No   Integumentary/Edema   Integumentary/Edema no deficits were identifed   Posture    Posture Forward head position;Protracted shoulders;Kyphosis   Range of Motion (ROM)   ROM Quick Adds ROM WFL   Strength   Manual Muscle Testing Quick Adds Able to perform R SLR;Able to perform L SLR;No deficits observed during functional mobility   Bed Mobility   Bed Mobility supine-sit;sit-supine   Supine-Sit [place] (Bed Mobility) independent   Sit-Supine [place] (Bed Mobility) independent   Comment (Bed Mobility) From flat bed w/o rail   Transfers   Transfers sit-stand transfer;bed-chair transfer   Bed-Chair Transfer   Bed-Chair [place] (Transfers) supervision   Bed/Chair Transfer Comments Supervision due to slight unsteadiness with turning, WBOS and medio-lateral deviations   Sit-Stand Transfer   Sit-Stand [place] (Transfers) independent   Gait/Stairs (Locomotion)   [place] Level (Gait) supervision   Distance in Feet (Required for LE Total Joints) 100   Negotiation (Stairs) stairs independence;handrail location;number of steps   [place] Level (Stairs) contact guard;supervision   Handrail Location (Stairs) left side (ascending)   Number of Steps (Stairs) 10   Comment (Gait/Stairs) Patient on room air with SpO2 monitored throughout ambulation and stairs, remained >86% throughout. Patient ambulated x100ft without AD with supervision. Patient demonstrated slow pace with WBOS, increased mediolateral deviations with unsteadiness. Patient ascended x10 stairs with L ascending railing CGA-SBA with step over step pattern, turned around at top CGA ,SpO2 87% at top, 1 standing rest break to increase >90% within 1 min, descended with railing step to pattern with CGA, patient requested MERLINE railings for last 4 steps.   Balance   Balance Comments Slight unsteadiness with turning  and stairs   Sensory Examination   Sensory Perception patient reports no sensory changes   Clinical Impression   Criteria for Skilled Therapeutic Intervention yes, treatment indicated   PT Diagnosis (PT) Impaired transfers, ambulation and stairs   Influenced by the following impairments Decreased activity tolerance, decreased respiratory, decreased strength   Functional limitations due to impairments Impaired functional mobility   Clinical Presentation Stable/Uncomplicated   Clinical Presentation Rationale Due to current status, PMH and social support   Clinical Decision Making (Complexity) low complexity   Therapy Frequency (PT) Daily   Predicted Duration of Therapy Intervention (days/wks) 3 days   Planned Therapy Interventions (PT) balance training;gait training;home exercise program;neuromuscular re-education;patient/family education;stair training;strengthening;transfer training;progressive activity/exercise   Anticipated Equipment Needs at Discharge (PT)   (has 4WW)   Risk & Benefits of therapy have been explained evaluation/treatment results reviewed;care plan/treatment goals reviewed;risks/benefits reviewed;current/potential barriers reviewed;participants voiced agreement with care plan;participants included;patient   PT Discharge Planning    PT Discharge Recommendation (DC Rec) home;home with outpatient physical therapy   PT Rationale for DC Rec Patient is below baseline functional mobility IND without AD, uses 4WW for long community durations. Patient requires supervision for transfers and ambulation withour AD, CGA-SBA on x10 sairs w/ 1-2 railings. Anticipate with skilled IN PT patient will be safe to d/c home IND. Recommend OP PT to continue increasing strength and endurance to increase activity tolerance.   PT Brief overview of current status  Bed mobility IND, transfers and ambulation supervision, stairs CGA-SBA   Total Evaluation Time   Total Evaluation Time (Minutes) 14

## 2021-11-05 NOTE — PLAN OF CARE
Occupational Therapy Discharge Summary    Reason for therapy discharge:    Discharged to home.    Progress towards therapy goal(s). See goals on Care Plan in [facility] electronic health record for goal details.  Goals partially met.  Barriers to achieving goals:   discharge from facility.    Therapy recommendation(s):    No further therapy is recommended.

## 2021-11-05 NOTE — PROGRESS NOTES
Case Management  Pt is discharging home today.  Have scheduled a PCP phone follow-up on 11/11/21 with Dr Thompson.    Pt is aware he will be called concerning scheduling of outpt PT.  Pt has transportation home.    In discussion with pt, no other needs have been identified.    Slime Stewart RN  Inpatient Care Management  [phone]

## 2021-11-05 NOTE — PLAN OF CARE
DATE & TIME: 11/04/21 9868-1869  Cognitive Concerns/ Orientation : A&O x4  hard of hearing.    BEHAVIOR & AGGRESSION TOOL COLOR: Green   CIWA SCORE:NA    ABNL VS/O2: VSS - 90's on 1-1 1/2L ( down from 3L)  MOBILITY: SBA - steady on feet but needs help with tubing  PAIN MANAGMENT: Denies  DIET: Regular  BOWEL/BLADDER: Continent, pt stated last BM 11/1 - Received senna in am - would like to try Miralax if no improvement  ABNL LAB/BG: CRP 92.8 to 41.4  DRAIN/DEVICES: Right PIV SL   TELEMETRY RHYTHM: NSR  SKIN: Pale; 2+ edema on BLE.   TESTS/PROCEDURES: NA  D/C DATE: Expected discharge in 2-3 days pending clinical stability  Discharge Barriers: O2 needs.   OTHER IMPORTANT INFO: Special precautions maintained for COVID.  LSC but diminished  IS THE PATIENT ON REMDESIVIR? DATE OF LAST SCHEDULED DOSE:  11/08/21

## 2021-11-05 NOTE — PROVIDER NOTIFICATION
Paged Dr. Gomes (on-call):   [phone]- Ivan BRUMFIELD- HR eusebia 47 at rest- is there anything you think we should do for the patient?-Cherise Kwan RN **[phone]    Response:since pt is asymptomatic continue to monitor- notify MD if HR goes below 40 bpm

## 2021-11-06 DIAGNOSIS — Z71.89 OTHER SPECIFIED COUNSELING: ICD-10-CM

## 2021-11-06 NOTE — PLAN OF CARE
Physical Therapy Discharge Summary    Reason for therapy discharge:    Discharged to home with outpatient therapy.    Progress towards therapy goal(s). See goals on Care Plan in [facility] electronic health record for goal details.  Goals partially met.  Barriers to achieving goals:   discharge from facility.    Therapy recommendation(s):    Continued therapy is recommended.  Rationale/Recommendations:Recommend OP PT to continue increasing strength and endurance to improve activity tolerance.

## 2021-11-07 ENCOUNTER — HOSPITAL ENCOUNTER (INPATIENT)
Facility: CLINIC | Age: 86
LOS: 2 days | Discharge: HOME | DRG: 177 | End: 2021-11-10
Attending: EMERGENCY MEDICINE | Admitting: HOSPITALIST
Payer: COMMERCIAL

## 2021-11-07 ENCOUNTER — APPOINTMENT (OUTPATIENT)
Dept: GENERAL RADIOLOGY | Facility: CLINIC | Age: 86
DRG: 177 | End: 2021-11-07
Attending: EMERGENCY MEDICINE
Payer: COMMERCIAL

## 2021-11-07 ENCOUNTER — NURSE TRIAGE (OUTPATIENT)
Dept: NURSING | Facility: CLINIC | Age: 86
End: 2021-11-07
Payer: COMMERCIAL

## 2021-11-07 DIAGNOSIS — R06.00 DYSPNEA, UNSPECIFIED TYPE: ICD-10-CM

## 2021-11-07 DIAGNOSIS — K59.00 CONSTIPATION, UNSPECIFIED CONSTIPATION TYPE: Primary | ICD-10-CM

## 2021-11-07 DIAGNOSIS — J12.82 PNEUMONIA DUE TO 2019 NOVEL CORONAVIRUS: ICD-10-CM

## 2021-11-07 DIAGNOSIS — R09.02 HYPOXIA: ICD-10-CM

## 2021-11-07 DIAGNOSIS — U07.1 PNEUMONIA DUE TO 2019 NOVEL CORONAVIRUS: ICD-10-CM

## 2021-11-07 LAB
ANION GAP SERPL CALCULATED.3IONS-SCNC: 2 MMOL/L (ref 3–14)
ATRIAL RATE - MUSE: 63 BPM
BASOPHILS # BLD AUTO: 0 10E3/UL (ref 0–0.2)
BASOPHILS NFR BLD AUTO: 0 %
BUN SERPL-MCNC: 31 MG/DL (ref 7–30)
CALCIUM SERPL-MCNC: 8.5 MG/DL (ref 8.5–10.1)
CHLORIDE BLD-SCNC: 110 MMOL/L (ref 94–109)
CO2 SERPL-SCNC: 26 MMOL/L (ref 20–32)
CREAT SERPL-MCNC: 0.73 MG/DL (ref 0.66–1.25)
CRP SERPL-MCNC: 103 MG/L (ref 0–8)
D DIMER PPP FEU-MCNC: 0.65 UG/ML FEU (ref 0–0.5)
DIASTOLIC BLOOD PRESSURE - MUSE: NORMAL MMHG
EOSINOPHIL # BLD AUTO: 0.1 10E3/UL (ref 0–0.7)
EOSINOPHIL NFR BLD AUTO: 1 %
ERYTHROCYTE [DISTWIDTH] IN BLOOD BY AUTOMATED COUNT: 12.6 % (ref 10–15)
GFR SERPL CREATININE-BSD FRML MDRD: 85 ML/MIN/1.73M2
GLUCOSE BLD-MCNC: 187 MG/DL (ref 70–99)
HCT VFR BLD AUTO: 43.7 % (ref 40–53)
HGB BLD-MCNC: 14.4 G/DL (ref 13.3–17.7)
HOLD SPECIMEN: NORMAL
IMM GRANULOCYTES # BLD: 0.1 10E3/UL
IMM GRANULOCYTES NFR BLD: 1 %
INTERPRETATION ECG - MUSE: NORMAL
LYMPHOCYTES # BLD AUTO: 0.6 10E3/UL (ref 0.8–5.3)
LYMPHOCYTES NFR BLD AUTO: 7 %
MCH RBC QN AUTO: 29.7 PG (ref 26.5–33)
MCHC RBC AUTO-ENTMCNC: 33 G/DL (ref 31.5–36.5)
MCV RBC AUTO: 90 FL (ref 78–100)
MONOCYTES # BLD AUTO: 0.8 10E3/UL (ref 0–1.3)
MONOCYTES NFR BLD AUTO: 10 %
NEUTROPHILS # BLD AUTO: 6.8 10E3/UL (ref 1.6–8.3)
NEUTROPHILS NFR BLD AUTO: 81 %
NRBC # BLD AUTO: 0 10E3/UL
NRBC BLD AUTO-RTO: 0 /100
NT-PROBNP SERPL-MCNC: 245 PG/ML (ref 0–1800)
P AXIS - MUSE: 58 DEGREES
PLATELET # BLD AUTO: 229 10E3/UL (ref 150–450)
POTASSIUM BLD-SCNC: 3.7 MMOL/L (ref 3.4–5.3)
PR INTERVAL - MUSE: 158 MS
PROCALCITONIN SERPL-MCNC: <0.05 NG/ML
QRS DURATION - MUSE: 138 MS
QT - MUSE: 454 MS
QTC - MUSE: 464 MS
R AXIS - MUSE: 16 DEGREES
RBC # BLD AUTO: 4.85 10E6/UL (ref 4.4–5.9)
SODIUM SERPL-SCNC: 138 MMOL/L (ref 133–144)
SYSTOLIC BLOOD PRESSURE - MUSE: NORMAL MMHG
T AXIS - MUSE: -11 DEGREES
TROPONIN I SERPL-MCNC: <0.015 UG/L (ref 0–0.04)
VENTRICULAR RATE- MUSE: 63 BPM
WBC # BLD AUTO: 8.4 10E3/UL (ref 4–11)

## 2021-11-07 PROCEDURE — 85379 FIBRIN DEGRADATION QUANT: CPT | Performed by: HOSPITALIST

## 2021-11-07 PROCEDURE — 86140 C-REACTIVE PROTEIN: CPT | Performed by: HOSPITALIST

## 2021-11-07 PROCEDURE — 99220 PR INITIAL OBSERVATION CARE,LEVEL III: CPT | Performed by: HOSPITALIST

## 2021-11-07 PROCEDURE — 84145 PROCALCITONIN (PCT): CPT | Performed by: EMERGENCY MEDICINE

## 2021-11-07 PROCEDURE — 85025 COMPLETE CBC W/AUTO DIFF WBC: CPT | Performed by: EMERGENCY MEDICINE

## 2021-11-07 PROCEDURE — 99285 EMERGENCY DEPT VISIT HI MDM: CPT | Mod: 25

## 2021-11-07 PROCEDURE — 71045 X-RAY EXAM CHEST 1 VIEW: CPT

## 2021-11-07 PROCEDURE — 83880 ASSAY OF NATRIURETIC PEPTIDE: CPT | Performed by: EMERGENCY MEDICINE

## 2021-11-07 PROCEDURE — G0378 HOSPITAL OBSERVATION PER HR: HCPCS

## 2021-11-07 PROCEDURE — 84484 ASSAY OF TROPONIN QUANT: CPT | Performed by: EMERGENCY MEDICINE

## 2021-11-07 PROCEDURE — 80048 BASIC METABOLIC PNL TOTAL CA: CPT | Performed by: EMERGENCY MEDICINE

## 2021-11-07 PROCEDURE — 36415 COLL VENOUS BLD VENIPUNCTURE: CPT | Performed by: EMERGENCY MEDICINE

## 2021-11-07 PROCEDURE — 93005 ELECTROCARDIOGRAM TRACING: CPT

## 2021-11-07 RX ORDER — DEXAMETHASONE SODIUM PHOSPHATE 4 MG/ML
6 INJECTION, SOLUTION INTRA-ARTICULAR; INTRALESIONAL; INTRAMUSCULAR; INTRAVENOUS; SOFT TISSUE ONCE
Status: COMPLETED | OUTPATIENT
Start: 2021-11-07 | End: 2021-11-08

## 2021-11-07 ASSESSMENT — ENCOUNTER SYMPTOMS
DIARRHEA: 0
SHORTNESS OF BREATH: 0
COUGH: 1
APPETITE CHANGE: 1
VOMITING: 0
FEVER: 0

## 2021-11-07 NOTE — TELEPHONE ENCOUNTER
Patient's wife, Margo called with concerns of patient's breathing.  CTC in media  Patient was discharged on Friday after being in hospital for covid related symptoms.  Patient is having difficulty keeping O2 saturations >90%.  He does not have supplemental O2 at home.  Wife checked O2 while writer on phone, and it was 84%. Patient took deep breaths and it very slowly came up to 89%  Disposition is to ED now  Patient and wife verbalized understanding and agrees with plan.     Doretha Traore RN  [facility] Nurse Advisor  4:50 PM 11/7/2021.    Reason for Disposition    MODERATE difficulty breathing (e.g., speaks in phrases, SOB even at rest, pulse 100-120)    Additional Information    Negative: SEVERE difficulty breathing (e.g., struggling for each breath, speaks in single words)    Negative: Difficult to awaken or acting confused (e.g., disoriented, slurred speech)    Negative: Bluish (or gray) lips or face now    Negative: Shock suspected (e.g., cold/pale/clammy skin, too weak to stand, low BP, rapid pulse)    Negative: Sounds like a life-threatening emergency to the triager    Negative: SEVERE or constant chest pain or pressure (Exception: mild central chest pain, present only when coughing)    Protocols used: CORONAVIRUS (COVID-19) DIAGNOSED OR [id number] 8.25.2021  COVID 19 Nurse Triage Plan/Patient Instructions    Please be aware that novel coronavirus (COVID-19) may be circulating in the community. If you develop symptoms such as fever, cough, or SOB or if you have concerns about the presence of another infection including coronavirus (COVID-19), please contact your health care provider or visit [email or web address].     Disposition/Instructions    ED Visit recommended. Follow protocol based instructions.     Bring Your Own Device:  Please also bring your smart device(s) (smart phones, tablets, laptops) and their charging cables for your personal use and to communicate with your care team  during your visit.    Thank you for taking steps to prevent the spread of this virus.  o Limit your contact with others.  o Wear a simple mask to cover your cough.  o Wash your hands well and often.    Resources    [facility]: About COVID-19: [email or web address]    CDC: What to Do If You're Sick: www.cdc.gov/coronavirus/2019-ncov/about/steps-when-sick.html    CDC: Ending Home Isolation: www.cdc.gov/coronavirus/2019-ncov/hcp/disposition-in-home-patients.html     CDC: Caring for Someone: www.cdc.gov/coronavirus/2019-ncov/if-you-are-sick/care-for-someone.html     [facility]: Interim Guidance for Hospital Discharge to Home: www.[email or web address]./diseases/coronavirus/hcp/hospdischarge.pdf    [facility] clinical trials (COVID-19 research studies): [email or web address].[email or web address]-clinical-trials     Below are the COVID-19 hotlines at the [place] Department [facility] ([facility]). Interpreters are available.   o For health questions: Call 893-192-4976 or [phone] (7 a.m. to 7 p.m.)  o For questions about schools and childcare: Call 211-863-6246 or [phone] (7 a.m. to 7 p.m.)

## 2021-11-08 ENCOUNTER — PATIENT OUTREACH (OUTPATIENT)
Dept: CARE COORDINATION | Facility: CLINIC | Age: 86
End: 2021-11-08
Payer: COMMERCIAL

## 2021-11-08 LAB
ANION GAP SERPL CALCULATED.3IONS-SCNC: 2 MMOL/L (ref 3–14)
BUN SERPL-MCNC: 28 MG/DL (ref 7–30)
CALCIUM SERPL-MCNC: 8.2 MG/DL (ref 8.5–10.1)
CHLORIDE BLD-SCNC: 109 MMOL/L (ref 94–109)
CO2 SERPL-SCNC: 27 MMOL/L (ref 20–32)
CREAT SERPL-MCNC: 0.71 MG/DL (ref 0.66–1.25)
CRP SERPL-MCNC: 125 MG/L (ref 0–8)
D DIMER PPP FEU-MCNC: 0.38 UG/ML FEU (ref 0–0.5)
D DIMER PPP FEU-MCNC: 0.42 UG/ML FEU (ref 0–0.5)
ERYTHROCYTE [DISTWIDTH] IN BLOOD BY AUTOMATED COUNT: 12.7 % (ref 10–15)
FIBRINOGEN PPP-MCNC: 579 MG/DL (ref 170–490)
GFR SERPL CREATININE-BSD FRML MDRD: 86 ML/MIN/1.73M2
GLUCOSE BLD-MCNC: 208 MG/DL (ref 70–99)
HCT VFR BLD AUTO: 41.2 % (ref 40–53)
HGB BLD-MCNC: 14 G/DL (ref 13.3–17.7)
MCH RBC QN AUTO: 30 PG (ref 26.5–33)
MCHC RBC AUTO-ENTMCNC: 34 G/DL (ref 31.5–36.5)
MCV RBC AUTO: 88 FL (ref 78–100)
PLATELET # BLD AUTO: 229 10E3/UL (ref 150–450)
POTASSIUM BLD-SCNC: 4.2 MMOL/L (ref 3.4–5.3)
RBC # BLD AUTO: 4.66 10E6/UL (ref 4.4–5.9)
SODIUM SERPL-SCNC: 138 MMOL/L (ref 133–144)
WBC # BLD AUTO: 6.1 10E3/UL (ref 4–11)

## 2021-11-08 PROCEDURE — 86140 C-REACTIVE PROTEIN: CPT | Performed by: HOSPITALIST

## 2021-11-08 PROCEDURE — 85027 COMPLETE CBC AUTOMATED: CPT | Performed by: HOSPITALIST

## 2021-11-08 PROCEDURE — 85379 FIBRIN DEGRADATION QUANT: CPT | Performed by: HOSPITALIST

## 2021-11-08 PROCEDURE — 250N000013 HC RX MED GY IP 250 OP 250 PS 637: Performed by: HOSPITALIST

## 2021-11-08 PROCEDURE — 99233 SBSQ HOSP IP/OBS HIGH 50: CPT | Performed by: HOSPITALIST

## 2021-11-08 PROCEDURE — G0378 HOSPITAL OBSERVATION PER HR: HCPCS

## 2021-11-08 PROCEDURE — 120N000004 HC R&B MS OVERFLOW

## 2021-11-08 PROCEDURE — 250N000011 HC RX IP 250 OP 636: Performed by: HOSPITALIST

## 2021-11-08 PROCEDURE — 85384 FIBRINOGEN ACTIVITY: CPT | Performed by: HOSPITALIST

## 2021-11-08 PROCEDURE — 80048 BASIC METABOLIC PNL TOTAL CA: CPT | Performed by: HOSPITALIST

## 2021-11-08 PROCEDURE — 36415 COLL VENOUS BLD VENIPUNCTURE: CPT | Performed by: HOSPITALIST

## 2021-11-08 PROCEDURE — 96372 THER/PROPH/DIAG INJ SC/IM: CPT | Performed by: HOSPITALIST

## 2021-11-08 RX ORDER — ACETAMINOPHEN 325 MG/1
975 TABLET ORAL EVERY 8 HOURS
Status: DISCONTINUED | OUTPATIENT
Start: 2021-11-08 | End: 2021-11-10 | Stop reason: HOSPADM

## 2021-11-08 RX ORDER — ONDANSETRON 4 MG/1
4 TABLET, ORALLY DISINTEGRATING ORAL EVERY 6 HOURS PRN
Status: DISCONTINUED | OUTPATIENT
Start: 2021-11-08 | End: 2021-11-10 | Stop reason: HOSPADM

## 2021-11-08 RX ORDER — ONDANSETRON 2 MG/ML
4 INJECTION INTRAMUSCULAR; INTRAVENOUS EVERY 6 HOURS PRN
Status: DISCONTINUED | OUTPATIENT
Start: 2021-11-08 | End: 2021-11-10 | Stop reason: HOSPADM

## 2021-11-08 RX ORDER — LIDOCAINE 40 MG/G
CREAM TOPICAL
Status: DISCONTINUED | OUTPATIENT
Start: 2021-11-08 | End: 2021-11-10 | Stop reason: HOSPADM

## 2021-11-08 RX ORDER — TERAZOSIN 2 MG/1
2 CAPSULE ORAL AT BEDTIME
Status: DISCONTINUED | OUTPATIENT
Start: 2021-11-08 | End: 2021-11-10 | Stop reason: HOSPADM

## 2021-11-08 RX ORDER — DEXAMETHASONE SODIUM PHOSPHATE 4 MG/ML
6 INJECTION, SOLUTION INTRA-ARTICULAR; INTRALESIONAL; INTRAMUSCULAR; INTRAVENOUS; SOFT TISSUE EVERY 24 HOURS
Status: DISCONTINUED | OUTPATIENT
Start: 2021-11-09 | End: 2021-11-10 | Stop reason: HOSPADM

## 2021-11-08 RX ORDER — AMOXICILLIN 250 MG
1 CAPSULE ORAL 2 TIMES DAILY PRN
Status: DISCONTINUED | OUTPATIENT
Start: 2021-11-08 | End: 2021-11-10 | Stop reason: HOSPADM

## 2021-11-08 RX ORDER — SENNOSIDES 8.6 MG
650 CAPSULE ORAL 2 TIMES DAILY
COMMUNITY

## 2021-11-08 RX ORDER — AMOXICILLIN 250 MG
2 CAPSULE ORAL 2 TIMES DAILY PRN
Status: DISCONTINUED | OUTPATIENT
Start: 2021-11-08 | End: 2021-11-10 | Stop reason: HOSPADM

## 2021-11-08 RX ORDER — ASPIRIN 81 MG/1
81 TABLET ORAL DAILY
Status: DISCONTINUED | OUTPATIENT
Start: 2021-11-08 | End: 2021-11-10 | Stop reason: HOSPADM

## 2021-11-08 RX ORDER — LISINOPRIL AND HYDROCHLOROTHIAZIDE 12.5; 2 MG/1; MG/1
1 TABLET ORAL DAILY
Status: DISCONTINUED | OUTPATIENT
Start: 2021-11-08 | End: 2021-11-10 | Stop reason: HOSPADM

## 2021-11-08 RX ADMIN — ACETAMINOPHEN 975 MG: 325 TABLET, FILM COATED ORAL at 00:58

## 2021-11-08 RX ADMIN — LISINOPRIL AND HYDROCHLOROTHIAZIDE 1 TABLET: 12.5; 2 TABLET ORAL at 08:42

## 2021-11-08 RX ADMIN — ASPIRIN 81 MG: 81 TABLET, COATED ORAL at 16:00

## 2021-11-08 RX ADMIN — ACETAMINOPHEN 975 MG: 325 TABLET, FILM COATED ORAL at 08:42

## 2021-11-08 RX ADMIN — ENOXAPARIN SODIUM 40 MG: 40 INJECTION SUBCUTANEOUS at 08:42

## 2021-11-08 RX ADMIN — TERAZOSIN HYDROCHLORIDE 2 MG: 2 CAPSULE ORAL at 21:20

## 2021-11-08 RX ADMIN — OMEPRAZOLE 20 MG: 20 CAPSULE, DELAYED RELEASE ORAL at 06:48

## 2021-11-08 RX ADMIN — ACETAMINOPHEN 975 MG: 325 TABLET, FILM COATED ORAL at 16:01

## 2021-11-08 RX ADMIN — DEXAMETHASONE SODIUM PHOSPHATE 6 MG: 4 INJECTION, SOLUTION INTRAMUSCULAR; INTRAVENOUS at 00:52

## 2021-11-08 ASSESSMENT — ACTIVITIES OF DAILY LIVING (ADL)
ADLS_ACUITY_SCORE: 12
ADLS_ACUITY_SCORE: 12
ADLS_ACUITY_SCORE: 5
CONCENTRATING,_REMEMBERING_OR_MAKING_DECISIONS_DIFFICULTY: NO
ADLS_ACUITY_SCORE: 12
ADLS_ACUITY_SCORE: 5
ADLS_ACUITY_SCORE: 12
TOILETING_ISSUES: NO
FALL_HISTORY_WITHIN_LAST_SIX_MONTHS: NO
DRESSING/BATHING_DIFFICULTY: NO
ADLS_ACUITY_SCORE: 12
DOING_ERRANDS_INDEPENDENTLY_DIFFICULTY: NO
ADLS_ACUITY_SCORE: 12
ADLS_ACUITY_SCORE: 12
WEAR_GLASSES_OR_BLIND: YES
DIFFICULTY_COMMUNICATING: NO
ADLS_ACUITY_SCORE: 12
WALKING_OR_CLIMBING_STAIRS_DIFFICULTY: NO
DIFFICULTY_EATING/SWALLOWING: NO

## 2021-11-08 ASSESSMENT — MIFFLIN-ST. JEOR: SCORE: 1626.57

## 2021-11-08 NOTE — ED NOTES
[facility]  ED Nurse Handoff Report    ED Chief complaint: Covid Concern      ED Diagnosis:   Final diagnoses:   Pneumonia due to 2019 novel coronavirus   Hypoxia   Dyspnea, unspecified type       Code Status: Full Code    Allergies:   Allergies   Allergen Reactions     Contrast Dye Hives       Patient Story: Patient brought by wife after testing positive for Covid on Tuesday. Sats around 95% with mild cough, however x-ray shows moderate infiltration in the lungs  Focused Assessment:  Mild cough    Treatments and/or interventions provided:   Labs Ordered and Resulted from Time of ED Arrival to Time of ED Departure   BASIC METABOLIC PANEL - Abnormal       Result Value    Sodium 138      Potassium 3.7      Chloride 110 (*)     Carbon Dioxide (CO2) 26      Anion Gap 2 (*)     Urea Nitrogen 31 (*)     Creatinine 0.73      Calcium 8.5      Glucose 187 (*)     GFR Estimate 85     CBC WITH PLATELETS AND DIFFERENTIAL - Abnormal    WBC Count 8.4      RBC Count 4.85      Hemoglobin 14.4      Hematocrit 43.7      MCV 90      MCH 29.7      MCHC 33.0      RDW 12.6      Platelet Count 229      % Neutrophils 81      % Lymphocytes 7      % Monocytes 10      % Eosinophils 1      % Basophils 0      % Immature Granulocytes 1      NRBCs per 100 WBC 0      Absolute Neutrophils 6.8      Absolute Lymphocytes 0.6 (*)     Absolute Monocytes 0.8      Absolute Eosinophils 0.1      Absolute Basophils 0.0      Absolute Immature Granulocytes 0.1 (*)     Absolute NRBCs 0.0     TROPONIN I - Normal    Troponin I <0.015     NT PROBNP INPATIENT - Normal    N terminal Pro BNP Inpatient 245     PROCALCITONIN - Normal    Procalcitonin <0.05     D DIMER QUANTITATIVE   CRP INFLAMMATION     apixaban ANTICOAGULANT (ELIQUIS) 2.5 MG tablet  Ascorbic Acid (VITAMIN C PO)  aspirin 81 MG tablet  atenolol (TENORMIN) 50 MG tablet  atorvastatin (LIPITOR) 40 MG tablet  calcium carbonate (OS-SREEDHAR 500 MG [place]. [place]) 500 MG tablet  glucosamine-chondroitin  500-400 MG CAPS  lisinopril-hydrochlorothiazide (ZESTORETIC) 20-12.5 MG tablet  nitroGLYcerin (NITROSTAT) 0.4 MG sublingual tablet  nystatin (MYCOSTATIN) 628557 UNIT/GM external ointment  Omega-3 Fatty Acids (OMEGA-3 FISH OIL PO)  omeprazole (PRILOSEC) 20 MG DR capsule  terazosin (HYTRIN) 2 MG capsule  vitamin  B complex with vitamin C (VITAMIN  B COMPLEX) TABS  VITAMIN D, CHOLECALCIFEROL, PO      XR Chest Port 1 View   Final Result   IMPRESSION:       New peripheral left lung consolidative opacities compared to 11/20/2021. Few other mild bilateral opacities are noted. Airway thickening present. Elevated left hemidiaphragm.       No significant pleural effusion. Stable cardiomediastinal silhouette. Aortic atherosclerosis.                Patient's response to treatments and/or interventions: No change    To be done/followed up on inpatient unit:  N/A    Does this patient have any cognitive concerns?: None    Activity level - Baseline/Home:  Independent  Activity Level - Current:   Independent    Patient's Preferred language: English   Needed?: No    Isolation: Contact  and Droplet  Infection: Not Applicable  COVID r/o and special precautions  Patient tested for COVID 19 prior to admission: YES  Bariatric?: No    Vital Signs:   Vitals:    11/07/21 1916   BP: 110/56   Pulse: 65   Resp: 22   Temp: 98.2  F (36.8  C)   SpO2: 95%       Cardiac Rhythm:     Was the PSS-3 completed:   Yes  What interventions are required if any?               Family Comments: None  OBS brochure/video discussed/provided to patient/family: Yes              Name of person given brochure if not patient: N/A              Relationship to patient: N/A    For the majority of the shift this patient's behavior was Green.   Behavioral interventions performed were N/A.    ED NURSE PHONE NUMBER: [phone]

## 2021-11-08 NOTE — ED TRIAGE NOTES
Pt dropped off by wife for covid concern. Pt wife refuses to pick pt up until seen by MD. Pt covid positive Tuesday and in hospital until Friday. Pt wife concerned about O2 sats which are 95 on room air in triage. Pt does not want to be here.

## 2021-11-08 NOTE — PHARMACY-ADMISSION MEDICATION HISTORY
Pharmacy Medication History  Admission medication history interview status for the 11/7/2021  admission is complete. See EPIC admission navigator for prior to admission medications     Location of Interview: Phone  Medication history sources: Patient's family/friend (Spouse Margo) and Surescripts    In the past week, patient estimated taking medication this percent of the time: greater than 90%    Additional medication history information:   Added Tylenol CR and Zinc.     Medication reconciliation completed by provider prior to medication history? No    Time spent in this activity: 30 minutes    Prior to Admission medications    Medication Sig Last Dose Taking? Auth Provider   acetaminophen (TYLENOL) 650 MG CR tablet Take 650 mg by mouth 2 times daily 11/6/2021 at AM Yes Unknown, Entered By History   apixaban ANTICOAGULANT (ELIQUIS) 2.5 MG tablet Take 1 tablet (2.5 mg) by mouth 2 times daily 11/7/2021 at AM Yes Angel Dasilva MD   Ascorbic Acid (VITAMIN C PO) Take 1,000 mg by mouth daily 11/6/2021 at AM Yes Reported, Patient   aspirin 81 MG tablet Take by mouth daily 11/6/2021 at hs Yes William Thompson MD   atenolol (TENORMIN) 50 MG tablet Take 0.5 tablets (25 mg) by mouth daily 11/7/2021 at AM Yes Angel Dasilva MD   atorvastatin (LIPITOR) 40 MG tablet Take 1 tablet (40 mg) by mouth daily 11/6/2021 at hs Yes Willy Johansen MD   calcium carbonate (OS-SREEDHAR 500 MG [place]. [place]) 500 MG tablet Take 500 mg by mouth daily 11/6/2021 at AM Yes Reported, Patient   glucosamine-chondroitin 500-400 MG CAPS Take 1 capsule by mouth daily 11/6/2021 at hs Yes Reported, Patient   lisinopril-hydrochlorothiazide (ZESTORETIC) 20-12.5 MG tablet Take 1 tablet by mouth daily 11/6/2021 at AM Yes Willy Johansen MD   nitroGLYcerin (NITROSTAT) 0.4 MG sublingual tablet For chest pain place 1 tablet under the tongue every 5 minutes for 3 doses. If symptoms persist 5 minutes after 1st dose call 911. Never used Yes Liegl,  Justine Watson PA-C   Omega-3 Fatty Acids (OMEGA-3 FISH OIL PO) Take 1 g by mouth daily  11/6/2021 at hs Yes Reported, Patient   omeprazole (PRILOSEC) 20 MG DR capsule TAKE 1 CAPSULE BY MOUTH EVERY DAY 11/7/2021 at AM Yes William Thompson MD   terazosin (HYTRIN) 2 MG capsule Take 1 capsule (2 mg) by mouth At Bedtime 11/6/2021 at HS Yes Willy Johansen MD   vitamin  B complex with vitamin C (VITAMIN  B COMPLEX) TABS Take 1 tablet by mouth daily 11/6/2021 at HS Yes Reported, Patient   VITAMIN D, CHOLECALCIFEROL, PO Take 5,000 Units by mouth daily  11/6/2021 at AM Yes Reported, Patient   Zinc 15 MG CAPS Take 15 mg by mouth daily 11/6/2021 at AM Yes Unknown, Entered By History       The information provided in this note is only as accurate as the sources available at the time of update(s)

## 2021-11-08 NOTE — H&P
[facility]    History and Physical - Hospitalist Service       Date of Admission:  11/7/2021    Assessment & Plan      Ivan Payton is a 86 year old male with hypertension, CAD, h/o CVA, dyslipidemia, GERD,  BPH, CKD stage 2 on 11/7/2021. He was admitted here 11/2 to 11/5 with covid pneumonia. Sent home off oxygen. Tonight pt's wife dropped him off at ED due to hypoxia at home.    # Confirmed COVID-19 infection    # Acute Hypoxic Respiratory Failure secondary to COVID-19 infection  # Viral Pneumonia secondary to COVID-19 infection     Symptom Onset unknown   Date of 1st Positive Test 11/2/21   Vaccination Status Fully Vaccinated - not boosted       - Admit to medical floor with continuous pulse ox, COVID-19 special precautions  - Oxygen: continue current support with nasal cannula at 2-10 L/min; titrate to keep SpO2 between 90-96%  - Labs: daily COVID labs ordered (CBC, creatinine, retic count, LDH, INR/PT, D-dimer, fibrinogen, troponin, CRP)   - Imaging: portable cxr with infiltrates  - Breathing treatments: no inhalers needed; avoid nebulizers in favor of MDIs   - IV fluids: not indicated at this time  - Antibiotics: not indicated   - COVID-Focused Medications: continue dexamethasone started previously at least until hospital discharge  - DVT Prophylaxis: at high risk of thrombotic complications due to COVID-19 (DDimer = <0.27 ug/mL FEU (Ref range: 0.00 - 0.50 ug/mL FEU) ).          - PROPHYLACTIC dosing: lovenox 40mg daily        - consider anticoag on discharge for 30 days & until return to normal mobility    - admit to obs  - on admission CXR with bilateral patchy opacities (portable CXR). Questionable new infiltrate. Will not add antibiotics at this point unless there is more indication of secondary infection.  - hypoxic at home to 84% per wife. Desats with minimal exertion in ED though not requiring oxygen at rest  - was previously treated with IV Remdesivir (11/2 to 11/5), PO  dexamethasone (11/2 to 11/5)  - thromboembolism prophylaxis with enoxaparin while in hospital (discharged on Po Eliquis) per COVID anticoagulation guidelines     Hypertension  Dyslipidemia  H/o CVA  Chronic LE edema  - continue PTA aspirin, atorvastatin, lisinopril- hydrochlorothiazide, atenolol, SL nitroglycerin once verified  - received lasix on previous admission. Also atenolol dose decreased from previous due to bradycardia.    GERD:  continue PTA Protonix  BPH:  continue PTA terazosin      Diet:   Regular  DVT Prophylaxis: Enoxaparin (Lovenox) SQ  Bernstein Catheter: Not present  Central Lines: None  Code Status:   Full code    Clinically Significant Risk Factors Present on Admission             # Coagulation Defect: home medication list includes an anticoagulant medication  # Platelet Defect: home medication list includes an antiplatelet medication      Disposition Plan   Expected discharge: 1-3 days recommended to prior living arrangement once O2 use less than 2 liters/minute and safe disposition plan/ TCU bed available.     The patient's care was discussed with the Attending Physician, Dr. Trierweiler in ED, Bedside Nurse and Patient.    Sonia Zarate MD  [facility]  Securely message with the Vocera Web Console (learn more here)  Text page via [employer] Paging/Directory        ______________________________________________________________________    Chief Complaint   SOB, hypoxia    History is obtained from the patient    History of Present Illness   Ivan Payton was admitted to this hospital from 11/02 to 11/05 with acute respiratory failure and pneumonia due to COVID. Today, the patient denies any symptoms of shortness of breath. He has been feeling much improved since discharge apart from an infrequent dry cough. However, his wife noted oxygenation as low as 84% at home, eventually 89% with deep breaths. He notes continued decreased appetite but has been drinking ample fluids. He  denies any vomiting, diarrhea, fever, body aches, or increased leg swelling. He received 2 Pfizer vaccines in Feb/March. Has not yet received booster.    Review of Systems    The 10 point Review of Systems is negative other than noted in the HPI or here.     Past Medical History    I have reviewed this patient's medical history and updated it with pertinent information if needed.   Past Medical History:   Diagnosis Date     Arthritis      Carotid stenosis 3/26/2014    S/p L CEA; R ICA ok;          See CUS 12/15 and Dr Jones's consult; continue plavix      Cerebral vascular disease 3/2014    multiple intracranial stenoses - lt post communic, lt post cerebral, rt middle cerebral, bilat porx M2 segments     Claustrophobia     Need sedation for MRI scans     Coronary artery disease 2014    Cath 9/2014: PTCA and KIKE to mLAD, KIKE to prox circumflex, PTCA and DESx2 to OM2; Cath 12/18/18: KIKE to PDA and OM1, patent stents to LAD and Cfx     CVA (cerebral infarction) 3/2014    2 small acute lesions noted left parietal/left posterior frontal region. Old lacunar infarct left caudate nucleus     Dyspnea 7/21/2014     Enlarged prostate      Essential hypertension      Problem list name updated by automated process. Provider to review     GERD (gastroesophageal reflux disease)      Hiatal hernia      Hydrocele     Right hydrocelectomy 2/2012     Hyperlipidemia with target LDL less than 100 3/14/2014     Diagnosis updated by automated process. Provider to review and confirm.     RBBB (right bundle branch block) 3/2014     Shortness of breath      Stented coronary artery      Uncomplicated asthma        Past Surgical History   I have reviewed this patient's surgical history and updated it with pertinent information if needed.  Past Surgical History:   Procedure Laterality Date     CV HEART CATHETERIZATION WITH POSSIBLE INTERVENTION N/A 12/18/2018    Procedure: Coronary Angiography;  Surgeon: Geovanni Tanner MD;  Location:   HEART CARDIAC CATH LAB     ENDARTERECTOMY CAROTID  3/26/2014    Procedure: ENDARTERECTOMY CAROTID;  LEFT CAROTID ENDARTERECTOMY WITH EEG;  Surgeon: Yobani Jones MD;  Location:  OR     HC REMOVAL OF TONSILS,<13 Y/O      Tonsils <13y.o.     HEART CATH, ANGIOPLASTY  14    Stenting of LAD,Prox LCx, and 2 stents to OM2     HERNIORRHAPHY INGUINAL      Right     HERNIORRHAPHY INGUINAL  2/10/2012    Procedure:HERNIORRHAPHY INGUINAL; LEFT OPEN INGUINAL HERNIA REPAIR WITH MESH, RIGHT HYDROCELECTOMY; Surgeon:JULI LOPES; Location: [facility]     HYDROCELECTOMY INGUINAL  2/10/2012    Procedure:HYDROCELECTOMY INGUINAL; Surgeon:JULI LOPES; Location: [facility]     HYDROCELECTOMY SCROTAL Right 2016    Procedure: HYDROCELECTOMY SCROTAL;  Surgeon: Yobani Stevens MD;  Location:  [facility]     LAPAROSCOPIC HERNIORRHAPHY INGUINAL BILATERAL Bilateral 2016    Procedure: LAPAROSCOPIC HERNIORRHAPHY INGUINAL BILATERAL;  Surgeon: Chandler Bowen MD;  Location:  OR     MR BRAIN AND ORBITS  3/2014    6 mm area of restricted diffusion subcortical white matter left parietal lobe/questionable area of restricted diffusion left posterior frontal region - c/w recent small infarcts. Small chronic lacunar infarct left caudate nucleus     SURGICAL HISTORY OF -       tonail removal       Social History   I have reviewed this patient's social history and updated it with pertinent information if needed.  Social History     Tobacco Use     Smoking status: Former Smoker     Packs/day: 0.50     Years: 20.00     Pack years: 10.00     Types: Cigarettes     Start date:      Quit date: 1968     Years since quittin.4     Smokeless tobacco: Former User   Substance Use Topics     Alcohol use: No     Alcohol/week: 0.0 standard drinks     Drug use: No       Family History   I have reviewed this patient's family history and updated it with pertinent information if needed.  Family History   Problem Relation Age of  Onset     C.A.D. Brother         older brother, CABG about age 56     Hypertension Brother      Respiratory Father         lung disease - farmer,      Hypertension Mother      Hypertension Sister      Hypertension Brother      Cerebrovascular Disease Son         12/26/2018       Prior to Admission Medications   Prior to Admission Medications   Prescriptions Last Dose Informant Patient Reported? Taking?   Ascorbic Acid (VITAMIN C PO)   Yes No   Sig: Take 1,000 mg by mouth daily   Omega-3 Fatty Acids (OMEGA-3 FISH OIL PO)   Yes No   Sig: Take 1 g by mouth daily    VITAMIN D, CHOLECALCIFEROL, PO   Yes No   Sig: Take 5,000 Units by mouth daily    apixaban ANTICOAGULANT (ELIQUIS) 2.5 MG tablet   No No   Sig: Take 1 tablet (2.5 mg) by mouth 2 times daily   aspirin 81 MG tablet   Yes No   Sig: Take by mouth daily   atenolol (TENORMIN) 50 MG tablet   No No   Sig: Take 0.5 tablets (25 mg) by mouth daily   atorvastatin (LIPITOR) 40 MG tablet   No No   Sig: Take 1 tablet (40 mg) by mouth daily   calcium carbonate (OS-SREEDHAR 500 MG [place]. [place]) 500 MG tablet   Yes No   Sig: Take 500 mg by mouth daily   glucosamine-chondroitin 500-400 MG CAPS   Yes No   Sig: Take 1 capsule by mouth daily   lisinopril-hydrochlorothiazide (ZESTORETIC) 20-12.5 MG tablet   No No   Sig: Take 1 tablet by mouth daily   nitroGLYcerin (NITROSTAT) 0.4 MG sublingual tablet   No No   Sig: For chest pain place 1 tablet under the tongue every 5 minutes for 3 doses. If symptoms persist 5 minutes after 1st dose call 911.   nystatin (MYCOSTATIN) [id number] UNIT/GM external ointment   No No   Sig: Apply topically 2 times daily   omeprazole (PRILOSEC) 20 MG DR capsule   No No   Sig: TAKE 1 CAPSULE BY MOUTH EVERY DAY   terazosin (HYTRIN) 2 MG capsule   No No   Sig: Take 1 capsule (2 mg) by mouth At Bedtime   vitamin  B complex with vitamin C (VITAMIN  B COMPLEX) TABS   Yes No   Sig: Take 1 tablet by mouth daily      Facility-Administered Medications: None      Allergies   Allergies   Allergen Reactions     Contrast Dye Hives       Physical Exam   Vital Signs: Temp: 98.2  F (36.8  C)   BP: 110/56 Pulse: 65   Resp: 22 SpO2: 95 % O2 Device: None (Room air)    Weight: 0 lbs 0 oz    Constitutional: Awake, alert, cooperative, no apparent distress  HEENT: neck supple, no LAD noted, moist mucous membranes  Respiratory: Clear to auscultation bilaterally, no crackles or wheezing  Cardiovascular: Regular rate and rhythm, normal S1 and S2, and no murmur noted  GI: Normal bowel sounds, soft, non-distended, non-tender  Skin/Integumen: No rashes, no cyanosis, no edema  Ext: no edema, moving all extremities  Neuro: CN 2-12 intact, non focal exam    Data   Data reviewed today: I reviewed all medications, new labs and imaging results over the last 24 hours. I personally reviewed no images or EKG's today.    Recent Labs   Lab 11/07/21  2046 11/05/21  0943 11/04/21  0858 11/03/21  0506 11/02/21 2008   WBC 8.4 6.4 4.0 3.0* 5.2   HGB 14.4 15.4 14.7 14.6 13.8   MCV 90 91 88 91 91    222 208 144* 156   INR  --   --   --  1.10  --     141  --   --  138  140   POTASSIUM 3.7 3.6  --   --  3.8  3.7   CHLORIDE 110* 108  --   --  106  108   CO2 26 27  --   --  28  26   BUN 31* 31*  --   --  19  18   CR 0.73 0.69  --   --  0.79  0.72   ANIONGAP 2* 6  --   --  4  6   SREEDHAR 8.5 8.6  --   --  7.8*  8.0*   * 174*  --   --  174*  179*   ALBUMIN  --   --  2.5*  --  2.5*   PROTTOTAL  --   --  6.3*  --  5.9*   BILITOTAL  --   --  0.4  --  0.4   ALKPHOS  --   --  57  --  57   ALT  --   --  29  --  27   AST  --   --  18  --  21   TROPONIN <0.015  --   --   --  <0.015     Recent Results (from the past 24 hour(s))   XR Chest Port 1 View    Narrative    EXAM: XR CHEST PORT 1 VIEW  LOCATION: [facility]  DATE/TIME: 11/7/2021 8:46 PM    INDICATION: Shortness of breath - known + COVID.  COMPARISON: 11/02/2021.      Impression    IMPRESSION:     New peripheral  left lung consolidative opacities compared to 11/20/2021. Few other mild bilateral opacities are noted. Airway thickening present. Elevated left hemidiaphragm.     No significant pleural effusion. Stable cardiomediastinal silhouette. Aortic atherosclerosis.

## 2021-11-08 NOTE — PROGRESS NOTES
"PRIMARY DIAGNOSIS: \"GENERIC\" NURSING  OUTPATIENT/OBSERVATION GOALS TO BE MET BEFORE DISCHARGE:  1. ADLs back to baseline: No    2. Activity and level of assistance: Up with standby assistance.    3. Pain status: Pain free.    4. Return to near baseline physical activity: Yes     Discharge Planner Nurse   Safe discharge environment identified: Yes  Barriers to discharge: Yes       Entered by: Sheila Michel 11/08/2021 9:17 AM     Please review provider order for any additional goals.   Nurse to notify provider when observation goals have been met and patient is ready for discharge.  "

## 2021-11-08 NOTE — ED PROVIDER NOTES
History   Chief Complaint:  Covid Concern     The history is provided by the patient.      Ivan Payton is a 86 year old male with history of asthma, stroke, hypertension, and CAD, currently anticoagulated on Eliquis, who presents with COVID concern. The patient was admitted to [facility] from 11/02 to 11/05 with acute respiratory failure and pneumonia due to COVID. Today, the patient denies any symptoms of shortness of breath. He has been feeling much improved since discharge apart from an infrequent dry cough. However, his wife noted oxygenation as low as 84% at home, eventually 89% with deep breaths. He notes continued decreased appetite but has been drinking ample fluids. He denies any vomiting, diarrhea, fever, body aches, or increased leg swelling. He is fully vaccinated against COVID.    Review of Systems   Constitutional: Positive for appetite change. Negative for fever.   Respiratory: Positive for cough. Negative for shortness of breath.    Cardiovascular: Negative for leg swelling.   Gastrointestinal: Negative for diarrhea and vomiting.   All other systems reviewed and are negative.    Allergies:  Contrast Dye    Medications:  Eliquis  Aspirin  Tenormin  Lipitor  Zestoretic  Nitrostat  Prilosec  Hytrin    Past Medical History:     Arthritis  Carotid stenosis  Stroke  CAD  Hypertension  GERD  Hiatal hernia  Hyperlipidemia  RBBB  Asthma   Obesity      Past Surgical History:    Heart catheterization  Left carotid endarterectomy  Tonsillectomy  Coronary angioplasty  Right inguinal herniorrhaphy  Left inguinal herniorrhaphy  Inguinal hydrocelectomy  Scrotal hydrocelectomy  Toenail removal    Family History:    CAD, brother  Hypertension, brother/mother/sister  Lung disease, father    Social History:  Presents to ED alone.    Physical Exam     Patient Vitals for the past 24 hrs:   BP Temp Pulse Resp SpO2   11/07/21 1916 110/56 98.2  F (36.8  C) 65 22 95 %       Physical Exam  Eye:  Pupils  are equal, round, and reactive.  Extraocular movements intact.    ENT:  Tympanic membranes are normal bilaterally.  + nasal congestion.  Moist mucus membranes.  Normal tongue and tonsil.    Cardiac:  Regular rate and rhythm.  No murmurs, gallops, or rubs.    Pulmonary:  Clear to auscultation bilaterally.  No wheezes, rales, or rhonchi.  Infrequent dry cough without increased work of breathing.    Abdomen:  Positive bowel sounds.  Abdomen is soft and non-distended, without focal tenderness.    Musculoskeletal:  Normal movement of all extremities without evidence for deficit.    Skin:  Warm and dry without rashes.    Neurologic:  Non-focal exam without asymmetric weakness or numbness.    Psychiatric:  Normal affect with appropriate interaction.     Emergency Department Course   ECG  ECG obtained at 2028, ECG read at 2030  Normal sinus rhythm  Right bundle branch block  Abnormal ECG   Rate 63 bpm. DC interval 158 ms. QRS duration 138 ms. QT/QTc 454/464 ms. P-R-T axes 58 16 -11.     Imaging:  XR Chest Port 1 View   Final Result   IMPRESSION:       New peripheral left lung consolidative opacities compared to 11/20/2021. Few other mild bilateral opacities are noted. Airway thickening present. Elevated left hemidiaphragm.       No significant pleural effusion. Stable cardiomediastinal silhouette. Aortic atherosclerosis.              Report per radiology    Laboratory:  Labs Ordered and Resulted from Time of ED Arrival to Time of ED Departure   BASIC METABOLIC PANEL - Abnormal       Result Value    Sodium 138      Potassium 3.7      Chloride 110 (*)     Carbon Dioxide (CO2) 26      Anion Gap 2 (*)     Urea Nitrogen 31 (*)     Creatinine 0.73      Calcium 8.5      Glucose 187 (*)     GFR Estimate 85     CBC WITH PLATELETS AND DIFFERENTIAL - Abnormal    WBC Count 8.4      RBC Count 4.85      Hemoglobin 14.4      Hematocrit 43.7      MCV 90      MCH 29.7      MCHC 33.0      RDW 12.6      Platelet Count 229      % Neutrophils  81      % Lymphocytes 7      % Monocytes 10      % Eosinophils 1      % Basophils 0      % Immature Granulocytes 1      NRBCs per 100 WBC 0      Absolute Neutrophils 6.8      Absolute Lymphocytes 0.6 (*)     Absolute Monocytes 0.8      Absolute Eosinophils 0.1      Absolute Basophils 0.0      Absolute Immature Granulocytes 0.1 (*)     Absolute NRBCs 0.0     TROPONIN I - Normal    Troponin I <0.015     NT PROBNP INPATIENT - Normal    N terminal Pro BNP Inpatient 245     PROCALCITONIN - Normal    Procalcitonin <0.05     D DIMER QUANTITATIVE   CRP INFLAMMATION        Emergency Department Course:  Reviewed:  I reviewed nursing notes, vitals, past medical history and MIIC.    Assessments:  2014 I obtained history and examined the patient as noted above.   2128 I rechecked the patient and explained findings.     Consults:  2119 I spoke to the patient's wife, Margo, regarding his history and presentation today.  2152  I spoke to Dr. Zarate of the hospitalist service who accepts the patient for admission.     Disposition:  The patient was admitted to the hospital under the care of Dr. Zarate.     Impression & Plan     Medical Decision Making:  This gentleman who I had the pleasure of evaluating and admitting last week for Covid pneumonia returns to us with a spell of hypoxia. He was discharged 2 days ago. His wife has been checking his oxygenation with their home pulse oximeter. She was concerned last night when she felt that his cough seemed to become more productive and he was up most of the night coughing. Today when she checked his oxygenation, she found her to be in the mid 80s. She also feels that he seems more short of breath.    On my exam, he is resting comfortably on the bed. He is not on oxygen, with oxygenation between 91 and 93%. However, he is somewhat tachypneic. I personally ambulated him up and down the loyd with desaturation to 89% which came up with deep breaths on his own. Because of his age and  recent admission, I elected to send lab work which is reassuring. However, chest x-ray does show a new significant lobar infiltrate in the left upper lung. I am concerned for the possibility of a secondary bacterial infection, though this may simply be further extension of his Covid pneumonia. Nonetheless, with his spells of hypoxia at home, advanced age, and worrisome x-ray findings, I feel he requires admission under observation status. I spoke with Dr. Zarate who agrees accept care of the patient. I have sent a procalcitonin level. We have elected to hold off on antibiotics at this point, though would have a low threshold to begin them if he shows worsening of his condition.    Diagnosis:    ICD-10-CM    1. Pneumonia due to 2019 novel coronavirus  U07.1     J12.82    2. Hypoxia  R09.02    3. Dyspnea, unspecified type  R06.00      Scribe Disclosure:  I, Rosalva Bergeron, am serving as a scribe at 8:07 PM on 11/7/2021 to document services personally performed by Trierweiler, Chad A, MD based on my observations and the provider's statements to me.      Trierweiler, Chad A, MD  11/07/21 [id number]

## 2021-11-08 NOTE — PROGRESS NOTES
Covid. A&O. SBA, gen weak. 3 L NC. Bradycardic, refused cont pulse ox. Reg diet. Denies pain. [place]. Cont to monitor.

## 2021-11-08 NOTE — PROGRESS NOTES
Clinic Care Coordination Contact    Background: Care Coordination referral placed from [facility] discharge report for reason of patient meeting criteria for a TCM outreach call by [facility] team.    Assessment: Upon chart review, [facility] Team member will cancel/close the referral for TCM outreach due to reason below:     Patient has presented to Emergency Department or has been readmitted to hospital.     Plan: Care Coordination referral for TCM outreach canceled.    Jean Houston  [facility] [place], [facility]

## 2021-11-08 NOTE — PROGRESS NOTES
"PRIMARY DIAGNOSIS: \"GENERIC\" NURSING  OUTPATIENT/OBSERVATION GOALS TO BE MET BEFORE DISCHARGE:  1. ADLs back to baseline: No    2. Activity and level of assistance: Up with standby assistance.    3. Pain status: Pain free.    4. Return to near baseline physical activity: Yes     Discharge Planner Nurse   Safe discharge environment identified: Yes  Barriers to discharge: Yes       Entered by: Sheila Michel 11/08/2021 4:52 PM     Please review provider order for any additional goals.   Nurse to notify provider when observation goals have been met and patient is ready for discharge.  "

## 2021-11-08 NOTE — PROGRESS NOTES
RECEIVING UNIT ED HANDOFF REVIEW    ED Nurse Handoff Report was reviewed by: Valery Mcguire RN on November 7, 2021 at 11:16 PM

## 2021-11-08 NOTE — PROGRESS NOTES
[facility]    Hospitalist Progress Note    Date of Admission:  11/7/2021    Assessment & Plan     Ivan Payton is a 86 year old male with hypertension, CAD, h/o CVA, dyslipidemia, GERD,  BPH, CKD stage 2 on 11/7/2021. He was admitted here 11/2 to 11/5 with covid pneumonia. Sent home off oxygen.  Readmitted on 11/7 with hypoxia.    COVID-19 infection    Acute Hypoxic Respiratory Failure secondary to COVID-19 pneumonia  Patient is fully vaccinated, has not received booster yet.  He was previously treated with IV Remdesivir (11/2 to 11/5), PO dexamethasone (11/2 to 11/5) during last admission.  - Admit to medical floor with continuous pulse ox, COVID-19 special precautions  - Oxygen: Currently at 2 L nasal cannula oxygen; titrate to keep SpO2 between 90-96%  - Labs: daily COVID labs ordered (CBC, creatinine,  D-dimer, fibrinogen, CRP.  CRP was around 90 at last admission, discharged at 15.8.  Readmitted at 106, increased to 125.  CRP rise is concerning.  - Imaging: portable cxr with consolidative infiltrate noted in left peripheral lung as well as some scattered bilaterally  - Breathing treatments: no inhalers needed; avoid nebulizers in favor of MDIs   - IV fluids: not indicated at this time  - Antibiotics: not indicated   - COVID-Focused Medications: continue dexamethasone 6 mg IV daily started previously at least until hospital discharge  - DVT Prophylaxis:        - PROPHYLACTIC dosing: lovenox 40mg daily        -At discharge will resume the Eliquis that he was previously sent home on x30 days     -Upgraded to inpatient status on 11/8 given worsening CRP, ongoing hypoxia with COVID-19 infection.     Hypertension  Dyslipidemia  H/o CVA  Chronic LE edema  - continue PTA aspirin, atorvastatin, lisinopril- hydrochlorothiazide, atenolol, SL nitroglycerin   - received lasix on previous admission. Also atenolol dose decreased from previous due to bradycardia.     GERD:  continue PTA  Protonix  BPH:  continue PTA terazosin         Diet:   Regular  DVT Prophylaxis: Enoxaparin (Lovenox) SQ  Bernstein Catheter: Not present  Central Lines: None  Code Status:   Full code        Clinically Significant Risk Factors Present on Admission                # Coagulation Defect: home medication list includes an anticoagulant medication  # Platelet Defect: home medication list includes an antiplatelet medication            Disposition Plan     Expected discharge: 1-3 days recommended to prior living arrangement once O2 use less than 2 liters/minute and safe disposition plan/ TCU bed available.    Edda García MD  Text Page (7am - 6pm, M-F)  [facility]  Securely message with the Vocera Web Console (learn more here)  Text page via [employer] Paging/Directory      Interval History   Stable overnight.  Still requiring 2 L nasal cannula oxygen.  Occasional productive cough noted.  No chest pain.  Mild shortness of breath.  Patient does not feel too bad overall.    -Data reviewed today: I reviewed all new labs and imaging results over the last 24 hours. I personally reviewed CXR with result as noted above    Physical Exam   Temp: 97.5  F (36.4  C) Temp src: Oral BP: (!) 148/78 Pulse: 66   Resp: 22 SpO2: 93 % O2 Device: Nasal cannula Oxygen Delivery: 2 LPM  Vitals:    11/08/21 0028   Weight: 93.5 kg (206 lb 3.2 oz)     Vital Signs with Ranges  Temp:  [97.2  F (36.2  C)-98.4  F (36.9  C)] 97.5  F (36.4  C)  Pulse:  [50-85] 66  Resp:  [20-24] 22  BP: (110-164)/(56-81) 148/78  SpO2:  [87 %-95 %] 93 %  I/O last 3 completed shifts:  In: 360 [P.O.:360]  Out: 350 [Urine:350]    Constitutional: Alert, appears comfortable, in no acute distress, appears nontoxic but appears fatigued  Respiratory: Non labored breathing, clear to auscultation   Cardiovascular: Heart sounds regular rate and rhythm, no murmurs, no leg edema  GI: Abdomen is soft, non distended, non tender. Normal BS  Skin/Integumen: no rashes, no  pressure sores  Neuro: alert, converses appropriately, moving all extremities, fluent speech, no facial asymmetry  Psych: mood and affect appropriate      Medications     - MEDICATION INSTRUCTIONS -         acetaminophen  975 mg Oral Q8H     [START ON 11/9/2021] dexamethasone  6 mg Intravenous Q24H     enoxaparin ANTICOAGULANT  40 mg Subcutaneous Q24H     lisinopril-hydrochlorothiazide  1 tablet Oral Daily     omeprazole  20 mg Oral QAM AC     sodium chloride (PF)  3 mL Intracatheter Q8H     terazosin  2 mg Oral At Bedtime       Data   Recent Labs   Lab 11/08/21  0703 11/07/21 2046 11/05/21  0943 11/04/21  0858 11/03/21  0506 11/02/21 2008   WBC 6.1 8.4 6.4 4.0 3.0* 5.2   HGB 14.0 14.4 15.4 14.7 14.6 13.8   MCV 88 90 91 88 91 91    229 222 208 144* 156   INR  --   --   --   --  1.10  --     138 141  --   --  138  140   POTASSIUM 4.2 3.7 3.6  --   --  3.8  3.7   CHLORIDE 109 110* 108  --   --  106  108   CO2 27 26 27  --   --  28  26   BUN 28 31* 31*  --   --  19  18   CR 0.71 0.73 0.69  --   --  0.79  0.72   ANIONGAP 2* 2* 6  --   --  4  6   SREEDHAR 8.2* 8.5 8.6  --   --  7.8*  8.0*   * 187* 174*  --   --  174*  179*   ALBUMIN  --   --   --  2.5*  --  2.5*   PROTTOTAL  --   --   --  6.3*  --  5.9*   BILITOTAL  --   --   --  0.4  --  0.4   ALKPHOS  --   --   --  57  --  57   ALT  --   --   --  29  --  27   AST  --   --   --  18  --  21   TROPONIN  --  <0.015  --   --   --  <0.015       Imaging  Recent Results (from the past 24 hour(s))   XR Chest Port 1 View    Narrative    EXAM: XR CHEST PORT 1 VIEW  LOCATION: [facility]  DATE/TIME: 11/7/2021 8:46 PM    INDICATION: Shortness of breath - known + COVID.  COMPARISON: 11/02/2021.      Impression    IMPRESSION:     New peripheral left lung consolidative opacities compared to 11/20/2021. Few other mild bilateral opacities are noted. Airway thickening present. Elevated left hemidiaphragm.     No significant pleural  effusion. Stable cardiomediastinal silhouette. Aortic atherosclerosis.

## 2021-11-08 NOTE — PROGRESS NOTES
"PRIMARY DIAGNOSIS: \"GENERIC\" NURSING  OUTPATIENT/OBSERVATION GOALS TO BE MET BEFORE DISCHARGE:  1. ADLs back to baseline: No    2. Activity and level of assistance: Up with standby assistance.    3. Pain status: Pain free.    4. Return to near baseline physical activity: Yes     Discharge Planner Nurse   Safe discharge environment identified: Yes  Barriers to discharge: Yes       Entered by: Sheila Michel 11/08/2021 2:13 PM     Please review provider order for any additional goals.   Nurse to notify provider when observation goals have been met and patient is ready for discharge.  "

## 2021-11-08 NOTE — PLAN OF CARE
PRIMARY DIAGNOSIS: PNEUMONIA  OUTPATIENT/OBSERVATION GOALS TO BE MET BEFORE DISCHARGE:  1. Dyspnea improved and O2 sats >88% on RA or back to baseline O2 levels: Not met  SpO2: 95 %, O2 Device: Nasal cannula met    2. Tolerating oral abx or appropriate plans made outpatient infusion: NA    3. Vitals signs normal or return to baseline: Not met, bradycardic with elevated bp    4. Short term supplemental O2 needed with activity at home: Yes    5. Tolerate oral intake to maintain hydration: Yes    6. Return to near baseline physical activity: No    Discharge Planner Nurse   Safe discharge environment identified: No  Barriers to discharge: No       Entered by: Valery Mcguire 11/08/2021 6:20 AM     Please review provider order for any additional goals.   Nurse to notify provider when observation goals have been met and patient is ready for discharge.

## 2021-11-09 LAB
ANION GAP SERPL CALCULATED.3IONS-SCNC: 4 MMOL/L (ref 3–14)
BUN SERPL-MCNC: 23 MG/DL (ref 7–30)
CALCIUM SERPL-MCNC: 8.2 MG/DL (ref 8.5–10.1)
CHLORIDE BLD-SCNC: 111 MMOL/L (ref 94–109)
CO2 SERPL-SCNC: 26 MMOL/L (ref 20–32)
CREAT SERPL-MCNC: 0.7 MG/DL (ref 0.66–1.25)
CRP SERPL-MCNC: 69.4 MG/L (ref 0–8)
D DIMER PPP FEU-MCNC: 0.42 UG/ML FEU (ref 0–0.5)
ERYTHROCYTE [DISTWIDTH] IN BLOOD BY AUTOMATED COUNT: 12.6 % (ref 10–15)
FIBRINOGEN PPP-MCNC: 540 MG/DL (ref 170–490)
GFR SERPL CREATININE-BSD FRML MDRD: 86 ML/MIN/1.73M2
GLUCOSE BLD-MCNC: 156 MG/DL (ref 70–99)
HCT VFR BLD AUTO: 41.2 % (ref 40–53)
HGB BLD-MCNC: 13.7 G/DL (ref 13.3–17.7)
MCH RBC QN AUTO: 29.7 PG (ref 26.5–33)
MCHC RBC AUTO-ENTMCNC: 33.3 G/DL (ref 31.5–36.5)
MCV RBC AUTO: 89 FL (ref 78–100)
PLATELET # BLD AUTO: 242 10E3/UL (ref 150–450)
POTASSIUM BLD-SCNC: 3.7 MMOL/L (ref 3.4–5.3)
RBC # BLD AUTO: 4.61 10E6/UL (ref 4.4–5.9)
SODIUM SERPL-SCNC: 141 MMOL/L (ref 133–144)
WBC # BLD AUTO: 7.2 10E3/UL (ref 4–11)

## 2021-11-09 PROCEDURE — 250N000013 HC RX MED GY IP 250 OP 250 PS 637: Performed by: HOSPITALIST

## 2021-11-09 PROCEDURE — 85384 FIBRINOGEN ACTIVITY: CPT | Performed by: HOSPITALIST

## 2021-11-09 PROCEDURE — 99232 SBSQ HOSP IP/OBS MODERATE 35: CPT | Performed by: HOSPITALIST

## 2021-11-09 PROCEDURE — 36415 COLL VENOUS BLD VENIPUNCTURE: CPT | Performed by: HOSPITALIST

## 2021-11-09 PROCEDURE — 80048 BASIC METABOLIC PNL TOTAL CA: CPT | Performed by: HOSPITALIST

## 2021-11-09 PROCEDURE — 120N000004 HC R&B MS OVERFLOW

## 2021-11-09 PROCEDURE — 250N000011 HC RX IP 250 OP 636: Performed by: HOSPITALIST

## 2021-11-09 PROCEDURE — 85027 COMPLETE CBC AUTOMATED: CPT | Performed by: HOSPITALIST

## 2021-11-09 PROCEDURE — 86140 C-REACTIVE PROTEIN: CPT | Performed by: HOSPITALIST

## 2021-11-09 PROCEDURE — 85379 FIBRIN DEGRADATION QUANT: CPT | Performed by: HOSPITALIST

## 2021-11-09 RX ADMIN — DEXAMETHASONE SODIUM PHOSPHATE 6 MG: 4 INJECTION, SOLUTION INTRAMUSCULAR; INTRAVENOUS at 06:27

## 2021-11-09 RX ADMIN — ENOXAPARIN SODIUM 40 MG: 40 INJECTION SUBCUTANEOUS at 08:07

## 2021-11-09 RX ADMIN — ACETAMINOPHEN 975 MG: 325 TABLET, FILM COATED ORAL at 08:07

## 2021-11-09 RX ADMIN — LISINOPRIL AND HYDROCHLOROTHIAZIDE 1 TABLET: 12.5; 2 TABLET ORAL at 08:07

## 2021-11-09 RX ADMIN — ACETAMINOPHEN 975 MG: 325 TABLET, FILM COATED ORAL at 00:57

## 2021-11-09 RX ADMIN — OMEPRAZOLE 20 MG: 20 CAPSULE, DELAYED RELEASE ORAL at 07:14

## 2021-11-09 RX ADMIN — ASPIRIN 81 MG: 81 TABLET, COATED ORAL at 08:07

## 2021-11-09 RX ADMIN — ACETAMINOPHEN 975 MG: 325 TABLET, FILM COATED ORAL at 16:42

## 2021-11-09 RX ADMIN — TERAZOSIN HYDROCHLORIDE 2 MG: 2 CAPSULE ORAL at 21:26

## 2021-11-09 ASSESSMENT — ACTIVITIES OF DAILY LIVING (ADL)
ADLS_ACUITY_SCORE: 10
ADLS_ACUITY_SCORE: 10
ADLS_ACUITY_SCORE: 5
ADLS_ACUITY_SCORE: 8
ADLS_ACUITY_SCORE: 5
ADLS_ACUITY_SCORE: 10
ADLS_ACUITY_SCORE: 5
ADLS_ACUITY_SCORE: 5
ADLS_ACUITY_SCORE: 10
ADLS_ACUITY_SCORE: 8
ADLS_ACUITY_SCORE: 8
ADLS_ACUITY_SCORE: 5
ADLS_ACUITY_SCORE: 5
ADLS_ACUITY_SCORE: 9
ADLS_ACUITY_SCORE: 10
ADLS_ACUITY_SCORE: 9
ADLS_ACUITY_SCORE: 8
ADLS_ACUITY_SCORE: 5
ADLS_ACUITY_SCORE: 5
ADLS_ACUITY_SCORE: 10
ADLS_ACUITY_SCORE: 5
ADLS_ACUITY_SCORE: 5

## 2021-11-09 NOTE — PROGRESS NOTES
Patient has been assessed for Home Oxygen needs. Oxygen readings:    *Pulse oximetry (SpO2) = 91% on room air at rest while awake.    *SpO2 improved to 92% on 1 liters/minute at rest.    *SpO2 = 92% on room air during activity/with exercise.    *SpO2 improved to 92% on 0 liters/minute during activity/with exercise.

## 2021-11-09 NOTE — PLAN OF CARE
Covid positive. Assumed care at 0730. Pt is a/o. VSS. Weaned down to RA. SOB with exertion. Uses IS frequently. Up with SBA. Up in chair for meals. Ambulated in the room. Tolerating diet. Possible discharge home tomorrow.

## 2021-11-09 NOTE — PLAN OF CARE
covid positive. A/Ox4 but forgetful. VSS on 1/2L O2, stating at 92% while sleeping goes up when awake. continue to wean. Denies SOB. Patient frustrated by alarms going off frequently and had difficultly sleep throughout the night. Up SBA, stands to use urinal at bedside. . PIV s/l'd, IV dexamethasone. IS up to 2000ml, encourage IS use. Continue to monitor.

## 2021-11-09 NOTE — PROGRESS NOTES
[facility]    Hospitalist Progress Note    Date of Admission:  11/7/2021    Assessment & Plan     Ivan Payton is a 86 year old male with hypertension, CAD, h/o CVA, dyslipidemia, GERD,  BPH, CKD stage 2 on 11/7/2021. He was admitted here 11/2 to 11/5 with covid pneumonia. Sent home off oxygen.  Readmitted on 11/7 with hypoxia.    COVID-19 infection    Acute Hypoxic Respiratory Failure secondary to COVID-19 pneumonia  Patient is fully vaccinated, has not received booster yet.  He was previously treated with IV Remdesivir (11/2 to 11/5), PO dexamethasone (11/2 to 11/5) during last admission.  - Admit to medical floor with continuous pulse ox, COVID-19 special precautions  - Oxygen: Currently at 2 L nasal cannula oxygen; titrate to keep SpO2 between 90-96%  - Labs: daily COVID labs ordered (CBC, creatinine,  D-dimer, fibrinogen, CRP.  CRP was around 90 at last admission, discharged at 15.8.  Readmitted at 106, increased to 125.  CRP rise is concerning.  - Imaging: portable cxr with consolidative infiltrate noted in left peripheral lung as well as some scattered bilaterally  - Breathing treatments: no inhalers needed; avoid nebulizers in favor of MDIs   - IV fluids: not indicated at this time  - Antibiotics: not indicated   - COVID-Focused Medications: continue dexamethasone 6 mg IV daily.  Will likely discharge home on oral dexamethasone to complete total 10-day course of steroids.  - DVT Prophylaxis:        - PROPHYLACTIC dosing: lovenox 40mg daily        -At discharge will resume the Eliquis that he was previously sent home on x30 days     -Upgraded to inpatient status on 11/8 given worsening CRP, ongoing hypoxia with COVID-19 infection.  -CRP improving to around 60 on 11/9 which is encouraging.  O2 requirements also stable at 1 to 2 L nasal cannula oxygen.  Try to wean off oxygen as able.     Hypertension  Dyslipidemia  H/o CVA  Chronic LE edema  - continue PTA aspirin, atorvastatin,  lisinopril- hydrochlorothiazide, atenolol, SL nitroglycerin   - received lasix on previous admission. Also atenolol dose decreased from previous due to bradycardia.     GERD:  continue PTA Protonix  BPH:  continue PTA terazosin         Diet:   Regular  DVT Prophylaxis: Enoxaparin (Lovenox) SQ  Bernstein Catheter: Not present  Central Lines: None  Code Status:   Full code        Clinically Significant Risk Factors Present on Admission                # Coagulation Defect: home medication list includes an anticoagulant medication  # Platelet Defect: home medication list includes an antiplatelet medication            Disposition Plan     Expected discharge:  Possibly home tomorrow if able to wean off oxygen.    Edda García MD  Text Page (7am - 6pm, M-F)  [facility]  Securely message with the Vocera Web Console (learn more here)  Text page via [facility] Paging/Directory      Interval History   Stable overnight.  Still requiring 2 L nasal cannula oxygen.  Occasional productive cough noted.  No chest pain.  Mild shortness of breath.  Patient does not feel too bad overall.  Hopeful to go home tomorrow if able to wean off oxygen.  No fevers.  Did feel somewhat sweaty and hot earlier in the morning.    -Data reviewed today: I reviewed all new labs and imaging results over the last 24 hours. I personally reviewed CXR with result as noted above    Physical Exam   Temp: 97.8  F (36.6  C) Temp src: Oral BP: 126/74 Pulse: 91   Resp: 20 SpO2: 92 % O2 Device: None (Room air) Oxygen Delivery: 2 LPM  Vitals:    11/08/21 0028   Weight: 93.5 kg (206 lb 3.2 oz)     Vital Signs with Ranges  Temp:  [97.2  F (36.2  C)-97.8  F (36.6  C)] 97.8  F (36.6  C)  Pulse:  [] 91  Resp:  [16-28] 20  BP: (126-169)/(72-86) 126/74  SpO2:  [90 %-95 %] 92 %  I/O last 3 completed shifts:  In: 360 [P.O.:360]  Out: 700 [Urine:700]    Constitutional: Alert, appears comfortable, in no acute distress, appears nontoxic but appears  fatigued  Respiratory: Non labored breathing, clear to auscultation   Cardiovascular: Heart sounds regular rate and rhythm, no murmurs, no leg edema  GI: Abdomen is soft, non distended, non tender. Normal BS  Skin/Integumen: no rashes, no pressure sores  Neuro: alert, converses appropriately, moving all extremities, fluent speech, no facial asymmetry  Psych: mood and affect appropriate      Medications     - MEDICATION INSTRUCTIONS -         acetaminophen  975 mg Oral Q8H     aspirin  81 mg Oral Daily     dexamethasone  6 mg Intravenous Q24H     enoxaparin ANTICOAGULANT  40 mg Subcutaneous Q24H     lisinopril-hydrochlorothiazide  1 tablet Oral Daily     omeprazole  20 mg Oral QAM AC     sodium chloride (PF)  3 mL Intracatheter Q8H     terazosin  2 mg Oral At Bedtime       Data   Recent Labs   Lab 11/09/21  0652 11/08/21  0703 11/07/21  2046 11/05/21  0943 11/04/21  0858 11/03/21  0506 11/02/21 2008   WBC 7.2 6.1 8.4   < > 4.0 3.0* 5.2   HGB 13.7 14.0 14.4   < > 14.7 14.6 13.8   MCV 89 88 90   < > 88 91 91    229 229   < > 208 144* 156   INR  --   --   --   --   --  1.10  --     138 138   < >  --   --  138  140   POTASSIUM 3.7 4.2 3.7   < >  --   --  3.8  3.7   CHLORIDE 111* 109 110*   < >  --   --  106  108   CO2 26 27 26   < >  --   --  28  26   BUN 23 28 31*   < >  --   --  19  18   CR 0.70 0.71 0.73   < >  --   --  0.79  0.72   ANIONGAP 4 2* 2*   < >  --   --  4  6   SREEDHAR 8.2* 8.2* 8.5   < >  --   --  7.8*  8.0*   * 208* 187*   < >  --   --  174*  179*   ALBUMIN  --   --   --   --  2.5*  --  2.5*   PROTTOTAL  --   --   --   --  6.3*  --  5.9*   BILITOTAL  --   --   --   --  0.4  --  0.4   ALKPHOS  --   --   --   --  57  --  57   ALT  --   --   --   --  29  --  27   AST  --   --   --   --  18  --  21   TROPONIN  --   --  <0.015  --   --   --  <0.015    < > = values in this interval not displayed.       Imaging  No results found for this or any previous visit (from the past 24  hour(s)).

## 2021-11-09 NOTE — PROGRESS NOTES
Patient has been assessed for Home Oxygen needs. Oxygen readings:    *Pulse oximetry (SpO2) = 89% on room air at rest while awake.    *SpO2 improved to 2 % on 93 liters/minute at rest.    *SpO2 = 93% on room air during activity/with exercise.    *SpO2 improved to 93% on 0liters/minute during activity/with exercise.

## 2021-11-10 VITALS
BODY MASS INDEX: 29.52 KG/M2 | SYSTOLIC BLOOD PRESSURE: 144 MMHG | OXYGEN SATURATION: 90 % | DIASTOLIC BLOOD PRESSURE: 80 MMHG | TEMPERATURE: 99.1 F | RESPIRATION RATE: 20 BRPM | HEIGHT: 70 IN | HEART RATE: 67 BPM | WEIGHT: 206.2 LBS

## 2021-11-10 LAB — CRP SERPL-MCNC: 34.9 MG/L (ref 0–8)

## 2021-11-10 PROCEDURE — 250N000013 HC RX MED GY IP 250 OP 250 PS 637: Performed by: HOSPITALIST

## 2021-11-10 PROCEDURE — 99239 HOSP IP/OBS DSCHRG MGMT >30: CPT | Performed by: HOSPITALIST

## 2021-11-10 PROCEDURE — 250N000011 HC RX IP 250 OP 636: Performed by: HOSPITALIST

## 2021-11-10 PROCEDURE — 999N000147 HC STATISTIC PT IP EVAL DEFER

## 2021-11-10 PROCEDURE — 86140 C-REACTIVE PROTEIN: CPT | Performed by: HOSPITALIST

## 2021-11-10 PROCEDURE — 36415 COLL VENOUS BLD VENIPUNCTURE: CPT | Performed by: HOSPITALIST

## 2021-11-10 RX ORDER — ATENOLOL 25 MG/1
25 TABLET ORAL DAILY
Status: DISCONTINUED | OUTPATIENT
Start: 2021-11-10 | End: 2021-11-10 | Stop reason: HOSPADM

## 2021-11-10 RX ORDER — DEXAMETHASONE 6 MG/1
6 TABLET ORAL DAILY
Qty: 4 TABLET | Refills: 0 | Status: SHIPPED | OUTPATIENT
Start: 2021-11-10 | End: 2021-12-02

## 2021-11-10 RX ORDER — AMOXICILLIN 250 MG
1 CAPSULE ORAL 2 TIMES DAILY PRN
Qty: 10 TABLET | Refills: 0 | Status: SHIPPED | OUTPATIENT
Start: 2021-11-10

## 2021-11-10 RX ADMIN — LISINOPRIL AND HYDROCHLOROTHIAZIDE 1 TABLET: 12.5; 2 TABLET ORAL at 07:58

## 2021-11-10 RX ADMIN — ASPIRIN 81 MG: 81 TABLET, COATED ORAL at 07:58

## 2021-11-10 RX ADMIN — SENNOSIDES AND DOCUSATE SODIUM 2 TABLET: 8.6; 5 TABLET ORAL at 07:58

## 2021-11-10 RX ADMIN — ATENOLOL 25 MG: 25 TABLET ORAL at 09:52

## 2021-11-10 RX ADMIN — ENOXAPARIN SODIUM 40 MG: 40 INJECTION SUBCUTANEOUS at 09:38

## 2021-11-10 RX ADMIN — OMEPRAZOLE 20 MG: 20 CAPSULE, DELAYED RELEASE ORAL at 06:34

## 2021-11-10 RX ADMIN — DEXAMETHASONE SODIUM PHOSPHATE 6 MG: 4 INJECTION, SOLUTION INTRAMUSCULAR; INTRAVENOUS at 06:35

## 2021-11-10 RX ADMIN — ACETAMINOPHEN 975 MG: 325 TABLET, FILM COATED ORAL at 00:56

## 2021-11-10 ASSESSMENT — ACTIVITIES OF DAILY LIVING (ADL)
ADLS_ACUITY_SCORE: 9
ADLS_ACUITY_SCORE: 10
ADLS_ACUITY_SCORE: 9
ADLS_ACUITY_SCORE: 10
ADLS_ACUITY_SCORE: 9
ADLS_ACUITY_SCORE: 10
ADLS_ACUITY_SCORE: 9

## 2021-11-10 NOTE — DISCHARGE SUMMARY
Discharge Summary  Hospitalist    Date of Admission:  11/7/2021  Date of Discharge:  11/10/2021  Discharging Provider: Edda García MD  Date of Service (when I saw the patient): 11/10/21    Discharge Diagnoses   COVID-19 infection    Acute Hypoxic Respiratory Failure secondary to COVID-19 pneumonia, resolved    History of Present Illness   Please refer H & P for details.      Hospital Course   Ivan Payton is a 86 year old male with hypertension, CAD, h/o CVA, dyslipidemia, GERD,  BPH, CKD stage 2 on 11/7/2021. He was admitted here 11/2 to 11/5 with covid pneumonia. Sent home off oxygen.  Readmitted on 11/7 with hypoxia.     COVID-19 infection    Acute Hypoxic Respiratory Failure secondary to COVID-19 pneumonia  Patient is fully vaccinated, has not received booster yet.  He was previously treated with IV Remdesivir (11/2 to 11/5), PO dexamethasone (11/2 to 11/5) during last admission.  - Admit to medical floor with continuous pulse ox, COVID-19 special precautions  - Oxygen: Currently at 2 L nasal cannula oxygen; titrate to keep SpO2 between 90-96%  - Labs: daily COVID labs ordered (CBC, creatinine,  D-dimer, fibrinogen, CRP.  CRP was around 90 at last admission, discharged at 15.8.  Readmitted at 106, increased to 125.  CRP rise is concerning.  - Imaging: portable cxr with consolidative infiltrate noted in left peripheral lung as well as some scattered bilaterally  - Breathing treatments: no inhalers needed; avoid nebulizers in favor of MDIs   - IV fluids: not indicated at this time  - Antibiotics: not indicated   - COVID-Focused Medications: continue dexamethasone 6 mg IV daily.    - DVT Prophylaxis:        - PROPHYLACTIC dosing: lovenox 40mg daily        -At discharge will resume the Eliquis that he was previously sent home on x30 days     -Upgraded to inpatient status on 11/8 given worsening CRP, ongoing hypoxia with COVID-19 infection.  -CRP improving to around 60 on 11/9 which is encouraging.  O2  requirements also stable at 1 to 2 L nasal cannula oxygen.  Try to wean off oxygen as able.  -11/10: CRP has improved to 35.  Patient has been weaned off oxygen and O2 saturations remained stable.  Patient is clinically and symptomatically improved and stable.  Patient is stable to discharge home.  We will plan on continuing oral dexamethasone 6 mg daily for 4 more days.  Follow-up with PCP.     Hypertension  Dyslipidemia  H/o CVA  Chronic LE edema  - continue PTA aspirin, atorvastatin, lisinopril- hydrochlorothiazide, atenolol, SL nitroglycerin   - received lasix on previous admission.     GERD:  continue PTA Protonix  BPH:  continue PTA terazosin       Edda García MD, MD      Pending Results   These results will be followed up by Hospitalist team.  Unresulted Labs Ordered in the Past 30 Days of this Admission     No orders found from 10/8/2021 to 11/8/2021.          Code Status   Full Code       Primary Care Physician   William Thompson    Follow-ups Needed After Discharge   Follow-up Appointments     Follow-up and recommended labs and tests       Follow up with primary care provider, William Thompson, within 7 days for   hospital follow- up.  No follow up labs or test are needed.             Physical Exam   Temp: 99.1  F (37.3  C) Temp src: Oral BP: (!) 144/80 Pulse: 67   Resp: 20 SpO2: 90 % O2 Device: None (Room air)    Vitals:    11/08/21 0028   Weight: 93.5 kg (206 lb 3.2 oz)     Vital Signs with Ranges  Temp:  [97.2  F (36.2  C)-99.1  F (37.3  C)] 99.1  F (37.3  C)  Pulse:  [] 67  Resp:  [20] 20  BP: (109-160)/(65-98) 144/80  SpO2:  [87 %-93 %] 90 %  I/O last 3 completed shifts:  In: 640 [P.O.:640]  Out: 600 [Urine:600]      Constitutional: Alert, appears comfortable, in no acute distress, appears nontoxic but appears fatigued  Respiratory: Non labored breathing, clear to auscultation   Cardiovascular: Heart sounds regular rate and rhythm, no murmurs, no leg edema  GI: Abdomen is soft, non distended,  non tender. Normal BS  Skin/Integumen: no rashes, no pressure sores  Neuro: alert, converses appropriately, moving all extremities, fluent speech, no facial asymmetry  Psych: mood and affect appropriate             Discharge Disposition   Discharged to home  Condition at discharge: Stable    Consultations This Hospital Stay   PHYSICAL THERAPY ADULT IP CONSULT    Time Spent on this Encounter   Edda JIMENEZ MD, personally saw the patient today and spent greater than 30 minutes discharging this patient.    Discharge Orders      Reason for your hospital stay    You were hospitalized with COVID-19 pneumonia     Follow-up and recommended labs and tests     Follow up with primary care provider, William Thompson, within 7 days for hospital follow- up.  No follow up labs or test are needed.     Activity    Your activity upon discharge: activity as tolerated     When to contact your care team    Call your primary doctor if you have any of the following: Worsening fever, cough, shortness of breath, chest pain, leg pain or swelling     Discharge Instructions    You are instructed to self quarantine at home till 11/16/2021.     Diet    Follow this diet upon discharge: Orders Placed This Encounter      Combination Diet Regular Diet Adult     Discharge Medications   Discharge Medication List as of 11/10/2021  2:06 PM      START taking these medications    Details   dexamethasone (DECADRON) 6 MG tablet Take 1 tablet (6 mg) by mouth daily, Disp-4 tablet, R-0, E-Prescribe      senna-docusate (SENOKOT-S/PERICOLACE) 8.6-50 MG tablet Take 1 tablet by mouth 2 times daily as needed for constipation, Disp-10 tablet, R-0, E-Prescribe         CONTINUE these medications which have NOT CHANGED    Details   acetaminophen (TYLENOL) 650 MG CR tablet Take 650 mg by mouth 2 times daily, Historical      apixaban ANTICOAGULANT (ELIQUIS) 2.5 MG tablet Take 1 tablet (2.5 mg) by mouth 2 times daily, Disp-60 tablet, R-0, E-Prescribe      Ascorbic  Acid (VITAMIN C PO) Take 1,000 mg by mouth daily, Historical      aspirin 81 MG tablet Take by mouth daily, Disp-30 tablet, Historical      atenolol (TENORMIN) 50 MG tablet Take 0.5 tablets (25 mg) by mouth daily, Disp-90 tablet, R-1, No Print Out      atorvastatin (LIPITOR) 40 MG tablet Take 1 tablet (40 mg) by mouth daily, Disp-90 tablet, R-2, E-Prescribe      calcium carbonate (OS-SREEDHAR 500 MG [place]. [place]) 500 MG tablet Take 500 mg by mouth daily, Historical      glucosamine-chondroitin 500-400 MG CAPS Take 1 capsule by mouth daily, Historical      lisinopril-hydrochlorothiazide (ZESTORETIC) 20-12.5 MG tablet Take 1 tablet by mouth daily, Disp-90 tablet, R-1, E-Prescribe      nitroGLYcerin (NITROSTAT) 0.4 MG sublingual tablet For chest pain place 1 tablet under the tongue every 5 minutes for 3 doses. If symptoms persist 5 minutes after 1st dose call 911., Disp-25 tablet, R-1, E-Prescribe      Omega-3 Fatty Acids (OMEGA-3 FISH OIL PO) Take 1 g by mouth daily , Historical      omeprazole (PRILOSEC) 20 MG DR capsule TAKE 1 CAPSULE BY MOUTH EVERY DAY, Disp-90 capsule, R-1, E-Prescribe      terazosin (HYTRIN) 2 MG capsule Take 1 capsule (2 mg) by mouth At Bedtime, Disp-90 capsule, R-1, E-Prescribe      vitamin  B complex with vitamin C (VITAMIN  B COMPLEX) TABS Take 1 tablet by mouth daily, Historical      VITAMIN D, CHOLECALCIFEROL, PO Take 5,000 Units by mouth daily , Historical      Zinc 15 MG CAPS Take 15 mg by mouth daily, Historical           Allergies   Allergies   Allergen Reactions     Contrast Dye Hives     Data   Most Recent 3 CBC's:  Recent Labs   Lab Test 11/09/21  0652 11/08/21  0703 11/07/21 2046   WBC 7.2 6.1 8.4   HGB 13.7 14.0 14.4   MCV 89 88 90    229 229      Most Recent 3 BMP's:  Recent Labs   Lab Test 11/09/21  0652 11/08/21  0703 11/07/21 2046    138 138   POTASSIUM 3.7 4.2 3.7   CHLORIDE 111* 109 110*   CO2 26 27 26   BUN 23 28 31*   CR 0.70 0.71 0.73   ANIONGAP 4 2* 2*   SREEDHAR 8.2*  8.2* 8.5   * 208* 187*     Most Recent 2 LFT's:  Recent Labs   Lab Test 11/04/21  0858 11/02/21 2008   AST 18 21   ALT 29 27   ALKPHOS 57 57   BILITOTAL 0.4 0.4     Most Recent INR's and Anticoagulation Dosing History:  Anticoagulation Dose History     Recent Dosing and Labs Latest Ref Rng & Units 7/23/2014 8/5/2014 9/9/2014 12/18/2018 11/3/2021    INR 0.85 - 1.15 1.00 1.04 1.03 1.06 1.10        Most Recent 3 Troponin's:  Recent Labs   Lab Test 11/07/21  2046 11/02/21 2008   TROPONIN <0.015 <0.015     Most Recent Cholesterol Panel:  Recent Labs   Lab Test 09/01/20  1038   CHOL 113   LDL 54   HDL 43   TRIG 81     Most Recent 6 Bacteria Isolates From Any Culture (See EPIC Reports for Culture Details):  Recent Labs   Lab Test 06/30/20  0138   CULT Moderate growth  Streptococcus intermedius  *  Light growth  Coagulase negative Staphylococcus  Susceptibility testing not routinely done  *     Most Recent TSH, T4 and A1c Labs:  Recent Labs   Lab Test 03/17/21  1207   A1C 6.2*       Results for orders placed or performed during the hospital encounter of 11/07/21   XR Chest Port 1 View    Narrative    EXAM: XR CHEST PORT 1 VIEW  LOCATION: [facility]  DATE/TIME: 11/7/2021 8:46 PM    INDICATION: Shortness of breath - known + COVID.  COMPARISON: 11/02/2021.      Impression    IMPRESSION:     New peripheral left lung consolidative opacities compared to 11/20/2021. Few other mild bilateral opacities are noted. Airway thickening present. Elevated left hemidiaphragm.     No significant pleural effusion. Stable cardiomediastinal silhouette. Aortic atherosclerosis.

## 2021-11-10 NOTE — PLAN OF CARE
A/O x4. VSS on RA. Weaned off O2 yesterday, sat @ 90-94 on RA. IS encouraged. Denies pain. Up with SBA. Tolerate regular diet. PIV SL. Possible discharge today.

## 2021-11-10 NOTE — PLAN OF CARE
Assumed care today at 0730. Pt a/o. VSS. No pain. Tolerating reg diet. Voiding. Ambulating. Dc paper work reviewed with pt. Verbalizes understanding. PIV removed. Take home meds given. Verified 5 rights of med. All questions answered. Follow up aware. Dc home with family.

## 2021-11-11 ENCOUNTER — VIRTUAL VISIT (OUTPATIENT)
Dept: INTERNAL MEDICINE | Facility: CLINIC | Age: 86
End: 2021-11-11
Payer: COMMERCIAL

## 2021-11-11 ENCOUNTER — PATIENT OUTREACH (OUTPATIENT)
Dept: CARE COORDINATION | Facility: CLINIC | Age: 86
End: 2021-11-11

## 2021-11-11 DIAGNOSIS — J12.82 PNEUMONIA DUE TO 2019 NOVEL CORONAVIRUS: Primary | ICD-10-CM

## 2021-11-11 DIAGNOSIS — Z71.89 OTHER SPECIFIED COUNSELING: ICD-10-CM

## 2021-11-11 DIAGNOSIS — U07.1 PNEUMONIA DUE TO 2019 NOVEL CORONAVIRUS: Primary | ICD-10-CM

## 2021-11-11 PROBLEM — I73.9 PAD (PERIPHERAL ARTERY DISEASE) (H): Status: ACTIVE | Noted: 2021-11-11

## 2021-11-11 PROCEDURE — 99495 TRANSJ CARE MGMT MOD F2F 14D: CPT | Mod: 95 | Performed by: INTERNAL MEDICINE

## 2021-11-11 NOTE — PATIENT INSTRUCTIONS
I suggest that you get the influenza vaccine when you are feeling better and out of quarantine, and schedule a Covid booster in a month or so.               Keep using your incentive spirometer to expand your lung volumes.                     Increase your activity level as tolerated.                      Be sure to follow-up if you redevelop fever and chills and productive cough and worsening shortness of breath.

## 2021-11-11 NOTE — PROGRESS NOTES
"Ivan is a 86 year old who is being evaluated via a billable telephone visit.      What phone number would you like to be contacted at? [phone]  How would you like to obtain your AVS? MyChart    Assessment & Plan     Pneumonia due to 2019 novel coronavirus  With gradual improvement               BMI:   Estimated body mass index is 29.59 kg/m  as calculated from the following:    Height as of 11/8/21: 1.778 m (5' 10\").    Weight as of 11/8/21: 93.5 kg (206 lb 3.2 oz).   Weight management plan: Discussed healthy diet and exercise guidelines    Patient Instructions   I suggest that you get the influenza vaccine when you are feeling better and out of quarantine, and schedule a Covid booster in a month or so.               Keep using your incentive spirometer to expand your lung volumes.                     Increase your activity level as tolerated.                      Be sure to follow-up if you redevelop fever and chills and productive cough and worsening shortness of breath.       No follow-ups on file.    William Thompson MD  [facility]    Subjective   Ivan is a 86 year old who presents for the following health issues  accompanied by his spouse.    [facility]       Hospital Follow-up Visit:    Hospital/Nursing Home/IP Rehab Facility: [facility]  Date of Admission: 11/7/21  Date of Discharge: 11/10/21  Reason(s) for Admission: COVID 19      Was your hospitalization related to COVID-19? YES   How are you feeling today? Better  In the past 24 hours have you had shortness of breath when speaking, walking, or climbing stairs? My breathing issues have improved  Do you have a cough? Yes, I have a cough but it's not worse  When is the last time you had a fever greater than 100?   Are you having any other symptoms? Fatigue   Do you have any other stressors you would like to discuss with your provider? No         PHQ Assesment Total Score(s) 7/7/2021   PHQ-2 Score 0 " "  Some recent data might be hidden       No flowsheet data found.  No flowsheet data found.    Was the patient in the ICU or did the patient experience delirium during hospitalization?  No          Problems taking medications regularly:  None  Medication changes since discharge: None  Problems adhering to non-medication therapy:  None    Summary of hospitalization:  [facility] discharge summary reviewed  Diagnostic Tests/Treatments reviewed.  Follow up needed: none  Other Healthcare Providers Involved in Patient s Care:         None  Update since discharge: improved.       Post Discharge Medication Reconciliation: discharge medications reconciled and changed, per note/orders.  Plan of care communicated with patient and family          He was admitted [facility] 11/2 to 11/5 with covid pneumonia. Sent home off oxygen.  Readmitted on 11/7 with hypoxia.             He was previously treated with IV Remdesivir (11/2 to 11/5), PO dexamethasone (11/2 to 11/5)         Patient has been weaned off oxygen and O2 saturations remained stable.  Patient is clinically and symptomatically improved and stable.  Patient is stable to discharge home.  We will plan on continuing oral dexamethasone 6 mg daily for 4 more days.  At discharge will resume the Eliquis that he was previously sent home on x30 days                                                                                                                                                                Today, he reports that he is feeling fatigued \"mediocre\" ; but \"not bad\".               He has an oximeter; 92% right now.       He is using an incentive spirometer.       I also spoke with his wife.                He wonders about the timing of pending influenza vaccine and a Covid booster.                                         Objective           Vitals:  No vitals were obtained today due to virtual visit.    Physical Exam     PSYCH: Alert and oriented times 3; " coherent speech, normal   rate and volume, able to articulate logical thoughts, able   to abstract reason, no tangential thoughts, no hallucinations   or delusions  His affect is normal  RESP: No cough, no audible wheezing, able to talk in full sentences  Remainder of exam unable to be completed due to telephone visits    Recent Results (from the past 744 hour(s))   XR Chest Port 1 View    Narrative    EXAM: XR CHEST PORT 1 VIEW  LOCATION: [facility]  DATE/TIME: 11/2/2021 8:45 PM    INDICATION: Shortness of breath.  COMPARISON: None.      Impression    IMPRESSION: Shallow inspiration accentuates heart size and pulmonary vascularity. There is likely pulmonary venous congestion. Elevation of the left hemidiaphragm. The lungs are otherwise clear. No pneumothorax.   XR Chest Port 1 View    Narrative    EXAM: XR CHEST PORT 1 VIEW  LOCATION: [facility]  DATE/TIME: 11/7/2021 8:46 PM    INDICATION: Shortness of breath - known + COVID.  COMPARISON: 11/02/2021.      Impression    IMPRESSION:     New peripheral left lung consolidative opacities compared to 11/20/2021. Few other mild bilateral opacities are noted. Airway thickening present. Elevated left hemidiaphragm.     No significant pleural effusion. Stable cardiomediastinal silhouette. Aortic atherosclerosis.                     Phone call duration: 10 minutes

## 2021-11-11 NOTE — PROGRESS NOTES
Clinic Care Coordination Contact    Background: Care Coordination referral placed from [facility] discharge report for reason of patient meeting criteria for a TCM outreach call by [facility] team.    Assessment: Upon chart review, [facility] Team member will cancel/close the referral for TCM outreach due to reason below:     Patient has a follow up appointment with an appropriate provider today for hospital discharge.       Plan: Care Coordination referral for TCM outreach canceled.    Estrella Thomas MA  [facility], [facility]

## 2021-12-02 ENCOUNTER — OFFICE VISIT (OUTPATIENT)
Dept: INTERNAL MEDICINE | Facility: CLINIC | Age: 86
End: 2021-12-02
Payer: COMMERCIAL

## 2021-12-02 VITALS
WEIGHT: 213 LBS | BODY MASS INDEX: 30.49 KG/M2 | DIASTOLIC BLOOD PRESSURE: 64 MMHG | SYSTOLIC BLOOD PRESSURE: 132 MMHG | TEMPERATURE: 97.8 F | HEART RATE: 67 BPM | HEIGHT: 70 IN | OXYGEN SATURATION: 96 %

## 2021-12-02 DIAGNOSIS — U07.1 PNEUMONIA DUE TO 2019 NOVEL CORONAVIRUS: Primary | ICD-10-CM

## 2021-12-02 DIAGNOSIS — Z23 NEED FOR INFLUENZA VACCINATION: ICD-10-CM

## 2021-12-02 DIAGNOSIS — Z23 HIGH PRIORITY FOR 2019-NCOV VACCINE: ICD-10-CM

## 2021-12-02 DIAGNOSIS — R00.1 BRADYCARDIA: ICD-10-CM

## 2021-12-02 DIAGNOSIS — J12.82 PNEUMONIA DUE TO 2019 NOVEL CORONAVIRUS: Primary | ICD-10-CM

## 2021-12-02 PROCEDURE — 90662 IIV NO PRSV INCREASED AG IM: CPT | Performed by: INTERNAL MEDICINE

## 2021-12-02 PROCEDURE — 91300 COVID-19,PF,PFIZER (12+ YRS): CPT | Performed by: INTERNAL MEDICINE

## 2021-12-02 PROCEDURE — G0008 ADMIN INFLUENZA VIRUS VAC: HCPCS | Performed by: INTERNAL MEDICINE

## 2021-12-02 PROCEDURE — 0004A COVID-19,PF,PFIZER (12+ YRS): CPT | Performed by: INTERNAL MEDICINE

## 2021-12-02 PROCEDURE — 99214 OFFICE O/P EST MOD 30 MIN: CPT | Mod: 25 | Performed by: INTERNAL MEDICINE

## 2021-12-02 ASSESSMENT — MIFFLIN-ST. JEOR: SCORE: 1657.41

## 2021-12-02 NOTE — PROGRESS NOTES
Assessment & Plan     Pneumonia due to 2019 novel coronavirus  With good recovery    High priority for 2019-nCoV vaccine  Booster given  - COVID-19,PF,PFIZER (12+ Yrs PURPLE LABEL)    Need for influenza vaccination  Injection given  - INFLUENZA, QUAD, HIGH DOSE, PF, 65YR + (FLUZONE HD)    Bradycardia  Okay to continue lower dose of atenolol               Patient Instructions   You can stop taking Eliquis when your supply runs out.    Keep taking the aspirin.                Best wishes!               Have a good, safe winter; no falls!       Return in about 6 months (around 6/2/2022) for follow up of several issues.    William Thompson MD  [facility]    Karen Love is a 86 year old who presents for the following health issues     HPI       Hospital Follow-up Visit:    Hospital/Nursing Home/ Rehab Facility: [facility]  Date of Admission: 11-2-21 re-admitted 11-7-21  Date of Discharge: 11-5-21 2nd hospital course discharge 11-10-21  Reason(s) for Admission:11-5-21:COVID-19 infection    Acute Hypoxic Respiratory Failure secondary to COVID-19 pneumonia,  11-7-21:  breakthrough COVID-19 pneumonia infection.       Was your hospitalization related to COVID-19? YES   How are you feeling today? Much better  In the past 24 hours have you had shortness of breath when speaking, walking, or climbing stairs? My breathing issues have improved  Do you have a cough? I don't have a cough  When is the last time you had a fever greater than 100? unknown  Are you having any other symptoms? Loss of appetite, Fatigue and Body aches   Do you have any other stressors you would like to discuss with your provider? No       PHQ Assesment Total Score(s) 7/7/2021   PHQ-2 Score 0   Some recent data might be hidden       No flowsheet data found.  No flowsheet data found.    Was the patient in the ICU or did the patient experience delirium during hospitalization?  No    Problems  taking medications regularly:  None  Medication changes since discharge: yes   Problems adhering to non-medication therapy:  None    Summary of hospitalization:  [facility] discharge summary reviewed  Diagnostic Tests/Treatments reviewed.  Follow up needed: none  Other Healthcare Providers Involved in Patient s Care:         None  Update since discharge: improved. Post Discharge Medication Reconciliation: discharge medications reconciled and changed, per note/orders.  Plan of care communicated with patient                     S/p + COVID PCR on 11/2. Hospitalized times 2.                         He was treated with remdesivir and Decadron, but no monoclonal antibodies.                He is feeling much better.          He was given 30 days of Eliquis and he is almost finished with that.  He still takes aspirin 81 mg/day.  In the hospital, he was apparently bradycardic while sleeping so his atenolol dose was cut back.                        His home oximetry readings are in the 93 to 97% range.  He is using the incentive spirometer frequently during the day.                 Review of Systems         Wt Readings from Last 4 Encounters:   12/02/21 96.6 kg (213 lb)   11/08/21 93.5 kg (206 lb 3.2 oz)   11/02/21 99.8 kg (220 lb)   09/03/21 100 kg (220 lb 6.4 oz)     Current Outpatient Medications   Medication Sig Dispense Refill     acetaminophen (TYLENOL) 650 MG CR tablet Take 650 mg by mouth 2 times daily       Ascorbic Acid (VITAMIN C PO) Take 1,000 mg by mouth daily       aspirin 81 MG tablet Take by mouth daily 30 tablet      atenolol (TENORMIN) 50 MG tablet Take 0.5 tablets (25 mg) by mouth daily 90 tablet 1     atorvastatin (LIPITOR) 40 MG tablet Take 1 tablet (40 mg) by mouth daily 90 tablet 2     calcium carbonate (OS-SREEDHAR 500 MG [place]. [place]) 500 MG tablet Take 500 mg by mouth daily       glucosamine-chondroitin 500-400 MG CAPS Take 1 capsule by mouth daily       lisinopril-hydrochlorothiazide  "(ZESTORETIC) 20-12.5 MG tablet Take 1 tablet by mouth daily 90 tablet 1     Omega-3 Fatty Acids (OMEGA-3 FISH OIL PO) Take 1 g by mouth daily        omeprazole (PRILOSEC) 20 MG DR capsule TAKE 1 CAPSULE BY MOUTH EVERY DAY 90 capsule 1     senna-docusate (SENOKOT-S/PERICOLACE) 8.6-50 MG tablet Take 1 tablet by mouth 2 times daily as needed for constipation 10 tablet 0     terazosin (HYTRIN) 2 MG capsule Take 1 capsule (2 mg) by mouth At Bedtime 90 capsule 1     vitamin  B complex with vitamin C (VITAMIN  B COMPLEX) TABS Take 1 tablet by mouth daily       VITAMIN D, CHOLECALCIFEROL, PO Take 5,000 Units by mouth daily        Zinc 15 MG CAPS Take 15 mg by mouth daily       nitroGLYcerin (NITROSTAT) 0.4 MG sublingual tablet For chest pain place 1 tablet under the tongue every 5 minutes for 3 doses. If symptoms persist 5 minutes after 1st dose call 911. (Patient not taking: Reported on 12/2/2021) 25 tablet 1       Objective    /64   Pulse 67   Temp 97.8  F (36.6  C) (Oral)   Ht 1.778 m (5' 10\")   Wt 96.6 kg (213 lb)   SpO2 96%   BMI 30.56 kg/m    Body mass index is 30.56 kg/m .  Physical Exam   GENERAL APPEARANCE: alert and no distress  RESP: no rales or rhonchi  CV: regular rates and rhythm, normal S1 S2, no S3 or S4 and no murmur, click or rub  MS: Kyphosis                "

## 2021-12-02 NOTE — PATIENT INSTRUCTIONS
You can stop taking Eliquis when your supply runs out.    Keep taking the aspirin.                Best wishes!               Have a good, safe winter; no falls!

## 2022-02-14 DIAGNOSIS — E78.5 HYPERLIPIDEMIA WITH TARGET LDL LESS THAN 100: Primary | ICD-10-CM

## 2022-02-14 DIAGNOSIS — I10 ESSENTIAL HYPERTENSION: ICD-10-CM

## 2022-02-14 DIAGNOSIS — I25.10 CORONARY ARTERY DISEASE INVOLVING NATIVE CORONARY ARTERY OF NATIVE HEART WITHOUT ANGINA PECTORIS: ICD-10-CM

## 2022-02-22 ENCOUNTER — HOSPITAL ENCOUNTER (OUTPATIENT)
Dept: CARDIOLOGY | Facility: CLINIC | Age: 87
End: 2022-02-22
Attending: INTERNAL MEDICINE
Payer: COMMERCIAL

## 2022-02-22 ENCOUNTER — LAB (OUTPATIENT)
Dept: LAB | Facility: CLINIC | Age: 87
End: 2022-02-22
Payer: COMMERCIAL

## 2022-02-22 ENCOUNTER — CARE COORDINATION (OUTPATIENT)
Dept: CARDIOLOGY | Facility: CLINIC | Age: 87
End: 2022-02-22

## 2022-02-22 VITALS — OXYGEN SATURATION: 96 % | WEIGHT: 216.2 LBS | HEIGHT: 68 IN | BODY MASS INDEX: 32.77 KG/M2 | HEART RATE: 78 BPM

## 2022-02-22 VITALS — SYSTOLIC BLOOD PRESSURE: 140 MMHG | DIASTOLIC BLOOD PRESSURE: 72 MMHG | HEART RATE: 89 BPM

## 2022-02-22 DIAGNOSIS — I10 ESSENTIAL HYPERTENSION: ICD-10-CM

## 2022-02-22 DIAGNOSIS — I25.10 CORONARY ARTERY DISEASE INVOLVING NATIVE CORONARY ARTERY OF NATIVE HEART WITHOUT ANGINA PECTORIS: ICD-10-CM

## 2022-02-22 DIAGNOSIS — E78.5 HYPERLIPIDEMIA WITH TARGET LDL LESS THAN 100: ICD-10-CM

## 2022-02-22 LAB
ANION GAP SERPL CALCULATED.3IONS-SCNC: 4 MMOL/L (ref 3–14)
BUN SERPL-MCNC: 18 MG/DL (ref 7–30)
CALCIUM SERPL-MCNC: 9 MG/DL (ref 8.5–10.1)
CHLORIDE BLD-SCNC: 107 MMOL/L (ref 94–109)
CHOLEST SERPL-MCNC: 122 MG/DL
CO2 SERPL-SCNC: 28 MMOL/L (ref 20–32)
CREAT SERPL-MCNC: 0.75 MG/DL (ref 0.66–1.25)
CV STRESS MAX HR HE: 108
FASTING STATUS PATIENT QL REPORTED: YES
GFR SERPL CREATININE-BSD FRML MDRD: 88 ML/MIN/1.73M2
GLUCOSE BLD-MCNC: 139 MG/DL (ref 70–99)
HDLC SERPL-MCNC: 43 MG/DL
LDLC SERPL CALC-MCNC: 58 MG/DL
NONHDLC SERPL-MCNC: 79 MG/DL
NUC STRESS EJECTION FRACTION: 73 %
POTASSIUM BLD-SCNC: 3.9 MMOL/L (ref 3.4–5.3)
RATE PRESSURE PRODUCT: NORMAL
SODIUM SERPL-SCNC: 139 MMOL/L (ref 133–144)
STRESS ECHO BASELINE DIASTOLIC HE: 72
STRESS ECHO BASELINE HR: 89 BPM
STRESS ECHO BASELINE SYSTOLIC BP: 140
STRESS ECHO CALCULATED PERCENT HR: 80 %
STRESS ECHO LAST STRESS DIASTOLIC BP: 68
STRESS ECHO LAST STRESS SYSTOLIC BP: 128
STRESS ECHO TARGET HR: 135
TRIGL SERPL-MCNC: 106 MG/DL

## 2022-02-22 PROCEDURE — 80061 LIPID PANEL: CPT | Performed by: INTERNAL MEDICINE

## 2022-02-22 PROCEDURE — 36415 COLL VENOUS BLD VENIPUNCTURE: CPT | Performed by: INTERNAL MEDICINE

## 2022-02-22 PROCEDURE — 250N000011 HC RX IP 250 OP 636: Performed by: INTERNAL MEDICINE

## 2022-02-22 PROCEDURE — A9502 TC99M TETROFOSMIN: HCPCS | Performed by: INTERNAL MEDICINE

## 2022-02-22 PROCEDURE — 93016 CV STRESS TEST SUPVJ ONLY: CPT | Performed by: INTERNAL MEDICINE

## 2022-02-22 PROCEDURE — 343N000001 HC RX 343: Performed by: INTERNAL MEDICINE

## 2022-02-22 PROCEDURE — 78452 HT MUSCLE IMAGE SPECT MULT: CPT | Mod: 26 | Performed by: INTERNAL MEDICINE

## 2022-02-22 PROCEDURE — 93018 CV STRESS TEST I&R ONLY: CPT | Performed by: INTERNAL MEDICINE

## 2022-02-22 PROCEDURE — 93017 CV STRESS TEST TRACING ONLY: CPT

## 2022-02-22 PROCEDURE — 80048 BASIC METABOLIC PNL TOTAL CA: CPT | Performed by: INTERNAL MEDICINE

## 2022-02-22 RX ORDER — ACYCLOVIR 200 MG/1
0-1 CAPSULE ORAL
Status: DISCONTINUED | OUTPATIENT
Start: 2022-02-22 | End: 2022-02-23 | Stop reason: HOSPADM

## 2022-02-22 RX ORDER — REGADENOSON 0.08 MG/ML
0.4 INJECTION, SOLUTION INTRAVENOUS ONCE
Status: COMPLETED | OUTPATIENT
Start: 2022-02-22 | End: 2022-02-22

## 2022-02-22 RX ORDER — CAFFEINE CITRATE 20 MG/ML
60 SOLUTION INTRAVENOUS
Status: DISCONTINUED | OUTPATIENT
Start: 2022-02-22 | End: 2022-02-23 | Stop reason: HOSPADM

## 2022-02-22 RX ORDER — AMINOPHYLLINE 25 MG/ML
50-100 INJECTION, SOLUTION INTRAVENOUS
Status: DISCONTINUED | OUTPATIENT
Start: 2022-02-22 | End: 2022-02-23 | Stop reason: HOSPADM

## 2022-02-22 RX ORDER — ALBUTEROL SULFATE 90 UG/1
2 AEROSOL, METERED RESPIRATORY (INHALATION) EVERY 5 MIN PRN
Status: DISCONTINUED | OUTPATIENT
Start: 2022-02-22 | End: 2022-02-23 | Stop reason: HOSPADM

## 2022-02-22 RX ADMIN — REGADENOSON 0.4 MG: 0.08 INJECTION, SOLUTION INTRAVENOUS at 11:16

## 2022-02-22 RX ADMIN — TETROFOSMIN 4.02 MCI.: 1.38 INJECTION, POWDER, LYOPHILIZED, FOR SOLUTION INTRAVENOUS at 09:44

## 2022-02-22 RX ADMIN — TETROFOSMIN 12.79 MCI.: 1.38 INJECTION, POWDER, LYOPHILIZED, FOR SOLUTION INTRAVENOUS at 11:19

## 2022-02-22 NOTE — PROGRESS NOTES
"Nuclear stress test results from 2/22/22 noted. Pt has 3/1/22 visit with  to review. Will route update to  to see if he has any other recommendations prior to visit. Per 's 2020 OV note, pt has \" long history of coronary artery disease and multiple stents placed to the LAD, circumflex, obtuse marginal and PDA.  His last stenting was in 12/2018.\"     Result Text        The nuclear stress test is probably abnormal.     There is a small area of ischemia in the mid to distal inferolateral segment(s) of the left ventricle.     Left ventricular function is normal.     The left ventricular ejection fraction at stress is 73%.     A prior study was conducted on 11/30/2018.  This study has no change when compared with the prior study.  "

## 2022-02-25 NOTE — TELEPHONE ENCOUNTER
This looks pretty good and was reported to be unchanged from 2018, which seems pretty good to me. I will review at OV 3/1. EE

## 2022-02-28 NOTE — PROGRESS NOTES
Noted. Nuclear stress test to be reviewed at tomorrow's OV. Shelby Bass RN on 2/28/2022 at 1:00 PM      Willy Johansen MD  [facility] Heart Team 7 3 days ago     EE       This looks pretty good and was reported to be unchanged from 2018, which seems pretty good to me. I will review at OV 3/1. EE         Documentation

## 2022-03-02 ENCOUNTER — OFFICE VISIT (OUTPATIENT)
Dept: CARDIOLOGY | Facility: CLINIC | Age: 87
End: 2022-03-02
Payer: COMMERCIAL

## 2022-03-02 VITALS
OXYGEN SATURATION: 96 % | BODY MASS INDEX: 33.42 KG/M2 | HEART RATE: 60 BPM | DIASTOLIC BLOOD PRESSURE: 73 MMHG | SYSTOLIC BLOOD PRESSURE: 120 MMHG | WEIGHT: 219.8 LBS

## 2022-03-02 DIAGNOSIS — I10 ESSENTIAL HYPERTENSION: ICD-10-CM

## 2022-03-02 DIAGNOSIS — I25.10 CORONARY ARTERY DISEASE INVOLVING NATIVE CORONARY ARTERY OF NATIVE HEART WITHOUT ANGINA PECTORIS: Primary | ICD-10-CM

## 2022-03-02 DIAGNOSIS — E78.5 HYPERLIPIDEMIA WITH TARGET LDL LESS THAN 100: ICD-10-CM

## 2022-03-02 PROCEDURE — 99214 OFFICE O/P EST MOD 30 MIN: CPT | Performed by: INTERNAL MEDICINE

## 2022-03-02 RX ORDER — ATENOLOL 25 MG/1
25 TABLET ORAL DAILY
Qty: 90 TABLET | Refills: 3 | Status: SHIPPED | OUTPATIENT
Start: 2022-03-02 | End: 2023-03-27

## 2022-03-02 NOTE — PROGRESS NOTES
Service Date: 03/02/2022    HISTORY OF PRESENT ILLNESS:  I had the opportunity to see Mr. Ivan Payton in Cardiology Clinic today at [facility] in [place] for reevaluation of coronary artery disease and cardiac risk factors including dyslipidemia and hypertension.  He also has a history of left carotid endarterectomy in 2014.  Previously, he has undergone stenting to the mid LAD, proximal circumflex, and obtuse marginal in 2014.  In 2018 he again underwent stenting to the obtuse marginal as well as the PDA.    This year, he tells me that he is feeling well.  He is not having any concerning symptoms of chest discomfort, lightheadedness or syncope.  He developed some mild shortness of breath with activity, but he is able to walk without difficulty if he walks slowly.  He underwent a stress test again this year with Lexiscan and nuclear perfusion imaging on 02/22/2022, which demonstrated a small area of mild inferolateral ischemia.  I reviewed the stress test and agree with those findings.  His left ventricular function remains normal with an ejection fraction of 73%.    His hypertension and dyslipidemia are well controlled.  His basic metabolic panel looks normal with a creatinine of 0.75 and BUN 18.  His LDL is 58 and HDL 43.  I did notice that his fasting glucose level was mildly elevated at 139.    PHYSICAL EXAMINATION:  On examination today his blood pressure is 120/73, heart rate 60 and weight 219 pounds.  His lungs are clear.  Heart rhythm is regular.  He has no cardiac murmurs or carotid bruits.    IMPRESSIONS:  Mr. Ivan Payton is an 86-year-old gentleman with a history of multivessel coronary artery stenting in the past.  He has done well from a cardiac standpoint since I saw him last in 2020 but was hospitalized for COVID pneumonia this fall.  Fortunately, he made a good recovery.    His stress test this year does show a small area of mild inferolateral ischemia.  However, since he is  asymptomatic without any symptoms of angina, and this stress test finding is a low risk issue, I have recommended that we continue medical management rather than proceed with invasive evaluation and treatment.  He is agreeable to that.  He will contact me if he has any symptoms of angina.    I will not make any medication changes since his blood pressure and cholesterol numbers remain excellent.  I will have him follow up with you for evaluation of his mild elevation in fasting glucose.    I will plan to see him back again in 1 year for reevaluation.    Willy Johansen MD    cc:  Declan Holly MD  [address], [address]  [place] [address]    Willy Johansen MD, [facility]        D: 2022   T: 2022   MT: ankur    Name:     GEOVANI STOREY  MRN:      [id number]        Account:      [id number]   :      1935           Service Date: 2022       Document: [id number]

## 2022-03-02 NOTE — LETTER
3/2/2022    Declan Holly MD  [address]  [place] [address]    RE: Ivan Payton       Dear Colleague,     I had the pleasure of seeing Ivan Payton in the [facility].  HPI and Plan:   See dictation    Today's clinic visit entailed:  Review of the result(s) of each unique test - bmp, flp, alt, lexiscan nuclear study  The following tests were independently interpreted by me as noted in my documentation: nuclear perfusion images  Ordering of each unique test  Prescription drug management  30 minutes spent on the date of the encounter doing chart review, review of test results, interpretation of tests, patient visit and documentation   Provider  Link to MDM Help Grid     The level of medical decision making during this visit was of high complexity.      Orders Placed This Encounter   Procedures     Basic metabolic panel     Lipid Profile     ALT     Follow-Up with Cardiology       Orders Placed This Encounter   Medications     atenolol (TENORMIN) 25 MG tablet     Sig: Take 1 tablet (25 mg) by mouth daily     Dispense:  90 tablet     Refill:  3       Medications Discontinued During This Encounter   Medication Reason     atenolol (TENORMIN) 50 MG tablet          Encounter Diagnoses   Name Primary?     Coronary artery disease involving native coronary artery of native heart without angina pectoris Yes     Hyperlipidemia with target LDL less than 100      Essential hypertension        CURRENT MEDICATIONS:  Current Outpatient Medications   Medication Sig Dispense Refill     acetaminophen (TYLENOL) 650 MG CR tablet Take 650 mg by mouth 2 times daily       Ascorbic Acid (VITAMIN C PO) Take 1,000 mg by mouth daily       aspirin 81 MG tablet Take by mouth daily 30 tablet      atenolol (TENORMIN) 25 MG tablet Take 1 tablet (25 mg) by mouth daily 90 tablet 3     atorvastatin (LIPITOR) 40 MG tablet Take 1 tablet (40 mg) by mouth daily 90 tablet 2     calcium carbonate (OS-SREEDHAR 500 MG [place]. [place])  500 MG tablet Take 500 mg by mouth daily       glucosamine-chondroitin 500-400 MG CAPS Take 1 capsule by mouth daily       lisinopril-hydrochlorothiazide (ZESTORETIC) 20-12.5 MG tablet Take 1 tablet by mouth daily 90 tablet 1     nitroGLYcerin (NITROSTAT) 0.4 MG sublingual tablet For chest pain place 1 tablet under the tongue every 5 minutes for 3 doses. If symptoms persist 5 minutes after 1st dose call 911. 25 tablet 1     Omega-3 Fatty Acids (OMEGA-3 FISH OIL PO) Take 1 g by mouth daily        omeprazole (PRILOSEC) 20 MG DR capsule TAKE 1 CAPSULE BY MOUTH EVERY DAY 90 capsule 1     senna-docusate (SENOKOT-S/PERICOLACE) 8.6-50 MG tablet Take 1 tablet by mouth 2 times daily as needed for constipation 10 tablet 0     terazosin (HYTRIN) 2 MG capsule Take 1 capsule (2 mg) by mouth At Bedtime 90 capsule 1     vitamin  B complex with vitamin C (VITAMIN  B COMPLEX) TABS Take 1 tablet by mouth daily       VITAMIN D, CHOLECALCIFEROL, PO Take 5,000 Units by mouth daily        Zinc 15 MG CAPS Take 15 mg by mouth daily         ALLERGIES     Allergies   Allergen Reactions     Contrast Dye Hives       PAST MEDICAL HISTORY:  Past Medical History:   Diagnosis Date     Arthritis      Carotid stenosis 3/26/2014    S/p L CEA; R ICA ok;          See CUS 12/15 and Dr Jones's consult; continue plavix      Cerebral vascular disease 3/2014    multiple intracranial stenoses - lt post communic, lt post cerebral, rt middle cerebral, bilat porx M2 segments     Claustrophobia     Need sedation for MRI scans     Coronary artery disease 2014    Cath 9/2014: PTCA and KIKE to mLAD, KIKE to prox circumflex, PTCA and DESx2 to OM2; Cath 12/18/18: KIKE to PDA and OM1, patent stents to LAD and Cfx     CVA (cerebral infarction) 3/2014    2 small acute lesions noted left parietal/left posterior frontal region. Old lacunar infarct left caudate nucleus     Dyspnea 7/21/2014     Enlarged prostate      Essential hypertension      Problem list name updated by  automated process. Provider to review     GERD (gastroesophageal reflux disease)      Hiatal hernia      Hydrocele     Right hydrocelectomy 2/2012     Hyperlipidemia with target LDL less than 100 3/14/2014     Diagnosis updated by automated process. Provider to review and confirm.     RBBB (right bundle branch block) 3/2014     Shortness of breath      Stented coronary artery      Uncomplicated asthma        PAST SURGICAL HISTORY:  Past Surgical History:   Procedure Laterality Date     CV HEART CATHETERIZATION WITH POSSIBLE INTERVENTION N/A 12/18/2018    Procedure: Coronary Angiography;  Surgeon: Geovanni Tanner MD;  Location:  HEART CARDIAC CATH LAB     ENDARTERECTOMY CAROTID  3/26/2014    Procedure: ENDARTERECTOMY CAROTID;  LEFT CAROTID ENDARTERECTOMY WITH EEG;  Surgeon: Yobani Jones MD;  Location:  OR      REMOVAL OF TONSILS,<11 Y/O      Tonsils <13y.o.     HEART CATH, ANGIOPLASTY  9/9/14    Stenting of LAD,Prox LCx, and 2 stents to OM2     HERNIORRHAPHY INGUINAL      Right     HERNIORRHAPHY INGUINAL  2/10/2012    Procedure:HERNIORRHAPHY INGUINAL; LEFT OPEN INGUINAL HERNIA REPAIR WITH MESH, RIGHT HYDROCELECTOMY; Surgeon:JULI LOPES; Location:[facility]     HYDROCELECTOMY INGUINAL  2/10/2012    Procedure:HYDROCELECTOMY INGUINAL; Surgeon:JULI LOPES; Location:[facility]     HYDROCELECTOMY SCROTAL Right 1/29/2016    Procedure: HYDROCELECTOMY SCROTAL;  Surgeon: Yobani Stevens MD;  Location: [facility]     LAPAROSCOPIC HERNIORRHAPHY INGUINAL BILATERAL Bilateral 5/24/2016    Procedure: LAPAROSCOPIC HERNIORRHAPHY INGUINAL BILATERAL;  Surgeon: Chandler Bowen MD;  Location:  OR     MR BRAIN AND ORBITS  3/2014    6 mm area of restricted diffusion subcortical white matter left parietal lobe/questionable area of restricted diffusion left posterior frontal region - c/w recent small infarcts. Small chronic lacunar infarct left caudate nucleus     SURGICAL HISTORY OF -       tonail removal        FAMILY HISTORY:  Family History   Problem Relation Age of Onset     GILBERT Brother         older brother, CABG about age 56     Hypertension Brother      Respiratory Father         lung disease - farmer,      Hypertension Mother      Hypertension Sister      Hypertension Brother      Cerebrovascular Disease Son         2018       SOCIAL HISTORY:  Social History     Socioeconomic History     Marital status:      Spouse name: madhavi     Number of children: 2     Years of education: None     Highest education level: None   Occupational History     Occupation: part time, building maintenance     Employer: RETIRED   Tobacco Use     Smoking status: Former Smoker     Packs/day: 0.50     Years: 20.00     Pack years: 10.00     Types: Cigarettes     Start date:      Quit date: 1968     Years since quittin.7     Smokeless tobacco: Former User   Substance and Sexual Activity     Alcohol use: No     Alcohol/week: 0.0 standard drinks     Drug use: No     Sexual activity: Not Currently     Comment:    Other Topics Concern      Service Not Asked     Blood Transfusions No     Caffeine Concern Yes     Comment: 1-2 cups per day     Occupational Exposure No     Hobby Hazards No     Sleep Concern No     Stress Concern No     Weight Concern No     Special Diet No     Back Care No     Exercise Yes     Comment: gym 4 days week, treadmill, 1.5 miles,  weights     Bike Helmet No     Seat Belt Yes     Self-Exams No     Parent/sibling w/ CABG, MI or angioplasty before 65F 55M? Yes     Comment: brothers - 1 had CABG 1 had MI pt unsure of age   Social History Narrative    3 GC; 2 children.                  Son is a chiropracter in [employer]     Social Determinants of Health     Financial Resource Strain: Not on file   Food Insecurity: Not on file   Transportation Needs: Not on file   Physical Activity: Not on file   Stress: Not on file   Social Connections: Not on file   Intimate Partner  Violence: Not on file   Housing Stability: Not on file       Review of Systems:  Skin:  Negative       Eyes:  Positive for glasses    ENT:  Negative      Respiratory:  Negative shortness of breath;dyspnea on exertion     Cardiovascular:  chest pain;Negative;palpitations;edema;dizziness;lightheadedness      Gastroenterology: Negative      Genitourinary:  Positive for nocturia    Musculoskeletal:  Negative back pain;neck pain    Neurologic:  Negative headaches    Psychiatric:  Negative      Heme/Lymph/Imm:  Positive for allergies    Endocrine:  Negative        Physical Exam:  Vitals: /73 (BP Location: Right arm, Patient Position: Sitting)   Pulse 60   Wt 99.7 kg (219 lb 12.8 oz)   SpO2 96%   BMI 33.42 kg/m      Constitutional:           Skin:             Head:           Eyes:           Lymph:      ENT:           Neck:           Respiratory:            Cardiac:                                                           GI:           Extremities and Muscular Skeletal:                 Neurological:           Psych:         CC  No referring provider defined for this encounter.    Thank you for allowing me to participate in the care of your patient.      Sincerely,   KATHY BERNSTEIN MD   [facility]

## 2022-03-02 NOTE — PATIENT INSTRUCTIONS
It was a pleasure seeing you today and thank you for allowing me to be a part of your health care team.  Should you have any questions regarding your visit or future needs please feel free to reach out to my care team for assistance.      Thank you, Dr. Willy Johansen        **Nursing: [phone]       **Scheduling: [phone]

## 2022-03-02 NOTE — PROGRESS NOTES
HPI and Plan:   See dictation    Today's clinic visit entailed:  Review of the result(s) of each unique test - bmp, flp, alt, lexiscan nuclear study  The following tests were independently interpreted by me as noted in my documentation: nuclear perfusion images  Ordering of each unique test  Prescription drug management  30 minutes spent on the date of the encounter doing chart review, review of test results, interpretation of tests, patient visit and documentation   Provider  Link to [facility] Help Grid     The level of medical decision making during this visit was of high complexity.      Orders Placed This Encounter   Procedures     Basic metabolic panel     Lipid Profile     ALT     Follow-Up with Cardiology       Orders Placed This Encounter   Medications     atenolol (TENORMIN) 25 MG tablet     Sig: Take 1 tablet (25 mg) by mouth daily     Dispense:  90 tablet     Refill:  3       Medications Discontinued During This Encounter   Medication Reason     atenolol (TENORMIN) 50 MG tablet          Encounter Diagnoses   Name Primary?     Coronary artery disease involving native coronary artery of native heart without angina pectoris Yes     Hyperlipidemia with target LDL less than 100      Essential hypertension        CURRENT MEDICATIONS:  Current Outpatient Medications   Medication Sig Dispense Refill     acetaminophen (TYLENOL) 650 MG CR tablet Take 650 mg by mouth 2 times daily       Ascorbic Acid (VITAMIN C PO) Take 1,000 mg by mouth daily       aspirin 81 MG tablet Take by mouth daily 30 tablet      atenolol (TENORMIN) 25 MG tablet Take 1 tablet (25 mg) by mouth daily 90 tablet 3     atorvastatin (LIPITOR) 40 MG tablet Take 1 tablet (40 mg) by mouth daily 90 tablet 2     calcium carbonate (OS-SREEDHAR 500 MG [place]. [place]) 500 MG tablet Take 500 mg by mouth daily       glucosamine-chondroitin 500-400 MG CAPS Take 1 capsule by mouth daily       lisinopril-hydrochlorothiazide (ZESTORETIC) 20-12.5 MG tablet Take 1 tablet by  mouth daily 90 tablet 1     nitroGLYcerin (NITROSTAT) 0.4 MG sublingual tablet For chest pain place 1 tablet under the tongue every 5 minutes for 3 doses. If symptoms persist 5 minutes after 1st dose call 911. 25 tablet 1     Omega-3 Fatty Acids (OMEGA-3 FISH OIL PO) Take 1 g by mouth daily        omeprazole (PRILOSEC) 20 MG DR capsule TAKE 1 CAPSULE BY MOUTH EVERY DAY 90 capsule 1     senna-docusate (SENOKOT-S/PERICOLACE) 8.6-50 MG tablet Take 1 tablet by mouth 2 times daily as needed for constipation 10 tablet 0     terazosin (HYTRIN) 2 MG capsule Take 1 capsule (2 mg) by mouth At Bedtime 90 capsule 1     vitamin  B complex with vitamin C (VITAMIN  B COMPLEX) TABS Take 1 tablet by mouth daily       VITAMIN D, CHOLECALCIFEROL, PO Take 5,000 Units by mouth daily        Zinc 15 MG CAPS Take 15 mg by mouth daily         ALLERGIES     Allergies   Allergen Reactions     Contrast Dye Hives       PAST MEDICAL HISTORY:  Past Medical History:   Diagnosis Date     Arthritis      Carotid stenosis 3/26/2014    S/p L CEA; R ICA ok;          See CUS 12/15 and Dr Jones's consult; continue plavix      Cerebral vascular disease 3/2014    multiple intracranial stenoses - lt post communic, lt post cerebral, rt middle cerebral, bilat porx M2 segments     Claustrophobia     Need sedation for MRI scans     Coronary artery disease 2014    Cath 9/2014: PTCA and KIKE to mLAD, KIKE to prox circumflex, PTCA and DESx2 to OM2; Cath 12/18/18: KIKE to PDA and OM1, patent stents to LAD and Cfx     CVA (cerebral infarction) 3/2014    2 small acute lesions noted left parietal/left posterior frontal region. Old lacunar infarct left caudate nucleus     Dyspnea 7/21/2014     Enlarged prostate      Essential hypertension      Problem list name updated by automated process. Provider to review     GERD (gastroesophageal reflux disease)      Hiatal hernia      Hydrocele     Right hydrocelectomy 2/2012     Hyperlipidemia with target LDL less than 100  3/14/2014     Diagnosis updated by automated process. Provider to review and confirm.     RBBB (right bundle branch block) 3/2014     Shortness of breath      Stented coronary artery      Uncomplicated asthma        PAST SURGICAL HISTORY:  Past Surgical History:   Procedure Laterality Date     CV HEART CATHETERIZATION WITH POSSIBLE INTERVENTION N/A 12/18/2018    Procedure: Coronary Angiography;  Surgeon: Geovanni Tanner MD;  Location:  HEART CARDIAC CATH LAB     ENDARTERECTOMY CAROTID  3/26/2014    Procedure: ENDARTERECTOMY CAROTID;  LEFT CAROTID ENDARTERECTOMY WITH EEG;  Surgeon: Yobani Jones MD;  Location:  OR      REMOVAL OF TONSILS,<14 Y/O      Tonsils <13y.o.     HEART CATH, ANGIOPLASTY  9/9/14    Stenting of LAD,Prox LCx, and 2 stents to OM2     HERNIORRHAPHY INGUINAL      Right     HERNIORRHAPHY INGUINAL  2/10/2012    Procedure:HERNIORRHAPHY INGUINAL; LEFT OPEN INGUINAL HERNIA REPAIR WITH MESH, RIGHT HYDROCELECTOMY; Surgeon:JULI LOPES; Location:[facility]     HYDROCELECTOMY INGUINAL  2/10/2012    Procedure:HYDROCELECTOMY INGUINAL; Surgeon:JULI LOPES; Location:[facility]     HYDROCELECTOMY SCROTAL Right 1/29/2016    Procedure: HYDROCELECTOMY SCROTAL;  Surgeon: Yobani Stevens MD;  Location: [facility]     LAPAROSCOPIC HERNIORRHAPHY INGUINAL BILATERAL Bilateral 5/24/2016    Procedure: LAPAROSCOPIC HERNIORRHAPHY INGUINAL BILATERAL;  Surgeon: Chandler Bowen MD;  Location:  OR     MR BRAIN AND ORBITS  3/2014    6 mm area of restricted diffusion subcortical white matter left parietal lobe/questionable area of restricted diffusion left posterior frontal region - c/w recent small infarcts. Small chronic lacunar infarct left caudate nucleus     SURGICAL HISTORY OF -       tonail removal       FAMILY HISTORY:  Family History   Problem Relation Age of Onset     C.A.D. Brother         older brother, CABG about age 56     Hypertension Brother      Respiratory Father         lung  disease - farmer,      Hypertension Mother      Hypertension Sister      Hypertension Brother      Cerebrovascular Disease Son         2018       SOCIAL HISTORY:  Social History     Socioeconomic History     Marital status:      Spouse name: madhavi     Number of children: 2     Years of education: None     Highest education level: None   Occupational History     Occupation: part time, building maintenance     Employer: RETIRED   Tobacco Use     Smoking status: Former Smoker     Packs/day: 0.50     Years: 20.00     Pack years: 10.00     Types: Cigarettes     Start date:      Quit date: 1968     Years since quittin.7     Smokeless tobacco: Former User   Substance and Sexual Activity     Alcohol use: No     Alcohol/week: 0.0 standard drinks     Drug use: No     Sexual activity: Not Currently     Comment:    Other Topics Concern      Service Not Asked     Blood Transfusions No     Caffeine Concern Yes     Comment: 1-2 cups per day     Occupational Exposure No     Hobby Hazards No     Sleep Concern No     Stress Concern No     Weight Concern No     Special Diet No     Back Care No     Exercise Yes     Comment: gym 4 days week, treadmill, 1.5 miles,  weights     Bike Helmet No     Seat Belt Yes     Self-Exams No     Parent/sibling w/ CABG, MI or angioplasty before 65F 55M? Yes     Comment: brothers - 1 had CABG 1 had MI pt unsure of age   Social History Narrative    3 GC; 2 children.                  Son is a chiropracter in [employer]     Social Determinants of Health     Financial Resource Strain: Not on file   Food Insecurity: Not on file   Transportation Needs: Not on file   Physical Activity: Not on file   Stress: Not on file   Social Connections: Not on file   Intimate Partner Violence: Not on file   Housing Stability: Not on file       Review of Systems:  Skin:  Negative       Eyes:  Positive for glasses    ENT:  Negative      Respiratory:  Negative shortness of  breath;dyspnea on exertion     Cardiovascular:  chest pain;Negative;palpitations;edema;dizziness;lightheadedness      Gastroenterology: Negative      Genitourinary:  Positive for nocturia    Musculoskeletal:  Negative back pain;neck pain    Neurologic:  Negative headaches    Psychiatric:  Negative      Heme/Lymph/Imm:  Positive for allergies    Endocrine:  Negative        Physical Exam:  Vitals: /73 (BP Location: Right arm, Patient Position: Sitting)   Pulse 60   Wt 99.7 kg (219 lb 12.8 oz)   SpO2 96%   BMI 33.42 kg/m      Constitutional:           Skin:             Head:           Eyes:           Lymph:      ENT:           Neck:           Respiratory:            Cardiac:                                                           GI:           Extremities and Muscular Skeletal:                 Neurological:           Psych:           CC  No referring provider defined for this encounter.

## 2022-03-05 ENCOUNTER — HEALTH MAINTENANCE LETTER (OUTPATIENT)
Age: 87
End: 2022-03-05

## 2022-03-21 DIAGNOSIS — I10 ESSENTIAL HYPERTENSION: ICD-10-CM

## 2022-03-21 RX ORDER — TERAZOSIN 2 MG/1
2 CAPSULE ORAL AT BEDTIME
Qty: 90 CAPSULE | Refills: 3 | Status: SHIPPED | OUTPATIENT
Start: 2022-03-21 | End: 2023-03-27

## 2022-05-03 DIAGNOSIS — Z98.890 HISTORY OF LEFT-SIDED CAROTID ENDARTERECTOMY: Primary | ICD-10-CM

## 2022-05-03 DIAGNOSIS — I65.21 ASYMPTOMATIC STENOSIS OF RIGHT CAROTID ARTERY: ICD-10-CM

## 2022-06-10 ENCOUNTER — TRANSFERRED RECORDS (OUTPATIENT)
Dept: HEALTH INFORMATION MANAGEMENT | Facility: CLINIC | Age: 87
End: 2022-06-10
Payer: COMMERCIAL

## 2022-06-14 NOTE — PROGRESS NOTES
[facility]    Ivan Payton returns for follow-up.  This 86-year-old patient underwent a left CEA on 3/27/2014 for high-grade asymptomatic stenosis.  Very tortuous artery noted.  Mild right carotid bifurcation disease.    Carotid duplex 6/11/2022 right ICA PSV= 148 cm/s and left ICA PSV= 146 cm/s.  Left CEA widely patent.  Stable moderate smooth stenosis right proximal ICA.    No evidence of PAD with palpable right PT. left more difficult to palpate but normal bedside Doppler waveforms      PMH: Medications: Hytrin, Zestoretic, Tenormin, Lipitor, omega-3 fish oil, Prilosec, aspirin,     Medical: Hypertension    Hyperlipidemia on statin-2/22/2022 LDL= 58    CAD-stable with recent Dr Eleno patrick    Non-smoker.    Asymptomatic calf/ankle edema      Patient reports no complaints.  No shortness of breath.  No neurological issues.  Lives independently.    Exam: Alert and appropriate.  Normal affect.  Glasses.   Blood pressure 150/84 (reports normal at office visits but does not check at home) pulse 73 regular   HEENT= well-healed carotid incision   Chest= clear   Cardiovascular= regular rate   Extremities= mild bilateral distal calf and ankle edema.  No ulcers.    Normal sensation    Pulses difficult to palpate due to edema    Bedside Doppler with +3 triphasic right PT and biphasic left       +2 monophasic left AT      Carotid duplex today reveals a stable mild calcified plaque at the proximal ICA of the right with PSV= 138 cm/s.  Left CEA is widely patent by duplex.  Unchanged tortuosity which results in mid ICA PSV= 142 cm/s.          Impression: #1.  Widely patent left CEA.     #2.  Stable mild asymptomatic right carotid stenosis     #3.  Mild PAD of no clinical concern.  Dependent edema.     #4.  Overall good risk factor control including LDL at goal and non-smoker.  Slightly elevated blood pressure today but usually normal.    Plan repeat carotid duplex in 2 years.    25 minutes with patient  today including chart review.    Yobani Jones MD  This note was created using Dragon voice recognition software which may result in transcription errors.

## 2022-06-16 ENCOUNTER — HOSPITAL ENCOUNTER (OUTPATIENT)
Dept: ULTRASOUND IMAGING | Facility: CLINIC | Age: 87
Discharge: HOME | End: 2022-06-16
Attending: SURGERY
Payer: COMMERCIAL

## 2022-06-16 ENCOUNTER — OFFICE VISIT (OUTPATIENT)
Dept: OTHER | Facility: CLINIC | Age: 87
End: 2022-06-16
Attending: SURGERY
Payer: COMMERCIAL

## 2022-06-16 VITALS — DIASTOLIC BLOOD PRESSURE: 84 MMHG | HEART RATE: 73 BPM | SYSTOLIC BLOOD PRESSURE: 150 MMHG

## 2022-06-16 DIAGNOSIS — R07.89 ATYPICAL CHEST PAIN: ICD-10-CM

## 2022-06-16 DIAGNOSIS — I65.21 ASYMPTOMATIC STENOSIS OF RIGHT CAROTID ARTERY: ICD-10-CM

## 2022-06-16 DIAGNOSIS — Z98.890 HISTORY OF LEFT-SIDED CAROTID ENDARTERECTOMY: Primary | ICD-10-CM

## 2022-06-16 DIAGNOSIS — E78.5 HYPERLIPIDEMIA LDL GOAL <70: ICD-10-CM

## 2022-06-16 DIAGNOSIS — Z98.890 HISTORY OF LEFT-SIDED CAROTID ENDARTERECTOMY: ICD-10-CM

## 2022-06-16 DIAGNOSIS — I73.9 PAD (PERIPHERAL ARTERY DISEASE) (H): ICD-10-CM

## 2022-06-16 PROCEDURE — 99213 OFFICE O/P EST LOW 20 MIN: CPT | Performed by: SURGERY

## 2022-06-16 PROCEDURE — 93880 EXTRACRANIAL BILAT STUDY: CPT

## 2022-06-16 PROCEDURE — G0463 HOSPITAL OUTPT CLINIC VISIT: HCPCS | Mod: 25

## 2022-06-16 PROCEDURE — 93923 UPR/LXTR ART STDY 3+ LVLS: CPT

## 2022-06-16 NOTE — PROGRESS NOTES
[facility]        Patient is here for a  follow up.      Pt is currently taking Aspirin and Statin.    BP (!) 150/84 (BP Location: Left arm, Patient Position: Chair, Cuff Size: Adult Large)   Pulse 73     The provider has been notified that the patient has no concerns.     Questions patient would like addressed today are: N/A.    Refills are needed: N/A    Has homecare services and agency name:  Shanna Rm MA

## 2022-06-17 NOTE — TELEPHONE ENCOUNTER
Routing refill request to provider for review/approval because:  Has not established care with new PCP. Previously Dr. Thompson patient. No appointment with Dr. Holly yet or scheduled.  Emy Long RN

## 2022-06-20 DIAGNOSIS — I10 ESSENTIAL HYPERTENSION: ICD-10-CM

## 2022-06-20 DIAGNOSIS — R07.89 ATYPICAL CHEST PAIN: ICD-10-CM

## 2022-06-20 RX ORDER — LISINOPRIL AND HYDROCHLOROTHIAZIDE 12.5; 2 MG/1; MG/1
1 TABLET ORAL DAILY
Qty: 90 TABLET | Refills: 2 | Status: SHIPPED | OUTPATIENT
Start: 2022-06-20 | End: 2023-03-06

## 2022-06-21 NOTE — TELEPHONE ENCOUNTER
Routing refill request to provider for review/approval because:  Patient needs to be seen because:  Need to establish care     Patient care team-    Appointment needed for patient.  For: Establish care     Please call patient to schedule an appointment if unable to reach please send a letter. Thank you.

## 2022-11-20 ENCOUNTER — HEALTH MAINTENANCE LETTER (OUTPATIENT)
Age: 87
End: 2022-11-20

## 2023-01-14 DIAGNOSIS — R07.89 ATYPICAL CHEST PAIN: ICD-10-CM

## 2023-01-24 ENCOUNTER — OFFICE VISIT (OUTPATIENT)
Dept: INTERNAL MEDICINE | Facility: CLINIC | Age: 88
End: 2023-01-24
Payer: COMMERCIAL

## 2023-01-24 VITALS
HEIGHT: 68 IN | DIASTOLIC BLOOD PRESSURE: 76 MMHG | OXYGEN SATURATION: 97 % | HEART RATE: 86 BPM | WEIGHT: 221.7 LBS | BODY MASS INDEX: 33.6 KG/M2 | SYSTOLIC BLOOD PRESSURE: 134 MMHG | TEMPERATURE: 97.7 F

## 2023-01-24 DIAGNOSIS — R07.89 ATYPICAL CHEST PAIN: ICD-10-CM

## 2023-01-24 DIAGNOSIS — I73.9 PAD (PERIPHERAL ARTERY DISEASE) (H): ICD-10-CM

## 2023-01-24 DIAGNOSIS — E78.5 HYPERLIPIDEMIA WITH TARGET LDL LESS THAN 100: ICD-10-CM

## 2023-01-24 DIAGNOSIS — I25.10 CORONARY ARTERY DISEASE INVOLVING NATIVE CORONARY ARTERY OF NATIVE HEART WITHOUT ANGINA PECTORIS: ICD-10-CM

## 2023-01-24 DIAGNOSIS — K21.9 GASTROESOPHAGEAL REFLUX DISEASE WITHOUT ESOPHAGITIS: ICD-10-CM

## 2023-01-24 DIAGNOSIS — I10 ESSENTIAL HYPERTENSION: ICD-10-CM

## 2023-01-24 DIAGNOSIS — Z23 NEED FOR COVID-19 VACCINE: ICD-10-CM

## 2023-01-24 DIAGNOSIS — Z23 NEED FOR PNEUMOCOCCAL VACCINATION: ICD-10-CM

## 2023-01-24 DIAGNOSIS — Z00.00 ENCOUNTER FOR ANNUAL WELLNESS EXAM IN MEDICARE PATIENT: Primary | ICD-10-CM

## 2023-01-24 PROBLEM — J96.01 ACUTE RESPIRATORY FAILURE WITH HYPOXIA (H): Status: RESOLVED | Noted: 2021-11-02 | Resolved: 2023-01-24

## 2023-01-24 LAB
ALT SERPL W P-5'-P-CCNC: 17 U/L (ref 10–50)
ANION GAP SERPL CALCULATED.3IONS-SCNC: 13 MMOL/L (ref 7–15)
AST SERPL W P-5'-P-CCNC: 19 U/L (ref 10–50)
BUN SERPL-MCNC: 19 MG/DL (ref 8–23)
CALCIUM SERPL-MCNC: 9.4 MG/DL (ref 8.8–10.2)
CHLORIDE SERPL-SCNC: 105 MMOL/L (ref 98–107)
CHOLEST SERPL-MCNC: 137 MG/DL
CREAT SERPL-MCNC: 0.8 MG/DL (ref 0.67–1.17)
DEPRECATED HCO3 PLAS-SCNC: 25 MMOL/L (ref 22–29)
ERYTHROCYTE [DISTWIDTH] IN BLOOD BY AUTOMATED COUNT: 14.3 % (ref 10–15)
GFR SERPL CREATININE-BSD FRML MDRD: 86 ML/MIN/1.73M2
GLUCOSE SERPL-MCNC: 144 MG/DL (ref 70–99)
HCT VFR BLD AUTO: 46.2 % (ref 40–53)
HDLC SERPL-MCNC: 45 MG/DL
HGB BLD-MCNC: 15.3 G/DL (ref 13.3–17.7)
LDLC SERPL CALC-MCNC: 70 MG/DL
MCH RBC QN AUTO: 30.2 PG (ref 26.5–33)
MCHC RBC AUTO-ENTMCNC: 33.1 G/DL (ref 31.5–36.5)
MCV RBC AUTO: 91 FL (ref 78–100)
NONHDLC SERPL-MCNC: 92 MG/DL
PLATELET # BLD AUTO: 154 10E3/UL (ref 150–450)
POTASSIUM SERPL-SCNC: 4.1 MMOL/L (ref 3.4–5.3)
RBC # BLD AUTO: 5.07 10E6/UL (ref 4.4–5.9)
SODIUM SERPL-SCNC: 143 MMOL/L (ref 136–145)
TRIGL SERPL-MCNC: 109 MG/DL
WBC # BLD AUTO: 4.8 10E3/UL (ref 4–11)

## 2023-01-24 PROCEDURE — 80061 LIPID PANEL: CPT | Performed by: INTERNAL MEDICINE

## 2023-01-24 PROCEDURE — G0009 ADMIN PNEUMOCOCCAL VACCINE: HCPCS | Performed by: INTERNAL MEDICINE

## 2023-01-24 PROCEDURE — 80048 BASIC METABOLIC PNL TOTAL CA: CPT | Performed by: INTERNAL MEDICINE

## 2023-01-24 PROCEDURE — 91312 COVID-19 VACCINE BIVALENT BOOSTER 12+ (PFIZER): CPT | Performed by: INTERNAL MEDICINE

## 2023-01-24 PROCEDURE — 90677 PCV20 VACCINE IM: CPT | Performed by: INTERNAL MEDICINE

## 2023-01-24 PROCEDURE — 84450 TRANSFERASE (AST) (SGOT): CPT | Performed by: INTERNAL MEDICINE

## 2023-01-24 PROCEDURE — 99214 OFFICE O/P EST MOD 30 MIN: CPT | Mod: 25 | Performed by: INTERNAL MEDICINE

## 2023-01-24 PROCEDURE — 85027 COMPLETE CBC AUTOMATED: CPT | Performed by: INTERNAL MEDICINE

## 2023-01-24 PROCEDURE — 84460 ALANINE AMINO (ALT) (SGPT): CPT | Performed by: INTERNAL MEDICINE

## 2023-01-24 PROCEDURE — 99207 PR ANNUAL WELLNESS VISIT, PPS, SUBSEQUENT STAT: CPT | Performed by: INTERNAL MEDICINE

## 2023-01-24 PROCEDURE — 36415 COLL VENOUS BLD VENIPUNCTURE: CPT | Performed by: INTERNAL MEDICINE

## 2023-01-24 PROCEDURE — 0124A COVID-19 VACCINE BIVALENT BOOSTER 12+ (PFIZER): CPT | Performed by: INTERNAL MEDICINE

## 2023-01-24 NOTE — PROGRESS NOTES
Assessment & Plan     (Z00.00) Encounter for annual wellness exam in Medicare patient  (primary encounter diagnosis)  Comment: Discussed cardiac disease risk factors and cardiac disease risk factor modification, including diabetes screening, blood pressure screening (and management if indicated), and cholesterol screening.   Reviewed immunzation guidelines, including pneumococcal vaccines, annual influenza, and shingles vaccines.   Discussed routine cancer screenings, including skin cancer, colon cancer screening for everyone until age 80, prostate cancer screening in men until age 75, mammogram and PAP/pelvic for women until age 75.   Recommended regular dentist visits to care for remaining teeth.   Recommended regular screening for vision and glaucoma.   Recommended safe driving and accident avoidance.   Plan: REVIEW OF HEALTH MAINTENANCE PROTOCOL ORDERS            (I73.9) PAD (peripheral artery disease) (H)  Comment: This condition is currently controlled on the current medical regimen.  Continue current therapy.   Discussed secondary risk factor modification and recommended continuing aggressive management of these items.   Plan: REVIEW OF HEALTH MAINTENANCE PROTOCOL ORDERS,         CBC with platelets, Basic metabolic panel, AST,        ALT, Lipid panel reflex to direct LDL Fasting              (I10) Essential hypertension  Comment: This condition is currently controlled on the current medical regimen.  Continue current therapy.   Plan: REVIEW OF HEALTH MAINTENANCE PROTOCOL ORDERS,         CBC with platelets, Basic metabolic panel            (E78.5) Hyperlipidemia with target LDL less than 100  Comment: This condition is currently controlled on the current medical regimen.  Continue current therapy.   This condition is currently controlled on the current medical regimen.  Continue current therapy.  Discussed guidelines recommending a statin cholesterol lowering medication for any patient with either diabetes  "and/or vascular disease, aiming for a LDL goal under 100 for sure, ideally under 70, using whatever dose of statin tolerated.    Plan: REVIEW OF HEALTH MAINTENANCE PROTOCOL ORDERS,         AST, ALT, Lipid panel reflex to direct LDL         Fasting            (I25.10) Coronary artery disease involving native coronary artery of native heart without angina pectoris  Comment: This condition is currently controlled on the current medical regimen.  Continue current therapy.   Discussed secondary risk factor modification and recommended continuing aggressive management of these items.   Plan: REVIEW OF HEALTH MAINTENANCE PROTOCOL ORDERS,         CBC with platelets, Basic metabolic panel, AST,        ALT, Lipid panel reflex to direct LDL Fasting            (R07.89) Atypical chest pain  Comment: Probable acid reflux.  No further recurrences since taking omeprazole.  Plan: REVIEW OF HEALTH MAINTENANCE PROTOCOL ORDERS,         omeprazole (PRILOSEC) 20 MG DR capsule            (Z23) Need for pneumococcal vaccination  Comment:   Plan: REVIEW OF HEALTH MAINTENANCE PROTOCOL ORDERS,         Pneumococcal 20 Valent Conjugate (Prevnar 20),         ADMIN PNEUMOVAX VACCINE (For MEDICARE Patients         ONLY) []            (Z23) Need for COVID-19 vaccine  Comment:   Plan: REVIEW OF HEALTH MAINTENANCE PROTOCOL ORDERS,         COVID-19,PF,PFIZER BOOSTER BIVALENT (12+YRS)            (K21.9) Gastroesophageal reflux disease without esophagitis  Comment: Stable on omeprazole.  Plan: REVIEW OF HEALTH MAINTENANCE PROTOCOL ORDERS,         omeprazole (PRILOSEC) 20 MG DR capsule                        BMI:   Estimated body mass index is 33.71 kg/m  as calculated from the following:    Height as of this encounter: 1.727 m (5' 8\").    Weight as of this encounter: 100.6 kg (221 lb 11.2 oz).           Return in about 1 year (around 1/24/2024) for Medicare Annual Wellness Exam, Blood pressure.    Neal Esteves MD  [facility]    Karen Love is a 87 year old, presenting for the following health issues:  Hematology (Concerns about hemoglobin, orders for labs), Establish Care, and Imm/Inj (Covid booster)      HPI   New patient  Hematology (Concerns about hemoglobin, orders for labs)  Covid Booster    1.    Hypertension:  History of hypertension, on medication.  No reported side effects from medications.    Reviewed last 6 BP readings in chart:  BP Readings from Last 6 Encounters:   01/24/23 134/76   06/16/22 (!) 150/84   03/02/22 120/73   02/22/22 (!) 140/72   12/02/21 132/64   11/10/21 (!) 144/80     No active cardiac complaints or symptoms with exercise.       2.  Prior of coronary artery disease as listed in medical history.  No current or recent cardiovascaulr symptoms.   No shortness of breath, no episodes of chest pain/pressure, no dyspnea on exertion, no changes in his abiliy to perform physical exertion or tasks.  Takes the same amount of time to perform similar physical tasks.      History of carotid endarterectomy on the left with critical stenosis, doing well since surgery with widely patent left CEA according to last ultrasound.     Stable mild asymptomatic right carotid stenosis  Mild PAD of no clinical concern.  Dependent edema.     3.  history of severe covid infection with respiratory failure Nov 2021, complete recovery.     4.  History of atypical chest pain, considered to be probable acid reflux.  Has been taking omeprazole since with no return of symptoms.  No side effects of this medicine.      Annual Wellness Visit    Patient has been advised of split billing requirements and indicates understanding: Yes     Are you in the first 12 months of your Medicare Part B coverage?  No    Physical Health:    In general, how would you rate your overall physical health? good    Outside of work, how many days during the week do you exercise?4-5 days/week    Outside of work, approximately how many  "minutes a day do you exercise?15-30 minutes    If you drink alcohol do you typically have >3 drinks per day or >7 drinks per week? No    Do you usually eat at least 4 servings of fruit and vegetables a day, include whole grains & fiber and avoid regularly eating high fat or \"junk\" foods? Yes    Do you have any problems taking medications regularly? No    Do you have any side effects from medications? none    Needs assistance for the following daily activities: no assistance needed    Which of the following safety concerns are present in your home?  none identified     Hearing impairment: No    In the past 6 months, have you been bothered by leaking of urine? no    Mental Health:    In general, how would you rate your overall mental or emotional health? excellent  PHQ-2 Score:      Do you feel safe in your environment? Yes    Have you ever done Advance Care Planning? (For example, a Health Directive, POLST, or a discussion with a medical provider or your loved ones about your wishes)?     Fall risk:       Cognitive Screenin) Repeat 3 items (Leader, Season, Table)    2) Clock draw: NORMAL  3) 3 item recall: Recalls 3 objects  Results: 3 items recalled: COGNITIVE IMPAIRMENT LESS LIKELY    Mini-CogTM Copyright S Abdirashid. Licensed by the author for use in [facility]; reprinted with permission (soob@.[email or web address]). All rights reserved.      Do you have sleep apnea, excessive snoring or daytime drowsiness?: no    Current providers sharing in care for this patient include:   Patient Care Team:  Declan Holly MD as PCP - General (Internal Medicine)  Yobani Jones MD as Assigned Heart and Vascular Provider    Patient has been advised of split billing requirements and indicates understanding: Yes        **I reviewed the information recorded in the patient's EPIC chart (including but not limited to medical history, surgical history, family history, problem list, medication list, and allergy list) and " "updated the information as indicated based on the patients reported information.           Review of Systems   Constitutional, HEENT, cardiovascular, pulmonary, gi and gu systems are negative, except as otherwise noted.      Objective    /76   Pulse 86   Temp 97.7  F (36.5  C) (Oral)   Ht 1.727 m (5' 8\")   Wt 100.6 kg (221 lb 11.2 oz)   SpO2 97%   BMI 33.71 kg/m    Body mass index is 33.71 kg/m .  Physical Exam   GENERAL alert and no distress  EYES:  Normal sclera,conjunctiva, EOMI  HENT: oral and posterior pharynx without lesions or erythema, facies symmetric  NECK: Neck supple. No LAD, without thyroidmegaly.  RESP: Clear to ausculation bilaterally without wheezes or crackles. Normal BS in all fields.  CV: RRR normal S1S2 without murmurs, rubs or gallops.  LYMPH: no cervical lymph adenopathy appreciated  MS: extremities- no gross deformities of the visible extremities noted,   EXT:  2+ chronic bilateral lower extremity edema  PSYCH: Alert and oriented times 3; speech- coherent  SKIN:  No obvious significant skin lesions on visible portions of face                     "

## 2023-01-24 NOTE — PATIENT INSTRUCTIONS
"We are rechecking your labs.  I will let you know if the results indicate any changes in your therapy.  Unless you hear otherwise, continue all medications at the same doses.  Contact your usual pharmacy if you need refills.     Assuming your labs look good, then continue all medications at the same doses.  Contact your usual pharmacy if you need refills.        Continue all medications at the same doses.  Contact your usual pharmacy if you need refills.      Follow up with Cardiology Clinic in the next couple months as you always do.      Get a tetanus vaccine the next time you are at your pharmacy.  Medicare does not pay for this vaccine given in the clinic, cheaper to get from a pharmacist in the pharmacy.         Return to see me in 1 year, sooner if needed.  Use [employer] or Call [phone] to schedule the appointment with me.         5 GOALS TO PREVENT VASCULAR DISEASE:     1.  Aggressive blood pressure control, under 130/80 ideally.  Using medications if needed.    Your blood pressure is under good control    BP Readings from Last 4 Encounters:   01/24/23 134/76   06/16/22 (!) 150/84   03/02/22 120/73   02/22/22 (!) 140/72       2.  Aggressive LDL cholesterol (\"bad cholesterol\") lowering as indicated.    Your goal is an LDL under 130 for sure, preferably under 100.  (If you have diabetes or previous vascular disease, the the LDL goals would be under 100 for sure, preferably under 70.)    New guidelines identify four high-risk groups who could benefit from statins:   *people with pre-existing heart disease, such as those who have had a heart attack;   *people ages 40 to 75 who have diabetes of any type  *patients ages 40 to 75 with at least a 7.5% risk of developing cardiovascular disease over the next decade, according to a formula described in the guidelines  *patients with the sort of super-high cholesterol that sometimes runs in families, as evidenced by an LDL of 190 milligrams per deciliter or " higher    Your cholesterol levels are well controlled.    Recent Labs   Lab Test 02/22/22  0904 09/01/20  1038 01/09/17  0846 06/19/15  0754   CHOL 122 113   < > 127   HDL 43 43   < > 43   LDL 58 54   < > 70   TRIG 106 81   < > 71   CHOLHDLRATIO  --   --   --  3.0    < > = values in this interval not displayed.       3.  Aggressive diabetic prevention, screening and/or management.      You do not have diabetes as of the most recent blood tests.     4.  No smoking    5.  Consider daily preventative aspirin over age 50 if you have enough cardiac risk factors to place you at higher risk for the presence of vascular disease.    If you have any reason not to take aspirin such easy bruising or bleeding, stomach problems, other anticoagulant medications, or any other side effects, then you should not take Aspirin.     --Based on your current cardiac risk factor profile, you should take regular daily Aspirin 81 mg once per day (as long as you do not have any side effects from taking aspirin).             Preventive Health Recommendations:   Male Ages 65 and over    Yearly exam:             See your health care provider every year in order to  o   Review health changes.   o   Discuss preventive care.    o   Review your medicines if your doctor has prescribed any.  Talk with your health care provider about whether you should have a test to screen for prostate cancer (PSA).  Every 3 years, have a diabetes test (fasting glucose). If you are at risk for diabetes, you should have this test more often.  Every 5 years, have a cholesterol test. Have this test more often if you are at risk for high cholesterol or heart disease.   Every 10 years, have a colonoscopy. Or, have a yearly FIT test (stool test). These exams will check for colon cancer.  Talk to with your health care provider about screening for Abdominal Aortic Aneurysm if you have a family history of AAA or have a history of smoking.    Shots:   Get a flu shot each year.  "  Get a tetanus shot every 10 years.   Talk to your doctor about your pneumonia vaccines. There are now two you should receive - Pneumovax (PPSV 23) and Prevnar (PCV 13).   Talk to your doctor about a shingles vaccine.   Talk to your doctor about the hepatitis B vaccine.  Nutrition:   Eat at least 5 servings of fruits and vegetables each day.   Eat whole-grain bread, whole-wheat pasta and brown rice instead of white grains and rice.   Talk to your provider about Calcium and Vitamin D.      --Good Grains:  Oats, brown rice, Quinoa (these do not raise the blood sugar as much)     --Bad grains:  Anything made from wheat or white rice     (because these raise the blood sugars significantly, and the possible gluten issue from wheat for some people).      --Proteins:  Aim for \"lean proteins\" including chicken, fish, seafood, pork, turkey, and eggs (in moderation); Eat red meat only occasionally    Lifestyle  Exercise for at least 150 minutes a week (30 minutes a day, 5 days a week). This will help you control your weight and prevent disease.   Limit alcohol to one drink per day.   No smoking.   Wear sunscreen to prevent skin cancer.   See your dentist every six months for an exam and cleaning.   See your eye doctor every 1 to 2 years to screen for conditions such as glaucoma, macular degeneration, cataracts, etc       "

## 2023-03-06 DIAGNOSIS — E78.5 HYPERLIPIDEMIA WITH TARGET LDL LESS THAN 100: ICD-10-CM

## 2023-03-06 DIAGNOSIS — I10 ESSENTIAL HYPERTENSION: ICD-10-CM

## 2023-03-06 RX ORDER — ATORVASTATIN CALCIUM 20 MG/1
20 TABLET, FILM COATED ORAL DAILY
Qty: 90 TABLET | Refills: 0 | Status: SHIPPED | OUTPATIENT
Start: 2023-03-06 | End: 2023-03-27

## 2023-03-06 RX ORDER — LISINOPRIL AND HYDROCHLOROTHIAZIDE 12.5; 2 MG/1; MG/1
1 TABLET ORAL DAILY
Qty: 90 TABLET | Refills: 0 | Status: SHIPPED | OUTPATIENT
Start: 2023-03-06 | End: 2023-03-27

## 2023-03-27 ENCOUNTER — OFFICE VISIT (OUTPATIENT)
Dept: CARDIOLOGY | Facility: CLINIC | Age: 88
End: 2023-03-27
Payer: COMMERCIAL

## 2023-03-27 VITALS
DIASTOLIC BLOOD PRESSURE: 77 MMHG | SYSTOLIC BLOOD PRESSURE: 138 MMHG | WEIGHT: 226 LBS | BODY MASS INDEX: 35.47 KG/M2 | OXYGEN SATURATION: 95 % | HEIGHT: 67 IN | HEART RATE: 76 BPM

## 2023-03-27 DIAGNOSIS — I25.10 CORONARY ARTERY DISEASE INVOLVING NATIVE CORONARY ARTERY OF NATIVE HEART WITHOUT ANGINA PECTORIS: ICD-10-CM

## 2023-03-27 DIAGNOSIS — E78.5 HYPERLIPIDEMIA WITH TARGET LDL LESS THAN 100: Primary | ICD-10-CM

## 2023-03-27 DIAGNOSIS — I10 ESSENTIAL HYPERTENSION: ICD-10-CM

## 2023-03-27 DIAGNOSIS — I65.29 STENOSIS OF CAROTID ARTERY, UNSPECIFIED LATERALITY: ICD-10-CM

## 2023-03-27 PROCEDURE — 99214 OFFICE O/P EST MOD 30 MIN: CPT | Performed by: INTERNAL MEDICINE

## 2023-03-27 RX ORDER — NITROGLYCERIN 0.4 MG/1
TABLET SUBLINGUAL
Qty: 25 TABLET | Refills: 1 | Status: SHIPPED | OUTPATIENT
Start: 2023-03-27 | End: 2024-03-27

## 2023-03-27 RX ORDER — ATORVASTATIN CALCIUM 20 MG/1
20 TABLET, FILM COATED ORAL DAILY
Qty: 90 TABLET | Refills: 3 | Status: SHIPPED | OUTPATIENT
Start: 2023-03-27 | End: 2024-03-27

## 2023-03-27 RX ORDER — LISINOPRIL AND HYDROCHLOROTHIAZIDE 12.5; 2 MG/1; MG/1
1 TABLET ORAL DAILY
Qty: 90 TABLET | Refills: 3 | Status: SHIPPED | OUTPATIENT
Start: 2023-03-27 | End: 2024-03-27

## 2023-03-27 RX ORDER — TERAZOSIN 5 MG/1
5 CAPSULE ORAL AT BEDTIME
Qty: 90 CAPSULE | Refills: 3 | Status: SHIPPED | OUTPATIENT
Start: 2023-03-27 | End: 2024-03-27

## 2023-03-27 RX ORDER — ATENOLOL 25 MG/1
25 TABLET ORAL DAILY
Qty: 90 TABLET | Refills: 3 | Status: SHIPPED | OUTPATIENT
Start: 2023-03-27 | End: 2024-03-27

## 2023-03-27 NOTE — PROGRESS NOTES
Service Date: 03/27/2023    CARDIOLOGY OFFICE PROGRESS NOTE    HISTORY OF PRESENT ILLNESS:  I had the opportunity to see Mr. Ivan Payton in Cardiology Clinic today at [facility] in [place] for reevaluation of coronary artery disease and cardiac risk factors including hypertension and dyslipidemia.  He has a history of carotid disease as well and underwent left carotid endarterectomy by Dr. Jones in the past who does periodic evaluation for that issue.    Mr. Payton is doing well this year without any symptoms of chest discomfort or shortness of breath.  He denies lightheadedness and syncope as well.  His coronary artery disease, history includes stenting of the mid LAD, proximal left circumflex and second obtuse marginal in 2014 with stenting of the PDA and first obtuse marginal in 2018.  He has not had any revascularization procedures since 2018.  We have been treating his hypertension and dyslipidemia aggressively.    His only complaint today is of frequent urination, especially at night.  He still is active but moves a lot slower.  He uses his walker for stability.    PHYSICAL EXAMINATION:    VITAL SIGNS:  Today, his blood pressure is 138/77, heart rate 76 and weight 226 pounds.    RESPIRATORY:  His lungs are clear.    CARDIAC:  Heart rhythm is regular.  He does not have any cardiac murmurs.    IMPRESSIONS:  Mr. Ivan Payton is an 87-year-old gentleman with hypertension, dyslipidemia, coronary artery disease with multiple stenting procedures in 2014 and 2018 and carotid endarterectomy.  He is now doing well without any concerning cardiac symptoms and his risk factors are under good control, although his blood pressure is at the upper limits of normal.  Since he is having frequent urination problems, probably due to enlarged prostate, I recommended that he increase his terazosin from 2 mg nightly to 5 mg nightly.  I warned him about lightheadedness symptoms.  I will plan to see him back again in  1 year for reevaluation of his cardiac issues.    Willy Johansen MD, FACC    cc:  Neal Esteves MD   [facility]  [address] [place] [id number]    Willy Johansen MD, FACC        D: 2023   T: 2023   MT: [email or web address]    Name:     GEOVANI STOREY  MRN:      [id number]        Account:      [id number]   :      1935           Service Date: 2023       Document: [id number]

## 2023-03-27 NOTE — PROGRESS NOTES
HPI and Plan:   See dictation    Today's clinic visit entailed:  Review of the result(s) of each unique test - bmp, flp, alt  Ordering of each unique test  Prescription drug management  30 minutes spent by me on the date of the encounter doing chart review, review of test results, patient visit and documentation   Provider  Link to [facility] Help Grid     The level of medical decision making during this visit was of moderate complexity.      Orders Placed This Encounter   Procedures     Lipid Profile     ALT     Basic metabolic panel     Follow-Up with Cardiology       Orders Placed This Encounter   Medications     terazosin (HYTRIN) 5 MG capsule     Sig: Take 1 capsule (5 mg) by mouth At Bedtime     Dispense:  90 capsule     Refill:  3     lisinopril-hydrochlorothiazide (ZESTORETIC) 20-12.5 MG tablet     Sig: Take 1 tablet by mouth daily     Dispense:  90 tablet     Refill:  3     atorvastatin (LIPITOR) 20 MG tablet     Sig: Take 1 tablet (20 mg) by mouth daily     Dispense:  90 tablet     Refill:  3     atenolol (TENORMIN) 25 MG tablet     Sig: Take 1 tablet (25 mg) by mouth daily     Dispense:  90 tablet     Refill:  3     nitroGLYcerin (NITROSTAT) 0.4 MG sublingual tablet     Sig: For chest pain place 1 tablet under the tongue every 5 minutes for 3 doses. If symptoms persist 5 minutes after 1st dose call 911.     Dispense:  25 tablet     Refill:  1       Medications Discontinued During This Encounter   Medication Reason     terazosin (HYTRIN) 2 MG capsule      nitroGLYcerin (NITROSTAT) 0.4 MG sublingual tablet Reorder (No AVS / No eCancel)     atenolol (TENORMIN) 25 MG tablet Reorder (No AVS / No eCancel)     lisinopril-hydrochlorothiazide (ZESTORETIC) 20-12.5 MG tablet Reorder (No AVS / No eCancel)     atorvastatin (LIPITOR) 20 MG tablet Reorder (No AVS / No eCancel)         Encounter Diagnoses   Name Primary?     Hyperlipidemia with target LDL less than 100 Yes     Essential hypertension      Coronary artery  disease involving native coronary artery of native heart without angina pectoris      Stenosis of carotid artery, unspecified laterality        CURRENT MEDICATIONS:  Current Outpatient Medications   Medication Sig Dispense Refill     acetaminophen (TYLENOL) 650 MG CR tablet Take 650 mg by mouth 2 times daily       Ascorbic Acid (VITAMIN C PO) Take 1,000 mg by mouth daily       aspirin 81 MG tablet Take by mouth daily 30 tablet      atenolol (TENORMIN) 25 MG tablet Take 1 tablet (25 mg) by mouth daily 90 tablet 3     atorvastatin (LIPITOR) 20 MG tablet Take 1 tablet (20 mg) by mouth daily 90 tablet 3     calcium carbonate (OS-SREEDHAR 500 MG [place]) 500 MG tablet Take 500 mg by mouth daily       glucosamine-chondroitin 500-400 MG CAPS Take 1 capsule by mouth daily       lisinopril-hydrochlorothiazide (ZESTORETIC) 20-12.5 MG tablet Take 1 tablet by mouth daily 90 tablet 3     nitroGLYcerin (NITROSTAT) 0.4 MG sublingual tablet For chest pain place 1 tablet under the tongue every 5 minutes for 3 doses. If symptoms persist 5 minutes after 1st dose call 911. 25 tablet 1     Omega-3 Fatty Acids (OMEGA-3 FISH OIL PO) Take 1 g by mouth daily       omeprazole (PRILOSEC) 20 MG DR capsule Take 1 capsule (20 mg) by mouth daily 90 capsule 3     senna-docusate (SENOKOT-S/PERICOLACE) 8.6-50 MG tablet Take 1 tablet by mouth 2 times daily as needed for constipation 10 tablet 0     terazosin (HYTRIN) 5 MG capsule Take 1 capsule (5 mg) by mouth At Bedtime 90 capsule 3     vitamin B complex with vitamin C (STRESS TAB) tablet Take 1 tablet by mouth daily       VITAMIN D, CHOLECALCIFEROL, PO Take 5,000 Units by mouth daily        Zinc 15 MG CAPS Take 15 mg by mouth daily         ALLERGIES     Allergies   Allergen Reactions     Contrast Dye Hives       PAST MEDICAL HISTORY:  Past Medical History:   Diagnosis Date     Arthritis      Carotid stenosis 3/26/2014    S/p L CEA; R ICA ok;          See CUS 12/15 and Dr Jones's consult; continue  plavix      Cerebral vascular disease 3/2014    multiple intracranial stenoses - lt post communic, lt post cerebral, rt middle cerebral, bilat porx M2 segments     Claustrophobia     Need sedation for MRI scans     Coronary artery disease 2014    Cath 9/2014: PTCA and KIKE to mLAD, KIKE to prox circumflex, PTCA and DESx2 to OM2; Cath 12/18/18: KIKE to PDA and OM1, patent stents to LAD and Cfx     CVA (cerebral infarction) 3/2014    2 small acute lesions noted left parietal/left posterior frontal region. Old lacunar infarct left caudate nucleus     Dyspnea 7/21/2014     Enlarged prostate      Essential hypertension      Problem list name updated by automated process. Provider to review     GERD (gastroesophageal reflux disease)      Hiatal hernia      Hydrocele     Right hydrocelectomy 2/2012     Hyperlipidemia with target LDL less than 100 3/14/2014     Diagnosis updated by automated process. Provider to review and confirm.     RBBB (right bundle branch block) 3/2014     Shortness of breath      Stented coronary artery      Uncomplicated asthma        PAST SURGICAL HISTORY:  Past Surgical History:   Procedure Laterality Date     CV HEART CATHETERIZATION WITH POSSIBLE INTERVENTION N/A 12/18/2018    Procedure: Coronary Angiography;  Surgeon: Geovanni Tanner MD;  Location:  HEART CARDIAC CATH LAB     ENDARTERECTOMY CAROTID  3/26/2014    Procedure: ENDARTERECTOMY CAROTID;  LEFT CAROTID ENDARTERECTOMY WITH EEG;  Surgeon: Yobani Jones MD;  Location:  OR      REMOVAL OF TONSILS,<14 Y/O      Tonsils <13y.o.     HEART CATH, ANGIOPLASTY  9/9/14    Stenting of LAD,Prox LCx, and 2 stents to OM2     HERNIORRHAPHY INGUINAL      Right     HERNIORRHAPHY INGUINAL  2/10/2012    Procedure:HERNIORRHAPHY INGUINAL; LEFT OPEN INGUINAL HERNIA REPAIR WITH MESH, RIGHT HYDROCELECTOMY; Surgeon:JULI LOPES; Location[facility]     HYDROCELECTOMY INGUINAL  2/10/2012    Procedure:HYDROCELECTOMY INGUINAL; Surgeon:MARIANNE  JULI ODOM; Location: [facility]     HYDROCELECTOMY SCROTAL Right 2016    Procedure: HYDROCELECTOMY SCROTAL;  Surgeon: Yobani Stevens MD;  Location: [facility]     LAPAROSCOPIC HERNIORRHAPHY INGUINAL BILATERAL Bilateral 2016    Procedure: LAPAROSCOPIC HERNIORRHAPHY INGUINAL BILATERAL;  Surgeon: Chandler Bowen MD;  Location:  OR     MR BRAIN AND ORBITS W CONTRAST  3/2014    6 mm area of restricted diffusion subcortical white matter left parietal lobe/questionable area of restricted diffusion left posterior frontal region - c/w recent small infarcts. Small chronic lacunar infarct left caudate nucleus     SURGICAL HISTORY OF -       tonail removal       FAMILY HISTORY:  Family History   Problem Relation Age of Onset     C.A.D. Brother         older brother, CABG about age 56     Hypertension Brother      Respiratory Father         lung disease - farmer,      Hypertension Mother      Hypertension Sister      Hypertension Brother      Cerebrovascular Disease Son         2018       SOCIAL HISTORY:  Social History     Socioeconomic History     Marital status:      Spouse name: madhavi     Number of children: 2     Years of education: None     Highest education level: None   Occupational History     Occupation: part time, building maintenance     Employer: RETIRED   Tobacco Use     Smoking status: Former     Packs/day: 0.50     Years: 20.00     Pack years: 10.00     Types: Cigarettes     Start date:      Quit date: 1968     Years since quittin.8     Smokeless tobacco: Former   Substance and Sexual Activity     Alcohol use: No     Alcohol/week: 0.0 standard drinks     Drug use: No     Sexual activity: Not Currently     Comment:    Other Topics Concern     Blood Transfusions No     Caffeine Concern Yes     Comment: 1-2 cups per day     Occupational Exposure No     Hobby Hazards No     Sleep Concern No     Stress Concern No     Weight Concern No     Special Diet No      "Back Care No     Exercise Yes     Comment: gym 4 days week, treadmill, 1.5 miles,  weights     Bike Helmet No     Seat Belt Yes     Self-Exams No     Parent/sibling w/ CABG, MI or angioplasty before 65F 55M? Yes     Comment: brothers - 1 had CABG 1 had MI pt unsure of age   Social History Narrative    3 GC; 2 children.                  Son is a chiropracter in [facility]       Review of Systems:  Skin:  Negative       Eyes:  Negative      ENT:  Negative      Respiratory:    shortness of breath Very rarely upon exertion but resolves quickly   Cardiovascular:  Negative      Gastroenterology: Negative      Genitourinary:  Negative      Musculoskeletal:    arthritis;joint pain;joint swelling Knee, hip, and shoulder joint pain for several years. Swelling around ankles  Neurologic:  Negative      Psychiatric:  Negative      Heme/Lymph/Imm:  Negative      Endocrine:  Negative        Physical Exam:  Vitals: /77   Pulse 76   Ht 1.702 m (5' 7\")   Wt 102.5 kg (226 lb)   SpO2 95%   BMI 35.40 kg/m      Constitutional:           Skin:             Head:           Eyes:           Lymph:      ENT:           Neck:           Respiratory:            Cardiac:                                                           GI:           Extremities and Muscular Skeletal:                 Neurological:           Psych:           CC  No referring provider defined for this encounter.              "

## 2023-03-27 NOTE — PATIENT INSTRUCTIONS
It was a pleasure seeing you today and thank you for allowing me to be a part of your health care team.  Should you have any questions regarding your visit or future needs please feel free to reach out to my care team for assistance.      Thank you, Dr. Willy Johansen        **Nursing: [phone]       **Scheduling: [phone]

## 2023-03-27 NOTE — LETTER
3/27/2023    Declan Holly MD  [address]  [place] [place] [address]    RE: Ivan Payton       Dear Colleague,     I had the pleasure of seeing Ivan Payton in the [facility].  HPI and Plan:   See dictation    Today's clinic visit entailed:  Review of the result(s) of each unique test - bmp, flp, alt  Ordering of each unique test  Prescription drug management  30 minutes spent by me on the date of the encounter doing chart review, review of test results, patient visit and documentation   Provider  Link to [facility] Help Grid     The level of medical decision making during this visit was of moderate complexity.      Orders Placed This Encounter   Procedures     Lipid Profile     ALT     Basic metabolic panel     Follow-Up with Cardiology       Orders Placed This Encounter   Medications     terazosin (HYTRIN) 5 MG capsule     Sig: Take 1 capsule (5 mg) by mouth At Bedtime     Dispense:  90 capsule     Refill:  3     lisinopril-hydrochlorothiazide (ZESTORETIC) 20-12.5 MG tablet     Sig: Take 1 tablet by mouth daily     Dispense:  90 tablet     Refill:  3     atorvastatin (LIPITOR) 20 MG tablet     Sig: Take 1 tablet (20 mg) by mouth daily     Dispense:  90 tablet     Refill:  3     atenolol (TENORMIN) 25 MG tablet     Sig: Take 1 tablet (25 mg) by mouth daily     Dispense:  90 tablet     Refill:  3     nitroGLYcerin (NITROSTAT) 0.4 MG sublingual tablet     Sig: For chest pain place 1 tablet under the tongue every 5 minutes for 3 doses. If symptoms persist 5 minutes after 1st dose call 911.     Dispense:  25 tablet     Refill:  1       Medications Discontinued During This Encounter   Medication Reason     terazosin (HYTRIN) 2 MG capsule      nitroGLYcerin (NITROSTAT) 0.4 MG sublingual tablet Reorder (No AVS / No eCancel)     atenolol (TENORMIN) 25 MG tablet Reorder (No AVS / No eCancel)     lisinopril-hydrochlorothiazide (ZESTORETIC) 20-12.5 MG tablet Reorder (No AVS / No eCancel)      atorvastatin (LIPITOR) 20 MG tablet Reorder (No AVS / No eCancel)         Encounter Diagnoses   Name Primary?     Hyperlipidemia with target LDL less than 100 Yes     Essential hypertension      Coronary artery disease involving native coronary artery of native heart without angina pectoris      Stenosis of carotid artery, unspecified laterality        CURRENT MEDICATIONS:  Current Outpatient Medications   Medication Sig Dispense Refill     acetaminophen (TYLENOL) 650 MG CR tablet Take 650 mg by mouth 2 times daily       Ascorbic Acid (VITAMIN C PO) Take 1,000 mg by mouth daily       aspirin 81 MG tablet Take by mouth daily 30 tablet      atenolol (TENORMIN) 25 MG tablet Take 1 tablet (25 mg) by mouth daily 90 tablet 3     atorvastatin (LIPITOR) 20 MG tablet Take 1 tablet (20 mg) by mouth daily 90 tablet 3     calcium carbonate (OS-SREEDHAR 500 MG [place]. [place]) 500 MG tablet Take 500 mg by mouth daily       glucosamine-chondroitin 500-400 MG CAPS Take 1 capsule by mouth daily       lisinopril-hydrochlorothiazide (ZESTORETIC) 20-12.5 MG tablet Take 1 tablet by mouth daily 90 tablet 3     nitroGLYcerin (NITROSTAT) 0.4 MG sublingual tablet For chest pain place 1 tablet under the tongue every 5 minutes for 3 doses. If symptoms persist 5 minutes after 1st dose call 911. 25 tablet 1     Omega-3 Fatty Acids (OMEGA-3 FISH OIL PO) Take 1 g by mouth daily       omeprazole (PRILOSEC) 20 MG DR capsule Take 1 capsule (20 mg) by mouth daily 90 capsule 3     senna-docusate (SENOKOT-S/PERICOLACE) 8.6-50 MG tablet Take 1 tablet by mouth 2 times daily as needed for constipation 10 tablet 0     terazosin (HYTRIN) 5 MG capsule Take 1 capsule (5 mg) by mouth At Bedtime 90 capsule 3     vitamin B complex with vitamin C (STRESS TAB) tablet Take 1 tablet by mouth daily       VITAMIN D, CHOLECALCIFEROL, PO Take 5,000 Units by mouth daily        Zinc 15 MG CAPS Take 15 mg by mouth daily         ALLERGIES     Allergies   Allergen Reactions      Contrast Dye Hives       PAST MEDICAL HISTORY:  Past Medical History:   Diagnosis Date     Arthritis      Carotid stenosis 3/26/2014    S/p L CEA; R ICA ok;          See CUS 12/15 and Dr Jones's consult; continue plavix      Cerebral vascular disease 3/2014    multiple intracranial stenoses - lt post communic, lt post cerebral, rt middle cerebral, bilat porx M2 segments     Claustrophobia     Need sedation for MRI scans     Coronary artery disease 2014    Cath 9/2014: PTCA and KIKE to mLAD, KIKE to prox circumflex, PTCA and DESx2 to OM2; Cath 12/18/18: KIKE to PDA and OM1, patent stents to LAD and Cfx     CVA (cerebral infarction) 3/2014    2 small acute lesions noted left parietal/left posterior frontal region. Old lacunar infarct left caudate nucleus     Dyspnea 7/21/2014     Enlarged prostate      Essential hypertension      Problem list name updated by automated process. Provider to review     GERD (gastroesophageal reflux disease)      Hiatal hernia      Hydrocele     Right hydrocelectomy 2/2012     Hyperlipidemia with target LDL less than 100 3/14/2014     Diagnosis updated by automated process. Provider to review and confirm.     RBBB (right bundle branch block) 3/2014     Shortness of breath      Stented coronary artery      Uncomplicated asthma        PAST SURGICAL HISTORY:  Past Surgical History:   Procedure Laterality Date     CV HEART CATHETERIZATION WITH POSSIBLE INTERVENTION N/A 12/18/2018    Procedure: Coronary Angiography;  Surgeon: Geovanni Tanner MD;  Location:  HEART CARDIAC CATH LAB     ENDARTERECTOMY CAROTID  3/26/2014    Procedure: ENDARTERECTOMY CAROTID;  LEFT CAROTID ENDARTERECTOMY WITH EEG;  Surgeon: Yobani Jones MD;  Location:  OR      REMOVAL OF TONSILS,<14 Y/O      Tonsils <13y.o.     HEART CATH, ANGIOPLASTY  9/9/14    Stenting of LAD,Prox LCx, and 2 stents to OM2     HERNIORRHAPHY INGUINAL      Right     HERNIORRHAPHY INGUINAL  2/10/2012    Procedure:HERNIORRHAPHY  INGUINAL; LEFT OPEN INGUINAL HERNIA REPAIR WITH MESH, RIGHT HYDROCELECTOMY; Surgeon:JULI LOPES; Location:[facility]     HYDROCELECTOMY INGUINAL  2/10/2012    Procedure:HYDROCELECTOMY INGUINAL; Surgeon:JULI LOPES; Location:[facility]     HYDROCELECTOMY SCROTAL Right 2016    Procedure: HYDROCELECTOMY SCROTAL;  Surgeon: Yobani Stevens MD;  Location: [facility]     LAPAROSCOPIC HERNIORRHAPHY INGUINAL BILATERAL Bilateral 2016    Procedure: LAPAROSCOPIC HERNIORRHAPHY INGUINAL BILATERAL;  Surgeon: Chandler Bowen MD;  Location:  OR     MR BRAIN AND ORBITS W CONTRAST  3/2014    6 mm area of restricted diffusion subcortical white matter left parietal lobe/questionable area of restricted diffusion left posterior frontal region - c/w recent small infarcts. Small chronic lacunar infarct left caudate nucleus     SURGICAL HISTORY OF -       tonail removal       FAMILY HISTORY:  Family History   Problem Relation Age of Onset     C.A.D. Brother         older brother, CABG about age 56     Hypertension Brother      Respiratory Father         lung disease - farmer,      Hypertension Mother      Hypertension Sister      Hypertension Brother      Cerebrovascular Disease Son         2018       SOCIAL HISTORY:  Social History     Socioeconomic History     Marital status:      Spouse name: madhavi     Number of children: 2     Years of education: None     Highest education level: None   Occupational History     Occupation: part time, building maintenance     Employer: RETIRED   Tobacco Use     Smoking status: Former     Packs/day: 0.50     Years: 20.00     Pack years: 10.00     Types: Cigarettes     Start date:      Quit date: 1968     Years since quittin.8     Smokeless tobacco: Former   Substance and Sexual Activity     Alcohol use: No     Alcohol/week: 0.0 standard drinks     Drug use: No     Sexual activity: Not Currently     Comment:    Other Topics Concern      "Blood Transfusions No     Caffeine Concern Yes     Comment: 1-2 cups per day     Occupational Exposure No     Hobby Hazards No     Sleep Concern No     Stress Concern No     Weight Concern No     Special Diet No     Back Care No     Exercise Yes     Comment: gym 4 days week, treadmill, 1.5 miles,  weights     Bike Helmet No     Seat Belt Yes     Self-Exams No     Parent/sibling w/ CABG, MI or angioplasty before 65F 55M? Yes     Comment: brothers - 1 had CABG 1 had MI pt unsure of age   Social History Narrative    3 GC; 2 children.                  Son is a chiropracter in [employer]       Review of Systems:  Skin:  Negative       Eyes:  Negative      ENT:  Negative      Respiratory:    shortness of breath Very rarely upon exertion but resolves quickly   Cardiovascular:  Negative      Gastroenterology: Negative      Genitourinary:  Negative      Musculoskeletal:    arthritis;joint pain;joint swelling Knee, hip, and shoulder joint pain for several years. Swelling around ankles  Neurologic:  Negative      Psychiatric:  Negative      Heme/Lymph/Imm:  Negative      Endocrine:  Negative        Physical Exam:  Vitals: /77   Pulse 76   Ht 1.702 m (5' 7\")   Wt 102.5 kg (226 lb)   SpO2 95%   BMI 35.40 kg/m      Constitutional:           Skin:             Head:           Eyes:           Lymph:      ENT:           Neck:           Respiratory:            Cardiac:                                                           GI:           Extremities and Muscular Skeletal:                 Neurological:           Psych:           CC  No referring provider defined for this encounter.                Service Date: 03/27/2023    CARDIOLOGY OFFICE PROGRESS NOTE    HISTORY OF PRESENT ILLNESS:  I had the opportunity to see Mr. Ivan Payton in Cardiology Clinic today at [facility] in [place] for reevaluation of coronary artery disease and cardiac risk factors including hypertension and dyslipidemia.  He has a history " of carotid disease as well and underwent left carotid endarterectomy by Dr. Jones in the past who does periodic evaluation for that issue.    Mr. Payton is doing well this year without any symptoms of chest discomfort or shortness of breath.  He denies lightheadedness and syncope as well.  His coronary artery disease, history includes stenting of the mid LAD, proximal left circumflex and second obtuse marginal in 2014 with stenting of the PDA and first obtuse marginal in 2018.  He has not had any revascularization procedures since 2018.  We have been treating his hypertension and dyslipidemia aggressively.    His only complaint today is of frequent urination, especially at night.  He still is active but moves a lot slower.  He uses his walker for stability.    PHYSICAL EXAMINATION:    VITAL SIGNS:  Today, his blood pressure is 138/77, heart rate 76 and weight 226 pounds.    RESPIRATORY:  His lungs are clear.    CARDIAC:  Heart rhythm is regular.  He does not have any cardiac murmurs.    IMPRESSIONS:  Mr. Geovani Payton is an 87-year-old gentleman with hypertension, dyslipidemia, coronary artery disease with multiple stenting procedures in  and 2018 and carotid endarterectomy.  He is now doing well without any concerning cardiac symptoms and his risk factors are under good control, although his blood pressure is at the upper limits of normal.  Since he is having frequent urination problems, probably due to enlarged prostate, I recommended that he increase his terazosin from 2 mg nightly to 5 mg nightly.  I warned him about lightheadedness symptoms.  I will plan to see him back again in 1 year for reevaluation of his cardiac issues.    Willy Johansen MD, FACC    cc:  Neal Esteves MD   [facility]  [address] [place] [id number]    Willy Johansen MD, FACC        D: 2023   T: 2023   MT: [email or web address]    Name:     GEOVANI PAYTON  MRN:      [id number]        Account:      [id number]   :       1935           Service Date: 03/27/2023       Document: [id number]      Thank you for allowing me to participate in the care of your patient.      Sincerely,     KATHY BERNSTEIN MD     [facility]  cc:   No referring provider defined for this encounter.

## 2023-06-01 ENCOUNTER — NURSE TRIAGE (OUTPATIENT)
Dept: FAMILY MEDICINE | Facility: CLINIC | Age: 88
End: 2023-06-01
Payer: COMMERCIAL

## 2023-06-01 NOTE — TELEPHONE ENCOUNTER
Sounds like intertrigo- try anti-fungal topical + some desitin/zinc oxide . Berta like what peds would recd for diaper rash

## 2023-06-01 NOTE — TELEPHONE ENCOUNTER
"Nurse Triage SBAR    Is this a 2nd Level Triage? NO    Situation: Received a call from the patient stating he has a rash in his \"crotch kuldip.\" Patient states the rash is about 2-3 inches long and narrow in size. Patient states the rash is located on the top leg/where the leg meets his body.     Background: Patient states this has happened occassionally in the past but patient does not recall what he used in the past for treatment. Patient states \"some type of cream.\" Upon chart review, Triage RN unable to find any cream medication listed in patient's history of medications.     Assessment: No itchiness present. No fever. No pain. Rash is \"very red\" and flat against the skin.     Protocol Recommended Disposition:   Home Care    Recommendation: Triage protocol recommending home care. Patient would like some guidance on what type of ointment he can use on the rash? Will route to PCP for recommendations.     Routed to provider    Does the patient meet one of the following criteria for ADS visit consideration? 16+ years old, with an [facility] PCP     TIP  Providers, please consider if this condition is appropriate for management at one of our Acute and Diagnostic Services sites.     If patient is a good candidate, please use dotphrase <dot>triageresponse and select Refer to ADS to document.    1. APPEARANCE of RASH: \"Describe the rash.\"       \"Very red\", flat against the skin  2. LOCATION: \"Where is the rash located?\"       Top part of the leg where it meets the body, 3-4 inches long, narrow  3. NUMBER: \"How many spots are there?\"       Unknown  4. SIZE: \"How big are the spots?\" (Inches, centimeters or compare to size of a coin)       3-4 inches long, narrow  5. ONSET: \"When did the rash start?\"       Last night   6. ITCHING: \"Does the rash itch?\" If Yes, ask: \"How bad is the itch?\"  (Scale 0-10; or none, mild, moderate, severe)      No itchiness present  7. PAIN: \"Does the rash hurt?\" If Yes, ask: \"How bad is the pain?\"  " "(Scale 0-10; or none, mild, moderate, severe)      No pain  8. OTHER SYMPTOMS: \"Do you have any other symptoms?\" (e.g., fever)      No fever  9. PREGNANCY: \"Is there any chance you are pregnant?\" \"When was your last menstrual period?\"      No    Can we leave a detailed message on this number? YES  Phone number patient can be reached at: Cell number on file:    Telephone Information:   Mobile [phone]       Reason for Disposition    Mild localized rash    Additional Information    Negative: Sounds like a life-threatening emergency to the triager    Negative: Athlete's Foot suspected (i.e., itchy rash between the toes)    Negative: Insect bite(s) suspected    Negative: Jock Itch suspected (i.e., itchy rash on inner thighs near genital area)    Negative: Localized lump (or swelling) without redness or rash    Negative: Poison ivy, oak, or sumac contact suspected    Negative: Rash of female genital area (vulva)    Negative: Rash of male genital area (penis or scrotum)    Negative: Redness of immunization site    Negative: Shingles suspected (i.e., painful rash, multiple small blisters grouped together in one area of body; dermatomal distribution)    Negative: Small spot, skin growth, or mole    Negative: Wound infection suspected (i.e., pain, spreading redness, or pus; in a cut, puncture, scrape or sutured wound)    Negative: Fever and localized purple or blood-colored spots or dots that are not from injury or friction    Negative: Fever and localized rash is very painful    Negative: Patient sounds very sick or weak to the triager    Negative: Looks like a boil, infected sore, deep ulcer, or other infected rash (spreading redness, pus)    Negative: Painful rash with multiple small blisters grouped together (i.e., dermatomal distribution or 'band' or 'stripe')    Negative: Localized rash is very painful (no fever)    Negative: Localized purple or blood-colored spots or dots that are not from injury or friction (no " fever)    Negative: Lyme disease suspected (e.g., bull's-eye rash or tick bite / exposure)    Negative: Patient wants to be seen    Negative: Tender bumps in armpits    Negative: Pimples (localized) and no improvement after using CARE ADVICE    Negative: SEVERE local itching persists after 2 days of steroid cream    Negative: Applying cream or ointment and it causes severe itch, burning, or pain    Negative: Medication patch causing local rash or itching    Negative: Localized rash present > 7 days    Negative: Red, moist, irritated area between skin folds (or under larger breasts)    Negative: Localized area of skin darkening or thickening on lower legs or ankles and has NOT been evaluated by a doctor (or NP/PA)    Protocols used: RASH OR REDNESS - [id number]    Arleen Lewis RN

## 2023-07-06 ENCOUNTER — NURSE TRIAGE (OUTPATIENT)
Dept: INTERNAL MEDICINE | Facility: CLINIC | Age: 88
End: 2023-07-06

## 2023-07-06 ENCOUNTER — OFFICE VISIT (OUTPATIENT)
Dept: FAMILY MEDICINE | Facility: CLINIC | Age: 88
End: 2023-07-06
Payer: COMMERCIAL

## 2023-07-06 VITALS
HEART RATE: 85 BPM | BODY MASS INDEX: 34.48 KG/M2 | WEIGHT: 219.7 LBS | RESPIRATION RATE: 12 BRPM | HEIGHT: 67 IN | SYSTOLIC BLOOD PRESSURE: 128 MMHG | DIASTOLIC BLOOD PRESSURE: 78 MMHG | TEMPERATURE: 97.5 F | OXYGEN SATURATION: 97 %

## 2023-07-06 DIAGNOSIS — R22.1 NECK MASS: Primary | ICD-10-CM

## 2023-07-06 PROCEDURE — 99213 OFFICE O/P EST LOW 20 MIN: CPT | Performed by: PHYSICIAN ASSISTANT

## 2023-07-06 ASSESSMENT — PAIN SCALES - GENERAL: PAINLEVEL: NO PAIN (0)

## 2023-07-06 NOTE — TELEPHONE ENCOUNTER
Pt complains of lump under left side of his jaw for 2 days. Denies fever, redness, drainage but it hurts when he chews. Triage advised OV today. Appt was scheduled.     Reason for Disposition    Patient wants to be seen    Additional Information    Negative: Patient sounds very sick or weak to the triager    Negative: Fever and bump is tender to touch    Negative: Looks infected (e.g., spreading redness, red streak, pus)    Negative: Looks like a boil, infected sore, or deep ulcer    Negative: Caller can't describe it clearly    Protocols used: SKIN LESION - MOLES OR GROWTHS-A-OH

## 2023-07-06 NOTE — PROGRESS NOTES
HPI: Ivan is an 88 yo male here with a lump in his neck x 2 days.  Denies pain unless he eats steak  Denies sore throat, fever or swallowing issues  Pt had same issue in 2021 and did see ENT (Maycol Stringer)  Notes not avail in chart but are requested.  Pt quit smoking over 50 years ago    Past Medical History:   Diagnosis Date     Arthritis      Carotid stenosis 3/26/2014    S/p L CEA; R ICA ok;          See [facility] 12/15 and Dr Jones's consult; continue plavix      Cerebral vascular disease 3/2014    multiple intracranial stenoses - lt post communic, lt post cerebral, rt middle cerebral, bilat porx M2 segments     Claustrophobia     Need sedation for MRI scans     Coronary artery disease 2014    Cath 9/2014: PTCA and KIKE to mLAD, KIKE to prox circumflex, PTCA and DESx2 to OM2; Cath 12/18/18: KIKE to PDA and OM1, patent stents to LAD and Cfx     CVA (cerebral infarction) 3/2014    2 small acute lesions noted left parietal/left posterior frontal region. Old lacunar infarct left caudate nucleus     Dyspnea 7/21/2014     Enlarged prostate      Essential hypertension      Problem list name updated by automated process. Provider to review     GERD (gastroesophageal reflux disease)      Hiatal hernia      Hydrocele     Right hydrocelectomy 2/2012     Hyperlipidemia with target LDL less than 100 3/14/2014     Diagnosis updated by automated process. Provider to review and confirm.     RBBB (right bundle branch block) 3/2014     Shortness of breath      Stented coronary artery      Uncomplicated asthma      Past Surgical History:   Procedure Laterality Date     CV HEART CATHETERIZATION WITH POSSIBLE INTERVENTION N/A 12/18/2018    Procedure: Coronary Angiography;  Surgeon: Geovanni Tanner MD;  Location:  HEART CARDI[facility] CATH LAB     ENDARTERECTOMY CAROTID  3/26/2014    Procedure: ENDARTERECTOMY CAROTID;  LEFT CAROTID ENDARTERECTOMY WITH EEG;  Surgeon: Yobani Jones MD;  Location:  OR      REMOVAL OF TONSILS,<11 Y/O       Tonsils <12y.o.     HEART CATH, ANGIOPLASTY  14    Stenting of LAD,Prox LCx, and 2 stents to OM2     HERNIORRHAPHY INGUINAL      Right     HERNIORRHAPHY INGUINAL  2/10/2012    Procedure:HERNIORRHAPHY INGUINAL; LEFT OPEN INGUINAL HERNIA REPAIR WITH MESH, RIGHT HYDROCELECTOMY; Surgeon:JULI LOPES; Location:[facility]     HYDROCELECTOMY INGUINAL  2/10/2012    Procedure:HYDROCELECTOMY INGUINAL; Surgeon:JULI LOPES; Location:[facility]     HYDROCELECTOMY SCROTAL Right 2016    Procedure: HYDROCELECTOMY SCROTAL;  Surgeon: Yobani Stevens MD;  Location: [facility]     LAPAROSCOPIC HERNIORRHAPHY INGUINAL BILATERAL Bilateral 2016    Procedure: LAPAROSCOPIC HERNIORRHAPHY INGUINAL BILATERAL;  Surgeon: Chandler Bowen MD;  Location:  OR     MR BRAIN AND ORBITS W CONTRAST  3/2014    6 mm area of restricted diffusion subcortical white matter left parietal lobe/questionable area of restricted diffusion left posterior frontal region - c/w recent small infarcts. Small chronic lacunar infarct left caudate nucleus     SURGICAL HISTORY OF -       tonail removal     Social History     Tobacco Use     Smoking status: Former     Packs/day: 0.50     Years: 20.00     Pack years: 10.00     Types: Cigarettes     Start date:      Quit date: 1968     Years since quittin.1     Smokeless tobacco: Former   Substance Use Topics     Alcohol use: No     Alcohol/week: 0.0 standard drinks of alcohol     Current Outpatient Medications   Medication Sig Dispense Refill     acetaminophen (TYLENOL) 650 MG CR tablet Take 650 mg by mouth 2 times daily       Ascorbic Acid (VITAMIN C PO) Take 1,000 mg by mouth daily       aspirin 81 MG tablet Take by mouth daily 30 tablet      atenolol (TENORMIN) 25 MG tablet Take 1 tablet (25 mg) by mouth daily 90 tablet 3     atorvastatin (LIPITOR) 20 MG tablet Take 1 tablet (20 mg) by mouth daily 90 tablet 3     calcium carbonate (OS-SREEDHAR 500 MG [place]. [place]) 500 MG tablet Take 500 mg  "by mouth daily       glucosamine-chondroitin 500-400 MG CAPS Take 1 capsule by mouth daily       lisinopril-hydrochlorothiazide (ZESTORETIC) 20-12.5 MG tablet Take 1 tablet by mouth daily 90 tablet 3     nitroGLYcerin (NITROSTAT) 0.4 MG sublingual tablet For chest pain place 1 tablet under the tongue every 5 minutes for 3 doses. If symptoms persist 5 minutes after 1st dose call 911. 25 tablet 1     Omega-3 Fatty Acids (OMEGA-3 FISH OIL PO) Take 1 g by mouth daily       omeprazole (PRILOSEC) 20 MG DR capsule Take 1 capsule (20 mg) by mouth daily 90 capsule 3     senna-docusate (SENOKOT-S/PERICOLACE) 8.6-50 MG tablet Take 1 tablet by mouth 2 times daily as needed for constipation 10 tablet 0     terazosin (HYTRIN) 5 MG capsule Take 1 capsule (5 mg) by mouth At Bedtime 90 capsule 3     vitamin B complex with vitamin C (STRESS TAB) tablet Take 1 tablet by mouth daily       VITAMIN D, CHOLECALCIFEROL, PO Take 5,000 Units by mouth daily        Zinc 15 MG CAPS Take 15 mg by mouth daily       Allergies   Allergen Reactions     Contrast Dye Hives     FAMILY HISTORY NOTED AND REVIEWED    PHYSICAL EXAM:    /78 (BP Location: Right arm, Patient Position: Sitting, Cuff Size: Adult Regular)   Pulse 85   Temp 97.5  F (36.4  C) (Tympanic)   Resp 12   Ht 1.702 m (5' 7\")   Wt 99.7 kg (219 lb 11.2 oz)   SpO2 97%   BMI 34.41 kg/m      Patient appears non toxic  L neck with grape sized submandibular mass that is soft  No facial or oral lesions  No other neck masses or LA    Assessment and Plan:     (R22.1) Neck mass  (primary encounter diagnosis)  Comment: 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 Recd he stay well hydrated and continue to massage the area.   Plan: US Head Neck Soft Tissue (if the swelling is not resolving).   I did arrange f/u appt with Dr. Stringer in ENT for 7/24/23 that he can keep if there is any persistent lump.              Enedina Roberts PA-C        "

## 2023-08-08 ENCOUNTER — APPOINTMENT (OUTPATIENT)
Dept: GENERAL RADIOLOGY | Facility: CLINIC | Age: 88
End: 2023-08-08
Attending: EMERGENCY MEDICINE
Payer: COMMERCIAL

## 2023-08-08 ENCOUNTER — HOSPITAL ENCOUNTER (EMERGENCY)
Facility: CLINIC | Age: 88
Discharge: HOME | End: 2023-08-08
Attending: EMERGENCY MEDICINE | Admitting: EMERGENCY MEDICINE
Payer: COMMERCIAL

## 2023-08-08 VITALS
BODY MASS INDEX: 33.67 KG/M2 | HEART RATE: 84 BPM | TEMPERATURE: 97 F | WEIGHT: 215 LBS | SYSTOLIC BLOOD PRESSURE: 111 MMHG | RESPIRATION RATE: 18 BRPM | OXYGEN SATURATION: 93 % | DIASTOLIC BLOOD PRESSURE: 60 MMHG

## 2023-08-08 DIAGNOSIS — K59.09 OTHER CONSTIPATION: ICD-10-CM

## 2023-08-08 PROCEDURE — 99283 EMERGENCY DEPT VISIT LOW MDM: CPT

## 2023-08-08 PROCEDURE — 74019 RADEX ABDOMEN 2 VIEWS: CPT

## 2023-08-08 PROCEDURE — 250N000013 HC RX MED GY IP 250 OP 250 PS 637: Performed by: EMERGENCY MEDICINE

## 2023-08-08 RX ADMIN — Medication 226 ML: at 13:51

## 2023-08-08 ASSESSMENT — ACTIVITIES OF DAILY LIVING (ADL): ADLS_ACUITY_SCORE: 35

## 2023-08-08 NOTE — ED PROVIDER NOTES
History     Chief Complaint:  Constipation       The history is provided by the patient.      Ivan Payton is a 87 year old male with a history of GERD who presents with constipation. Patient reports his last bowel movement was 2 days ago. He reports he tried to have a bowel movement this morning but was not able to. He reports some bloody stool after he tried to disimpact himself. He reports he normally has a bowel movement every other day. He notes he usually takes a stool softener but has not used it recently. He denies abdominal pain,anal or rectal pain. He denies fever, vomiting, cough or sore throat. He denies changes in medication as well. He denies having hemorrhoids but notes past history of them. He denies an increase in his protein intake.       Independent Historian:   None - Patient Only    Review of External Notes:   none       Medications:    Aspirin   Zestoretic   Nitrostat   Lipitor   Cholecalciferol  Prilosec    Past Medical History:    Arthritis   Carotid stenosis   CVD  Dyspnea  GERD   Hiatal hernia   Right bundle branch block   Asthma   Stented coronary artery    Past Surgical History:    Endarterectomy carotid   CV heart cath with possible intervention   Heart cath angioplasty         Physical Exam   Patient Vitals for the past 24 hrs:   BP Temp Temp src Pulse Resp SpO2 Weight   08/08/23 1126 111/60 97  F (36.1  C) Temporal 84 18 93 % 97.5 kg (215 lb)        Physical Exam     Constitutional:             Patient is oriented to person, place, and time. They appear well-developed and well-nourished.   HENT:   Eyes:                           Conjunctivae normal and EOM are normal. Pupils are equal, round, and reactive to light.   Neck:                           Normal range of motion.   Cardiovascular:           Normal rate, regular rhythm and normal heart sounds.  Exam reveals no gallop and no friction rub.  No murmur heard.  Pulmonary/Chest:       Effort normal and breath sounds normal.  Patient has no wheezes. Patient has no rales.   Abdominal:                  Soft. Bowel sounds are normal. Patient exhibits no mass. There is no tenderness. There is no rebound and no guarding.   Musculoskeletal:         Normal range of motion. Patient exhibits no edema.   Neurological:               Patient is alert and oriented to person, place, and time. Patient has normal strength. No cranial nerve deficit or sensory deficit. GCS 15  Skin:                            Skin is warm and dry. No rash noted. No erythema.   Psychiatric:                  Patient has a normal mood and affect. Patient's behavior is normal. Judgment and thought content normal.    Emergency Department Course     XR Abdomen 2 Views   Preliminary Result   IMPRESSION: No small bowel obstruction. Small to moderate stool at the   proximal colon. Moderate stool in the rectum. Pelvic phleboliths.   Elevated left hemidiaphragm.         Report per radiology    Laboratory:  Labs Ordered and Resulted from Time of ED Arrival to Time of ED Departure - No data to display     Procedures   Pink lady edematous was done with good results     Emergency Department Course & Assessments:         Interventions:  Medications   Enema Compound (docusate/mineral oil/NaPhos) NO MAG CIT PREMIX (226 mLs Rectal $Given 8/8/23 1351)        Assessments:  1251    I obtained history and examined the patient as noted above.  1330    I rechecked and updated the patient.     Independent Interpretation (X-rays, CTs, rhythm strip):  Agree with interpretation of no obstructive pattern    Consultations/Discussion of Management or Tests:  None        Social Determinants of Health affecting care:   None    Disposition:  The patient was discharged to home.     Impression & Plan    CMS Diagnoses: None    Medical Decision Making:  Ivan Payton is a 87 year old male who presents for evaluation for decreased stool output without signs of serious intraabdominal problems. Signs and  symptoms are consistent with constipation.  X-rays showed a significant amount of stool in the rectal vault therefore a pink lady enema was performed with excellent results.  There are no signs of obstruction nor other intraabdominal catastrophe.   There are no indications for advanced imaging or admission.  I am going to send them home with instructions on medication management of this. They will then start daily stool softener as well once they are more completely cleaned out. Patient is agreeable with this and questions are answered.       Diagnosis:    ICD-10-CM    1. Other constipation  K59.09            Discharge Medications:  Discharge Medication List as of 8/8/2023  2:13 PM             Scribe Disclosure:  I, PRINCESS MELISSA, am serving as a scribe at 2:12 PM on 8/8/2023 to document services personally performed by Sanjana Landaverde MD based on my observations and the provider's statements to me.     8/8/2023   Sanjana Landaverde MD Bochert, Michelle Ann, MD  08/08/23 4024

## 2023-08-08 NOTE — ED NOTES
Pt states he tried to have a bowel movement multiple times today yet was not successful. Last stool was yesterday. States slight abdominal fullness.

## 2023-08-08 NOTE — DISCHARGE INSTRUCTIONS
Take MiraLAX 1-2 treatments a day for the next week.  I would also recommend trying a laxative such as Dulcolax.  As bowel movements return to your normal frequency continue on the MiraLAX once a day.  Increase the fiber in your diet.

## 2023-08-08 NOTE — ED TRIAGE NOTES
Patient presents to ED with constipation as of this morning. Reports had a BM yesterday, normally takes a stool softener every morning but forgot. Took suppository at home. States his stomach feels full.

## 2023-08-11 ENCOUNTER — TRANSFERRED RECORDS (OUTPATIENT)
Dept: HEALTH INFORMATION MANAGEMENT | Facility: CLINIC | Age: 88
End: 2023-08-11

## 2023-11-10 ENCOUNTER — NURSE TRIAGE (OUTPATIENT)
Dept: INTERNAL MEDICINE | Facility: CLINIC | Age: 88
End: 2023-11-10
Payer: COMMERCIAL

## 2023-11-10 DIAGNOSIS — U07.1 INFECTION DUE TO 2019 NOVEL CORONAVIRUS: Primary | ICD-10-CM

## 2023-11-10 NOTE — TELEPHONE ENCOUNTER
RN COVID TREATMENT VISIT  11/10/23    The patient has been triaged and does not require a higher level of care.    Ivan Payton  88 year old  Current weight? 219 lb    Has the patient been seen by a primary care provider at an [facility] or [facility] within the past two years? Yes.   Have you been in close proximity to/do you have a known exposure to a person with a confirmed case of influenza? No.     General treatment eligibility:  Date of positive COVID test (PCR or at home)?  11/10/23    Are you or have you been hospitalized for this COVID-19 infection? No.   Have you received monoclonal antibodies or antiviral treatment for COVID-19 since this positive test? No.   Do you have any of the following conditions that place you at risk of being very sick from COVID-19?   - Age 50 years or older  Yes, patient has at least one high risk condition as noted above.     Current COVID symptoms:   - cough  Yes. Patient has at least one symptom as selected.     How many days since symptoms started? 5 days or less. Established patient, 12 years or older weighing at least 88.2 lbs, who has symptoms that started in the past 5 days, has not been hospitalized nor received treatment already, and is at risk for being very sick from COVID-19.     Treatment eligibility by RN:  Are you currently pregnant or nursing? No  Do you have a clinically significant hypersensitivity to nirmatrelvir or ritonavir, or toxic epidermal necrolysis (TEN) or Hutchins-Kirill Syndrome? No  Do you have a history of hepatitis, any hepatic impairment on the Problem List (such as Child-Garcia Class C, cirrhosis, fatty liver disease, alcoholic liver disease), or was the last liver lab (hepatic panel, ALT, AST, ALK Phos, bilirubin) elevated in the past 6 months? No  Do you have any history of severe renal impairment (eGFR < 30mL/min)? No    Is patient eligible to continue? Yes, patient meets all eligibility requirements for the RN  COVID treatment (as denoted by all no responses above).     Current Outpatient Medications   Medication Sig Dispense Refill    acetaminophen (TYLENOL) 650 MG CR tablet Take 650 mg by mouth 2 times daily      Ascorbic Acid (VITAMIN C PO) Take 1,000 mg by mouth daily      aspirin 81 MG tablet Take by mouth daily 30 tablet     atenolol (TENORMIN) 25 MG tablet Take 1 tablet (25 mg) by mouth daily 90 tablet 3    atorvastatin (LIPITOR) 20 MG tablet Take 1 tablet (20 mg) by mouth daily 90 tablet 3    calcium carbonate (OS-SREEDHAR 500 MG [place]. [place]) 500 MG tablet Take 500 mg by mouth daily      glucosamine-chondroitin 500-400 MG CAPS Take 1 capsule by mouth daily      lisinopril-hydrochlorothiazide (ZESTORETIC) 20-12.5 MG tablet Take 1 tablet by mouth daily 90 tablet 3    nitroGLYcerin (NITROSTAT) 0.4 MG sublingual tablet For chest pain place 1 tablet under the tongue every 5 minutes for 3 doses. If symptoms persist 5 minutes after 1st dose call 911. 25 tablet 1    Omega-3 Fatty Acids (OMEGA-3 FISH OIL PO) Take 1 g by mouth daily      omeprazole (PRILOSEC) 20 MG DR capsule Take 1 capsule (20 mg) by mouth daily 90 capsule 3    senna-docusate (SENOKOT-S/PERICOLACE) 8.6-50 MG tablet Take 1 tablet by mouth 2 times daily as needed for constipation 10 tablet 0    terazosin (HYTRIN) 5 MG capsule Take 1 capsule (5 mg) by mouth At Bedtime 90 capsule 3    vitamin B complex with vitamin C (STRESS TAB) tablet Take 1 tablet by mouth daily      VITAMIN D, CHOLECALCIFEROL, PO Take 5,000 Units by mouth daily       Zinc 15 MG CAPS Take 15 mg by mouth daily         Medications from List 1 of the standing order (on medications that exclude the use of Paxlovid) that patient is taking: NONE. Is patient taking Bailey's Wort? No  Is patient taking Bailey's Wort or any meds from List 1? No.   Medications from List 2 of the standing order (on meds that provider needs to adjust) that patient is taking: NONE. Is patient on any of the meds from List 2?  No.   Medications from List 3 of standing order (on meds that a RN needs to adjust) that patient is taking: atorvastatin (Lipitor): Instructed patient to stop atorvastatin while taking Paxlovid and restart atorvastatin 1 day after the completion of Paxlovid.  Is patient on any meds from List 3? Yes. Patient is on meds from list 3. No meds require a provider visit and at least one med required RN to adjust.     Paxlovid has an approximate 90% reduction in hospitalization. Paxlovid can possibly cause altered sense of taste, diarrhea (loose, watery stools), high blood pressure, muscle aches.     Would patient like a Paxlovid prescription?   Yes.   Lab Results   Component Value Date    GFRESTIMATED 86 01/24/2023       Was last eGFR reduced? No, eGFR 60 or greater/ No Result on record. Patient can receive the normal renal function dose. Paxlovid Rx sent to [facility]    Temporary change to home medications: See above.    All medication adjustments (holds, etc) were discussed with the patient and patient was asked to repeat back (teachback) their med adjustment.  Did patient understand med adjustment? Yes, patient repeated back and understood correctly.        Reviewed the following instructions with the patient:    Paxlovid (nimatrelvir and ritonavir)    How it works  Two medicines (nirmatrelvir and ritonavir) are taken together. They stop the virus from growing. Less amount of virus is easier for your body to fight.    How to take  Medicine comes in a daily container with both medicine tablets. Take by mouth twice daily (once in the morning, once at night) for 5 days.  The number of tablets to take varies by patient.  Don't chew or break capsules. Swallow whole.    When to take  Take as soon as possible after positive COVID-19 test result, and within 5 days of your first symptoms.    Possible side effects  Can cause altered sense of taste, diarrhea (loose, watery stools), high blood pressure, muscle  daisys.    Courtney Matta RN

## 2023-11-10 NOTE — TELEPHONE ENCOUNTER
Patients wife states that patient had a cough yesterday 11/9   Today patient tested positive on a home test today 11/10    Call back number [phone]       Reason for Disposition   HIGH RISK patient (e.g., weak immune system, age > 64 years, obesity with BMI of 30 or higher, pregnant, chronic lung disease or other chronic medical condition) and COVID symptoms (e.g., cough, fever)  (Exceptions: Already seen by doctor or NP/PA and no new or worsening symptoms.)    Additional Information   Negative: SEVERE difficulty breathing (e.g., struggling for each breath, speaks in single words)   Negative: Difficult to awaken or acting confused (e.g., disoriented, slurred speech)   Negative: Bluish (or gray) lips or face now   Negative: Shock suspected (e.g., cold/pale/clammy skin, too weak to stand, low BP, rapid pulse)   Negative: Sounds like a life-threatening emergency to the triager   Negative: Diagnosed or suspected COVID-19 and symptoms lasting 3 or more weeks   Negative: COVID-19 exposure and no symptoms   Negative: COVID-19 vaccine reaction suspected (e.g., fever, headache, muscle aches) occurring 1 to 3 days after getting vaccine   Negative: COVID-19 vaccine, questions about   Negative: Lives with someone known to have influenza (flu test positive) and flu-like symptoms (e.g., cough, runny nose, sore throat, SOB; with or without fever)   Negative: Possible COVID-19 symptoms and triager concerned about severity of symptoms or other causes   Negative: COVID-19 and breastfeeding, questions about   Negative: SEVERE or constant chest pain or pressure  (Exception: Mild central chest pain, present only when coughing.)   Negative: MODERATE difficulty breathing (e.g., speaks in phrases, SOB even at rest, pulse 100-120)   Negative: Headache and stiff neck (can't touch chin to chest)   Negative: Oxygen level (e.g., pulse oximetry) 90% or lower   Negative: Chest pain or pressure  (Exception: MILD central chest pain, present  only when coughing.)   Negative: Drinking very little and dehydration suspected (e.g., no urine > 12 hours, very dry mouth, very lightheaded)   Negative: Patient sounds very sick or weak to the triager   Negative: Fever > 100.0 F (37.8 C) and bedridden (e.g., CVA, chronic illness, recovering from surgery)   Negative: Fever > 101 F (38.3 C) and over 60 years of age   Negative: MILD difficulty breathing (e.g., minimal/no SOB at rest, SOB with walking, pulse <100)   Negative: Fever > 103 F (39.4 C)    Protocols used: Coronavirus (COVID-19) Diagnosed or [id number]

## 2023-12-15 ENCOUNTER — TRANSFERRED RECORDS (OUTPATIENT)
Dept: HEALTH INFORMATION MANAGEMENT | Facility: CLINIC | Age: 88
End: 2023-12-15

## 2024-01-18 ENCOUNTER — PATIENT OUTREACH (OUTPATIENT)
Dept: CARE COORDINATION | Facility: CLINIC | Age: 89
End: 2024-01-18
Payer: COMMERCIAL

## 2024-02-01 ENCOUNTER — PATIENT OUTREACH (OUTPATIENT)
Dept: CARE COORDINATION | Facility: CLINIC | Age: 89
End: 2024-02-01
Payer: COMMERCIAL

## 2024-03-06 ENCOUNTER — LAB (OUTPATIENT)
Dept: LAB | Facility: CLINIC | Age: 89
End: 2024-03-06
Payer: COMMERCIAL

## 2024-03-06 DIAGNOSIS — I10 ESSENTIAL HYPERTENSION: ICD-10-CM

## 2024-03-06 DIAGNOSIS — E78.5 HYPERLIPIDEMIA WITH TARGET LDL LESS THAN 100: ICD-10-CM

## 2024-03-06 LAB
ALT SERPL W P-5'-P-CCNC: 18 U/L (ref 0–70)
ANION GAP SERPL CALCULATED.3IONS-SCNC: 14 MMOL/L (ref 7–15)
BUN SERPL-MCNC: 15.2 MG/DL (ref 8–23)
CALCIUM SERPL-MCNC: 9.3 MG/DL (ref 8.8–10.2)
CHLORIDE SERPL-SCNC: 101 MMOL/L (ref 98–107)
CHOLEST SERPL-MCNC: 137 MG/DL
CREAT SERPL-MCNC: 0.79 MG/DL (ref 0.67–1.17)
DEPRECATED HCO3 PLAS-SCNC: 26 MMOL/L (ref 22–29)
EGFRCR SERPLBLD CKD-EPI 2021: 86 ML/MIN/1.73M2
FASTING STATUS PATIENT QL REPORTED: YES
GLUCOSE SERPL-MCNC: 140 MG/DL (ref 70–99)
HDLC SERPL-MCNC: 41 MG/DL
LDLC SERPL CALC-MCNC: 76 MG/DL
NONHDLC SERPL-MCNC: 96 MG/DL
POTASSIUM SERPL-SCNC: 4 MMOL/L (ref 3.4–5.3)
SODIUM SERPL-SCNC: 141 MMOL/L (ref 135–145)
TRIGL SERPL-MCNC: 98 MG/DL

## 2024-03-06 PROCEDURE — 84460 ALANINE AMINO (ALT) (SGPT): CPT | Performed by: INTERNAL MEDICINE

## 2024-03-06 PROCEDURE — 36415 COLL VENOUS BLD VENIPUNCTURE: CPT | Performed by: INTERNAL MEDICINE

## 2024-03-06 PROCEDURE — 80048 BASIC METABOLIC PNL TOTAL CA: CPT | Performed by: INTERNAL MEDICINE

## 2024-03-06 PROCEDURE — 80061 LIPID PANEL: CPT | Performed by: INTERNAL MEDICINE

## 2024-03-27 ENCOUNTER — OFFICE VISIT (OUTPATIENT)
Dept: CARDIOLOGY | Facility: CLINIC | Age: 89
End: 2024-03-27
Payer: COMMERCIAL

## 2024-03-27 VITALS
HEIGHT: 67 IN | BODY MASS INDEX: 34.95 KG/M2 | SYSTOLIC BLOOD PRESSURE: 138 MMHG | HEART RATE: 88 BPM | DIASTOLIC BLOOD PRESSURE: 78 MMHG | WEIGHT: 222.7 LBS

## 2024-03-27 DIAGNOSIS — I10 ESSENTIAL HYPERTENSION: ICD-10-CM

## 2024-03-27 DIAGNOSIS — R07.89 ATYPICAL CHEST PAIN: ICD-10-CM

## 2024-03-27 DIAGNOSIS — E78.5 HYPERLIPIDEMIA WITH TARGET LDL LESS THAN 100: ICD-10-CM

## 2024-03-27 DIAGNOSIS — K21.9 GASTROESOPHAGEAL REFLUX DISEASE WITHOUT ESOPHAGITIS: ICD-10-CM

## 2024-03-27 DIAGNOSIS — I25.10 CORONARY ARTERY DISEASE INVOLVING NATIVE CORONARY ARTERY OF NATIVE HEART WITHOUT ANGINA PECTORIS: ICD-10-CM

## 2024-03-27 PROCEDURE — 99214 OFFICE O/P EST MOD 30 MIN: CPT | Performed by: INTERNAL MEDICINE

## 2024-03-27 RX ORDER — ATORVASTATIN CALCIUM 20 MG/1
20 TABLET, FILM COATED ORAL DAILY
Qty: 90 TABLET | Refills: 3 | Status: SHIPPED | OUTPATIENT
Start: 2024-03-27

## 2024-03-27 RX ORDER — ATENOLOL 25 MG/1
25 TABLET ORAL DAILY
Qty: 90 TABLET | Refills: 3 | Status: SHIPPED | OUTPATIENT
Start: 2024-03-27

## 2024-03-27 RX ORDER — LISINOPRIL AND HYDROCHLOROTHIAZIDE 12.5; 2 MG/1; MG/1
1 TABLET ORAL DAILY
Qty: 90 TABLET | Refills: 3 | Status: CANCELLED | OUTPATIENT
Start: 2024-03-27

## 2024-03-27 RX ORDER — TERAZOSIN 5 MG/1
5 CAPSULE ORAL AT BEDTIME
Qty: 90 CAPSULE | Refills: 3 | Status: SHIPPED | OUTPATIENT
Start: 2024-03-27

## 2024-03-27 RX ORDER — NITROGLYCERIN 0.4 MG/1
TABLET SUBLINGUAL
Qty: 25 TABLET | Refills: 1 | Status: SHIPPED | OUTPATIENT
Start: 2024-03-27

## 2024-03-27 RX ORDER — LISINOPRIL AND HYDROCHLOROTHIAZIDE 20; 25 MG/1; MG/1
1 TABLET ORAL DAILY
Qty: 90 TABLET | Refills: 3 | Status: SHIPPED | OUTPATIENT
Start: 2024-03-27 | End: 2024-08-09 | Stop reason: ALTCHOICE

## 2024-03-27 NOTE — PROGRESS NOTES
General Cardiology Clinic Progress Note  Ivan John MRN# [id number]   YOB: 1935 Age: 88 year old       Reason for visit: Coronary artery disease and cardiac risk factors    History of presenting illness:    I had the opportunity to see Ivan John at [facility] today for reevaluation of coronary artery disease and cardiac risk factors including hypertension and dyslipidemia.  He also has a history of carotid artery disease and has undergone left carotid endarterectomy by Dr. Jones and continues to follow-up with him.    Mr. John has undergone stenting of the LAD, proximal circumflex, and second obtuse marginal back in 2014 and then a second procedure in 2018 with stenting of the PDA and first obtuse marginal.  Fortunately, since then he has done well without any recurrent anginal symptoms.  He continues to be somewhat active, using his snowblower in the winter and doing some walking in the warmer months with his walker.  He has not had any concerning symptoms of shortness of breath or chest discomfort.  He has some ankle edema which developed throughout the day and is quite significant by nighttime but seems to be better by morning.  He continues to be troubled by frequent urination despite increasing the terazosin from 2 milligrams to 5 mg last year.    His labs this year look good including a basic metabolic panel and fasting lipid panel.  His ALT is normal.    His examination is unremarkable.  His blood pressure is well-controlled at 138/78, heart rate 88.  Weight stable at 222 pounds.            Assessment and Plan:     ASSESSMENT:    Mr. Ivan John is an 88-year-old gentleman with a history of recurrent coronary artery disease with multiple stenting procedures in 2014 and 2018 as described above.  Fortunately he is not having any symptoms of recurrent coronary artery disease now.  His cardiac risk factors appear to be well-controlled.  He is having some increasing ankle  edema problems and wanted to try increasing the diuretic portion of his lisinopril/hydrochlorothiazide 20/12.5 mg daily.  I will increase that to 20/25 mg p.o. daily and see if that helps his edema.    I will plan to see him back again in 1 year for evaluation.    Willy Johansen MD           Orders this Visit:  Orders Placed This Encounter   Procedures    Basic metabolic panel    Lipid Profile    ALT    Follow-Up with Cardiology     Orders Placed This Encounter   Medications    nitroGLYcerin (NITROSTAT) 0.4 MG sublingual tablet     Sig: For chest pain place 1 tablet under the tongue every 5 minutes for 3 doses. If symptoms persist 5 minutes after 1st dose call 911.     Dispense:  25 tablet     Refill:  1    atenolol (TENORMIN) 25 MG tablet     Sig: Take 1 tablet (25 mg) by mouth daily     Dispense:  90 tablet     Refill:  3    atorvastatin (LIPITOR) 20 MG tablet     Sig: Take 1 tablet (20 mg) by mouth daily     Dispense:  90 tablet     Refill:  3    terazosin (HYTRIN) 5 MG capsule     Sig: Take 1 capsule (5 mg) by mouth at bedtime     Dispense:  90 capsule     Refill:  3    omeprazole (PRILOSEC) 20 MG DR capsule     Sig: Take 1 capsule (20 mg) by mouth daily     Dispense:  90 capsule     Refill:  3    lisinopril-hydrochlorothiazide (ZESTORETIC) 20-25 MG tablet     Sig: Take 1 tablet by mouth daily     Dispense:  90 tablet     Refill:  3     Medications Discontinued During This Encounter   Medication Reason    lisinopril-hydrochlorothiazide (ZESTORETIC) 20-12.5 MG tablet     omeprazole (PRILOSEC) 20 MG DR capsule Reorder (No AVS)    terazosin (HYTRIN) 5 MG capsule Reorder (No AVS)    atorvastatin (LIPITOR) 20 MG tablet Reorder (No AVS)    atenolol (TENORMIN) 25 MG tablet Reorder (No AVS)    nitroGLYcerin (NITROSTAT) 0.4 MG sublingual tablet Reorder (No AVS)       Today's clinic visit entailed:    30 minutes spent by me on the date of the encounter doing chart review, history and exam, documentation and further  "activities per the note  Provider  Link to MDM Help Grid     The level of medical decision making during this visit was of high complexity.           Review of Systems:     Review of Systems:  Skin:  Positive for bruising   Eyes:  Positive for glasses  ENT:  Negative    Respiratory:  Positive for dyspnea on exertion;shortness of breath  Cardiovascular:    Positive for;edema  Gastroenterology: Negative    Genitourinary:  Positive for nocturia  Musculoskeletal:  Negative    Neurologic:  Negative    Psychiatric:  Negative    Heme/Lymph/Imm:  Negative    Endocrine:  Negative thyroid disorder;diabetes            Physical Exam:     Vitals: /78   Pulse 88   Ht 1.702 m (5' 7\")   Wt 101 kg (222 lb 11.2 oz)   BMI 34.88 kg/m    Constitutional: Well nourished and in no apparent distress.  Eyes: Pupils equal, round. Sclerae anicteric.   HEENT: Normocephalic, atraumatic.   Neck: Supple. JVD   Respiratory: Breathing non-labored. Lungs clear to auscultation bilaterally. No crackles, wheezes, rhonchi, or rales.  Cardiovascular:  Regular rate and rhythm, normal S1 and S2. No murmur, rub, or gallop.  Skin: Warm, dry. No rashes, cyanosis, or xanthelasma.  Extremities: No edema.  Neurologic: No gross motor deficits. Alert, awake, and oriented to person, place and time.  Psychiatric: Affect appropriate.             Medications:     Current Outpatient Medications   Medication Sig Dispense Refill    acetaminophen (TYLENOL) 650 MG CR tablet Take 650 mg by mouth 2 times daily      Ascorbic Acid (VITAMIN C PO) Take 1,000 mg by mouth daily      aspirin 81 MG tablet Take by mouth daily 30 tablet     atenolol (TENORMIN) 25 MG tablet Take 1 tablet (25 mg) by mouth daily 90 tablet 3    atorvastatin (LIPITOR) 20 MG tablet Take 1 tablet (20 mg) by mouth daily 90 tablet 3    calcium carbonate (OS-SREEDHAR 500 MG [place]. [place]) 500 MG tablet Take 500 mg by mouth daily      glucosamine-chondroitin 500-400 MG CAPS Take 1 capsule by mouth daily      " lisinopril-hydrochlorothiazide (ZESTORETIC) 20-25 MG tablet Take 1 tablet by mouth daily 90 tablet 3    nitroGLYcerin (NITROSTAT) 0.4 MG sublingual tablet For chest pain place 1 tablet under the tongue every 5 minutes for 3 doses. If symptoms persist 5 minutes after 1st dose call 911. 25 tablet 1    Omega-3 Fatty Acids (OMEGA-3 FISH OIL PO) Take 1 g by mouth daily      omeprazole (PRILOSEC) 20 MG DR capsule Take 1 capsule (20 mg) by mouth daily 90 capsule 3    senna-docusate (SENOKOT-S/PERICOLACE) 8.6-50 MG tablet Take 1 tablet by mouth 2 times daily as needed for constipation 10 tablet 0    terazosin (HYTRIN) 5 MG capsule Take 1 capsule (5 mg) by mouth at bedtime 90 capsule 3    vitamin B complex with vitamin C (STRESS TAB) tablet Take 1 tablet by mouth daily      VITAMIN D, CHOLECALCIFEROL, PO Take 5,000 Units by mouth daily       Zinc 15 MG CAPS Take 15 mg by mouth daily         Family History   Problem Relation Age of Onset    C.A.D. Brother         older brother, CABG about age 56    Hypertension Brother     Respiratory Father         lung disease - farmer,     Hypertension Mother     Hypertension Sister     Hypertension Brother     Cerebrovascular Disease Son         2018       Social History     Socioeconomic History    Marital status:      Spouse name: madhavi    Number of children: 2    Years of education: Not on file    Highest education level: Not on file   Occupational History    Occupation: part time, building maintenance     Employer: RETIRED   Tobacco Use    Smoking status: Former     Packs/day: 0.50     Years: 20.00     Additional pack years: 0.00     Total pack years: 10.00     Types: Cigarettes     Start date:      Quit date: 1968     Years since quittin.8    Smokeless tobacco: Former   Vaping Use    Vaping Use: Never used   Substance and Sexual Activity    Alcohol use: No     Alcohol/week: 0.0 standard drinks of alcohol    Drug use: No    Sexual activity: Not  Currently     Comment:    Other Topics Concern     Service Not Asked    Blood Transfusions No    Caffeine Concern Yes     Comment: 1-2 cups per day    Occupational Exposure No    Hobby Hazards No    Sleep Concern No    Stress Concern No    Weight Concern No    Special Diet No    Back Care No    Exercise Yes     Comment: gym 4 days week, treadmill, 1.5 miles,  weights    Bike Helmet No    Seat Belt Yes    Self-Exams No    Parent/sibling w/ CABG, MI or angioplasty before 65F 55M? Yes     Comment: brothers - 1 had CABG 1 had MI pt unsure of age   Social History Narrative    3 GC; 2 children.                  Son is a chiropracter in [facility]     Social Determinants of Health     Financial Resource Strain: Not on file   Food Insecurity: Not on file   Transportation Needs: Not on file   Physical Activity: Not on file   Stress: Not on file   Social Connections: Not on file   Interpersonal Safety: Not on file   Housing Stability: Not on file            Past Medical History:     Past Medical History:   Diagnosis Date    Arthritis     Carotid stenosis 3/26/2014    S/p L CEA; R ICA ok;          See [facility] 12/15 and Dr Jones's consult; continue plavix     Cerebral vascular disease 3/2014    multiple intracranial stenoses - lt post communic, lt post cerebral, rt middle cerebral, bilat porx M2 segments    Claustrophobia     Need sedation for MRI scans    Coronary artery disease 2014    Cath 9/2014: PTCA and KIKE to mLAD, KIKE to prox circumflex, PTCA and DESx2 to OM2; Cath 12/18/18: KIKE to PDA and OM1, patent stents to LAD and Cfx    CVA (cerebral infarction) 3/2014    2 small acute lesions noted left parietal/left posterior frontal region. Old lacunar infarct left caudate nucleus    Dyspnea 7/21/2014    Enlarged prostate     Essential hypertension      Problem list name updated by automated process. Provider to review    GERD (gastroesophageal reflux disease)     Hiatal hernia     Hydrocele     Right hydrocelectomy  2/2012    Hyperlipidemia with target LDL less than 100 3/14/2014     Diagnosis updated by automated process. Provider to review and confirm.    RBBB (right bundle branch block) 3/2014    Shortness of breath     Stented coronary artery     Uncomplicated asthma               Past Surgical History:     Past Surgical History:   Procedure Laterality Date    CV HEART CATHETERIZATION WITH POSSIBLE INTERVENTION N/A 12/18/2018    Procedure: Coronary Angiography;  Surgeon: Geovanni Tanner MD;  Location:  HEART CARDI[facility] CATH LAB    ENDARTERECTOMY CAROTID  3/26/2014    Procedure: ENDARTERECTOMY CAROTID;  LEFT CAROTID ENDARTERECTOMY WITH EEG;  Surgeon: Yobani Jones MD;  Location:  OR     REMOVAL OF TONSILS,<11 Y/O      Tonsils <13y.o.    HEART CATH, ANGIOPLASTY  9/9/14    Stenting of LAD,Prox LCx, and 2 stents to OM2    HERNIORRHAPHY INGUINAL      Right    HERNIORRHAPHY INGUINAL  2/10/2012    Procedure:HERNIORRHAPHY INGUINAL; LEFT OPEN INGUINAL HERNIA REPAIR WITH MESH, RIGHT HYDROCELECTOMY; Surgeon:JULI LOPES; Location:[facility]    HYDROCELECTOMY INGUINAL  2/10/2012    Procedure:HYDROCELECTOMY INGUINAL; Surgeon:JULI LOPES; Location:[facility]    HYDROCELECTOMY SCROTAL Right 1/29/2016    Procedure: HYDROCELECTOMY SCROTAL;  Surgeon: Yobani Stevens MD;  Location: [facility]    LAPAROSCOPIC HERNIORRHAPHY INGUINAL BILATERAL Bilateral 5/24/2016    Procedure: LAPAROSCOPIC HERNIORRHAPHY INGUINAL BILATERAL;  Surgeon: Chandler Bowen MD;  Location:  OR    MR BRAIN AND ORBITS W CONTRAST  3/2014    6 mm area of restricted diffusion subcortical white matter left parietal lobe/questionable area of restricted diffusion left posterior frontal region - c/w recent small infarcts. Small chronic lacunar infarct left caudate nucleus    SURGICAL HISTORY OF -       tonail removal              Allergies:   Contrast dye       Data:   All laboratory data reviewed:    Recent Labs   Lab Test 03/06/24  0938  01/24/23  0912 02/22/22  0904   LDL 76 70 58   HDL 41 45 43   NHDL 96 92 79   CHOL 137 137 122   TRIG 98 109 106       Lab Results   Component Value Date    WBC 4.8 01/24/2023    WBC 5.1 01/07/2020    RBC 5.07 01/24/2023    RBC 4.90 01/07/2020    HGB 15.3 01/24/2023    HGB 14.9 01/07/2020    HCT 46.2 01/24/2023    HCT 44.9 01/07/2020    MCV 91 01/24/2023    MCV 92 01/07/2020    MCH 30.2 01/24/2023    MCH 30.4 01/07/2020    MCHC 33.1 01/24/2023    MCHC 33.2 01/07/2020    RDW 14.3 01/24/2023    RDW 13.7 01/07/2020     01/24/2023     01/07/2020       Lab Results   Component Value Date     03/06/2024     03/17/2021    POTASSIUM 4.0 03/06/2024    POTASSIUM 3.9 02/22/2022    POTASSIUM 4.4 03/17/2021    CHLORIDE 101 03/06/2024    CHLORIDE 107 02/22/2022    CHLORIDE 109 03/17/2021    CO2 26 03/06/2024    CO2 28 02/22/2022    CO2 29 03/17/2021    ANIONGAP 14 03/06/2024    ANIONGAP 4 02/22/2022    ANIONGAP <1 (L) 03/17/2021     (H) 03/06/2024     (H) 02/22/2022     (H) 03/17/2021    BUN 15.2 03/06/2024    BUN 18 02/22/2022    BUN 21 03/17/2021    CR 0.79 03/06/2024    CR 0.80 03/17/2021    GFRESTIMATED 86 03/06/2024    GFRESTIMATED 82 03/17/2021    GFRESTBLACK >90 03/17/2021    SREEDHAR 9.3 03/06/2024    SREEDHAR 8.6 03/17/2021      Lab Results   Component Value Date    AST 19 01/24/2023    AST 12 03/17/2021    ALT 18 03/06/2024    ALT 40 03/17/2021       Lab Results   Component Value Date    A1C 6.2 (H) 03/17/2021       Lab Results   Component Value Date    INR 1.10 11/03/2021    INR 1.06 12/18/2018    INR 1.03 09/09/2014         KATHY BERNSTEIN MD  [facility]

## 2024-03-27 NOTE — LETTER
3/27/2024    Neal Esteves MD  [address] [facility] [address]    RE: Ivan John       Dear Colleague,     I had the pleasure of seeing Ivan John in the [facility].    General Cardiology Clinic Progress Note  Ivan John MRN# [id number]   YOB: 1935 Age: 88 year old       Reason for visit: Coronary artery disease and cardiac risk factors    History of presenting illness:    I had the opportunity to see Ivan John at [facility] today for reevaluation of coronary artery disease and cardiac risk factors including hypertension and dyslipidemia.  He also has a history of carotid artery disease and has undergone left carotid endarterectomy by Dr. Jones and continues to follow-up with him.    Mr. John has undergone stenting of the LAD, proximal circumflex, and second obtuse marginal back in 2014 and then a second procedure in 2018 with stenting of the PDA and first obtuse marginal.  Fortunately, since then he has done well without any recurrent anginal symptoms.  He continues to be somewhat active, using his snowblower in the winter and doing some walking in the warmer months with his walker.  He has not had any concerning symptoms of shortness of breath or chest discomfort.  He has some ankle edema which developed throughout the day and is quite significant by nighttime but seems to be better by morning.  He continues to be troubled by frequent urination despite increasing the terazosin from 2 milligrams to 5 mg last year.    His labs this year look good including a basic metabolic panel and fasting lipid panel.  His ALT is normal.    His examination is unremarkable.  His blood pressure is well-controlled at 138/78, heart rate 88.  Weight stable at 222 pounds.            Assessment and Plan:     ASSESSMENT:    Mr. Ivan John is an 88-year-old gentleman with a history of recurrent coronary artery disease with multiple stenting procedures in 2014  and 2018 as described above.  Fortunately he is not having any symptoms of recurrent coronary artery disease now.  His cardiac risk factors appear to be well-controlled.  He is having some increasing ankle edema problems and wanted to try increasing the diuretic portion of his lisinopril/hydrochlorothiazide 20/12.5 mg daily.  I will increase that to 20/25 mg p.o. daily and see if that helps his edema.    I will plan to see him back again in 1 year for evaluation.    Willy Johansen MD           Orders this Visit:  Orders Placed This Encounter   Procedures    Basic metabolic panel    Lipid Profile    ALT    Follow-Up with Cardiology     Orders Placed This Encounter   Medications    nitroGLYcerin (NITROSTAT) 0.4 MG sublingual tablet     Sig: For chest pain place 1 tablet under the tongue every 5 minutes for 3 doses. If symptoms persist 5 minutes after 1st dose call 911.     Dispense:  25 tablet     Refill:  1    atenolol (TENORMIN) 25 MG tablet     Sig: Take 1 tablet (25 mg) by mouth daily     Dispense:  90 tablet     Refill:  3    atorvastatin (LIPITOR) 20 MG tablet     Sig: Take 1 tablet (20 mg) by mouth daily     Dispense:  90 tablet     Refill:  3    terazosin (HYTRIN) 5 MG capsule     Sig: Take 1 capsule (5 mg) by mouth at bedtime     Dispense:  90 capsule     Refill:  3    omeprazole (PRILOSEC) 20 MG DR capsule     Sig: Take 1 capsule (20 mg) by mouth daily     Dispense:  90 capsule     Refill:  3    lisinopril-hydrochlorothiazide (ZESTORETIC) 20-25 MG tablet     Sig: Take 1 tablet by mouth daily     Dispense:  90 tablet     Refill:  3     Medications Discontinued During This Encounter   Medication Reason    lisinopril-hydrochlorothiazide (ZESTORETIC) 20-12.5 MG tablet     omeprazole (PRILOSEC) 20 MG DR capsule Reorder (No AVS)    terazosin (HYTRIN) 5 MG capsule Reorder (No AVS)    atorvastatin (LIPITOR) 20 MG tablet Reorder (No AVS)    atenolol (TENORMIN) 25 MG tablet Reorder (No AVS)    nitroGLYcerin (NITROSTAT)  "0.4 MG sublingual tablet Reorder (No AVS)       Today's clinic visit entailed:    30 minutes spent by me on the date of the encounter doing chart review, history and exam, documentation and further activities per the note  Provider  Link to [facility] Help Grid     The level of medical decision making during this visit was of high complexity.           Review of Systems:     Review of Systems:  Skin:  Positive for bruising   Eyes:  Positive for glasses  ENT:  Negative    Respiratory:  Positive for dyspnea on exertion;shortness of breath  Cardiovascular:    Positive for;edema  Gastroenterology: Negative    Genitourinary:  Positive for nocturia  Musculoskeletal:  Negative    Neurologic:  Negative    Psychiatric:  Negative    Heme/Lymph/Imm:  Negative    Endocrine:  Negative thyroid disorder;diabetes            Physical Exam:     Vitals: /78   Pulse 88   Ht 1.702 m (5' 7\")   Wt 101 kg (222 lb 11.2 oz)   BMI 34.88 kg/m    Constitutional: Well nourished and in no apparent distress.  Eyes: Pupils equal, round. Sclerae anicteric.   HEENT: Normocephalic, atraumatic.   Neck: Supple. JVD   Respiratory: Breathing non-labored. Lungs clear to auscultation bilaterally. No crackles, wheezes, rhonchi, or rales.  Cardiovascular:  Regular rate and rhythm, normal S1 and S2. No murmur, rub, or gallop.  Skin: Warm, dry. No rashes, cyanosis, or xanthelasma.  Extremities: No edema.  Neurologic: No gross motor deficits. Alert, awake, and oriented to person, place and time.  Psychiatric: Affect appropriate.             Medications:     Current Outpatient Medications   Medication Sig Dispense Refill    acetaminophen (TYLENOL) 650 MG CR tablet Take 650 mg by mouth 2 times daily      Ascorbic Acid (VITAMIN C PO) Take 1,000 mg by mouth daily      aspirin 81 MG tablet Take by mouth daily 30 tablet     atenolol (TENORMIN) 25 MG tablet Take 1 tablet (25 mg) by mouth daily 90 tablet 3    atorvastatin (LIPITOR) 20 MG tablet Take 1 tablet (20 mg) " by mouth daily 90 tablet 3    calcium carbonate (OS-SREEDHAR 500 MG [place]. [place]) 500 MG tablet Take 500 mg by mouth daily      glucosamine-chondroitin 500-400 MG CAPS Take 1 capsule by mouth daily      lisinopril-hydrochlorothiazide (ZESTORETIC) 20-25 MG tablet Take 1 tablet by mouth daily 90 tablet 3    nitroGLYcerin (NITROSTAT) 0.4 MG sublingual tablet For chest pain place 1 tablet under the tongue every 5 minutes for 3 doses. If symptoms persist 5 minutes after 1st dose call 911. 25 tablet 1    Omega-3 Fatty Acids (OMEGA-3 FISH OIL PO) Take 1 g by mouth daily      omeprazole (PRILOSEC) 20 MG DR capsule Take 1 capsule (20 mg) by mouth daily 90 capsule 3    senna-docusate (SENOKOT-S/PERICOLACE) 8.6-50 MG tablet Take 1 tablet by mouth 2 times daily as needed for constipation 10 tablet 0    terazosin (HYTRIN) 5 MG capsule Take 1 capsule (5 mg) by mouth at bedtime 90 capsule 3    vitamin B complex with vitamin C (STRESS TAB) tablet Take 1 tablet by mouth daily      VITAMIN D, CHOLECALCIFEROL, PO Take 5,000 Units by mouth daily       Zinc 15 MG CAPS Take 15 mg by mouth daily         Family History   Problem Relation Age of Onset    C.A.D. Brother         older brother, CABG about age 56    Hypertension Brother     Respiratory Father         lung disease - farmer,     Hypertension Mother     Hypertension Sister     Hypertension Brother     Cerebrovascular Disease Son         2018       Social History     Socioeconomic History    Marital status:      Spouse name: madhavi    Number of children: 2    Years of education: Not on file    Highest education level: Not on file   Occupational History    Occupation: part time, building maintenance     Employer: RETIRED   Tobacco Use    Smoking status: Former     Packs/day: 0.50     Years: 20.00     Additional pack years: 0.00     Total pack years: 10.00     Types: Cigarettes     Start date:      Quit date: 1968     Years since quittin.8    Smokeless  tobacco: Former   Vaping Use    Vaping Use: Never used   Substance and Sexual Activity    Alcohol use: No     Alcohol/week: 0.0 standard drinks of alcohol    Drug use: No    Sexual activity: Not Currently     Comment:    Other Topics Concern     Service Not Asked    Blood Transfusions No    Caffeine Concern Yes     Comment: 1-2 cups per day    Occupational Exposure No    Hobby Hazards No    Sleep Concern No    Stress Concern No    Weight Concern No    Special Diet No    Back Care No    Exercise Yes     Comment: gym 4 days week, treadmill, 1.5 miles,  weights    Bike Helmet No    Seat Belt Yes    Self-Exams No    Parent/sibling w/ CABG, MI or angioplasty before 65F 55M? Yes     Comment: brothers - 1 had CABG 1 had MI pt unsure of age   Social History Narrative    3 GC; 2 children.                  Son is a chiropracter in [facility]     Social Determinants of Health     Financial Resource Strain: Not on file   Food Insecurity: Not on file   Transportation Needs: Not on file   Physical Activity: Not on file   Stress: Not on file   Social Connections: Not on file   Interpersonal Safety: Not on file   Housing Stability: Not on file            Past Medical History:     Past Medical History:   Diagnosis Date    Arthritis     Carotid stenosis 3/26/2014    S/p L CEA; R ICA ok;          See CUS 12/15 and Dr Jones's consult; continue plavix     Cerebral vascular disease 3/2014    multiple intracranial stenoses - lt post communic, lt post cerebral, rt middle cerebral, bilat porx M2 segments    Claustrophobia     Need sedation for MRI scans    Coronary artery disease 2014    Cath 9/2014: PTCA and KIKE to mLAD, KIKE to prox circumflex, PTCA and DESx2 to OM2; Cath 12/18/18: KIKE to PDA and OM1, patent stents to LAD and Cfx    CVA (cerebral infarction) 3/2014    2 small acute lesions noted left parietal/left posterior frontal region. Old lacunar infarct left caudate nucleus    Dyspnea 7/21/2014    Enlarged prostate      Essential hypertension      Problem list name updated by automated process. Provider to review    GERD (gastroesophageal reflux disease)     Hiatal hernia     Hydrocele     Right hydrocelectomy 2/2012    Hyperlipidemia with target LDL less than 100 3/14/2014     Diagnosis updated by automated process. Provider to review and confirm.    RBBB (right bundle branch block) 3/2014    Shortness of breath     Stented coronary artery     Uncomplicated asthma               Past Surgical History:     Past Surgical History:   Procedure Laterality Date    CV HEART CATHETERIZATION WITH POSSIBLE INTERVENTION N/A 12/18/2018    Procedure: Coronary Angiography;  Surgeon: Geovanni Tanner MD;  Location:  HEART CARDIAC CATH LAB    ENDARTERECTOMY CAROTID  3/26/2014    Procedure: ENDARTERECTOMY CAROTID;  LEFT CAROTID ENDARTERECTOMY WITH EEG;  Surgeon: Yobani Jones MD;  Location:  OR     REMOVAL OF TONSILS,<13 Y/O      Tonsils <13y.o.    HEART CATH, ANGIOPLASTY  9/9/14    Stenting of LAD,Prox LCx, and 2 stents to OM2    HERNIORRHAPHY INGUINAL      Right    HERNIORRHAPHY INGUINAL  2/10/2012    Procedure:HERNIORRHAPHY INGUINAL; LEFT OPEN INGUINAL HERNIA REPAIR WITH MESH, RIGHT HYDROCELECTOMY; Surgeon:JULI LOPES; Location:[facility]    HYDROCELECTOMY INGUINAL  2/10/2012    Procedure:HYDROCELECTOMY INGUINAL; Surgeon:JULI LOPES; Location:[facility]    HYDROCELECTOMY SCROTAL Right 1/29/2016    Procedure: HYDROCELECTOMY SCROTAL;  Surgeon: Yobani Stevens MD;  Location: [facility]    LAPAROSCOPIC HERNIORRHAPHY INGUINAL BILATERAL Bilateral 5/24/2016    Procedure: LAPAROSCOPIC HERNIORRHAPHY INGUINAL BILATERAL;  Surgeon: Chandler Bowen MD;  Location:  OR     BRAIN AND ORBITS W CONTRAST  3/2014    6 mm area of restricted diffusion subcortical white matter left parietal lobe/questionable area of restricted diffusion left posterior frontal region - c/w recent small infarcts. Small chronic lacunar infarct left  caudate nucleus    SURGICAL HISTORY OF -       tonail removal              Allergies:   Contrast dye       Data:   All laboratory data reviewed:    Recent Labs   Lab Test 03/06/24  0938 01/24/23  0912 02/22/22  0904   LDL 76 70 58   HDL 41 45 43   NHDL 96 92 79   CHOL 137 137 122   TRIG 98 109 106       Lab Results   Component Value Date    WBC 4.8 01/24/2023    WBC 5.1 01/07/2020    RBC 5.07 01/24/2023    RBC 4.90 01/07/2020    HGB 15.3 01/24/2023    HGB 14.9 01/07/2020    HCT 46.2 01/24/2023    HCT 44.9 01/07/2020    MCV 91 01/24/2023    MCV 92 01/07/2020    MCH 30.2 01/24/2023    MCH 30.4 01/07/2020    MCHC 33.1 01/24/2023    MCHC 33.2 01/07/2020    RDW 14.3 01/24/2023    RDW 13.7 01/07/2020     01/24/2023     01/07/2020       Lab Results   Component Value Date     03/06/2024     03/17/2021    POTASSIUM 4.0 03/06/2024    POTASSIUM 3.9 02/22/2022    POTASSIUM 4.4 03/17/2021    CHLORIDE 101 03/06/2024    CHLORIDE 107 02/22/2022    CHLORIDE 109 03/17/2021    CO2 26 03/06/2024    CO2 28 02/22/2022    CO2 29 03/17/2021    ANIONGAP 14 03/06/2024    ANIONGAP 4 02/22/2022    ANIONGAP <1 (L) 03/17/2021     (H) 03/06/2024     (H) 02/22/2022     (H) 03/17/2021    BUN 15.2 03/06/2024    BUN 18 02/22/2022    BUN 21 03/17/2021    CR 0.79 03/06/2024    CR 0.80 03/17/2021    GFRESTIMATED 86 03/06/2024    GFRESTIMATED 82 03/17/2021    GFRESTBLACK >90 03/17/2021    SREEDHAR 9.3 03/06/2024    SREEDHAR 8.6 03/17/2021      Lab Results   Component Value Date    AST 19 01/24/2023    AST 12 03/17/2021    ALT 18 03/06/2024    ALT 40 03/17/2021       Lab Results   Component Value Date    A1C 6.2 (H) 03/17/2021       Lab Results   Component Value Date    INR 1.10 11/03/2021    INR 1.06 12/18/2018    INR 1.03 09/09/2014         KATHY BERNSTEIN MD  [facility]    Thank you for allowing me to participate in the care of your patient.      Sincerely,     KATHY BERNSTEIN MD     [facility]  [facility]  cc:   Willy Johansen MD  [address]  [place] [id number]

## 2024-03-27 NOTE — PATIENT INSTRUCTIONS
It was a pleasure seeing you today and thank you for allowing me to be a part of your health care team.  Should you have any questions regarding your visit or future needs please feel free to reach out to my care team for assistance.      Thank you, Dr. Willy Johansen        **Nursing: [phone]       **Scheduling: [phone]

## 2024-04-13 ENCOUNTER — HEALTH MAINTENANCE LETTER (OUTPATIENT)
Age: 89
End: 2024-04-13

## 2024-06-12 NOTE — PROGRESS NOTES
[place] [facility]    Ivan Payton returns for vascular follow-up.  Underwent left CEA 3/27/2014 for high-grade asymptomatic stenosis.  Noted tortuous artery.    --6/16/2022 duplex: Widely patent left CEA with PSV= 142 cm/s due to tortuosity.    Mild plaque right proximal ICA with PSV= 138 cm/s    Strong biphasic signal in the right and left PT arteries      PMH: Medications: Hytrin, Zestoretic, Tenormin, Prilosec, Lipitor, fish oil, aspirin     Medical: Hypertension    Hyperlipidemia on statin-3/6/2024 LDL= 76    Impaired fasting glucose--3/6/2024 glucose= 140    GERD    History of coronary stents-LAD, proximal circumflex, second obtuse marginal   in 2014 and PDA 2018  Excellent follow-up visit with Dr. Johansen 3/27/2024      ROS: Patient still lives independently.  Uses a walker when he is out of the home.  Has chronic bilateral leg swelling but no clinical concern.  No cardiac issues.  Non-smoker.    Exam: Alert and appropriate.  Very conversant.  Comfortable.  Wheeled walker   Blood pressure 117/60.  Pulse 83   HEENT= unremarkable with well-healed carotid incision.  Glasses.     Chest= clear   Cardiovascular= regular rate   Extremities= bilateral calf and ankle edema making pulses not palpable.    No ischemic changes.  Good foot wear.    Carotid duplex today: Widely patent left CEA.  Elevated PSV= 166 cm/s due to tortuosity and not stenosis.  Stable plaque at the origin of the right ICA with PSV= 156 cm/s.        IMPRESSION:   #1.  Widely patent left CEA.  Elevated velocities due to tortuosity and no recurrent stenosis.    #2.  Mild stable plaque stenosis of right ICA of no clinical concern.    #3.  Well-controlled risk factors including blood pressure, non-smoker, LDL at goal    25 minutes with patient today including chart review.  He is now 88 years old.  We will tentatively schedule for carotid duplex and exam in 2 years depending on his health.      Yobani Jones MD   This note was  created using Dragon voice recognition software which may result in transcription errors.

## 2024-06-13 ENCOUNTER — OFFICE VISIT (OUTPATIENT)
Dept: OTHER | Facility: CLINIC | Age: 89
End: 2024-06-13
Attending: SURGERY
Payer: COMMERCIAL

## 2024-06-13 ENCOUNTER — HOSPITAL ENCOUNTER (OUTPATIENT)
Dept: ULTRASOUND IMAGING | Facility: CLINIC | Age: 89
Discharge: HOME | End: 2024-06-13
Attending: SURGERY
Payer: COMMERCIAL

## 2024-06-13 VITALS — DIASTOLIC BLOOD PRESSURE: 69 MMHG | SYSTOLIC BLOOD PRESSURE: 117 MMHG | HEART RATE: 83 BPM

## 2024-06-13 DIAGNOSIS — I65.21 ASYMPTOMATIC STENOSIS OF RIGHT CAROTID ARTERY: ICD-10-CM

## 2024-06-13 DIAGNOSIS — Z98.890 HISTORY OF LEFT-SIDED CAROTID ENDARTERECTOMY: Primary | ICD-10-CM

## 2024-06-13 DIAGNOSIS — E78.5 HYPERLIPIDEMIA LDL GOAL <70: ICD-10-CM

## 2024-06-13 DIAGNOSIS — Z98.890 HISTORY OF LEFT-SIDED CAROTID ENDARTERECTOMY: ICD-10-CM

## 2024-06-13 PROCEDURE — 99214 OFFICE O/P EST MOD 30 MIN: CPT | Performed by: SURGERY

## 2024-06-13 PROCEDURE — G0463 HOSPITAL OUTPT CLINIC VISIT: HCPCS | Mod: 25 | Performed by: SURGERY

## 2024-06-13 PROCEDURE — 93880 EXTRACRANIAL BILAT STUDY: CPT

## 2024-06-13 NOTE — NURSING NOTE
Carotid Disease Education Note    The following information has been reviewed with the patient:    Warning signs of stroke  Calling 911 if having warning signs of stroke  Patient's risk factors for stroke:    high blood pressure     high cholesterol     smoking/tobacco use     overweight     CAD     diet     physical inactivity  Medications: Aspirin and Statin  Carotid surgery instructions: N/A      Educational Barriers: No barriers  Learner's response to risk factors / lifestyle modification education: verbalized understanding    HAY Schwarz, RN, CV-[facility]

## 2024-06-13 NOTE — PATIENT INSTRUCTIONS
"Carotid Artery Disease      What is carotid artery disease?  A carotid artery on each side of the neck supplies blood to the brain. Carotid artery disease occurs when a substance called plaque builds up in either or both arteries. The buildup may narrow the artery and limit blood flow to the brain. If this plaque breaks open, it may form a blood clot. Or pieces of the plaque may break off. A piece of plaque or a blood clot could move to the brain and cause a stroke or transient ischemic attack (TIA).    The narrowing in an artery is called stenosis. The more narrow an artery becomes, the greater the risk of stroke or TIA.        What causes it?  Carotid artery disease is caused by a process called hardening of the arteries, or atherosclerosis. Plaque builds up inside the carotid arteries. Things that can lead to this buildup include:    Smoking.  High blood pressure.  High cholesterol.  Diabetes.  A family history of hardening of the arteries.    What are the symptoms?  Many people have no symptoms. In some people, a doctor can hear a sound in their neck called a bruit (pronounced \"broo-EE\"). This is a whooshing sound that happens when a carotid artery is narrowed.    For some people, a transient ischemic attack (TIA) or stroke is the first sign of the disease.    If you have any of these symptoms of a TIA or stroke, call 911 or other emergency services right away.    Sudden numbness, tingling, weakness, or loss of movement in your face, arm, or leg, especially on only one side of your body.  Sudden vision changes.  Sudden trouble speaking.  Sudden confusion or trouble understanding simple statements.  Sudden problems with walking or balance.  A sudden, severe headache that is different from past headaches.    How is it diagnosed?  An ultrasound test is used to diagnose carotid artery disease. This test uses sound waves to show how blood flows through your carotid arteries. You may have other tests such as a CT " angiogram or a magnetic resonance angiogram (MRA) to check your carotid arteries.    Screening tests for carotid artery disease are not recommended for people who do not have signs or symptoms of carotid artery disease.footnote1, footnote2 If you have risk factors, signs, or symptoms of carotid artery disease, your doctor may recommend an ultrasound test to check for it.footnote2, footnote3    Some companies sell ultrasound screening. But insurance doesn't pay for these tests because experts don't recommend them. And since your doctor didn't prescribe the tests, they aren't there to explain the results to you. It's a good idea to talk to your doctor before having one of these tests.    How is carotid artery disease treated?  The goal of treatment is to lower your risk of a stroke. Treatment depends on whether you have symptoms and how narrow your arteries are. You probably will take medicine. You also will be encouraged to have a heart-healthy lifestyle. Some people also have a procedure to lower their risk.    Medicines  You may take aspirin or another medicine to prevent blood clots. You will likely also take medicine to lower cholesterol. You may also take medicine to help manage blood pressure.    Heart-healthy lifestyle  A heart-healthy lifestyle can help lower your risk of stroke.    Don't smoke. Avoid secondhand smoke too.  Eat heart-healthy foods.  Be active. Ask your doctor what type of exercise is safe for you.  Stay at a healthy weight. Lose weight if you need to.  Manage other health problems, such as diabetes. If you think you may have a problem with alcohol or drug use, talk to your doctor.  Get vaccinated against COVID-19, the flu, and pneumonia.    Regular ultrasounds  Your doctor may recommend regular ultrasounds. This is to see if the narrowing in your arteries is getting worse.    Surgery or stenting  Surgery in the arteries is called carotid endarterectomy. The doctor makes a cut in the neck and  takes the plaque out of the artery.    Some people have a stent placed inside a carotid artery. A stent may be inserted during a catheter procedure. In this procedure, a doctor puts a thin tube, called a catheter, into a blood vessel in your groin or arm. The doctor threads the tube up to the carotid artery in the neck. The catheter is used to place the stent. In another type of procedure, a stent is placed in the artery through a cut in the neck.    Surgery and stenting may help lower your risk of a stroke. But they also have a risk of serious problems. You and your doctor can decide together if you want to have surgery or stenting.    Current as of: June 24, 2023  Author: [employer] StaffYou are leaving this website for information purposes only  Clinical Review BoardYou are leaving this website for information purposes only  All [employer] education is reviewed by a team that includes physicians, nurses, advanced practitioners, registered dieticians, and other healthcare professionals.      Please scan the QR codes to watch videos about Stroke symptoms and Risk factors for stroke. Links are provided if you are unable to use the code.          You can read more about the risk factors you can work on to prevent a stroke. Please scan the QR codes or links.    , , , ,

## 2024-06-13 NOTE — PROGRESS NOTES
[facility]        Patient is here for a  follow up.    Pt is currently taking Aspirin and Statin.    /69 (BP Location: Right arm, Patient Position: Chair, Cuff Size: Adult Regular)   Pulse 83     The provider has been notified that the patient has no concerns.     Questions patient would like addressed today are: N/A.    Refills are needed: N/A    Has homecare services and agency name:  Shanna Rm MA

## 2024-06-21 DIAGNOSIS — K21.9 GASTROESOPHAGEAL REFLUX DISEASE WITHOUT ESOPHAGITIS: ICD-10-CM

## 2024-06-21 DIAGNOSIS — R07.89 ATYPICAL CHEST PAIN: ICD-10-CM

## 2024-07-01 ENCOUNTER — OFFICE VISIT (OUTPATIENT)
Dept: PEDIATRICS | Facility: CLINIC | Age: 89
End: 2024-07-01
Payer: COMMERCIAL

## 2024-07-01 ENCOUNTER — ANCILLARY PROCEDURE (OUTPATIENT)
Dept: ULTRASOUND IMAGING | Facility: CLINIC | Age: 89
End: 2024-07-01
Attending: PHYSICIAN ASSISTANT
Payer: COMMERCIAL

## 2024-07-01 ENCOUNTER — NURSE TRIAGE (OUTPATIENT)
Dept: INTERNAL MEDICINE | Facility: CLINIC | Age: 89
End: 2024-07-01

## 2024-07-01 VITALS
HEIGHT: 67 IN | RESPIRATION RATE: 20 BRPM | OXYGEN SATURATION: 97 % | DIASTOLIC BLOOD PRESSURE: 70 MMHG | BODY MASS INDEX: 33.85 KG/M2 | TEMPERATURE: 97.2 F | SYSTOLIC BLOOD PRESSURE: 117 MMHG | WEIGHT: 215.7 LBS | HEART RATE: 87 BPM

## 2024-07-01 DIAGNOSIS — M79.89 PAIN AND SWELLING OF LEFT LOWER EXTREMITY: ICD-10-CM

## 2024-07-01 DIAGNOSIS — L03.116 CELLULITIS OF LEFT LOWER EXTREMITY: ICD-10-CM

## 2024-07-01 DIAGNOSIS — M79.605 PAIN AND SWELLING OF LEFT LOWER EXTREMITY: ICD-10-CM

## 2024-07-01 DIAGNOSIS — I82.411 ACUTE DEEP VEIN THROMBOSIS (DVT) OF FEMORAL VEIN OF RIGHT LOWER EXTREMITY (H): Primary | ICD-10-CM

## 2024-07-01 PROCEDURE — 99417 PROLNG OP E/M EACH 15 MIN: CPT | Performed by: PHYSICIAN ASSISTANT

## 2024-07-01 PROCEDURE — 99215 OFFICE O/P EST HI 40 MIN: CPT | Performed by: PHYSICIAN ASSISTANT

## 2024-07-01 PROCEDURE — 93970 EXTREMITY STUDY: CPT

## 2024-07-01 RX ORDER — CEPHALEXIN 500 MG/1
500 CAPSULE ORAL 4 TIMES DAILY
Qty: 28 CAPSULE | Refills: 0 | Status: SHIPPED | OUTPATIENT
Start: 2024-07-01 | End: 2024-07-08

## 2024-07-01 RX ORDER — APIXABAN 5 MG (74)
KIT ORAL
Qty: 74 EACH | Refills: 0 | Status: SHIPPED | OUTPATIENT
Start: 2024-07-01 | End: 2024-07-31

## 2024-07-01 ASSESSMENT — PAIN SCALES - GENERAL: PAINLEVEL: MILD PAIN (2)

## 2024-07-01 NOTE — TELEPHONE ENCOUNTER
"Referral to Acute and Diagnostic Services    [phone] ([place]) [facility]- [address], [address], [place] [address]    Transition to Acute & Diagnostic Services Clinic has been discussed with patient, and he agrees with next level of care.   Patient understands that evaluation/treatment at [facility] typically takes significantly longer than in clinic/urgent care (>2 hours).  [facility] has been contacted by provider/staff to confirm patient acceptance.         Special issues:      Allergy to contrast dye                         Nurse Triage SBAR    Is this a 2nd Level Triage? YES, LICENSED PRACTITIONER REVIEW IS REQUIRED    Situation: Pt's wife (C2C on file) called the clinic reporting that the pt's left leg is swollen, warm, and red from below the knee to the ankle.     Background: She states the pt has \"big\" legs but does not usually have swelling at baseline. He has a medical history of CHF.     Assessment: The pt denies chest pain, shortness of breath, or a fever. Swelling began yesterday.     Protocol Recommended Disposition:   Go To Office Now    Recommendation: Pt would rather go to [facility] than the ER at this time. Will send referral request HP to [facility] team for review. Please contact pt's wife with provider recommendation.   Advised pt to proceed to the ER with any SOB, chest pain, or a fever.     Routed to provider    Does the patient meet one of the following criteria for [facility] visit consideration? 16+ years old, with an [facility] PCP     TIP  Providers, please consider if this condition is appropriate for management at one of our Acute and Diagnostic Services sites.     If patient is a good candidate, please use dotphrase <dot>triageresponse and select Refer to [facility] to document.      Reason for Disposition   Thigh, calf, or ankle swelling in only one leg    Additional Information   Negative: Sounds like a life-threatening emergency to the triager   Negative: Chest " "pain   Negative: Followed an insect bite and has localized swelling (e.g., small area of puffy or swollen skin)   Negative: Followed a knee injury   Negative: Ankle or foot injury   Negative: Pregnant with leg swelling or edema   Negative: Difficulty breathing at rest   Negative: Entire foot is cool or blue in comparison to other side   Negative: SEVERE swelling (e.g., swelling extends above knee, entire leg is swollen, weeping fluid)   Negative: Cast on leg or ankle and has increasing pain   Negative: Can't walk or can barely stand (new-onset)   Negative: Fever and red area (or area very tender to touch)   Negative: Patient sounds very sick or weak to the triager   Negative: Swelling of face, arm or hands  (Exception: Slight puffiness of fingers during hot weather.)   Negative: Pregnant 20 or more weeks and sudden weight gain (i.e., > 2 lbs, 1 kg in one week)   Negative: Thigh or calf pain and only 1 side and present > 1 hour    Answer Assessment - Initial Assessment Questions  1. ONSET: \"When did the swelling start?\" (e.g., minutes, hours, days)      Yesterday  2. LOCATION: \"What part of the leg is swollen?\"  \"Are both legs swollen or just one leg?\"      Left leg   3. SEVERITY: \"How bad is the swelling?\" (e.g., localized; mild, moderate, severe)    - Localized: Small area of swelling localized to one leg.    - MILD pedal edema: Swelling limited to foot and ankle, pitting edema < 1/4 inch (6 mm) deep, rest and elevation eliminate most or all swelling.    - MODERATE edema: Swelling of lower leg to knee, pitting edema > 1/4 inch (6 mm) deep, rest and elevation only partially reduce swelling.    - SEVERE edema: Swelling extends above knee, facial or hand swelling present.       Below the knee, to the ankle - moderate  4. REDNESS: \"Does the swelling look red or infected?\"      Redness  5. PAIN: \"Is the swelling painful to touch?\" If Yes, ask: \"How painful is it?\"   (Scale 1-10; mild, moderate or severe)      Yes, " "mild  6. FEVER: \"Do you have a fever?\" If Yes, ask: \"What is it, how was it measured, and when did it start?\"       None  7. CAUSE: \"What do you think is causing the leg swelling?\"      Unsure  8. MEDICAL HISTORY: \"Do you have a history of blood clots (e.g., DVT), cancer, heart failure, kidney disease, or liver failure?\"      CHF, stents  9. RECURRENT SYMPTOM: \"Have you had leg swelling before?\" If Yes, ask: \"When was the last time?\" \"What happened that time?\"      None  10. OTHER SYMPTOMS: \"Do you have any other symptoms?\" (e.g., chest pain, difficulty breathing)        None  11. PREGNANCY: \"Is there any chance you are pregnant?\" \"When was your last menstrual period?\"        N/A    Protocols used: Leg Swelling and Edema-A-OH  Thank you,  Jerri Whitten RN    "

## 2024-07-01 NOTE — PROGRESS NOTES
"Acute and Diagnostic Services Clinic Visit    {PROVIDER CHARTING PREFERENCE:257840}    Karen Love is a 88 year old, presenting for the following health issues:  Deep Vein Thrombosis  {(!) Visit Details have not yet been documented.  Please enter Visit Details and then use this list to pull in documentation. (Optional):216012}  HPI     Evaluation for possible DVT  Onset/Duration: 3 days ago  Description:       Location: Left lower leg        Redness: YES       Pain: mild       Warmth: YES       Joint swelling YES- History of joint swelling in feet and ankles  Progression of symptoms same  Accompanying signs and symptoms:       Fevers: no        Numbness/tingling/weakness: no        Chest pain/pleurisy: no        Shortness of breath: no   History        Trauma: no         Recent travel/when: no         Previous history of DVT: no         Family history of DVT: no         Recent surgery: no   Aggravating factors include: none  Therapies tried and outcome: ice  Prior surgery on arteries of veins in this area: No  {Link to age adjusted D-dimer (UTD)   :488559}      {ROS Picklists (Optional):342824}      Objective    /70 (BP Location: Right arm, Patient Position: Sitting, Cuff Size: Adult Large)   Pulse 87   Temp 97.2  F (36.2  C) (Temporal)   Resp 20   Ht 1.702 m (5' 7\")   Wt 97.8 kg (215 lb 11.2 oz)   SpO2 97%   BMI 33.78 kg/m    Body mass index is 33.78 kg/m .  Physical Exam   {Exam List (Optional):042712}    {Diagnostic Test Results (Optional):053866}        Signed Electronically by: Jessica Vargas PA-C  {Email feedback regarding this note to [email or web address]   :981654}  "

## 2024-07-01 NOTE — PATIENT INSTRUCTIONS
-Stop your aspirin. Do not take ibuprofen (Advil) or naproxen (Aleve) while on this.  -We will start a blood thinner to treat this blood clot.   -Follow up with hematology to discuss further work-up/management of your blood clot.   -Go to ER for abdominal pain, chest pain, or shortness of breath.    -Complete antibiotics as prescribed. Take with food to help prevent stomach upset.   -Elevate the affected limb as much as possible. This will help keep the swelling down.  -Keep the infected area clean; warm water soak with mild soap for 10-15 minutes 3 times a day. Pat dry.  -If develop a fever, increased redness, pain, drainage or swelling from the area, should contact primary care clinic/provider.  -Symptoms should be improving within 48 hours and resolved in next 7-10 days.

## 2024-07-01 NOTE — PROGRESS NOTES
Assessment/Plan:    Ultrasound shows partially occlusive DVT in right common femoral vein through deep femoral and upper to mid portions of femoral vein. He does not report pain in the R groin or proximal thigh. Unclear if this is acute or chronic.  Pt is 87 y/o but reports a steady gait and he denies frequent falls or history of bleeding issues. No absolute contraindications to anticoagulation.  Consulted [place] oncology/hematology who advised anticoagulation & having patient discontinue aspirin.   Rx Eliquis. Discontinue aspirin, do not take NSAIDs. Follow up with hem/onc; referral placed.   No CP or SOB to suggest PE.    Also suspect cellulitis to L shin; Rx Keflex for this. Follow up with PCP if not improving in 2-3 days, sooner if worsening.    See patient instructions below.    At the end of the encounter, I discussed results, diagnosis, medications. Discussed red flags for immediate return to clinic/ER, as well as indications for follow up if no improvement. Patient understood and agreed to plan. Patient was stable for discharge.    65 minutes was spent preparing for the visit and reviewing the patient's chart; getting a history and performing an exam; counseling and providing education to the patient, family, or caregiver; ordering medicines and/or tests; communicating with other healthcare professionals; documenting information in the medical record; interpreting results and sharing that information with the patient, family, or caregiver; and care coordination.       ICD-10-CM    1. Acute deep vein thrombosis (DVT) of femoral vein of right lower extremity (H)  I82.411 Apixaban Starter Pack (ELIQUIS DVT/PE STARTER PACK) 5 MG TBPK     Adult Oncology/Hematology  Referral      2. Pain and swelling of left lower extremity  M79.605 CANCELED: US Lower Extremity Venous Duplex Left    M79.89       3. Cellulitis of left lower extremity  L03.116 cephALEXin (KEFLEX) 500 MG capsule            No follow-ups on  file.    KALEE Velasquez, KILEY AMARAL [facility] JOSE  -----------------------------------------------------------------------------------------------------------------------------------------------------    HPI:  Ivan Payton is a 88 year old male with history of CAD, CVA, HTN, HLD, and PAD who presents for evaluation of the following:    Evaluation for possible DVT  Onset/Duration: 3 days ago  Description:       Location: Left lower leg             Redness: YES       Pain: mild       Warmth: YES       Joint swelling YES- History of swelling in feet and ankles (L more swollen than usual in last few days)  Progression of symptoms same  Accompanying signs and symptoms:       Fevers: no        Numbness/tingling/weakness: no        Chest pain/pleurisy: no        Shortness of breath: no   History        Trauma: no         Recent travel/when: no         Previous history of DVT: no         Family history of DVT: no         Recent surgery: no   Aggravating factors include: none  Therapies tried and outcome: ice  Prior surgery on arteries of veins in this area: No    Pt takes Zestoretic but still typically has baseline lower extremity edema.   Discomfort in L anterior lower leg only with palpation, no pain with weight bearing or constant pain.   No SOB, orthopnea, weight gain, PND, fever.    Past Medical History:   Diagnosis Date    Arthritis     Carotid stenosis 3/26/2014    S/p L CEA; R ICA ok;          See CUS 12/15 and Dr Jones's consult; continue plavix     Cerebral vascular disease 3/2014    multiple intracranial stenoses - lt post communic, lt post cerebral, rt middle cerebral, bilat porx M2 segments    Claustrophobia     Need sedation for MRI scans    Coronary artery disease 2014    Cath 9/2014: PTCA and KIKE to mLAD, KIKE to prox circumflex, PTCA and DESx2 to OM2; Cath 12/18/18: KIKE to PDA and OM1, patent stents to LAD and Cfx    CVA (cerebral infarction) 3/2014    2 small acute lesions  "noted left parietal/left posterior frontal region. Old lacunar infarct left caudate nucleus    Dyspnea 2014    Enlarged prostate     Essential hypertension      Problem list name updated by automated process. Provider to review    GERD (gastroesophageal reflux disease)     Hiatal hernia     Hydrocele     Right hydrocelectomy 2012    Hyperlipidemia with target LDL less than 100 3/14/2014     Diagnosis updated by automated process. Provider to review and confirm.    RBBB (right bundle branch block) 3/2014    Shortness of breath     Stented coronary artery     Uncomplicated asthma        Vitals:    24 1052   BP: 117/70   BP Location: Right arm   Patient Position: Sitting   Cuff Size: Adult Large   Pulse: 87   Resp: 20   Temp: 97.2  F (36.2  C)   TempSrc: Temporal   SpO2: 97%   Weight: 97.8 kg (215 lb 11.2 oz)   Height: 1.702 m (5' 7\")       Physical Exam  Vitals and nursing note reviewed.   Cardiovascular:      Pulses:           Posterior tibial pulses are 1+ on the right side and 1+ on the left side.   Pulmonary:      Effort: Pulmonary effort is normal.      Breath sounds: Normal breath sounds.   Musculoskeletal:      Right lower le+ Pitting Edema present.      Left lower leg: Tenderness (anterior shin) present. 3+ Pitting Edema present.        Legs:       Comments: Erythema, warmth to L shin. No weeping or open sores   Neurological:      Mental Status: He is alert.       Labs/Imaging:  No results found for this or any previous visit (from the past 24 hour(s)).  No results found for this or any previous visit (from the past 24 hour(s)).    Results for orders placed or performed in visit on 24   US Lower Extremity Venous Duplex Bilateral     Status: None    Narrative    EXAM: US LOWER EXTREMITY VENOUS DUPLEX BILATERAL  LOCATION: [facility]  DATE: 2024    INDICATION: LLE erythema, swelling, pain x 3 days  COMPARISON: None.  TECHNIQUE: Venous Duplex ultrasound of " bilateral lower extremities with and without compression, augmentation and duplex. Color flow and spectral Doppler with waveform analysis performed.    FINDINGS: Exam includes the common femoral, femoral, popliteal veins as well as segmentally visualized deep calf veins and greater saphenous vein.     RIGHT: Partially occlusive deep venous thrombosis is seen extending from the right common femoral vein through the deep femoral and upper to mid portions of the femoral vein. No superficial thrombophlebitis. No popliteal cyst.    LEFT: No deep vein thrombosis. No superficial thrombophlebitis. No popliteal cyst.      Impression    IMPRESSION:  1.  Partially occlusive deep venous thrombosis seen within the right lower extremity as described above.  2.  Mild subcutaneous edema seen within the bilateral calf regions.       Patient Instructions   -Stop your aspirin. Do not take ibuprofen (Advil) or naproxen (Aleve) while on this.  -We will start a blood thinner to treat this blood clot.   -Follow up with hematology to discuss further work-up/management of your blood clot.   -Go to ER for abdominal pain, chest pain, or shortness of breath.    -Complete antibiotics as prescribed. Take with food to help prevent stomach upset.   -Elevate the affected limb as much as possible. This will help keep the swelling down.  -Keep the infected area clean; warm water soak with mild soap for 10-15 minutes 3 times a day. Pat dry.  -If develop a fever, increased redness, pain, drainage or swelling from the area, should contact primary care clinic/provider.  -Symptoms should be improving within 48 hours and resolved in next 7-10 days.

## 2024-07-01 NOTE — TELEPHONE ENCOUNTER
Per chart review, pt is scheduled.     Jul 01, 2024 10:45 AM  Diagnostic Visit with Jessica Vargas PA-C  [facility] [place] ([facility] ) [phone]     Thank you,  Jerri Whitten, RN

## 2024-07-12 ENCOUNTER — TELEPHONE (OUTPATIENT)
Dept: INTERNAL MEDICINE | Facility: CLINIC | Age: 89
End: 2024-07-12
Payer: COMMERCIAL

## 2024-07-12 DIAGNOSIS — I82.411 ACUTE DEEP VEIN THROMBOSIS (DVT) OF FEMORAL VEIN OF RIGHT LOWER EXTREMITY (H): ICD-10-CM

## 2024-07-12 RX ORDER — APIXABAN 5 MG (74)
KIT ORAL
Qty: 74 EACH | Refills: 0 | Status: CANCELLED | OUTPATIENT
Start: 2024-07-12 | End: 2024-08-10

## 2024-07-12 NOTE — TELEPHONE ENCOUNTER
Eliquis starter pack sent 7/1 and will run out 7/31, patients hem visit is 8/7     Demarco Alcantara RN

## 2024-07-12 NOTE — TELEPHONE ENCOUNTER
New Medication Request    What medication are you requesting?: Eliquis    Reason for medication request: patient was prescribed Eliquis following a visit at the [facility] clinic, has an appointment with a hematologist but will run out before that. Patient requesting refill from PCP to cover that gap    Have you taken this medication before?: Yes: prescribed on 7/1/24     Controlled Substance Agreement on file:   CSA -- Patient Level:    CSA: None found at the patient level.         Patient offered an appointment? No    Preferred Pharmacy:     [facility] [id number] [place] [facility] [address] [place] [address]  [address] [facility] [address]  Phone: [phone] Fax: [phone]      Could we send this information to you in [place] or would you prefer to receive a phone call?:   Patient would prefer a phone call   Okay to leave a detailed message?: Yes at Home number on file [phone] (home)

## 2024-07-15 ENCOUNTER — TRANSFERRED RECORDS (OUTPATIENT)
Dept: HEALTH INFORMATION MANAGEMENT | Facility: CLINIC | Age: 89
End: 2024-07-15
Payer: COMMERCIAL

## 2024-07-15 NOTE — TELEPHONE ENCOUNTER
1 month prescription sent electronically to the specified pharmacy.     Close encounter when done.

## 2024-07-22 ENCOUNTER — PATIENT OUTREACH (OUTPATIENT)
Dept: CARE COORDINATION | Facility: CLINIC | Age: 89
End: 2024-07-22
Payer: COMMERCIAL

## 2024-07-31 ENCOUNTER — DOCUMENTATION ONLY (OUTPATIENT)
Dept: ANTICOAGULATION | Facility: CLINIC | Age: 89
End: 2024-07-31
Payer: COMMERCIAL

## 2024-07-31 NOTE — PROGRESS NOTES
Anticoagulant Therapeutic Duplication    Duplicate orders identified: same medication but different dose, form, frequency or route    Duplicate orders appropriate Starter pack and therapeutic dose    Active anticoagulant: apixaban (Eliquis)    Plan made per [facility] anticoagulation protocol.    Herberth Kowalski RN  7/31/2024

## 2024-08-02 ENCOUNTER — APPOINTMENT (OUTPATIENT)
Dept: GENERAL RADIOLOGY | Facility: CLINIC | Age: 89
End: 2024-08-02
Attending: EMERGENCY MEDICINE
Payer: COMMERCIAL

## 2024-08-02 ENCOUNTER — TELEPHONE (OUTPATIENT)
Dept: INTERNAL MEDICINE | Facility: CLINIC | Age: 89
End: 2024-08-02

## 2024-08-02 ENCOUNTER — APPOINTMENT (OUTPATIENT)
Dept: ULTRASOUND IMAGING | Facility: CLINIC | Age: 89
End: 2024-08-02
Attending: EMERGENCY MEDICINE
Payer: COMMERCIAL

## 2024-08-02 ENCOUNTER — NURSE TRIAGE (OUTPATIENT)
Dept: INTERNAL MEDICINE | Facility: CLINIC | Age: 89
End: 2024-08-02
Payer: COMMERCIAL

## 2024-08-02 ENCOUNTER — HOSPITAL ENCOUNTER (EMERGENCY)
Facility: CLINIC | Age: 89
Discharge: HOME | End: 2024-08-02
Attending: EMERGENCY MEDICINE | Admitting: EMERGENCY MEDICINE
Payer: COMMERCIAL

## 2024-08-02 VITALS
HEART RATE: 62 BPM | RESPIRATION RATE: 16 BRPM | OXYGEN SATURATION: 98 % | TEMPERATURE: 97.7 F | DIASTOLIC BLOOD PRESSURE: 56 MMHG | SYSTOLIC BLOOD PRESSURE: 132 MMHG

## 2024-08-02 DIAGNOSIS — J18.9 PNEUMONIA OF LEFT LOWER LOBE DUE TO INFECTIOUS ORGANISM: Primary | ICD-10-CM

## 2024-08-02 DIAGNOSIS — I82.511 CHRONIC DEEP VEIN THROMBOSIS (DVT) OF FEMORAL VEIN OF RIGHT LOWER EXTREMITY (H): ICD-10-CM

## 2024-08-02 DIAGNOSIS — R60.0 BILATERAL LOWER EXTREMITY EDEMA: ICD-10-CM

## 2024-08-02 DIAGNOSIS — M79.89 RIGHT LEG SWELLING: ICD-10-CM

## 2024-08-02 LAB
ANION GAP SERPL CALCULATED.3IONS-SCNC: 9 MMOL/L (ref 7–15)
BASOPHILS # BLD AUTO: 0 10E3/UL (ref 0–0.2)
BASOPHILS NFR BLD AUTO: 0 %
BUN SERPL-MCNC: 26.8 MG/DL (ref 8–23)
CALCIUM SERPL-MCNC: 9.1 MG/DL (ref 8.8–10.4)
CHLORIDE SERPL-SCNC: 102 MMOL/L (ref 98–107)
CREAT SERPL-MCNC: 0.82 MG/DL (ref 0.67–1.17)
CRP SERPL-MCNC: 10.25 MG/L
EGFRCR SERPLBLD CKD-EPI 2021: 85 ML/MIN/1.73M2
EOSINOPHIL # BLD AUTO: 0.1 10E3/UL (ref 0–0.7)
EOSINOPHIL NFR BLD AUTO: 1 %
ERYTHROCYTE [DISTWIDTH] IN BLOOD BY AUTOMATED COUNT: 14.2 % (ref 10–15)
ERYTHROCYTE [SEDIMENTATION RATE] IN BLOOD BY WESTERGREN METHOD: 9 MM/HR (ref 0–20)
GLUCOSE SERPL-MCNC: 123 MG/DL (ref 70–99)
HCO3 SERPL-SCNC: 27 MMOL/L (ref 22–29)
HCT VFR BLD AUTO: 40.5 % (ref 40–53)
HGB BLD-MCNC: 13.5 G/DL (ref 13.3–17.7)
IMM GRANULOCYTES # BLD: 0.1 10E3/UL
IMM GRANULOCYTES NFR BLD: 1 %
LACTATE SERPL-SCNC: 1.3 MMOL/L (ref 0.7–2)
LYMPHOCYTES # BLD AUTO: 0.9 10E3/UL (ref 0.8–5.3)
LYMPHOCYTES NFR BLD AUTO: 11 %
MCH RBC QN AUTO: 31.3 PG (ref 26.5–33)
MCHC RBC AUTO-ENTMCNC: 33.3 G/DL (ref 31.5–36.5)
MCV RBC AUTO: 94 FL (ref 78–100)
MONOCYTES # BLD AUTO: 0.8 10E3/UL (ref 0–1.3)
MONOCYTES NFR BLD AUTO: 9 %
NEUTROPHILS # BLD AUTO: 6.6 10E3/UL (ref 1.6–8.3)
NEUTROPHILS NFR BLD AUTO: 78 %
NRBC # BLD AUTO: 0 10E3/UL
NRBC BLD AUTO-RTO: 0 /100
NT-PROBNP SERPL-MCNC: 319 PG/ML (ref 0–1800)
PLATELET # BLD AUTO: 168 10E3/UL (ref 150–450)
POTASSIUM SERPL-SCNC: 4.5 MMOL/L (ref 3.4–5.3)
RBC # BLD AUTO: 4.31 10E6/UL (ref 4.4–5.9)
SODIUM SERPL-SCNC: 138 MMOL/L (ref 135–145)
WBC # BLD AUTO: 8.4 10E3/UL (ref 4–11)

## 2024-08-02 PROCEDURE — 83605 ASSAY OF LACTIC ACID: CPT | Performed by: EMERGENCY MEDICINE

## 2024-08-02 PROCEDURE — 85025 COMPLETE CBC W/AUTO DIFF WBC: CPT | Performed by: EMERGENCY MEDICINE

## 2024-08-02 PROCEDURE — 36415 COLL VENOUS BLD VENIPUNCTURE: CPT | Performed by: EMERGENCY MEDICINE

## 2024-08-02 PROCEDURE — 85652 RBC SED RATE AUTOMATED: CPT | Performed by: EMERGENCY MEDICINE

## 2024-08-02 PROCEDURE — 86140 C-REACTIVE PROTEIN: CPT | Performed by: EMERGENCY MEDICINE

## 2024-08-02 PROCEDURE — 87040 BLOOD CULTURE FOR BACTERIA: CPT | Performed by: EMERGENCY MEDICINE

## 2024-08-02 PROCEDURE — 71046 X-RAY EXAM CHEST 2 VIEWS: CPT

## 2024-08-02 PROCEDURE — 93970 EXTREMITY STUDY: CPT

## 2024-08-02 PROCEDURE — 83880 ASSAY OF NATRIURETIC PEPTIDE: CPT | Performed by: EMERGENCY MEDICINE

## 2024-08-02 PROCEDURE — 99285 EMERGENCY DEPT VISIT HI MDM: CPT | Mod: 25

## 2024-08-02 PROCEDURE — 80048 BASIC METABOLIC PNL TOTAL CA: CPT | Performed by: EMERGENCY MEDICINE

## 2024-08-02 RX ORDER — DOXYCYCLINE 100 MG/1
100 CAPSULE ORAL 2 TIMES DAILY
Qty: 10 CAPSULE | Refills: 0 | Status: SHIPPED | OUTPATIENT
Start: 2024-08-02 | End: 2024-08-07

## 2024-08-02 RX ORDER — FUROSEMIDE 20 MG
20 TABLET ORAL DAILY
Qty: 3 TABLET | Refills: 0 | Status: SHIPPED | OUTPATIENT
Start: 2024-08-02 | End: 2024-08-05

## 2024-08-02 ASSESSMENT — COLUMBIA-SUICIDE SEVERITY RATING SCALE - C-SSRS
1. IN THE PAST MONTH, HAVE YOU WISHED YOU WERE DEAD OR WISHED YOU COULD GO TO SLEEP AND NOT WAKE UP?: NO
6. HAVE YOU EVER DONE ANYTHING, STARTED TO DO ANYTHING, OR PREPARED TO DO ANYTHING TO END YOUR LIFE?: NO
2. HAVE YOU ACTUALLY HAD ANY THOUGHTS OF KILLING YOURSELF IN THE PAST MONTH?: NO

## 2024-08-02 ASSESSMENT — ACTIVITIES OF DAILY LIVING (ADL)
ADLS_ACUITY_SCORE: 38
ADLS_ACUITY_SCORE: 36

## 2024-08-02 NOTE — ED TRIAGE NOTES
Pt sts he noticed increased leg swelling and leg weeping a few days ago     Triage Assessment (Adult)       Row Name 08/02/24 1405          Triage Assessment    Airway WDL WDL        Respiratory WDL    Respiratory WDL WDL        Skin Circulation/Temperature WDL    Skin Circulation/Temperature WDL WDL        Cardiac WDL    Cardiac WDL WDL        Peripheral/Neurovascular WDL    Peripheral Neurovascular WDL WDL        Cognitive/Neuro/Behavioral WDL    Cognitive/Neuro/Behavioral WDL WDL

## 2024-08-02 NOTE — TELEPHONE ENCOUNTER
Nurse Triage SBAR    Is this a 2nd Level Triage? YES, LICENSED PRACTITIONER REVIEW IS REQUIRED    Situation: Pt and wife called concerned of right leg swelling and drainage for 3-4 days.     Background: pt has CHF and was last seen at [facility] for left leg infection.    Assessment: Pt reports right leg discomfort and mild discoloration. Pt denies severe breathing problems, chest pain or fever. Left leg infection resolved with Keflex.     Protocol Recommended Disposition:   Go To ED/UCC Now (Or To Office With PCP Approval)    Recommendation:huddle with [facility]    Paged to provider    Does the patient meet one of the following criteria for [facility] visit consideration? 16+ years old, with an [facility] PCP     TIP  Providers, please consider if this condition is appropriate for management at one of our Acute and Diagnostic Services sites.     If patient is a good candidate, please use dotphrase <dot>triageresponse and select Refer to [facility] to document.      Reason for Disposition   SEVERE swelling (e.g., swelling extends above knee, entire leg is swollen, weeping fluid)    Additional Information   Negative: Sounds like a life-threatening emergency to the triager   Negative: Chest pain   Negative: Followed an insect bite and has localized swelling (e.g., small area of puffy or swollen skin)   Negative: Followed a knee injury   Negative: Ankle or foot injury   Negative: Pregnant with leg swelling or edema   Negative: Difficulty breathing at rest   Negative: Entire foot is cool or blue in comparison to other side    Protocols used: Leg Swelling and Edema-A-OH

## 2024-08-02 NOTE — DISCHARGE INSTRUCTIONS
Please follow-up with your primary care provider and/or specialist regarding your visit to the ER today.    Please return to the emergency department should you experience any of the symptoms we specifically discussed, including but not limited to recurrence or worsening of your symptoms, or development of any new and concerning symptoms such as fever, leg pain, worsening redness in your leg, new open wounds to your leg, numbness or weakness, or chest pain or difficulty breathing.  Please continue to take your blood thinners and keep limb elevated to assist with drainage.  You have been prescribed with Lasix or furosemide which is a diuretic and that can potentially make you urinate a lot and sometimes can make you slightly lightheaded and dizzy.  Please make sure to count to 10 before going from laying to sitting to standing and moving to limit potential chance of falling.    Please take medication as instructed on bottle.

## 2024-08-02 NOTE — TELEPHONE ENCOUNTER
Per huddle with [facility] provider Jessica, pt was advised to seek care at ER. Pt and wife verbalized understanding and agreement with plan.

## 2024-08-02 NOTE — ED PROVIDER NOTES
Emergency Department Note      History of Present Illness     Chief Complaint   Leg swelling    HPI   Ivan Payton is a 88 year old male with a history of CAD, CVA, hyperlipidemia, blood clots, and stented coronary artery on Eliquis who presents to the ED with right lower extremity leg swelling and oozing of fluid. The patient's wife reports the patient's right leg began swelling within the past week. She endorses the patient experiencing more drainage and slight erythema to right leg. The patient endorses some tingling sensation in the right leg. He denies pain, fever, chills, rhinorrhea, congestion, cough, sore throat, chest pain, difficulty breathing, nausea, vomiting, abdominal pain, dysuria, hematuria, diarrhea, or black/bloody stools. He denies history of diabetes mellitus. He confirms he had a blood clot in the groin and is on Eliquis. The patient's wife notes the patient had an infection in the left leg and states it was more red compared to his right leg now.  No recent wounds or injuries to the right leg.  Patient is currently not on furosemide/Lasix.    Independent Historian   Wife as detailed above.    Review of External Notes   Nursing telephone triage note from today.    Past Medical History     Medical History and Problem List   Arthritis  Carotid stenosis  Cerebral vascular disease  Claustrophobia  Coronary artery disease  CVA (cerebral infarction)  Dyspnea  Enlarged prostate  Essential hypertension  GERD (gastroesophageal reflux disease)  Hiatal hernia  Hydrocele  Hyperlipidemia with target LDL less than 100  RBBB (right bundle branch block)  Shortness of breath  Stented coronary artery  Uncomplicated asthma      Medications   Eliquis  Atenolol   Atorvastatin   Glucosamine-chondroitin  Lisinopril-hydrochlorothiazide  Nitroglycerin  Omeprazole   Senna-docusate  Terazosin    Surgical History   Cv heart catheterization with possible intervention   Laparoscopic herniorrhaphy inguinal bilateral  (B)  Hydrocelectomy scrotal (R)  Heart cath, angioplasty   Endarterectomy carotid   Mr brain and orbits w contrast   Herniorrhaphy inguinal x 2  Hydrocelectomy inguinal   Tonsillectomy  Toe nail removal    Physical Exam     Patient Vitals for the past 24 hrs:   BP Temp Temp src Pulse Resp SpO2   24 1406 132/56 97.7  F (36.5  C) Oral 62 16 98 %     Physical Exam  Constitutional:       Appearance: Normal appearance.   HENT:      Head: Normocephalic and atraumatic.   Eyes:      Extraocular Movements: Extraocular movements intact.      Conjunctiva/sclera: Conjunctivae normal.   Cardiovascular:      Rate and Rhythm: Normal rate and regular rhythm.   Pulmonary:      Effort: Pulmonary effort is normal. No respiratory distress.      Breath sounds: Clear to auscultation bilaterally.  Abdominal:      General: Abdomen is flat. There is no distension.      Palpations: Abdomen is soft.      Tenderness: There is no abdominal tenderness.   Musculoskeletal:      Cervical back: Normal range of motion. No rigidity.       Right lower le+ pitting edema.  Erythema extending from right foot to right lower leg.  No obvious open lesions or wounds. See image below.     Left lower le+ pitting edema.   Skin:     General: Skin is warm and dry.   Neurological:      General: No focal deficit present.      Mental Status: Alert and oriented to person, place, and time.   Psychiatric:         Mood and Affect: Mood normal.         Behavior: Behavior normal.            Diagnostics     Lab Results   Labs Ordered and Resulted from Time of ED Arrival to Time of ED Departure   BASIC METABOLIC PANEL - Abnormal       Result Value    Sodium 138      Potassium 4.5      Chloride 102      Carbon Dioxide (CO2) 27      Anion Gap 9      Urea Nitrogen 26.8 (*)     Creatinine 0.82      GFR Estimate 85      Calcium 9.1      Glucose 123 (*)    CRP INFLAMMATION - Abnormal    CRP Inflammation 10.25 (*)    CBC WITH PLATELETS AND DIFFERENTIAL - Abnormal     WBC Count 8.4      RBC Count 4.31 (*)     Hemoglobin 13.5      Hematocrit 40.5      MCV 94      MCH 31.3      MCHC 33.3      RDW 14.2      Platelet Count 168      % Neutrophils 78      % Lymphocytes 11      % Monocytes 9      % Eosinophils 1      % Basophils 0      % Immature Granulocytes 1      NRBCs per 100 WBC 0      Absolute Neutrophils 6.6      Absolute Lymphocytes 0.9      Absolute Monocytes 0.8      Absolute Eosinophils 0.1      Absolute Basophils 0.0      Absolute Immature Granulocytes 0.1      Absolute NRBCs 0.0     NT PROBNP INPATIENT - Normal    N terminal Pro BNP Inpatient 319     ERYTHROCYTE SEDIMENTATION RATE AUTO - Normal    Erythrocyte Sedimentation Rate 9     LACTIC ACID WHOLE BLOOD - Normal    Lactic Acid 1.3     BLOOD CULTURE       Imaging   XR Chest 2 Views   Final Result   IMPRESSION: Focal consolidation is noted within the left mid to lower   lung suspicious for pneumonia. Recommend radiographic follow-up to   ensure resolution.      Elevated left hemidiaphragm is unchanged. Stable mildly enlarged   cardiomediastinal silhouette. Vascular calcifications are seen within   the thoracic aorta. Large hiatal hernia is present, best seen on   lateral view. Multilevel degenerative changes are seen in the spine.   Several age-indeterminate compression deformities are noted within the   thoracic spine      ROE CARTER MD            SYSTEM ID:  [id number]      US Lower Extremity Venous Duplex Bilateral   Final Result   IMPRESSION: Nonocclusive DVT in one of 2 right mid superficial femoral   veins. This is most likely chronic and was present on the previous   ultrasound.      ANETTE ERICKSON MD            SYSTEM ID:  [id number]          Independent Interpretation   None    ED Course      Medications Administered   Medications - No data to display    Procedures   Procedures     Discussion of Management   5211 I spoke with Dr Lindsey MD of the IR service, regarding the patient.    ED Course   ED Course as  of 08/02/24 1635   Fri Aug 02, 2024   1414 I obtained history and examined the patient as noted above.   1458 Lactic Acid: 1.3   1458 Sed Rate: 9  Less likely infectious   1458 WBC: 8.4   1524 CRP Inflammation(!): 10.25   1525 US Lower Extremity Venous Duplex Bilateral  Nonocclusive DVT in one of 2 right mid superficial femoral veins. This is most likely chronic and was present on the previous ultrasound.     1528 XR Chest 2 Views  On my independent interpretation of chest x-ray, there is no obvious focal opacity, mediastinal widening, or pneumothorax.   1546 I rechecked the patient and explained findings.   1559 I spoke to Dr. Lindsey MD of the IR service. He advised there is no need for ultrasound aortoiliac as there is low suspicion for proximal extension.  Good Doppler flow.  Patient's clot actually appears better than prior ultrasound.  No acute intervention indicated at this time.  Recommend continued blood thinner usage and follow-up with PCP for further management such as diuretics.   1616 Patient updated on lab and image findings and conversation with interventional radiology.  Low suspicion for cellulitis at this time of the right lower extremity especially given lack of leukocytosis and no significant inflammatory marker elevation.  Mild elevation of CRP, but no ESR elevation.  No lactic acidemia. Patient is comfortable with being initiated on Lasix/furosemide for the next few days pending follow-up with PCP.  Advised continued leg elevation and evaluate for signs of cellulitis.  Given patient's advanced age, will treat incidental finding of chest x-ray suspicious for developing pneumonia.  Nonetheless, will prescribe doxycycline given patient is otherwise asymptomatic/without URI symptoms and has no prior pulmonary disease process.  Discussed return precautions.  Answered all questions.  Patient voiced understanding and agreement with plan.       Additional Documentation  None    Medical Decision  Making / Diagnosis     CMS Diagnoses: None    MIPS       None    [facility]   Ivan Payton is a 88-year-old male as described above presents to the emergency department for increased right lower extremity swelling, edema, and mild increased redness per wife.  History of left lower extremity cellulitis per wife.  Patient hemodynamically stable at time of evaluation.  Afebrile.  Nontoxic nonseptic appearing.  Right lower extremity minimally erythematous, but noticeable serous oozing of fluid.  No significant tenderness to palpation.  It is more erythematous as compared with left lower extremity.  While patient is on blood thinners, will obtain lower extremity ultrasound for evaluation for DVT development given new lower extremity swelling and changes.  Blood work for evaluation for underlying infection with inflammatory markers to rule stratify for infectious cause of skin color changes versus dermatological changes secondary to edema.  Chest x-ray for evaluation for fluid overload.  BNP for evaluation for CHF.  Infectious workup ordered.  Discussed care plan with patient who voiced understanding and agreement with plan.  Answered all questions.  Additional workup and orders as listed in chart.    Please refer to ED course above as part of continuation of MDM for details on the patient's treatment course and any changes or updates in care plan beyond my initial evaluation and MDM creation.    Disposition   The patient was discharged.     Diagnosis     ICD-10-CM    1. Pneumonia of left lower lobe due to infectious organism  J18.9       2. Bilateral lower extremity edema  R60.0       3. Right leg swelling  M79.89       4. 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        Norberto Nj DO  08/02/24 9814

## 2024-08-02 NOTE — TELEPHONE ENCOUNTER
Reason for Call:  Appointment Request    Patient requesting this type of appt: Chronic Diease Management/Medication/Follow-Up    Requested provider: Neal Esteves    Reason patient unable to be scheduled: Not within requested timeframe    When does patient want to be seen/preferred time: 3-7 days    Comments: Need follow up appointment was seen for cellulitis and pneumonia and need for medicine     Could we [facility] or would you prefer to receive a phone call?:   Patient would prefer a phone call   Okay to leave a detailed message?: Yes at Home number on file [phone] (home)    Call taken on 8/2/2024 at 5:07 PM by Lidia Somers

## 2024-08-05 DIAGNOSIS — R60.0 BILATERAL LOWER EXTREMITY EDEMA: Primary | ICD-10-CM

## 2024-08-05 NOTE — TELEPHONE ENCOUNTER
CS, pt seen in ER 08/02/2024 for cellulitis- Er notes state: Patient is comfortable with being initiated on Lasix/furosemide for the next few days pending follow-up with PCP.     Pt also had incidental finding of chest x-ray suspicious for developing pneumonia. Nonetheless, will prescribe doxycycline given patient is otherwise asymptomatic/without URI symptoms and has no prior pulmonary disease process.       Please advise on request for follow up    Jasmina Lee CMA

## 2024-08-05 NOTE — TELEPHONE ENCOUNTER
RECORDS STATUS - ALL OTHER DIAGNOSIS      RECORDS RECEIVED FROM: [facility] - Internal records   DATE RECEIVED: 8/5

## 2024-08-05 NOTE — TELEPHONE ENCOUNTER
Furosemide sent by emergency room provider on 08/02/2024. Routing refill to primary care provider.

## 2024-08-05 NOTE — TELEPHONE ENCOUNTER
Patient called the clinic and was scheduled for an appointment per provider's message below. Patient agreed with this plan and had no further questions.    Caterina NUÑEZ RN  [facility] Triage Team

## 2024-08-05 NOTE — TELEPHONE ENCOUNTER
Medication Question or Refill    Contacts       Contact Date/Time Type Contact Phone/Fax    08/05/2024 04:21 PM CDT Phone (Incoming) Margo John (Emergency Contact) [phone] (M)    08/05/2024 04:21 PM CDT Phone (Incoming) Margo John (Emergency Contact) [phone] (M)            What medication are you calling about (include dose and sig)?: furosemide (LASIX) 20 MG tablet     Preferred Pharmacy:[id number] - [place] - [address]  [address] [address]  Phone: [phone] Fax: [phone]    [facility] [id number] [place] [facility] - [place] - [address]  [address] [address]  Phone: [phone] Fax: [phone]      Controlled Substance Agreement on file:   CSA -- Patient Level:    CSA: None found at the patient level.       Who prescribed the medication?: Ed provider    Do you need a refill? Yes    When did you use the medication last? 8/5/2024    Patient offered an appointment? No    Do you have any questions or concerns?  Yes: mo medication      Could we send this information to you in [employer] or would you prefer to receive a phone call?:   Patient would like to be contacted via [employer]

## 2024-08-05 NOTE — TELEPHONE ENCOUNTER
OK to place him into a same day or next day appointment spot or virtual release spot late this week or else next week to recheck the swelling, etc.     I will also be having open appointment spots next Tuesday and Wednesday when my schedule re-opens tomorrow.  (I will be in the clinic on those day, just waiting for admin to open the schedule up again)    FYI: I do not have a long history with this patient, just having seen him one time in Jan 2023.     Close encounter when done.

## 2024-08-07 ENCOUNTER — PRE VISIT (OUTPATIENT)
Dept: ONCOLOGY | Facility: CLINIC | Age: 89
End: 2024-08-07
Payer: COMMERCIAL

## 2024-08-07 ENCOUNTER — ONCOLOGY VISIT (OUTPATIENT)
Dept: ONCOLOGY | Facility: CLINIC | Age: 89
End: 2024-08-07
Attending: PHYSICIAN ASSISTANT
Payer: COMMERCIAL

## 2024-08-07 VITALS
OXYGEN SATURATION: 97 % | RESPIRATION RATE: 16 BRPM | DIASTOLIC BLOOD PRESSURE: 81 MMHG | HEIGHT: 67 IN | WEIGHT: 210.2 LBS | SYSTOLIC BLOOD PRESSURE: 131 MMHG | TEMPERATURE: 97.7 F | BODY MASS INDEX: 32.99 KG/M2 | HEART RATE: 63 BPM

## 2024-08-07 DIAGNOSIS — I82.411 ACUTE DEEP VEIN THROMBOSIS (DVT) OF FEMORAL VEIN OF RIGHT LOWER EXTREMITY (H): ICD-10-CM

## 2024-08-07 DIAGNOSIS — R07.9 CHEST PAIN, UNSPECIFIED TYPE: ICD-10-CM

## 2024-08-07 DIAGNOSIS — R10.84 ABDOMINAL PAIN, GENERALIZED: Primary | ICD-10-CM

## 2024-08-07 LAB — BACTERIA BLD CULT: NO GROWTH

## 2024-08-07 PROCEDURE — G0463 HOSPITAL OUTPT CLINIC VISIT: HCPCS | Performed by: INTERNAL MEDICINE

## 2024-08-07 PROCEDURE — 99203 OFFICE O/P NEW LOW 30 MIN: CPT | Performed by: INTERNAL MEDICINE

## 2024-08-07 RX ORDER — FUROSEMIDE 20 MG
20 TABLET ORAL EVERY MORNING
Qty: 30 TABLET | Refills: 0 | Status: SHIPPED | OUTPATIENT
Start: 2024-08-07 | End: 2024-08-29

## 2024-08-07 ASSESSMENT — PAIN SCALES - GENERAL: PAINLEVEL: NO PAIN (0)

## 2024-08-07 NOTE — LETTER
8/7/2024      Ivan John  [address] [place] [address]      Dear Colleague,    Thank you for referring your patient, Ivan John, to the [facility]. Please see a copy of my visit note below.    This consult has been requested by Jessica Vargas PA-C for DVT.    Mr. John is a 88-year-old gentleman with right lower extremity DVT.  Patient has multiple medical problems including coronary artery disease, stroke and hyperlipidemia.  Patient was seen in the clinic on 07/01/2024 because of left lower extremity swelling and pain.  He had bilateral lower extremity ultrasound on 07/01/2024.  It revealed partially occlusive DVT extending from right common femoral vein through the deep femoral vein and upper to mid-portion of femoral vein.  No DVT in the left lower extremity.  Although his pain was in the left lower extremity, DVT was found in right lower extremity.  Patient was started on Eliquis.    He was seen in emergency room on 08/02/2024 with right leg swelling.  Multiple investigations were done.  -Normal CBC  -Electrolytes normal except elevated urea nitrogen  -Elevated CRP  -Bilateral lower extremity ultrasound reveals nonocclusive DVT in one of the 2 right mid superficial femoral veins.  This is most likely chronic and was present in previous ultrasound.    Discussed with the patient regarding provoking factors.  This is a first episode of thrombosis.  No family history of DVT.  He was not on any hormonal treatment that can cause thrombosis.  Denies any recent surgery.  Denies any recent trauma to the lower extremities.  Denies any recent long distance driving or flying.    Review of system: Patient has generalized weakness.  He is able to do all his activities of daily living.  No headache.  Some lightheadedness.  He intermittently gets mild bilateral chest pain.  No shortness of breath at rest.  Gets short of breath on exertion.  He intermittently gets mild  abdominal pain.  No nausea or vomiting.  No bleeding.  He has bilateral lower extremity swelling/lymphedema.  No worsening.    Allergies: Reviewed    Medications: Reviewed.    Past medical history:  Arthritis  Carotid stenosis  Cerebral vascular disease  Coronary artery disease  Enlarged prostate  Essential hypertension  GERD (gastroesophageal reflux disease)  Hiatal hernia  Hydrocele  Hyperlipidemia   Stented coronary artery  Uncomplicated asthma     Past surgical history:  Laparoscopic herniorrhaphy inguinal bilateral (B)  Hydrocelectomy scrotal (R)  Heart cath, angioplasty   Endarterectomy carotid   Herniorrhaphy inguinal x 2  Hydrocelectomy inguinal   Tonsillectomy    Exam:  He is alert and oriented x 3.  Not in any respiratory distress.  Vitals: Reviewed.  Lower extremities: Bilateral massive pedal edema.  No signs of infection.  Rest of the system is not examined.    Labs: Reviewed.    Assessment:  1.  An 88-year-old gentleman with unprovoked right lower extremity DVT diagnosed on 07/01/2024.  2.  Bilateral pedal edema.  3.  Multiple other medical problems as described above.    Recommendation:  -CT scan in 2-3 weeks.  -Continue eliquis.  -Follow-up after CT scan.    Discussion:  1.  Discussed with the patient regarding DVT.  Patient had gone to the clinic in July with left lower extremity pain.  Ultrasound revealed right lower extremity DVT.  Explained to the patient that right lower extremity DVT was incidental finding.  He was not symptomatic in the right leg from the knee pain.    Discussed regarding thrombosis.  Different causes discussed.  This is unprovoked thrombosis.    Discussed regarding treatment.  He is on Eliquis.  He will continue on it.  He is not having any bleeding complications.  For unprovoked thrombosis, indefinite anticoagulation is recommended.    Patient is elderly.  He will be at high risk of bleeding.  In future we will consider either dose reduction of Eliquis or stopping it.    2.   Patient intermittently gets some chest pain and abdominal pain.  For further evaluation, will get CT chest, abdomen and pelvis.  Given his age, we want to rule out any malignancy.    3.  Patient had a few questions which were all answered.  I will see him after the CT scan.    Thanks for the consult.    Total visit time of 40 minutes.  Time spent in today's visit, review of chart/investigations today and documentation today.              Again, thank you for allowing me to participate in the care of your patient.        Sincerely,        Domonique Alvarez MD

## 2024-08-07 NOTE — NURSING NOTE
"Oncology Rooming Note    August 7, 2024 1:46 PM   Ivan Payton is a 88 year old male who presents for:    Chief Complaint   Patient presents with    Oncology Clinic Visit     Initial Vitals: /81   Pulse 63   Temp 97.7  F (36.5  C) (Oral)   Resp 16   Ht 1.702 m (5' 7\")   Wt 95.3 kg (210 lb 3.2 oz)   SpO2 97%   BMI 32.92 kg/m   Estimated body mass index is 32.92 kg/m  as calculated from the following:    Height as of this encounter: 1.702 m (5' 7\").    Weight as of this encounter: 95.3 kg (210 lb 3.2 oz). Body surface area is 2.12 meters squared.  No Pain (0) Comment: Data Unavailable   No LMP for male patient.  Allergies reviewed: Yes  Medications reviewed: Yes    Medications: Medication refills not needed today.  Pharmacy name entered into [employer]:    [id number] [place] [place] - [address] [address] [place] - [facility] [address]    Frailty Screening:   Is the patient here for a new oncology consult visit in cancer care? 2. No      Clinical concerns: Sreedhar Castaneda            "

## 2024-08-07 NOTE — LETTER
8/7/2024         RE: Ivan John  [address] [place] [address]      This consult has been requested by Jessica Vargas PA-C for DVT.    Mr. John is a 88-year-old gentleman with right lower extremity DVT.  Patient has multiple medical problems including coronary artery disease, stroke and hyperlipidemia.  Patient was seen in the clinic on 07/01/2024 because of left lower extremity swelling and pain.  He had bilateral lower extremity ultrasound on 07/01/2024.  It revealed partially occlusive DVT extending from right common femoral vein through the deep femoral vein and upper to mid-portion of femoral vein.  No DVT in the left lower extremity.  Although his pain was in the left lower extremity, DVT was found in right lower extremity.  Patient was started on Eliquis.    He was seen in emergency room on 08/02/2024 with right leg swelling.  Multiple investigations were done.  -Normal CBC  -Electrolytes normal except elevated urea nitrogen  -Elevated CRP  -Bilateral lower extremity ultrasound reveals nonocclusive DVT in one of the 2 right mid superficial femoral veins.  This is most likely chronic and was present in previous ultrasound.    Discussed with the patient regarding provoking factors.  This is a first episode of thrombosis.  No family history of DVT.  He was not on any hormonal treatment that can cause thrombosis.  Denies any recent surgery.  Denies any recent trauma to the lower extremities.  Denies any recent long distance driving or flying.    Review of system: Patient has generalized weakness.  He is able to do all his activities of daily living.  No headache.  Some lightheadedness.  He intermittently gets mild bilateral chest pain.  No shortness of breath at rest.  Gets short of breath on exertion.  He intermittently gets mild abdominal pain.  No nausea or vomiting.  No bleeding.  He has bilateral lower extremity swelling/lymphedema.  No worsening.    Allergies:  Reviewed    Medications: Reviewed.    Past medical history:  Arthritis  Carotid stenosis  Cerebral vascular disease  Coronary artery disease  Enlarged prostate  Essential hypertension  GERD (gastroesophageal reflux disease)  Hiatal hernia  Hydrocele  Hyperlipidemia   Stented coronary artery  Uncomplicated asthma     Past surgical history:  Laparoscopic herniorrhaphy inguinal bilateral (B)  Hydrocelectomy scrotal (R)  Heart cath, angioplasty   Endarterectomy carotid   Herniorrhaphy inguinal x 2  Hydrocelectomy inguinal   Tonsillectomy    Exam:  He is alert and oriented x 3.  Not in any respiratory distress.  Vitals: Reviewed.  Lower extremities: Bilateral massive pedal edema.  No signs of infection.  Rest of the system is not examined.    Labs: Reviewed.    Assessment:  1.  An 88-year-old gentleman with unprovoked right lower extremity DVT diagnosed on 07/01/2024.  2.  Bilateral pedal edema.  3.  Multiple other medical problems as described above.    Recommendation:  -CT scan in 2-3 weeks.  -Continue eliquis.  -Follow-up after CT scan.    Discussion:  1.  Discussed with the patient regarding DVT.  Patient had gone to the clinic in July with left lower extremity pain.  Ultrasound revealed right lower extremity DVT.  Explained to the patient that right lower extremity DVT was incidental finding.  He was not symptomatic in the right leg from the knee pain.    Discussed regarding thrombosis.  Different causes discussed.  This is unprovoked thrombosis.    Discussed regarding treatment.  He is on Eliquis.  He will continue on it.  He is not having any bleeding complications.  For unprovoked thrombosis, indefinite anticoagulation is recommended.    Patient is elderly.  He will be at high risk of bleeding.  In future we will consider either dose reduction of Eliquis or stopping it.    2.  Patient intermittently gets some chest pain and abdominal pain.  For further evaluation, will get CT chest, abdomen and pelvis.  Given his  age, we want to rule out any malignancy.    3.  Patient had a few questions which were all answered.  I will see him after the CT scan.    Thanks for the consult.    Total visit time of 40 minutes.  Time spent in today's visit, review of chart/investigations today and documentation today.                  Domonique Alvarez MD

## 2024-08-09 ENCOUNTER — OFFICE VISIT (OUTPATIENT)
Dept: INTERNAL MEDICINE | Facility: CLINIC | Age: 89
End: 2024-08-09
Payer: COMMERCIAL

## 2024-08-09 VITALS
OXYGEN SATURATION: 99 % | WEIGHT: 209.5 LBS | SYSTOLIC BLOOD PRESSURE: 120 MMHG | BODY MASS INDEX: 32.88 KG/M2 | HEART RATE: 84 BPM | DIASTOLIC BLOOD PRESSURE: 64 MMHG | HEIGHT: 67 IN | TEMPERATURE: 97.5 F | RESPIRATION RATE: 18 BRPM

## 2024-08-09 DIAGNOSIS — K21.9 GASTROESOPHAGEAL REFLUX DISEASE WITHOUT ESOPHAGITIS: ICD-10-CM

## 2024-08-09 DIAGNOSIS — I89.0 LYMPHEDEMA OF BOTH LOWER EXTREMITIES: Primary | ICD-10-CM

## 2024-08-09 DIAGNOSIS — I82.411 ACUTE DEEP VEIN THROMBOSIS (DVT) OF FEMORAL VEIN OF RIGHT LOWER EXTREMITY (H): ICD-10-CM

## 2024-08-09 DIAGNOSIS — I25.10 CORONARY ARTERY DISEASE INVOLVING NATIVE CORONARY ARTERY OF NATIVE HEART WITHOUT ANGINA PECTORIS: ICD-10-CM

## 2024-08-09 DIAGNOSIS — I10 ESSENTIAL HYPERTENSION: ICD-10-CM

## 2024-08-09 PROCEDURE — 99214 OFFICE O/P EST MOD 30 MIN: CPT | Performed by: INTERNAL MEDICINE

## 2024-08-09 PROCEDURE — G2211 COMPLEX E/M VISIT ADD ON: HCPCS | Performed by: INTERNAL MEDICINE

## 2024-08-09 RX ORDER — LISINOPRIL 20 MG/1
20 TABLET ORAL DAILY
Qty: 90 TABLET | Refills: 1 | Status: SHIPPED | OUTPATIENT
Start: 2024-08-09

## 2024-08-09 ASSESSMENT — PAIN SCALES - GENERAL: PAINLEVEL: NO PAIN (0)

## 2024-08-09 NOTE — PROGRESS NOTES
"  Assessment & Plan     (I89.0) Lymphedema of both lower extremities  (primary encounter diagnosis)  Comment:   Plan: Lymphedema Therapy  Referral,         Comprehensive metabolic panel            (I10) Essential hypertension  Comment:   Plan: lisinopril (ZESTRIL) 20 MG tablet,         Comprehensive metabolic panel            (K21.9) Gastroesophageal reflux disease without esophagitis  Comment:   Plan: Comprehensive metabolic panel            (I25.10) Coronary artery disease involving native coronary artery of native heart without angina pectoris  Comment:   Plan: lisinopril (ZESTRIL) 20 MG tablet,         Comprehensive metabolic panel            (I82.411) Acute deep vein thrombosis (DVT) of femoral vein of right lower extremity (H)  Comment:   Plan: apixaban ANTICOAGULANT (ELIQUIS) 5 MG tablet,         Comprehensive metabolic panel               Swelling in lower legs in in the soft tissues more than blood vessels.      Complete the CT scans as recommended by the oncology doctors. Eed to check for reasons for both the swelling and blood clots in the leg.      Continue the diuretic Furosemide 20 mg once per day in the morning.      Stop the Lisinopril HCTZ, we will remove the HCTZ portion while on furosemide.      Start plain Lisinopril 20 mg once per day      Reduce sodium intake, keep under 2000 mg per day as best possible.  READ LABELS!!!    Keep feet elevated throughout the day.      Referral to lymphedema clinic to learn other ways to manage swelling with compression stocking and wraps.   You will be contacted to arrange.         Return to see me in approximately 1 month, schedule a follow up visit sooner if needed for anything else.  Please get non-fasting labs done at the [facility] or any other [facility] lab 1-2 days before this appointment (schedule a \"lab appointment\").  If you get the labs done at another clinic, make arrangements with them directly.  The orders will be in " "place.  Use [employer] or Call [phone] to schedule the appointment with me and lab appointment.               MED REC REQUIRED  Post Medication Reconciliation Status: discharge medications reconciled, continue medications without change  BMI  Estimated body mass index is 32.81 kg/m  as calculated from the following:    Height as of this encounter: 1.702 m (5' 7\").    Weight as of this encounter: 95 kg (209 lb 8 oz).             Karen Love is a 88 year old, presenting for the following health issues:  Hospital F/U and Recheck Medication (Furosemide)        8/9/2024    10:13 AM   Additional Questions   Roomed by Alicia SANABRIA CMA     History of Present Illness       Reason for visit:  Cellulitis  Symptom onset:  1-2 weeks ago    He eats 0-1 servings of fruits and vegetables daily.He consumes 1 sweetened beverage(s) daily.He exercises with enough effort to increase his heart rate 10 to 19 minutes per day.  He exercises with enough effort to increase his heart rate 3 or less days per week.   He is taking medications regularly.     ED/UC Followup:    Facility:  [facility]  Date of visit: 8-2-24  Reason for visit: Pneumonia-left lower lobe, Bi-lat leg swelling right > left, chronic DVT right leg  Current Status: following up for leg swelling and diuretic furosemide    **I reviewed the information recorded in the patient's EPIC chart (including but not limited to medical history, surgical history, family history, problem list, medication list, and allergy list) and updated the information as indicated based on the patients reported information.         Review of Systems  Constitutional, HEENT, cardiovascular, pulmonary, gi and gu systems are negative, except as otherwise noted.      Objective    /64   Pulse 84   Temp 97.5  F (36.4  C) (Oral)   Resp 18   Ht 1.702 m (5' 7\")   Wt 95 kg (209 lb 8 oz)   SpO2 99%   BMI 32.81 kg/m    Body mass index is 32.81 kg/m .  Physical Exam   GENERAL alert and no " distress  EYES:  Normal sclera,conjunctiva, EOMI  HENT: oral and posterior pharynx without lesions or erythema, facies symmetric  NECK: Neck supple. No LAD, without thyroidmegaly.  RESP: Clear to ausculation bilaterally without wheezes or crackles. Normal BS in all fields.  CV: RRR normal S1S2 without murmurs, rubs or gallops.  LYMPH: no cervical lymph adenopathy appreciated  MS: extremities- no gross deformities of the visible extremities noted,   EXT:  2+ bilateral lower extremity edema  PSYCH: Alert and oriented times 3; speech- coherent  SKIN:  No obvious significant skin lesions on visible portions of face         The longitudinal plan of care for the diagnosis(es)/condition(s) as documented were addressed during this visit. Due to the added complexity in care, I will continue to support Ivan in the subsequent management and with ongoing continuity of care.        Signed Electronically by: Neal Esteves MD

## 2024-08-09 NOTE — PATIENT INSTRUCTIONS
"   Swelling in lower legs in in the soft tissues more than blood vessels.      Complete the CT scans as recommended by the oncology doctors. Eed to check for reasons for both the swelling and blood clots in the leg.      Continue the diuretic Furosemide 20 mg once per day in the morning.      Stop the Lisinopril HCTZ, we will remove the HCTZ portion while on furosemide.      Start plain Lisinopril 20 mg once per day      Reduce sodium intake, keep under 2000 mg per day as best possible.  READ LABELS!!!    Keep feet elevated throughout the day.      Referral to lymphedema clinic to learn other ways to manage swelling with compression stocking and wraps.   You will be contacted to arrange.         Return to see me in approximately 1 month, schedule a follow up visit sooner if needed for anything else.  Please get non-fasting labs done at the [facility] or any other [facility] lab 1-2 days before this appointment (schedule a \"lab appointment\").  If you get the labs done at another clinic, make arrangements with them directly.  The orders will be in place.  Use [employer] or Call [phone] to schedule the appointment with me and lab appointment.      "

## 2024-08-14 NOTE — PROGRESS NOTES
This consult has been requested by Jessica Vargas PA-C for DVT.    Mr. John is a 88-year-old gentleman with right lower extremity DVT.  Patient has multiple medical problems including coronary artery disease, stroke and hyperlipidemia.  Patient was seen in the clinic on 07/01/2024 because of left lower extremity swelling and pain.  He had bilateral lower extremity ultrasound on 07/01/2024.  It revealed partially occlusive DVT extending from right common femoral vein through the deep femoral vein and upper to mid-portion of femoral vein.  No DVT in the left lower extremity.  Although his pain was in the left lower extremity, DVT was found in right lower extremity.  Patient was started on Eliquis.    He was seen in emergency room on 08/02/2024 with right leg swelling.  Multiple investigations were done.  -Normal CBC  -Electrolytes normal except elevated urea nitrogen  -Elevated CRP  -Bilateral lower extremity ultrasound reveals nonocclusive DVT in one of the 2 right mid superficial femoral veins.  This is most likely chronic and was present in previous ultrasound.    Discussed with the patient regarding provoking factors.  This is a first episode of thrombosis.  No family history of DVT.  He was not on any hormonal treatment that can cause thrombosis.  Denies any recent surgery.  Denies any recent trauma to the lower extremities.  Denies any recent long distance driving or flying.    Review of system: Patient has generalized weakness.  He is able to do all his activities of daily living.  No headache.  Some lightheadedness.  He intermittently gets mild bilateral chest pain.  No shortness of breath at rest.  Gets short of breath on exertion.  He intermittently gets mild abdominal pain.  No nausea or vomiting.  No bleeding.  He has bilateral lower extremity swelling/lymphedema.  No worsening.    Allergies: Reviewed    Medications: Reviewed.    Past medical history:  Arthritis  Carotid stenosis  Cerebral  vascular disease  Coronary artery disease  Enlarged prostate  Essential hypertension  GERD (gastroesophageal reflux disease)  Hiatal hernia  Hydrocele  Hyperlipidemia   Stented coronary artery  Uncomplicated asthma     Past surgical history:  Laparoscopic herniorrhaphy inguinal bilateral (B)  Hydrocelectomy scrotal (R)  Heart cath, angioplasty   Endarterectomy carotid   Herniorrhaphy inguinal x 2  Hydrocelectomy inguinal   Tonsillectomy    Exam:  He is alert and oriented x 3.  Not in any respiratory distress.  Vitals: Reviewed.  Lower extremities: Bilateral massive pedal edema.  No signs of infection.  Rest of the system is not examined.    Labs: Reviewed.    Assessment:  1.  An 88-year-old gentleman with unprovoked right lower extremity DVT diagnosed on 07/01/2024.  2.  Bilateral pedal edema.  3.  Multiple other medical problems as described above.    Recommendation:  -CT scan in 2-3 weeks.  -Continue eliquis.  -Follow-up after CT scan.    Discussion:  1.  Discussed with the patient regarding DVT.  Patient had gone to the clinic in July with left lower extremity pain.  Ultrasound revealed right lower extremity DVT.  Explained to the patient that right lower extremity DVT was incidental finding.  He was not symptomatic in the right leg from the knee pain.    Discussed regarding thrombosis.  Different causes discussed.  This is unprovoked thrombosis.    Discussed regarding treatment.  He is on Eliquis.  He will continue on it.  He is not having any bleeding complications.  For unprovoked thrombosis, indefinite anticoagulation is recommended.    Patient is elderly.  He will be at high risk of bleeding.  In future we will consider either dose reduction of Eliquis or stopping it.    2.  Patient intermittently gets some chest pain and abdominal pain.  For further evaluation, will get CT chest, abdomen and pelvis.  Given his age, we want to rule out any malignancy.    3.  Patient had a few questions which were all  answered.  I will see him after the CT scan.    Thanks for the consult.    Total visit time of 40 minutes.  Time spent in today's visit, review of chart/investigations today and documentation today.

## 2024-08-20 ENCOUNTER — OFFICE VISIT (OUTPATIENT)
Dept: URGENT CARE | Facility: URGENT CARE | Age: 89
End: 2024-08-20
Payer: COMMERCIAL

## 2024-08-20 ENCOUNTER — THERAPY VISIT (OUTPATIENT)
Dept: OCCUPATIONAL THERAPY | Facility: CLINIC | Age: 89
End: 2024-08-20
Attending: INTERNAL MEDICINE
Payer: COMMERCIAL

## 2024-08-20 VITALS
OXYGEN SATURATION: 98 % | HEART RATE: 64 BPM | WEIGHT: 217 LBS | TEMPERATURE: 98.1 F | DIASTOLIC BLOOD PRESSURE: 67 MMHG | SYSTOLIC BLOOD PRESSURE: 109 MMHG | RESPIRATION RATE: 19 BRPM | BODY MASS INDEX: 33.99 KG/M2

## 2024-08-20 DIAGNOSIS — L03.115 CELLULITIS OF RIGHT LOWER EXTREMITY: Primary | ICD-10-CM

## 2024-08-20 DIAGNOSIS — I89.0 LYMPHEDEMA OF BOTH LOWER EXTREMITIES: ICD-10-CM

## 2024-08-20 PROCEDURE — 97166 OT EVAL MOD COMPLEX 45 MIN: CPT | Mod: GO | Performed by: OCCUPATIONAL THERAPIST

## 2024-08-20 PROCEDURE — 97535 SELF CARE MNGMENT TRAINING: CPT | Mod: GO | Performed by: OCCUPATIONAL THERAPIST

## 2024-08-20 PROCEDURE — 99213 OFFICE O/P EST LOW 20 MIN: CPT

## 2024-08-20 RX ORDER — CEPHALEXIN 500 MG/1
500 CAPSULE ORAL 3 TIMES DAILY
Qty: 21 CAPSULE | Refills: 0 | Status: SHIPPED | OUTPATIENT
Start: 2024-08-20 | End: 2024-08-27

## 2024-08-20 NOTE — PATIENT INSTRUCTIONS
Take the antibiotic as prescribed and finish the full course even if symptoms improve.  Try yogurt with active cultures or probiotics such as Culturelle daily to help prevent diarrhea while using antibiotics.  Go to the emergency department with any new or worsening symptoms.

## 2024-08-20 NOTE — PROGRESS NOTES
"OCCUPATIONAL THERAPY EVALUATION  Type of Visit: Evaluation       Fall Risk Screen:  Fall screen completed by: OT  Have you fallen 2 or more times in the past year?: No  Have you fallen and had an injury in the past year?: No  Is patient a fall risk?: Yes  Fall screen comments: Gait uneven, heavy when ambulating w/o AD. Pt reports he normally uses FWW. OT encouraged pt to bring FWW to all appts to facilitate functional mobility and reduce fall risk.    Subjective      Presenting condition or subjective complaint: swollen legs  Date of onset: 08/09/24 (Likely chronic. Pt reports \"I've always had big legs\")    Relevant medical history: Arthritis; Cold or hot arm or leg     Per HPI from 8/2/2024 ED visit: \"Ivan Payton is a 88 year old male with a history of CAD, CVA, hyperlipidemia, blood clots, and stented coronary artery on Eliquis who presents to the ED with right lower extremity leg swelling and oozing of fluid. The patient's wife reports the patient's right leg began swelling within the past week. She endorses the patient experiencing more drainage and slight erythema to right leg. The patient endorses some tingling sensation in the right leg. He denies pain, fever, chills, rhinorrhea, congestion, cough, sore throat, chest pain, difficulty breathing, nausea, vomiting, abdominal pain, dysuria, hematuria, diarrhea, or black/bloody stools. He denies history of diabetes mellitus. He confirms he had a blood clot in the groin and is on Eliquis. The patient's wife notes the patient had an infection in the left leg and states it was more red compared to his right leg now.  No recent wounds or injuries to the right leg.  Patient is currently not on furosemide/Lasix. \"    Dates & types of surgery: heart stents  more than 5 years ago    Prior diagnostic imaging/testing results: X-ray; Other ultra sound   Prior therapy history for the same diagnosis, illness or injury: No      Prior Level of Function  Transfers: Assistive " "equipment  Ambulation: Assistive equipment, 4WW  ADL: Assistive equipment, Assistive person, 4WW,  pt's wife can assist if needed.  IADL:  Pt's wife provides assist.    Living Environment  Social support: With a significant other or spouse   Type of home: 2-story   Stairs to enter the home:         Ramp: No   Stairs inside the home: Yes 13 Is there a railing: Yes     Help at home: Home management tasks (cooking, cleaning); Home and Yard maintenance tasks  Equipment owned: Walker with wheels; Grab bars     Employment: No    Hobbies/Interests: garden    Patient goals for therapy: normal walk    Pain assessment:  Pt reports no pain related to BLE lymphedema.     Objective       EDEMA EVALUATION  Additional history:  Body part affected by edema: legs  If cancer related, treatment: none  If not cancer related, problems with veins or cause of swelling:    Distance able to walk: around block with walker  Time able to stand: not long  Sensation problems in hands/feet: No    Edema etiology: Unknown, possibly primary lymphedema as pt reports \"I've always had big legs, and my mother and sister had big legs too\"    FUNCTIONAL SCALES  LLIS:     Cognitive Status Examination  Orientation: Oriented to person, place and time   Level of Consciousness: Alert  Follows Commands and Answers Questions: 100% of the time  Personal Safety and Judgement: Intact, though pt needing repeated education/encouragement to seek attention for s/s of infection in RLE (increased size, warmth, redness). Pt also taking no steps to manage lymphorrhea and needing repeated education in importance of skin cares.  Memory: Impaired. Pt demonstrating impaired short-term recall of instructions/education provided by OT. Needed repeated reinforcement.    EDEMA  Skin Condition: RLE: Redness extending from ankle to above knee, skin taut and shiny, warm to the touch. LLE: Intact, scattered spidery veins  Pitting: RLE: large, doughy 3-4+ pitting from ankle to knee, " "softer 2+ pitting just proximal to knee. LLE: Trace edema around knee with clear shelf of 3+ edema starting 3\" distal to knee and extending to ankle.   Scar: No  Dorsal Pedal Pulse:  Not assessed, shoes not removed.  Stemmer Sign: Not assessed, shoes not removed.  Ulceration:  Pencil-eraser sized scab on lateral R shin.    GIRTH MEASUREMENTS: Refer to separate girth measurement flowsheet.     VOLUME LE  RLE volume visually larger than LLE. Measurements not taken.     RANGE OF MOTION:  WFL  STRENGTH:  WFL  POSTURE: Standing Posture: Rounded shoulders, Forward head, forward lean  ACTIVITIES OF DAILY LIVING: Increased assist for LB dressing  BED MOBILITY: Independent, effortful  TRANSFERS: Independent, though effortful from lower surface w/o 4WW. OT encouraged pt to bring walker to all appts.  GAIT/LOCOMOTION: Uneven, heavy gait  BALANCE:  No overt LOB, though pt appears unsteady  SENSATION:  Some numbness in BLE. Will continue to assess.  VASCULAR:  Will continue to assess.  COORDINATION: WFL  MUSCLE TONE: WFL    Assessment & Plan   CLINICAL IMPRESSIONS  Medical Diagnosis: Lymphedema    Treatment Diagnosis: Lymphedema    Impression/Assessment: Pt is a 88 year old male presenting to Occupational Therapy due to increased BLE lymphedema and recent treatment for celluitis resulting in impaired funtional mobility and ADL/IADL performance.  The following significant findings have been identified: Impaired activity tolerance, Impaired balance, and Impaired mobility.  These identified deficits interfere with their ability to perform self care tasks, recreational activities, household chores, household mobility, community mobility,  yard work, and meal planning and preparation as compared to previous level of function.     Clinical Decision Making (Complexity):  Assessment of Occupational Performance: 3-5 Performance Deficits  Occupational Performance Limitations: dressing, functional mobility, driving and community mobility, " health management and maintenance, home establishment and management, and meal preparation and cleanup  Clinical Decision Making (Complexity): Moderate complexity    PLAN OF CARE  Treatment Interventions:  Interventions: Self-Care/Home Management, Gradient Compression Bandaging, Manual Therapy    Long Term Goals   OT Goal 1  Goal Identifier: 1 - Education  Goal Description: In order to improve functional mobility for activities of living, by the completion of intensive treatment, patient and/or caregiver will;   Verbalize and/or demonstrate understanding of techniques to independently manage their lymphedema at home  Rationale: In order to maximize safety and independence with performance of self-care activities  OT Goal 2  Goal Identifier: 1a - Compression Tolerance  Goal Description: tolerate gradient compression bandaging/wearing compression garments 23 hrs/day to decrease volume of extracellular fluid  Rationale: In order to maximize safety and independence with performance of self-care activities  OT Goal 3  Goal Identifier: 1b - GCB  Goal Description: Pt or caregiver demonstrate independence in applying gradient compression bandages  Rationale: In order to maximize safety and independence with performance of self-care activities  OT Goal 4  Goal Identifier: 1c - Exercises, Self-MLD  Goal Description: demonstrate independence in performing prescribed exercises, self MLD to facilate the lymph system  Rationale: In order to maximize safety and independence with performance of self-care activities  OT Goal 5  Goal Identifier: 1d - Garments  Goal Description: Pt and caregiver be independent in donning/doffing, wearing schedule, and care of compression garments  Rationale: In order to maximize safety and independence with performance of self-care activities      Frequency of Treatment: 3x/week x3 weeks, then 1x/week x4 weeks  Duration of Treatment: 90 days     Recommended Referrals to Other Professionals: Physical  "Therapy - pt may benefit from PT assessment for gait and balance. Will continue to assess at treatment for BLE lymphedema progresses.  Education Assessment: Learner/Method: Patient;Family;Listening;Reading;Demonstration  Education Comments: Pt and wife verbalize understanding. Pt will benefit from ongoing reinforcement.     Risks and benefits of evaluation/treatment have been explained.   Patient/Family/caregiver agrees with Plan of Care.     Evaluation Time:    OT Eval, Moderate Complexity Minutes (96894): 28    Signing Clinician: TIGIST Goldman [facility]                                                                                   OUTPATIENT OCCUPATIONAL THERAPY      PLAN OF TREATMENT FOR OUTPATIENT REHABILITATION   Patient's Last Name, First Name, M.Ivan Mcfarlane YOB: 1935   Provider's Name   [facility]   Medical Record No.  [id number]     Onset Date: 08/09/24 (Likely chronic. Pt reports \"I've always had big legs\") Start of Care Date: 08/20/24     Medical Diagnosis:  Lymphedema      OT Treatment Diagnosis:  Lymphedema Plan of Treatment  Frequency/Duration:3x/week x3 weeks, then 1x/week x4 weeks/90 days    Certification date from 08/20/24   To 11/17/24        See note for plan of treatment details and functional goals     TIGIST Goldman                         I CERTIFY THE NEED FOR THESE SERVICES FURNISHED UNDER        THIS PLAN OF TREATMENT AND WHILE UNDER MY CARE     (Physician attestation of this document indicates review and certification of the therapy plan).              Referring Provider:  Neal Esteevs    Initial Assessment  See Epic Evaluation- 08/20/24                      "

## 2024-08-20 NOTE — PROGRESS NOTES
Assessment & Plan   (L03.115) Cellulitis of right lower extremity  (primary encounter diagnosis)  Plan: cephALEXin (KEFLEX) 500 MG capsule    Cellulitis patient instructions discussed and provided.  Informed the patient to take the antibiotic as prescribed and finish the full course even if symptoms improve.  We discussed the need to try yogurt with active cultures or probiotic such as Culturelle daily to help prevent diarrhea while taking the antibiotic.  We also discussed the need to go to the emergency department with any new or worsening symptoms.  Patient acknowledged his understanding of the above plan.    The use of Dragon/[employer] dictation services may have been used to construct the content in this note; any grammatical or spelling errors are non-intentional. Please contact the author of this note directly if you are in need of any clarification.      JAILENE Carrington CNP  [facility] JOSE Love is a 88 year old male who presents to clinic today for the following health issues:  Chief Complaint   Patient presents with    Urgent Care     Pt present with swollen R leg, pt thinks it is infected, onset 2 plus weeks     HPI  Patient has had his right lower leg become more swollen and red over the past 2 weeks.  He was seen at his lymphedema clinic earlier today and they advised he be seen in urgent care to evaluate for a possible infection.    ROS:  Negative except noted above.    Review of Systems        Objective    /67   Pulse 64   Temp 98.1  F (36.7  C) (Tympanic)   Resp 19   Wt 98.4 kg (217 lb)   SpO2 98%   BMI 33.99 kg/m    Physical Exam   GENERAL: alert and no distress  MS: Right lower leg -2+ pitting edema.  Multiple areas of open wounds.  Erythema on the lateral aspect wrapping around into the posterior portion with red streaks traveling upward.  Left lower leg -1+ pitting edema.  No erythema.

## 2024-08-29 DIAGNOSIS — R60.0 BILATERAL LOWER EXTREMITY EDEMA: ICD-10-CM

## 2024-08-29 RX ORDER — FUROSEMIDE 20 MG
20 TABLET ORAL EVERY MORNING
Qty: 90 TABLET | Refills: 0 | Status: SHIPPED | OUTPATIENT
Start: 2024-08-29 | End: 2024-09-03

## 2024-08-30 ENCOUNTER — ANCILLARY PROCEDURE (OUTPATIENT)
Dept: CT IMAGING | Facility: CLINIC | Age: 89
End: 2024-08-30
Attending: INTERNAL MEDICINE
Payer: COMMERCIAL

## 2024-08-30 DIAGNOSIS — R10.84 ABDOMINAL PAIN, GENERALIZED: ICD-10-CM

## 2024-08-30 DIAGNOSIS — R07.9 CHEST PAIN, UNSPECIFIED TYPE: ICD-10-CM

## 2024-08-30 DIAGNOSIS — I82.411 ACUTE DEEP VEIN THROMBOSIS (DVT) OF FEMORAL VEIN OF RIGHT LOWER EXTREMITY (H): ICD-10-CM

## 2024-08-30 PROCEDURE — 71250 CT THORAX DX C-: CPT

## 2024-08-30 NOTE — RESULT ENCOUNTER NOTE
Dear Mr. Jonesries,    CT scan does not reveal any cancer. We will review it during appointment.    Please, call me with any questions.    Domonique Alvarez MD

## 2024-08-31 ENCOUNTER — LAB (OUTPATIENT)
Dept: LAB | Facility: CLINIC | Age: 89
End: 2024-08-31
Payer: COMMERCIAL

## 2024-08-31 DIAGNOSIS — K21.9 GASTROESOPHAGEAL REFLUX DISEASE WITHOUT ESOPHAGITIS: ICD-10-CM

## 2024-08-31 DIAGNOSIS — I82.411 ACUTE DEEP VEIN THROMBOSIS (DVT) OF FEMORAL VEIN OF RIGHT LOWER EXTREMITY (H): ICD-10-CM

## 2024-08-31 DIAGNOSIS — I89.0 LYMPHEDEMA OF BOTH LOWER EXTREMITIES: ICD-10-CM

## 2024-08-31 DIAGNOSIS — I10 ESSENTIAL HYPERTENSION: ICD-10-CM

## 2024-08-31 DIAGNOSIS — I25.10 CORONARY ARTERY DISEASE INVOLVING NATIVE CORONARY ARTERY OF NATIVE HEART WITHOUT ANGINA PECTORIS: ICD-10-CM

## 2024-08-31 LAB
ALBUMIN SERPL BCG-MCNC: 3.7 G/DL (ref 3.5–5.2)
ALP SERPL-CCNC: 51 U/L (ref 40–150)
ALT SERPL W P-5'-P-CCNC: 11 U/L (ref 0–70)
ANION GAP SERPL CALCULATED.3IONS-SCNC: 12 MMOL/L (ref 7–15)
AST SERPL W P-5'-P-CCNC: 19 U/L (ref 0–45)
BILIRUB SERPL-MCNC: 0.3 MG/DL
BUN SERPL-MCNC: 17.3 MG/DL (ref 8–23)
CALCIUM SERPL-MCNC: 8.9 MG/DL (ref 8.8–10.4)
CHLORIDE SERPL-SCNC: 104 MMOL/L (ref 98–107)
CREAT SERPL-MCNC: 0.76 MG/DL (ref 0.67–1.17)
EGFRCR SERPLBLD CKD-EPI 2021: 87 ML/MIN/1.73M2
GLUCOSE SERPL-MCNC: 175 MG/DL (ref 70–99)
HCO3 SERPL-SCNC: 25 MMOL/L (ref 22–29)
POTASSIUM SERPL-SCNC: 4.2 MMOL/L (ref 3.4–5.3)
PROT SERPL-MCNC: 5.9 G/DL (ref 6.4–8.3)
SODIUM SERPL-SCNC: 141 MMOL/L (ref 135–145)

## 2024-08-31 PROCEDURE — 80053 COMPREHEN METABOLIC PANEL: CPT

## 2024-08-31 PROCEDURE — 36415 COLL VENOUS BLD VENIPUNCTURE: CPT

## 2024-09-03 ENCOUNTER — OFFICE VISIT (OUTPATIENT)
Dept: INTERNAL MEDICINE | Facility: CLINIC | Age: 89
End: 2024-09-03
Payer: COMMERCIAL

## 2024-09-03 ENCOUNTER — THERAPY VISIT (OUTPATIENT)
Dept: OCCUPATIONAL THERAPY | Facility: CLINIC | Age: 89
End: 2024-09-03
Payer: COMMERCIAL

## 2024-09-03 VITALS
OXYGEN SATURATION: 95 % | HEART RATE: 69 BPM | DIASTOLIC BLOOD PRESSURE: 60 MMHG | WEIGHT: 218.5 LBS | SYSTOLIC BLOOD PRESSURE: 116 MMHG | BODY MASS INDEX: 34.29 KG/M2 | RESPIRATION RATE: 18 BRPM | HEIGHT: 67 IN | TEMPERATURE: 97.8 F

## 2024-09-03 DIAGNOSIS — I82.411 ACUTE DEEP VEIN THROMBOSIS (DVT) OF FEMORAL VEIN OF RIGHT LOWER EXTREMITY (H): ICD-10-CM

## 2024-09-03 DIAGNOSIS — I89.0 LYMPHEDEMA OF BOTH LOWER EXTREMITIES: Primary | ICD-10-CM

## 2024-09-03 DIAGNOSIS — I89.0 LYMPHEDEMA: Primary | ICD-10-CM

## 2024-09-03 DIAGNOSIS — I25.10 CORONARY ARTERY DISEASE INVOLVING NATIVE CORONARY ARTERY OF NATIVE HEART WITHOUT ANGINA PECTORIS: ICD-10-CM

## 2024-09-03 DIAGNOSIS — R60.0 BILATERAL LOWER EXTREMITY EDEMA: ICD-10-CM

## 2024-09-03 DIAGNOSIS — I10 ESSENTIAL HYPERTENSION: ICD-10-CM

## 2024-09-03 DIAGNOSIS — E78.5 HYPERLIPIDEMIA WITH TARGET LDL LESS THAN 100: ICD-10-CM

## 2024-09-03 DIAGNOSIS — R73.09 ELEVATED GLUCOSE: ICD-10-CM

## 2024-09-03 LAB
CREAT UR-MCNC: 126 MG/DL
MICROALBUMIN UR-MCNC: 14.6 MG/L
MICROALBUMIN/CREAT UR: 11.59 MG/G CR (ref 0–17)

## 2024-09-03 PROCEDURE — 97140 MANUAL THERAPY 1/> REGIONS: CPT | Mod: GO | Performed by: OCCUPATIONAL THERAPIST

## 2024-09-03 PROCEDURE — 99214 OFFICE O/P EST MOD 30 MIN: CPT | Performed by: INTERNAL MEDICINE

## 2024-09-03 PROCEDURE — 82043 UR ALBUMIN QUANTITATIVE: CPT | Performed by: INTERNAL MEDICINE

## 2024-09-03 PROCEDURE — 82570 ASSAY OF URINE CREATININE: CPT | Performed by: INTERNAL MEDICINE

## 2024-09-03 RX ORDER — FUROSEMIDE 20 MG
40 TABLET ORAL EVERY MORNING
Status: SHIPPED
Start: 2024-09-03 | End: 2024-10-02

## 2024-09-03 ASSESSMENT — PAIN SCALES - GENERAL: PAINLEVEL: NO PAIN (0)

## 2024-09-03 NOTE — PROGRESS NOTES
"  Assessment & Plan     (I89.0) Lymphedema  (primary encounter diagnosis)  Comment: onset of bilateral lower extremity edema for past 3 months.   New diagnosis of right DVT 2 months ago.   CT abd/pelvis/chest shows no lymphoma, no cancer etc.   No clear CHF symptoms.   Somewhat better with diuresis, will increase dose of furosemide.   Return in 4 weeks with labs before.   Check urine to rule out protein wasting nephropathy       Reassuring that there seems to be no cancer in the abdomen.      Swelling better, but can still more to go.      Increase the Furosemide to 40 mg once per day in the morning (take 2 x 20 mg tablets)     Continue all other medications at the same doses.  Contact your usual pharmacy if you need refills.      Echocardiogram .     Lymphedema therapy as planned.      Return to see me in approximately 4 weeks, schedule a follow up visit sooner if needed for anything else.  Please get non-fasting labs done at the [facility] or any other [facility] Lab lab 1-2 days before this appointment (schedule a \"lab appointment\").  If you get the labs done at another clinic, make arrangements with them directly.  The orders will be in place.  Use [employer] or Call [phone] to schedule the appointment with me and lab appointment.          Plan: Echocardiogram Complete, Basic metabolic panel,        CBC with platelets, Albumin Random Urine         Quantitative with Creat Ratio            (R60.0) Bilateral lower extremity edema  Comment:   Plan: Echocardiogram Complete, furosemide (LASIX) 20         MG tablet, Basic metabolic panel, CBC with         platelets, Albumin Random Urine Quantitative         with Creat Ratio            (I82.411) Acute deep vein thrombosis (DVT) of femoral vein of right lower extremity (H)  Comment:   Plan: Echocardiogram Complete            (R73.09) Elevated glucose  Comment: check A1C at next lab draw  Liely has at least pre-diabetes.   Plan: Basic metabolic panel, " "Hemoglobin A1c            (I10) Essential hypertension  Comment: This condition is currently controlled on the current medical regimen.  Continue current therapy.   Plan: Basic metabolic panel, CBC with platelets,         Albumin Random Urine Quantitative with Creat         Ratio            (I25.10) Coronary artery disease involving native coronary artery of native heart without angina pectoris  Comment: This condition is currently controlled on the current medical regimen.  Continue current therapy.   Plan:     (E78.5) Hyperlipidemia with target LDL less than 100  Comment: This condition is currently controlled on the current medical regimen.  Continue current therapy.   Plan:              BMI  Estimated body mass index is 34.22 kg/m  as calculated from the following:    Height as of this encounter: 1.702 m (5' 7\").    Weight as of this encounter: 99.1 kg (218 lb 8 oz).             Karen Love is a 88 year old, presenting for the following health issues:  RECHECK (Follow up lymphedema)        9/3/2024    11:33 AM   Additional Questions   Roomed by Alicia SANABRIA CMA     History of Present Illness       Reason for visit:  Legs    He eats 2-3 servings of fruits and vegetables daily.He consumes 1 sweetened beverage(s) daily.He exercises with enough effort to increase his heart rate 9 or less minutes per day.  He exercises with enough effort to increase his heart rate 3 or less days per week.   He is taking medications regularly.     Developed lower extremity edema in both legs about 2-3 months ago, was not present at cardiology clinic visit March 2024.     No shortness of breath, no dyspnea on exertion, no orthopnea.   Weight similar.   No change sin diet, has been taking less sodium.     Developed DVT July 1, 2024.   On eliquis.     Follow up Lymphedema  Wakes patient up from sleep     Swelling better in both legs.   Was weeping before, but not currently  Has not yet seen lymphedema therapists.   No shortness of " "breath, no VILLAVICENCIO, no orthopnea.       He has not experienced any significant side effects of this medication.       **I reviewed the information recorded in the patient's EPIC chart (including but not limited to medical history, surgical history, family history, problem list, medication list, and allergy list) and updated the information as indicated based on the patients reported information.         Review of Systems  Constitutional, HEENT, cardiovascular, pulmonary, gi and gu systems are negative, except as otherwise noted.      Objective    /60   Pulse 69   Temp 97.8  F (36.6  C) (Oral)   Resp 18   Ht 1.702 m (5' 7\")   Wt 99.1 kg (218 lb 8 oz)   SpO2 95%   BMI 34.22 kg/m    Body mass index is 34.22 kg/m .  Physical Exam   GENERAL alert and no distress  EYES:  Normal sclera,conjunctiva, EOMI  HENT: oral and posterior pharynx without lesions or erythema, facies symmetric  NECK: Neck supple. No LAD, without thyroidmegaly.  RESP: Clear to ausculation bilaterally without wheezes or crackles. Normal BS in all fields.  CV: RRR normal S1S2 without murmurs, rubs or gallops.  LYMPH: no cervical lymph adenopathy appreciated  MS: extremities- no gross deformities of the visible extremities noted,   EXT: 2-3+ bilateral pitting lower extremity edema  R>L, swelling today is improved, lower legs were much more tense before, less so now.  Was weeping 3 weeks ago, but not now.   PSYCH: Alert and oriented times 3; speech- coherent  SKIN:  No obvious significant skin lesions on visible portions of face             Signed Electronically by: Neal Esteves MD    "

## 2024-09-03 NOTE — PATIENT INSTRUCTIONS
"   Reassuring that there seems to be no cancer in the abdomen.      Swelling better, but can still more to go.      Increase the Furosemide to 40 mg once per day in the morning (take 2 x 20 mg tablets)     Continue all other medications at the same doses.  Contact your usual pharmacy if you need refills.        Echocardiogram to check villela pumping function.   You will contacted to arrange this exam.       Lymphedema therapy as planned.      Return to see me in approximately 4 weeks, schedule a follow up visit sooner if needed for anything else.  Please get non-fasting labs done at the [facility] or any other [facility] Lab lab 1-2 days before this appointment (schedule a \"lab appointment\").  If you get the labs done at another clinic, make arrangements with them directly.  The orders will be in place.  Use [employer] or Call [phone] to schedule the appointment with me and lab appointment.        "

## 2024-09-06 ENCOUNTER — THERAPY VISIT (OUTPATIENT)
Dept: OCCUPATIONAL THERAPY | Facility: CLINIC | Age: 89
End: 2024-09-06
Payer: COMMERCIAL

## 2024-09-06 DIAGNOSIS — I89.0 LYMPHEDEMA OF BOTH LOWER EXTREMITIES: Primary | ICD-10-CM

## 2024-09-06 PROCEDURE — 97140 MANUAL THERAPY 1/> REGIONS: CPT | Mod: GO | Performed by: OCCUPATIONAL THERAPIST

## 2024-09-10 ENCOUNTER — THERAPY VISIT (OUTPATIENT)
Dept: OCCUPATIONAL THERAPY | Facility: CLINIC | Age: 89
End: 2024-09-10
Payer: COMMERCIAL

## 2024-09-10 DIAGNOSIS — I89.0 LYMPHEDEMA: Primary | ICD-10-CM

## 2024-09-10 PROCEDURE — 97140 MANUAL THERAPY 1/> REGIONS: CPT | Mod: GO | Performed by: OCCUPATIONAL THERAPIST

## 2024-09-13 ENCOUNTER — THERAPY VISIT (OUTPATIENT)
Dept: OCCUPATIONAL THERAPY | Facility: CLINIC | Age: 89
End: 2024-09-13
Payer: COMMERCIAL

## 2024-09-13 DIAGNOSIS — I89.0 LYMPHEDEMA: Primary | ICD-10-CM

## 2024-09-13 PROCEDURE — 97140 MANUAL THERAPY 1/> REGIONS: CPT | Mod: GO | Performed by: OCCUPATIONAL THERAPIST

## 2024-09-16 ENCOUNTER — THERAPY VISIT (OUTPATIENT)
Dept: OCCUPATIONAL THERAPY | Facility: CLINIC | Age: 89
End: 2024-09-16
Payer: COMMERCIAL

## 2024-09-16 ENCOUNTER — ONCOLOGY VISIT (OUTPATIENT)
Dept: ONCOLOGY | Facility: CLINIC | Age: 89
End: 2024-09-16
Attending: INTERNAL MEDICINE
Payer: COMMERCIAL

## 2024-09-16 VITALS
DIASTOLIC BLOOD PRESSURE: 79 MMHG | WEIGHT: 212 LBS | RESPIRATION RATE: 16 BRPM | OXYGEN SATURATION: 97 % | SYSTOLIC BLOOD PRESSURE: 132 MMHG | BODY MASS INDEX: 33.27 KG/M2 | HEART RATE: 73 BPM | HEIGHT: 67 IN

## 2024-09-16 DIAGNOSIS — I89.0 LYMPHEDEMA: Primary | ICD-10-CM

## 2024-09-16 DIAGNOSIS — R91.8 ABNORMAL CT SCAN OF LUNG: ICD-10-CM

## 2024-09-16 DIAGNOSIS — K86.2 PANCREATIC CYST: Primary | ICD-10-CM

## 2024-09-16 DIAGNOSIS — I82.411 ACUTE DEEP VEIN THROMBOSIS (DVT) OF FEMORAL VEIN OF RIGHT LOWER EXTREMITY (H): ICD-10-CM

## 2024-09-16 PROCEDURE — 97140 MANUAL THERAPY 1/> REGIONS: CPT | Mod: GO | Performed by: OCCUPATIONAL THERAPIST

## 2024-09-16 PROCEDURE — G0463 HOSPITAL OUTPT CLINIC VISIT: HCPCS | Performed by: INTERNAL MEDICINE

## 2024-09-16 PROCEDURE — 99214 OFFICE O/P EST MOD 30 MIN: CPT | Performed by: INTERNAL MEDICINE

## 2024-09-16 PROCEDURE — G2211 COMPLEX E/M VISIT ADD ON: HCPCS | Performed by: INTERNAL MEDICINE

## 2024-09-16 ASSESSMENT — PAIN SCALES - GENERAL: PAINLEVEL: NO PAIN (0)

## 2024-09-16 NOTE — LETTER
"9/16/2024      Ivan John  [address]  [place] [facility] [address]      Dear Colleague,    Thank you for referring your patient, Ivan John, to the [facility]. Please see a copy of my visit note below.    Oncology Rooming Note    September 16, 2024 2:12 PM   Ivan John is a 88 year old male who presents for:    Chief Complaint   Patient presents with     Oncology Clinic Visit     Initial Vitals: There were no vitals taken for this visit. Estimated body mass index is 34.22 kg/m  as calculated from the following:    Height as of 9/3/24: 1.702 m (5' 7\").    Weight as of 9/3/24: 99.1 kg (218 lb 8 oz). There is no height or weight on file to calculate BSA.  Data Unavailable Comment: Data Unavailable   No LMP for male patient.  Allergies reviewed: Yes  Medications reviewed: Yes    Medications: Medication refills not needed today.  Pharmacy name entered into [employer]:    [id number] - [place], [place] - [address] [id number] [facility] [employer] - [place], [place] - [address]    Frailty Screening:   Is the patient here for a new oncology consult visit in cancer care? 2. No        Maryanne Quiros MA              HEMATOLOGY HISTORY: Mr. John is an gentleman with unprovoked right lower extremity DVT.      He had bilateral lower extremity ultrasound on 07/01/2024.  It revealed partially occlusive DVT extending from right common femoral vein through the deep femoral vein and upper to mid-portion of femoral vein.  No DVT in the left lower extremity.  Although his pain was in the left lower extremity, DVT was found in right lower extremity.    -Patient was started on Eliquis.  2.  Bilateral lower extremity ultrasound reveals nonocclusive DVT in one of the 2 right mid superficial femoral veins.  This is most likely chronic and was present in previous ultrasound.  3.  CT chest, abdomen and pelvis on 08/30/2024 does not reveal any evidence of malignancy.  There is large hiatal hernia containing an " intrathoracic stomach and a portion of the pancreas.  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    Subjective:  Mr. Payton is a 88-year-old gentleman who was recently seen for unprovoked right lower extremity DVT.  Patient is on Eliquis.    He is here for follow-up on the result of CT scan which was done on 08/30/2024:  1.  No acute abnormality in the chest, abdomen, or pelvis.  2.  Streaky opacities in both lungs are favored to reflect scarring and/or atelectatic changes but, evaluation is limited. Consider chest CT follow-up in 3 months if symptomatic.  3.  Large hiatal hernia containing an intrathoracic stomach and a portion of the pancreas.  4.  Question a 14 mm cystic lesion/side branch IPMN in the region of the pancreatic body. Guidelines below.  5.  Prostatomegaly.    Patient's overall condition is stable.  He is tolerating Eliquis well.  No bleeding complications. No headache.  Some lightheadedness.  No chest pain.  No shortness of breath at rest.  Gets short of breath on exertion.  No abdominal pain.  No nausea or vomiting.  No bleeding.  He has bilateral lower extremity swelling/lymphedema.  No worsening of swelling.     Exam:  He is alert and oriented x 3.  Not in any respiratory distress.  Vitals: Reviewed.  Rest of the system is not examined.     Assessment:  1.  An 88-year-old gentleman with unprovoked right lower extremity DVT diagnosed on 07/01/2024.  Patient on Eliquis.  2.  Pancreatic cystic lesion.    Plan:  -Continue eliquis.  -CT abdomen and pelvis in 6 months.  -See me after CT scan.     Discussion:  1.  CT scan was reviewed the patient.  Explained to him there is no evidence of malignancy.  There are few other abnormalities.  There is some atelectatic changes in the lung.  Nothing needs to be done regarding it.  There is large hiatal hernia.  He is pretty asymptomatic.  He will let us know if he has any heartburn.  There is a cystic lesion in the pancreatic body.   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  Explained to the patient that we will monitor it.  He also has prostatomegaly.    Will get CT abdomen and pelvis in 6 months.  He is agreeable for it.    2.  For unprovoked thrombosis, he will continue on Eliquis.  He is not having any bleeding complications.  We may consider dose reduction of Eliquis in future.    3.  He had a few questions which were all answered.  I will see him in 6 months with CT scan.  Advised him to go to emergency room if he has chest pain, shortness of breath, pain/swelling/redness in the extremities, bleeding or any other concerns.     Total visit time of 20 minutes.  Time spent in today's visit, review of chart/investigations today and documentation.        Again, thank you for allowing me to participate in the care of your patient.        Sincerely,        Domonique Alvarez MD

## 2024-09-16 NOTE — PROGRESS NOTES
"Oncology Rooming Note    September 16, 2024 2:12 PM   Ivan Payton is a 88 year old male who presents for:    Chief Complaint   Patient presents with    Oncology Clinic Visit     Initial Vitals: There were no vitals taken for this visit. Estimated body mass index is 34.22 kg/m  as calculated from the following:    Height as of 9/3/24: 1.702 m (5' 7\").    Weight as of 9/3/24: 99.1 kg (218 lb 8 oz). There is no height or weight on file to calculate BSA.  Data Unavailable Comment: Data Unavailable   No LMP for male patient.  Allergies reviewed: Yes  Medications reviewed: Yes    Medications: Medication refills not needed today.  Pharmacy name entered into [employer]:    [id number] - [place] - [address] [id number] [place] [facility] [place] - [address]    Frailty Screening:   Is the patient here for a new oncology consult visit in cancer care? 2. No        Maryanne Quiros MA            "

## 2024-09-17 ENCOUNTER — THERAPY VISIT (OUTPATIENT)
Dept: OCCUPATIONAL THERAPY | Facility: CLINIC | Age: 89
End: 2024-09-17
Payer: COMMERCIAL

## 2024-09-17 DIAGNOSIS — I89.0 LYMPHEDEMA: Primary | ICD-10-CM

## 2024-09-17 PROCEDURE — 97140 MANUAL THERAPY 1/> REGIONS: CPT | Mod: GO | Performed by: OCCUPATIONAL THERAPIST

## 2024-09-18 ENCOUNTER — THERAPY VISIT (OUTPATIENT)
Dept: OCCUPATIONAL THERAPY | Facility: CLINIC | Age: 89
End: 2024-09-18
Payer: COMMERCIAL

## 2024-09-18 DIAGNOSIS — I89.0 LYMPHEDEMA: Primary | ICD-10-CM

## 2024-09-18 PROCEDURE — 97140 MANUAL THERAPY 1/> REGIONS: CPT | Mod: GO | Performed by: OCCUPATIONAL THERAPIST

## 2024-09-21 NOTE — PROGRESS NOTES
HEMATOLOGY HISTORY: Mr. John is an gentleman with unprovoked right lower extremity DVT.      He had bilateral lower extremity ultrasound on 07/01/2024.  It revealed partially occlusive DVT extending from right common femoral vein through the deep femoral vein and upper to mid-portion of femoral vein.  No DVT in the left lower extremity.  Although his pain was in the left lower extremity, DVT was found in right lower extremity.    -Patient was started on Eliquis.  2.  Bilateral lower extremity ultrasound reveals nonocclusive DVT in one of the 2 right mid superficial femoral veins.  This is most likely chronic and was present in previous ultrasound.  3.  CT chest, abdomen and pelvis on 08/30/2024 does not reveal any evidence of malignancy.  There is large hiatal hernia containing an intrathoracic stomach and a portion of the pancreas.  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    Subjective:  Mr. John is a 88-year-old gentleman who was recently seen for unprovoked right lower extremity DVT.  Patient is on Eliquis.    He is here for follow-up on the result of CT scan which was done on 08/30/2024:  1.  No acute abnormality in the chest, abdomen, or pelvis.  2.  Streaky opacities in both lungs are favored to reflect scarring and/or atelectatic changes but, evaluation is limited. Consider chest CT follow-up in 3 months if symptomatic.  3.  Large hiatal hernia containing an intrathoracic stomach and a portion of the pancreas.  4.  Question a 14 mm cystic lesion/side branch IPMN in the region of the pancreatic body. Guidelines below.  5.  Prostatomegaly.    Patient's overall condition is stable.  He is tolerating Eliquis well.  No bleeding complications. No headache.  Some lightheadedness.  No chest pain.  No shortness of breath at rest.  Gets short of breath on exertion.  No abdominal pain.  No nausea or vomiting.  No bleeding.  He has bilateral lower extremity swelling/lymphedema.  No  worsening of swelling.     Exam:  He is alert and oriented x 3.  Not in any respiratory distress.  Vitals: Reviewed.  Rest of the system is not examined.     Assessment:  1.  An 88-year-old gentleman with unprovoked right lower extremity DVT diagnosed on 07/01/2024.  Patient on Eliquis.  2.  Pancreatic cystic lesion.    Plan:  -Continue eliquis.  -CT abdomen and pelvis in 6 months.  -See me after CT scan.     Discussion:  1.  CT scan was reviewed the patient.  Explained to him there is no evidence of malignancy.  There are few other abnormalities.  There is some atelectatic changes in the lung.  Nothing needs to be done regarding it.  There is large hiatal hernia.  He is pretty asymptomatic.  He will let us know if he has any heartburn.  There is a cystic lesion in the pancreatic body.  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  Explained to the patient that we will monitor it.  He also has prostatomegaly.    Will get CT abdomen and pelvis in 6 months.  He is agreeable for it.    2.  For unprovoked thrombosis, he will continue on Eliquis.  He is not having any bleeding complications.  We may consider dose reduction of Eliquis in future.    3.  He had a few questions which were all answered.  I will see him in 6 months with CT scan.  Advised him to go to emergency room if he has chest pain, shortness of breath, pain/swelling/redness in the extremities, bleeding or any other concerns.     Total visit time of 20 minutes.  Time spent in today's visit, review of chart/investigations today and documentation.

## 2024-09-24 ENCOUNTER — THERAPY VISIT (OUTPATIENT)
Dept: OCCUPATIONAL THERAPY | Facility: CLINIC | Age: 89
End: 2024-09-24
Payer: COMMERCIAL

## 2024-09-24 DIAGNOSIS — I89.0 LYMPHEDEMA: Primary | ICD-10-CM

## 2024-09-24 PROCEDURE — 97140 MANUAL THERAPY 1/> REGIONS: CPT | Mod: GO | Performed by: OCCUPATIONAL THERAPIST

## 2024-09-28 ENCOUNTER — LAB (OUTPATIENT)
Dept: LAB | Facility: CLINIC | Age: 89
End: 2024-09-28
Payer: COMMERCIAL

## 2024-09-28 DIAGNOSIS — I10 ESSENTIAL HYPERTENSION: ICD-10-CM

## 2024-09-28 DIAGNOSIS — R73.09 ELEVATED GLUCOSE: ICD-10-CM

## 2024-09-28 DIAGNOSIS — I89.0 LYMPHEDEMA: ICD-10-CM

## 2024-09-28 DIAGNOSIS — R60.0 BILATERAL LOWER EXTREMITY EDEMA: ICD-10-CM

## 2024-09-28 LAB
ANION GAP SERPL CALCULATED.3IONS-SCNC: 8 MMOL/L (ref 7–15)
BUN SERPL-MCNC: 20.7 MG/DL (ref 8–23)
CALCIUM SERPL-MCNC: 9.1 MG/DL (ref 8.8–10.4)
CHLORIDE SERPL-SCNC: 107 MMOL/L (ref 98–107)
CREAT SERPL-MCNC: 0.78 MG/DL (ref 0.67–1.17)
EGFRCR SERPLBLD CKD-EPI 2021: 86 ML/MIN/1.73M2
ERYTHROCYTE [DISTWIDTH] IN BLOOD BY AUTOMATED COUNT: 14.2 % (ref 10–15)
EST. AVERAGE GLUCOSE BLD GHB EST-MCNC: 137 MG/DL
GLUCOSE SERPL-MCNC: 129 MG/DL (ref 70–99)
HBA1C MFR BLD: 6.4 % (ref 0–5.6)
HCO3 SERPL-SCNC: 28 MMOL/L (ref 22–29)
HCT VFR BLD AUTO: 42 % (ref 40–53)
HGB BLD-MCNC: 13.9 G/DL (ref 13.3–17.7)
MCH RBC QN AUTO: 30.7 PG (ref 26.5–33)
MCHC RBC AUTO-ENTMCNC: 33.1 G/DL (ref 31.5–36.5)
MCV RBC AUTO: 93 FL (ref 78–100)
PLATELET # BLD AUTO: 169 10E3/UL (ref 150–450)
POTASSIUM SERPL-SCNC: 4.4 MMOL/L (ref 3.4–5.3)
RBC # BLD AUTO: 4.53 10E6/UL (ref 4.4–5.9)
SODIUM SERPL-SCNC: 143 MMOL/L (ref 135–145)
WBC # BLD AUTO: 5.8 10E3/UL (ref 4–11)

## 2024-09-28 PROCEDURE — 36415 COLL VENOUS BLD VENIPUNCTURE: CPT

## 2024-09-28 PROCEDURE — 80048 BASIC METABOLIC PNL TOTAL CA: CPT

## 2024-09-28 PROCEDURE — 85027 COMPLETE CBC AUTOMATED: CPT

## 2024-09-28 PROCEDURE — 83036 HEMOGLOBIN GLYCOSYLATED A1C: CPT

## 2024-10-02 ENCOUNTER — OFFICE VISIT (OUTPATIENT)
Dept: INTERNAL MEDICINE | Facility: CLINIC | Age: 89
End: 2024-10-02
Payer: COMMERCIAL

## 2024-10-02 VITALS
HEART RATE: 59 BPM | SYSTOLIC BLOOD PRESSURE: 118 MMHG | RESPIRATION RATE: 16 BRPM | HEIGHT: 67 IN | OXYGEN SATURATION: 95 % | TEMPERATURE: 97.4 F | WEIGHT: 208.1 LBS | DIASTOLIC BLOOD PRESSURE: 66 MMHG | BODY MASS INDEX: 32.66 KG/M2

## 2024-10-02 DIAGNOSIS — I89.0 LYMPHEDEMA OF BOTH LOWER EXTREMITIES: Primary | ICD-10-CM

## 2024-10-02 DIAGNOSIS — R60.0 BILATERAL LOWER EXTREMITY EDEMA: ICD-10-CM

## 2024-10-02 DIAGNOSIS — R07.89 ATYPICAL CHEST PAIN: ICD-10-CM

## 2024-10-02 DIAGNOSIS — E78.5 HYPERLIPIDEMIA WITH TARGET LDL LESS THAN 100: ICD-10-CM

## 2024-10-02 DIAGNOSIS — Z23 NEED FOR INFLUENZA VACCINATION: ICD-10-CM

## 2024-10-02 DIAGNOSIS — I82.411 ACUTE DEEP VEIN THROMBOSIS (DVT) OF FEMORAL VEIN OF RIGHT LOWER EXTREMITY (H): ICD-10-CM

## 2024-10-02 DIAGNOSIS — I10 ESSENTIAL HYPERTENSION: ICD-10-CM

## 2024-10-02 DIAGNOSIS — I73.9 PAD (PERIPHERAL ARTERY DISEASE) (H): ICD-10-CM

## 2024-10-02 DIAGNOSIS — I25.10 CORONARY ARTERY DISEASE INVOLVING NATIVE CORONARY ARTERY OF NATIVE HEART WITHOUT ANGINA PECTORIS: ICD-10-CM

## 2024-10-02 DIAGNOSIS — R73.03 PREDIABETES: ICD-10-CM

## 2024-10-02 DIAGNOSIS — K21.9 GASTROESOPHAGEAL REFLUX DISEASE WITHOUT ESOPHAGITIS: ICD-10-CM

## 2024-10-02 PROCEDURE — 99214 OFFICE O/P EST MOD 30 MIN: CPT | Mod: 25 | Performed by: INTERNAL MEDICINE

## 2024-10-02 PROCEDURE — G0008 ADMIN INFLUENZA VIRUS VAC: HCPCS | Performed by: INTERNAL MEDICINE

## 2024-10-02 PROCEDURE — 90662 IIV NO PRSV INCREASED AG IM: CPT | Performed by: INTERNAL MEDICINE

## 2024-10-02 RX ORDER — FUROSEMIDE 20 MG/1
20 TABLET ORAL EVERY MORNING
Qty: 90 TABLET | Refills: 1 | Status: SHIPPED | OUTPATIENT
Start: 2024-10-02

## 2024-10-02 RX ORDER — POLYETHYLENE GLYCOL 3350 17 G/17G
1 POWDER, FOR SOLUTION ORAL DAILY
COMMUNITY

## 2024-10-02 ASSESSMENT — PAIN SCALES - GENERAL: PAINLEVEL: NO PAIN (0)

## 2024-10-02 NOTE — PATIENT INSTRUCTIONS
" Swelling in lower legs much better.      Continue the leg wraps.      Reduce the dose of Furosemide (diuretic tablet) to 20 mg ocne per day in the morning.  If the swelling gets worse on the 20 mg dose, then go back to 40 mg once per day in the morning.      Continue all other medications at the same doses.  Contact your usual pharmacy if you need refills.      Annual flu shot given today, get this every fall     Plan to get an updated Covid booster each fall at least by the end of October for the best protection throughout the winter season.   Get this from any pharmacy or Covid vaccine clinic.        RSV vaccines are now available.  The will help provide protection against respiratory syncytial virus (RSV), a common upper respiratory virus that is known to cause severe inflammation in the airways.  This vaccine is recommended for adults over age 60, especially those with high risk conditions and/or chronic respiratory illnesses such as asthma or COPD.  This vaccine is available at most pharmacies and clinics.    If you are on Medicare insurance, get this vaccine from a pharmacist in a pharmacy for the best cost and to confirm coverage, it will be less expensive to get this there rather than from our clinic.          Return to see me in early March 2025, schedule a follow up visit sooner if needed for anything else.  Please get fasting labs done at the [facility] or any other [facility] Lab lab 1-2 days before this appointment (schedule a \"lab appointment\")   Eat nothing for at least 8 hours prior to having these labs drawn.  Use [employer] or Call [phone] to schedule the appointment with me and lab appointment.       "

## 2024-10-02 NOTE — PROGRESS NOTES
Assessment & Plan   Problem List Items Addressed This Visit       Coronary artery disease (Chronic)    Relevant Orders    Lipid panel reflex to direct LDL Fasting    Comprehensive metabolic panel    CBC with platelets    Hemoglobin A1c    Essential hypertension    Relevant Orders    Lipid panel reflex to direct LDL Fasting    Comprehensive metabolic panel    CBC with platelets    Hemoglobin A1c    Hyperlipidemia with target LDL less than 100    Relevant Orders    Lipid panel reflex to direct LDL Fasting    Comprehensive metabolic panel    CBC with platelets    Hemoglobin A1c    Gastroesophageal reflux disease without esophagitis    Relevant Medications    polyethylene glycol (MIRALAX) 17 GM/Dose powder    Other Relevant Orders    Lipid panel reflex to direct LDL Fasting    Comprehensive metabolic panel    CBC with platelets    Hemoglobin A1c    PAD (peripheral artery disease) (H)    Relevant Orders    Lipid panel reflex to direct LDL Fasting    Comprehensive metabolic panel    CBC with platelets    Hemoglobin A1c    Prediabetes    Relevant Orders    Lipid panel reflex to direct LDL Fasting    Comprehensive metabolic panel    CBC with platelets    Hemoglobin A1c     Other Visit Diagnoses       Lymphedema of both lower extremities    -  Primary    Relevant Orders    Lipid panel reflex to direct LDL Fasting    Comprehensive metabolic panel    CBC with platelets    Hemoglobin A1c    Acute deep vein thrombosis (DVT) of femoral vein of right lower extremity (H)        Relevant Orders    Lipid panel reflex to direct LDL Fasting    Comprehensive metabolic panel    CBC with platelets    Hemoglobin A1c    Atypical chest pain        Relevant Orders    Lipid panel reflex to direct LDL Fasting    Comprehensive metabolic panel    CBC with platelets    Hemoglobin A1c    Bilateral lower extremity edema        Relevant Medications    furosemide (LASIX) 20 MG tablet    Other Relevant Orders    Lipid panel reflex to direct LDL  "Fasting    Comprehensive metabolic panel    CBC with platelets    Hemoglobin A1c    Need for influenza vaccination        Relevant Orders    INFLUENZA HIGH DOSE, TRIVALENT, PF (FLUZONE) (Completed)    Lipid panel reflex to direct LDL Fasting    Comprehensive metabolic panel    CBC with platelets    Hemoglobin A1c                Swelling in lower legs much better.      Continue the leg wraps.      Reduce the dose of Furosemide (diuretic tablet) to 20 mg ocne per day in the morning.  If the swelling gets worse on the 20 mg dose, then go back to 40 mg once per day in the morning.      Continue all other medications at the same doses.  Contact your usual pharmacy if you need refills.      Annual flu shot given today, get this every fall     Plan to get an updated Covid booster each fall at least by the end of October for the best protection throughout the winter season.   Get this from any pharmacy or Covid vaccine clinic.        RSV vaccines are now available.  The will help provide protection against respiratory syncytial virus (RSV), a common upper respiratory virus that is known to cause severe inflammation in the airways.  This vaccine is recommended for adults over age 60, especially those with high risk conditions and/or chronic respiratory illnesses such as asthma or COPD.  This vaccine is available at most pharmacies and clinics.    If you are on Medicare insurance, get this vaccine from a pharmacist in a pharmacy for the best cost and to confirm coverage, it will be less expensive to get this there rather than from our clinic.          Return to see me in early March 2025, schedule a follow up visit sooner if needed for anything else.  Please get fasting labs done at the [facility], [place] or any other [facility] Lab lab 1-2 days before this appointment (schedule a \"lab appointment\")   Eat nothing for at least 8 hours prior to having these labs drawn.  Use [employer] or Call [phone] to schedule the " "appointment with me and lab appointment.            BMI  Estimated body mass index is 32.59 kg/m  as calculated from the following:    Height as of this encounter: 1.702 m (5' 7\").    Weight as of this encounter: 94.4 kg (208 lb 1.6 oz).             Karen Love is a 88 year old, presenting for the following health issues:  RECHECK (Follow up lymphedema) and Results        10/2/2024     1:18 PM   Additional Questions   Roomed by Alicia SANABRIA CMA     HPI       Follow up lymphedema  Review lab rsults    1.)   HTN stable.     2.) bilateral lower extremity edema is better.     3.)  known coronary artery disease, no new symptosm    4.)  history of peripheral arterial disease       **I reviewed the information recorded in the patient's EPIC chart (including but not limited to medical history, surgical history, family history, problem list, medication list, and allergy list) and updated the information as indicated based on the patients reported information.         Review of Systems  Constitutional, HEENT, cardiovascular, pulmonary, gi and gu systems are negative, except as otherwise noted.      Objective    /66   Pulse 59   Temp 97.4  F (36.3  C) (Oral)   Resp 16   Ht 1.702 m (5' 7\")   Wt 94.4 kg (208 lb 1.6 oz)   SpO2 95%   BMI 32.59 kg/m    Body mass index is 32.59 kg/m .  Physical Exam   GENERAL alert and no distress  EYES:  Normal sclera,conjunctiva, EOMI  HENT: oral and posterior pharynx without lesions or erythema, facies symmetric  NECK: Neck supple. No LAD, without thyroidmegaly.  RESP: Clear to ausculation bilaterally without wheezes or crackles. Normal BS in all fields.  CV: RRR normal S1S2 without murmurs, rubs or gallops.  LYMPH: no cervical lymph adenopathy appreciated  MS: extremities- no gross deformities of the visible extremities noted,   EXT:  2+ bilateral  lower extremity edema  PSYCH: Alert and oriented times 3; speech- coherent  SKIN:  No obvious significant skin lesions on visible " portions of face             The longitudinal plan of care for the diagnosis(es)/condition(s) as documented were addressed during this visit. Due to the added complexity in care, I will continue to support Ivan in the subsequent management and with ongoing continuity of care.      Signed Electronically by: Neal Esteves MD

## 2024-10-04 ENCOUNTER — THERAPY VISIT (OUTPATIENT)
Dept: OCCUPATIONAL THERAPY | Facility: CLINIC | Age: 89
End: 2024-10-04
Payer: COMMERCIAL

## 2024-10-04 DIAGNOSIS — I89.0 LYMPHEDEMA: Primary | ICD-10-CM

## 2024-10-04 PROCEDURE — 97140 MANUAL THERAPY 1/> REGIONS: CPT | Mod: GO | Performed by: OCCUPATIONAL THERAPIST

## 2024-10-04 NOTE — PROGRESS NOTES
"   10/04/24 0500   Appointment Info   Treating Provider Kerri Johnson, OTR/L, CLT   Visits Used 10/10 - [employer]   Medical Diagnosis Lymphedema   OT Tx Diagnosis Lymphedema   Progress Note/Certification   Start Of Care Date 08/20/24   Onset of Illness/Injury or Date of Surgery 08/09/24  (Likely chronic. Pt reports \"I've always had big legs\")   Therapy Frequency 3x/week x3 weeks, then 1x/week x4 weeks   Predicted Duration 90 days   Certification date from 08/20/24   Certification date to 11/17/24   Progress Note Due Date   (10th visit)   Progress Note Completed Date 10/04/24       Present No   Goals   OT Goals 1;2;3;4;5   OT Goal 1   Goal Identifier 1 - Education   Goal Description In order to improve functional mobility for activities of living, by the completion of intensive treatment, patient and/or caregiver will;   Verbalize and/or demonstrate understanding of techniques to independently manage their lymphedema at home   Rationale In order to maximize safety and independence with performance of self-care activities   Goal Progress Pt IND verbalizing s/s lymphedema, celluitis, components CDT   Target Date 11/17/24   Date Met 09/24/24   OT Goal 2   Goal Identifier 1a - Compression Tolerance   Goal Description tolerate gradient compression bandaging/wearing compression garments 23 hrs/day to decrease volume of extracellular fluid   Rationale In order to maximize safety and independence with performance of self-care activities   Goal Progress Goal Met - pt is tolerating compression when wrapped in \"quick wrap\" style.   Target Date 11/17/24   Date Met 09/18/24   OT Goal 3   Goal Identifier 1b - GCB   Goal Description Pt or caregiver demonstrate independence in applying gradient compression bandages   Rationale In order to maximize safety and independence with performance of self-care activities   Goal Progress Goal Met. Pt's wife providing assist to apply quick wrap. Pt wearing for ~20 " "hrs/day.   Target Date 11/17/24   Date Met 09/18/24   OT Goal 4   Goal Identifier 1c - Exercises, Self-MLD   Goal Description demonstrate independence in performing prescribed exercises, self MLD to facilate the lymph system   Rationale In order to maximize safety and independence with performance of self-care activities   Goal Progress Progressing. Pt gardening daily. Pt will benefit from continued education in HEP for winter months.   Target Date 11/17/24   OT Goal 5   Goal Identifier 1d - Garments   Goal Description Pt and caregiver be independent in donning/doffing, wearing schedule, and care of compression garments   Rationale In order to maximize safety and independence with performance of self-care activities   Goal Progress In process - Pt has ordered garments, waiting to be delivered. Plan to educate pt in donning techs.   Target Date 11/17/24   OT Goal 6   Goal Identifier Volume   Goal Description BLE volume will decrease by 600 ml to facilitate safe functional mobility, increased ADL IND, and reduced infection risk.   Rationale In order to maximize safety and independence with cognitive function within the home or community   Goal Progress Volume decreasing. Full measurements not taken, but circumfernce R calf is reduced 8 cm   Target Date 11/17/24   Subjective Report   Subjective Report \"I went to [employer]. They will mail my garments to me.\"   Treatment Interventions (OT)   Interventions Manual Therapy   Manual Therapy   Manual Therapy Minutes (75181) 53   Manual Therapy 1 - Details Pt presenting with wraps doffed and rerolled for application. BLE volume done, skin color and texture improved. Skin loose and wrinkled over foot and ankle with very soft/mobile pockets fluid B feet, puffy 2+ around ankles, softening 2+ from ankles to mid-calf where there is a clear shelf were edema stops. Fibrotic cuff of skin mid-shin. Skin intact, no flaking, LLE w/ pink venous discoloration. Performed short MLD sequence: " "terminus, axillary nodes, deep breaths w/o abdo press, ing nodes, established ing-axillo anastmoses, proximal > distal and medial > lateral clearing on BLE, short session gentle fibrosis techs on distal BLE, then reversed the above. Applied lotion to BKK. OT Donned M TGSoft base layer, x3 layers 10 cm felt, accordion folded over anterior ankle crease to protect skin, followed by 1 x 8cm short stretch bandage from MTP's to distal shin and 1 x 10 cm short stretch bandage from ankle to knee crease using \"Quick wrap technique\" with 50% overlap. Tape adhered around full circumference of wraps with tabs to facilitate removal, extra length TG Soft folded over. Pt IND verbalizing wear schedule and benefits exercise to promote lymph mobility. OT reivewed pt's POC/sheduling. Pt demonstrating IND management of GCBs, will confirm IND management of garments then likely discharge. Pt demonstrating good understanding of chronic nature of lymphedema and need for ongoing management.   Skilled Intervention MLD. quick wraps, education   Patient Response/Progress Softening and limb profile improved in BLE following MLD and consistent home wrapping. Pt and wife verbalizing understanding of education. + progress, approaching readiness for discharge.   Education   Learner/Method Patient;Family;Listening;Reading;Demonstration   Education Comments Pt and wife verbalize understanding. Pt will benefit from ongoing reinforcement.   Plan   Home program Skin cares, GCB/quick wrap, self-MLD, exercise   Updates to plan of care Pt has met w/ Karen. Garments being mailed.   Plan for next session GCB vs garments, review home program, prepare to discharge   Total Session Time   Timed Code Treatment Minutes 53   Total Treatment Time (sum of timed and untimed services) 53         PLAN  Continue therapy per current plan of care.    Beginning/End Dates of Progress Note Reporting Period:  10/04/24 to 10/04/2024    Referring Provider:  Neal Joe " Danielito

## 2024-10-08 ENCOUNTER — PATIENT OUTREACH (OUTPATIENT)
Dept: CARE COORDINATION | Facility: CLINIC | Age: 89
End: 2024-10-08

## 2024-10-18 ENCOUNTER — THERAPY VISIT (OUTPATIENT)
Dept: OCCUPATIONAL THERAPY | Facility: CLINIC | Age: 89
End: 2024-10-18
Payer: COMMERCIAL

## 2024-10-18 DIAGNOSIS — I89.0 LYMPHEDEMA OF BOTH LOWER EXTREMITIES: Primary | ICD-10-CM

## 2024-10-18 PROCEDURE — 97140 MANUAL THERAPY 1/> REGIONS: CPT | Mod: GO | Performed by: OCCUPATIONAL THERAPIST

## 2024-10-22 ENCOUNTER — THERAPY VISIT (OUTPATIENT)
Dept: OCCUPATIONAL THERAPY | Facility: CLINIC | Age: 89
End: 2024-10-22
Payer: COMMERCIAL

## 2024-10-22 DIAGNOSIS — I89.0 LYMPHEDEMA OF BOTH LOWER EXTREMITIES: Primary | ICD-10-CM

## 2024-10-22 PROCEDURE — 97140 MANUAL THERAPY 1/> REGIONS: CPT | Mod: GO | Performed by: OCCUPATIONAL THERAPIST

## 2024-10-22 NOTE — PROGRESS NOTES
"   10/22/24 0500   Appointment Info   Treating Provider Kerri Johnson, OTR/L, CLT   Visits Used 12/20 - [employer]   Medical Diagnosis Lymphedema   OT Tx Diagnosis Lymphedema   Progress Note/Certification   Start Of Care Date 08/20/24   Onset of Illness/Injury or Date of Surgery 08/09/24  (Likely chronic. Pt reports \"I've always had big legs\")   Therapy Frequency 3x/week x3 weeks, then 1x/week x4 weeks   Predicted Duration 90 days   Certification date from 08/20/24   Certification date to 11/17/24   Progress Note Due Date   (10th visit)   Progress Note Completed Date 10/04/24       Present No   Goals   OT Goals 1;2;3;4;5   OT Goal 1   Goal Identifier 1 - Education   Goal Description In order to improve functional mobility for activities of living, by the completion of intensive treatment, patient and/or caregiver will;   Verbalize and/or demonstrate understanding of techniques to independently manage their lymphedema at home   Rationale In order to maximize safety and independence with performance of self-care activities   Goal Progress Pt IND verbalizing s/s lymphedema, celluitis, components CDT   Target Date 11/17/24   Date Met 09/24/24   OT Goal 2   Goal Identifier 1a - Compression Tolerance   Goal Description tolerate gradient compression bandaging/wearing compression garments 23 hrs/day to decrease volume of extracellular fluid   Rationale In order to maximize safety and independence with performance of self-care activities   Goal Progress Goal Met - pt is tolerating compression when wrapped in \"quick wrap\" style.   Target Date 11/17/24   Date Met 09/18/24   OT Goal 3   Goal Identifier 1b - GCB   Goal Description Pt or caregiver demonstrate independence in applying gradient compression bandages   Rationale In order to maximize safety and independence with performance of self-care activities   Goal Progress Goal Met. Pt's wife providing assist to apply quick wrap. Pt wearing for ~20 " "hrs/day.   Target Date 11/17/24   Date Met 09/18/24   OT Goal 4   Goal Identifier 1c - Exercises, Self-MLD   Goal Description demonstrate independence in performing prescribed exercises, self MLD to facilate the lymph system   Rationale In order to maximize safety and independence with performance of self-care activities   Goal Progress Pt walks daily, IND verbalizing options for leg elevated HEP.   Target Date 11/17/24   Date Met 10/22/24   OT Goal 5   Goal Identifier 1d - Garments   Goal Description Pt and caregiver be independent in donning/doffing, wearing schedule, and care of compression garments   Rationale In order to maximize safety and independence with performance of self-care activities   Goal Progress Goal Met - pt has wife provide assist to don. Pt IND verbalizing wear schedule.   Target Date 11/17/24   Date Met 10/22/24   OT Goal 6   Goal Identifier Volume   Goal Description BLE volume will decrease by 600 ml to facilitate safe functional mobility, increased ADL IND, and reduced infection risk.   Rationale In order to maximize safety and independence with cognitive function within the home or community   Goal Progress Goal met. RLE -2707 ml, LLE -1734. Pt reporting he has lost 10 lbs and his MD has reduced diuretic dose.   Target Date 11/17/24   Date Met 10/22/24   Subjective Report   Subjective Report \"I think everything is going well. I think I'm ready to manage my legs w/ Margo's help.\"   Treatment Interventions (OT)   Interventions Manual Therapy   Manual Therapy   Manual Therapy Minutes (78161) 40   Manual Therapy 1 - Details Pt presenting with TG Soft baselayer mid-foot to knee crease, loose athletic sock donned over everything else. Pt presenting with soft, very mobile edema in [place] feet, reduced volume edema around ankles. LLE larger than RLE. Coloring improved bilaterally, no erythema. Performed short MLD sequence while pt seated EOB (pt reporting lying supine painful for lower back): deep " breathing, cleared popliteal LN, proximal > distal and medial > lateral clearing on BLE, short session gentle fibrosis techs on distal BLE, then reversed the above. OT provided Max A to don TG Soft, velcro garments, socks at pt's request. Pt IND donned shoes and ambulated from clinic using 4WW. OT reviewed education in all components of CDT for maintenance phase/home program. Pt is demonstrating consistent performance of home program w/ assist from wife to don/doff garments. Goals met. Pt agreeable to discharge OP lymph therapy. OT educated pt in option to return to clinic if he experiences symptom exacerbation, gave pt contact card.   Skilled Intervention MLD. quick wraps, education   Patient Response/Progress Softening and limb profile improved in BLE following MLD and consistent home wrapping. Pt and wife verbalizing understanding of education. + progress, ready for DC OP lymph therapy.   Education   Learner/Method Patient;Family;Listening;Reading;Demonstration   Education Comments Pt and wife verbalize understanding. Pt will benefit from ongoing reinforcement.   Plan   Home program Skin cares, GCB/quick wrap, self-MLD, exercise   Updates to plan of care Pt has velcro garments w/ foot and calf piece. Pt struggles to don transition liner, uses lengths M TG Soft instead.   Plan for next session GCB vs garments, review home program, prepare to discharge   Total Session Time   Timed Code Treatment Minutes 40   Total Treatment Time (sum of timed and untimed services) 40         DISCHARGE  Reason for Discharge: Patient has met all goals.    Equipment Issued: BBK velcro garments w/ calf and foot components, transition liner sock    Discharge Plan: Patient to continue home program.    Referring Provider:  Neal Esteves

## 2024-11-04 ENCOUNTER — TELEPHONE (OUTPATIENT)
Dept: INTERNAL MEDICINE | Facility: CLINIC | Age: 89
End: 2024-11-04
Payer: COMMERCIAL

## 2024-11-04 NOTE — TELEPHONE ENCOUNTER
Per wife lasix rx authorized 10-2-24 by PCP was not received. Rx called to pharmacy and left details on voice mail.    Helene Small RN

## 2024-11-26 DIAGNOSIS — I82.411 ACUTE DEEP VEIN THROMBOSIS (DVT) OF FEMORAL VEIN OF RIGHT LOWER EXTREMITY (H): ICD-10-CM

## 2024-11-26 DIAGNOSIS — I25.10 CORONARY ARTERY DISEASE INVOLVING NATIVE CORONARY ARTERY OF NATIVE HEART WITHOUT ANGINA PECTORIS: ICD-10-CM

## 2024-11-26 DIAGNOSIS — I10 ESSENTIAL HYPERTENSION: ICD-10-CM

## 2024-11-26 RX ORDER — APIXABAN 5 MG/1
5 TABLET, FILM COATED ORAL 2 TIMES DAILY
Qty: 60 TABLET | Refills: 2 | Status: SHIPPED | OUTPATIENT
Start: 2024-11-26

## 2024-11-26 RX ORDER — LISINOPRIL 20 MG/1
20 TABLET ORAL DAILY
Qty: 90 TABLET | Refills: 1 | Status: SHIPPED | OUTPATIENT
Start: 2024-11-26

## 2025-02-10 ENCOUNTER — TELEPHONE (OUTPATIENT)
Dept: INTERNAL MEDICINE | Facility: CLINIC | Age: 89
End: 2025-02-10
Payer: COMMERCIAL

## 2025-02-10 DIAGNOSIS — I89.0 LYMPHEDEMA OF BOTH LOWER EXTREMITIES: Primary | ICD-10-CM

## 2025-02-10 NOTE — TELEPHONE ENCOUNTER
Order/Referral Request    Who is requesting: Pt per orthotics / rehab provider     Orders being requested: Needs a new compression sleeve with velcro ties for knees & ankles : Velcro garments w/ calf component, foot component, transition liner     Reason service is needed/diagnosis: lymphedema     When are orders needed by: ASAP    Has this been discussed with Provider: Yes has ordered it for pt in the past     Does patient have a preference on a Group/Provider/Facility? [facility]     Does patient have an appointment scheduled?: No    Where to send orders: Place orders within Epic    Could we send this information to you in [employer] or would you prefer to receive a phone call?:   No preference   Okay to leave a detailed message?: Yes at Cell number on file:    Telephone Information:   Mobile [phone]

## 2025-02-13 PROBLEM — I89.0 LYMPHEDEMA OF BOTH LOWER EXTREMITIES: Status: ACTIVE | Noted: 2025-02-13

## 2025-02-24 DIAGNOSIS — I82.411 ACUTE DEEP VEIN THROMBOSIS (DVT) OF FEMORAL VEIN OF RIGHT LOWER EXTREMITY (H): ICD-10-CM

## 2025-02-24 RX ORDER — APIXABAN 5 MG/1
5 TABLET, FILM COATED ORAL 2 TIMES DAILY
Qty: 60 TABLET | Refills: 2 | Status: SHIPPED | OUTPATIENT
Start: 2025-02-24

## 2025-03-01 ENCOUNTER — LAB (OUTPATIENT)
Dept: LAB | Facility: CLINIC | Age: 89
End: 2025-03-01
Payer: COMMERCIAL

## 2025-03-01 DIAGNOSIS — I82.411 ACUTE DEEP VEIN THROMBOSIS (DVT) OF FEMORAL VEIN OF RIGHT LOWER EXTREMITY (H): ICD-10-CM

## 2025-03-01 DIAGNOSIS — I10 ESSENTIAL HYPERTENSION: ICD-10-CM

## 2025-03-01 DIAGNOSIS — R07.89 ATYPICAL CHEST PAIN: ICD-10-CM

## 2025-03-01 DIAGNOSIS — Z23 NEED FOR INFLUENZA VACCINATION: ICD-10-CM

## 2025-03-01 DIAGNOSIS — I89.0 LYMPHEDEMA OF BOTH LOWER EXTREMITIES: ICD-10-CM

## 2025-03-01 DIAGNOSIS — E78.5 HYPERLIPIDEMIA WITH TARGET LDL LESS THAN 100: ICD-10-CM

## 2025-03-01 DIAGNOSIS — R73.03 PREDIABETES: ICD-10-CM

## 2025-03-01 DIAGNOSIS — R60.0 BILATERAL LOWER EXTREMITY EDEMA: ICD-10-CM

## 2025-03-01 DIAGNOSIS — I73.9 PAD (PERIPHERAL ARTERY DISEASE): ICD-10-CM

## 2025-03-01 DIAGNOSIS — I25.10 CORONARY ARTERY DISEASE INVOLVING NATIVE CORONARY ARTERY OF NATIVE HEART WITHOUT ANGINA PECTORIS: ICD-10-CM

## 2025-03-01 DIAGNOSIS — K21.9 GASTROESOPHAGEAL REFLUX DISEASE WITHOUT ESOPHAGITIS: ICD-10-CM

## 2025-03-01 LAB
ALBUMIN SERPL BCG-MCNC: 3.7 G/DL (ref 3.5–5.2)
ALP SERPL-CCNC: 69 U/L (ref 40–150)
ALT SERPL W P-5'-P-CCNC: 14 U/L (ref 0–70)
ANION GAP SERPL CALCULATED.3IONS-SCNC: 8 MMOL/L (ref 7–15)
AST SERPL W P-5'-P-CCNC: 16 U/L (ref 0–45)
BILIRUB SERPL-MCNC: 0.6 MG/DL
BUN SERPL-MCNC: 15.1 MG/DL (ref 8–23)
CALCIUM SERPL-MCNC: 8.9 MG/DL (ref 8.8–10.4)
CHLORIDE SERPL-SCNC: 107 MMOL/L (ref 98–107)
CHOLEST SERPL-MCNC: 133 MG/DL
CREAT SERPL-MCNC: 0.76 MG/DL (ref 0.67–1.17)
EGFRCR SERPLBLD CKD-EPI 2021: 86 ML/MIN/1.73M2
ERYTHROCYTE [DISTWIDTH] IN BLOOD BY AUTOMATED COUNT: 13.8 % (ref 10–15)
EST. AVERAGE GLUCOSE BLD GHB EST-MCNC: 128 MG/DL
FASTING STATUS PATIENT QL REPORTED: YES
FASTING STATUS PATIENT QL REPORTED: YES
GLUCOSE SERPL-MCNC: 133 MG/DL (ref 70–99)
HBA1C MFR BLD: 6.1 % (ref 0–5.6)
HCO3 SERPL-SCNC: 28 MMOL/L (ref 22–29)
HCT VFR BLD AUTO: 42 % (ref 40–53)
HDLC SERPL-MCNC: 46 MG/DL
HGB BLD-MCNC: 14.2 G/DL (ref 13.3–17.7)
LDLC SERPL CALC-MCNC: 70 MG/DL
MCH RBC QN AUTO: 30 PG (ref 26.5–33)
MCHC RBC AUTO-ENTMCNC: 33.8 G/DL (ref 31.5–36.5)
MCV RBC AUTO: 89 FL (ref 78–100)
NONHDLC SERPL-MCNC: 87 MG/DL
PLATELET # BLD AUTO: 163 10E3/UL (ref 150–450)
POTASSIUM SERPL-SCNC: 4.4 MMOL/L (ref 3.4–5.3)
PROT SERPL-MCNC: 5.7 G/DL (ref 6.4–8.3)
RBC # BLD AUTO: 4.73 10E6/UL (ref 4.4–5.9)
SODIUM SERPL-SCNC: 143 MMOL/L (ref 135–145)
TRIGL SERPL-MCNC: 83 MG/DL
WBC # BLD AUTO: 4.5 10E3/UL (ref 4–11)

## 2025-03-01 PROCEDURE — 83036 HEMOGLOBIN GLYCOSYLATED A1C: CPT | Mod: GZ

## 2025-03-01 PROCEDURE — 85027 COMPLETE CBC AUTOMATED: CPT

## 2025-03-01 PROCEDURE — 36415 COLL VENOUS BLD VENIPUNCTURE: CPT

## 2025-03-01 PROCEDURE — 80053 COMPREHEN METABOLIC PANEL: CPT

## 2025-03-01 PROCEDURE — 80061 LIPID PANEL: CPT | Mod: GZ

## 2025-03-04 ENCOUNTER — OFFICE VISIT (OUTPATIENT)
Dept: INTERNAL MEDICINE | Facility: CLINIC | Age: 89
End: 2025-03-04
Payer: COMMERCIAL

## 2025-03-04 VITALS
TEMPERATURE: 98.4 F | BODY MASS INDEX: 31.84 KG/M2 | OXYGEN SATURATION: 97 % | SYSTOLIC BLOOD PRESSURE: 118 MMHG | WEIGHT: 202.9 LBS | HEART RATE: 64 BPM | HEIGHT: 67 IN | DIASTOLIC BLOOD PRESSURE: 70 MMHG | RESPIRATION RATE: 12 BRPM

## 2025-03-04 DIAGNOSIS — I25.10 CORONARY ARTERY DISEASE INVOLVING NATIVE CORONARY ARTERY OF NATIVE HEART WITHOUT ANGINA PECTORIS: ICD-10-CM

## 2025-03-04 DIAGNOSIS — I10 ESSENTIAL HYPERTENSION: ICD-10-CM

## 2025-03-04 DIAGNOSIS — R60.0 BILATERAL LOWER EXTREMITY EDEMA: ICD-10-CM

## 2025-03-04 DIAGNOSIS — E78.5 HYPERLIPIDEMIA WITH TARGET LDL LESS THAN 100: ICD-10-CM

## 2025-03-04 DIAGNOSIS — I82.411 ACUTE DEEP VEIN THROMBOSIS (DVT) OF FEMORAL VEIN OF RIGHT LOWER EXTREMITY (H): ICD-10-CM

## 2025-03-04 DIAGNOSIS — I89.0 LYMPHEDEMA OF BOTH LOWER EXTREMITIES: ICD-10-CM

## 2025-03-04 DIAGNOSIS — K21.9 GASTROESOPHAGEAL REFLUX DISEASE WITHOUT ESOPHAGITIS: ICD-10-CM

## 2025-03-04 DIAGNOSIS — Z00.00 MEDICARE ANNUAL WELLNESS VISIT, SUBSEQUENT: Primary | ICD-10-CM

## 2025-03-04 PROCEDURE — 3074F SYST BP LT 130 MM HG: CPT | Performed by: INTERNAL MEDICINE

## 2025-03-04 PROCEDURE — 3078F DIAST BP <80 MM HG: CPT | Performed by: INTERNAL MEDICINE

## 2025-03-04 PROCEDURE — G0439 PPPS, SUBSEQ VISIT: HCPCS | Performed by: INTERNAL MEDICINE

## 2025-03-04 PROCEDURE — 1126F AMNT PAIN NOTED NONE PRSNT: CPT | Performed by: INTERNAL MEDICINE

## 2025-03-04 PROCEDURE — 99214 OFFICE O/P EST MOD 30 MIN: CPT | Mod: 25 | Performed by: INTERNAL MEDICINE

## 2025-03-04 RX ORDER — TERAZOSIN 5 MG/1
5 CAPSULE ORAL AT BEDTIME
Qty: 90 CAPSULE | Refills: 3 | Status: SHIPPED | OUTPATIENT
Start: 2025-03-04

## 2025-03-04 RX ORDER — OMEPRAZOLE 20 MG/1
20 CAPSULE, DELAYED RELEASE ORAL DAILY
Qty: 90 CAPSULE | Refills: 3 | Status: SHIPPED | OUTPATIENT
Start: 2025-03-04

## 2025-03-04 RX ORDER — LISINOPRIL 20 MG/1
20 TABLET ORAL DAILY
Qty: 90 TABLET | Refills: 1 | Status: SHIPPED | OUTPATIENT
Start: 2025-03-04

## 2025-03-04 RX ORDER — ATENOLOL 25 MG/1
25 TABLET ORAL DAILY
Qty: 90 TABLET | Refills: 3 | Status: SHIPPED | OUTPATIENT
Start: 2025-03-04

## 2025-03-04 RX ORDER — ATORVASTATIN CALCIUM 20 MG/1
20 TABLET, FILM COATED ORAL DAILY
Qty: 90 TABLET | Refills: 3 | Status: SHIPPED | OUTPATIENT
Start: 2025-03-04

## 2025-03-04 RX ORDER — FUROSEMIDE 20 MG/1
20 TABLET ORAL EVERY MORNING
Qty: 90 TABLET | Refills: 3 | Status: SHIPPED | OUTPATIENT
Start: 2025-03-04

## 2025-03-04 SDOH — HEALTH STABILITY: PHYSICAL HEALTH: ON AVERAGE, HOW MANY DAYS PER WEEK DO YOU ENGAGE IN MODERATE TO STRENUOUS EXERCISE (LIKE A BRISK WALK)?: 0 DAYS

## 2025-03-04 ASSESSMENT — SOCIAL DETERMINANTS OF HEALTH (SDOH): HOW OFTEN DO YOU GET TOGETHER WITH FRIENDS OR RELATIVES?: ONCE A WEEK

## 2025-03-04 ASSESSMENT — PAIN SCALES - GENERAL: PAINLEVEL_OUTOF10: NO PAIN (0)

## 2025-03-04 NOTE — PROGRESS NOTES
Preventive Care Visit  [facility]  Neal Esteves MD, Internal Medicine  Mar 4, 2025      Assessment & Plan     (Z00.00) Medicare annual wellness visit, subsequent  (primary encounter diagnosis)  Comment: Discussed cardiac disease risk factors and cardiac disease risk factor modification, including diabetes screening, blood pressure screening (and management if indicated), and cholesterol screening.   Reviewed immunzation guidelines, including pneumococcal vaccines, annual influenza, and shingles vaccines.   Discussed routine cancer screenings, including skin cancer, colon cancer screening for everyone until age 80, prostate cancer screening in men until age 75, mammogram and PAP/pelvic for women until age 75.   Recommended regular dentist visits to care for remaining teeth.   Recommended regular screening for vision and glaucoma.   Recommended safe driving and accident avoidance.    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   Has follow up appt with Oncology regarding this issue in APril, defer recommendations on continued anticoagulation to that evaluation.   Plan: \      (E78.5) Hyperlipidemia with target LDL less than 100  Comment: This condition is currently controlled on the current medical regimen.  Continue current therapy.   Discussed guidelines recommending a statin cholesterol lowering medication for any patient with either diabetes and/or vascular disease,  "aiming for a LDL goal under 100 for sure, ideally under 70, using whatever dose of statin tolerated.    Plan: atorvastatin (LIPITOR) 20 MG tablet            (I89.0) Lymphedema of both lower extremities  Comment: much improved with compression therapy.   Plan:     (R60.0) Bilateral lower extremity edema  Comment: This condition is currently controlled on the current medical regimen.  Continue current therapy.   Plan: furosemide (LASIX) 20 MG tablet            (I25.10) Coronary artery disease involving native coronary artery of native heart without angina pectoris  Comment: This condition is currently controlled on the current medical regimen.  Continue current therapy.   Discussed secondary risk factor modification and recommended continuing aggressive management of these items.   Plan: lisinopril (ZESTRIL) 20 MG tablet            (K21.9) Gastroesophageal reflux disease without esophagitis  Comment: This condition is currently controlled on the current medical regimen.  Continue current therapy.   Plan: omeprazole (PRILOSEC) 20 MG DR capsule            (I10) Essential hypertension  Comment: This condition is currently controlled on the current medical regimen.  Continue current therapy.   Plan: atenolol (TENORMIN) 25 MG tablet, lisinopril         (ZESTRIL) 20 MG tablet, terazosin (HYTRIN) 5 MG        capsule               Patient has been advised of split billing requirements and indicates understanding: Yes        BMI  Estimated body mass index is 31.78 kg/m  as calculated from the following:    Height as of this encounter: 1.702 m (5' 7\").    Weight as of this encounter: 92 kg (202 lb 14.4 oz).       Counseling  Appropriate preventive services were addressed with this patient via screening, questionnaire, or discussion as appropriate for fall prevention, nutrition, physical activity, Tobacco-use cessation, social engagement, weight loss and cognition.  Checklist reviewing preventive services available has been " given to the patient.  Reviewed patient's diet, addressing concerns and/or questions.   The patient was provided with written information regarding signs of hearing loss.   Information on urinary incontinence and treatment options given to patient.           Karen Love is a 89 year old, presenting for the following:  Medicare Visit, Hypertension, Lymphedema, and Results (Review lab results)        3/4/2025    12:46 PM   Additional Questions   Roomed by Alicia MARQUIS       1.)  lymphedema ,much improved with comrpession garments.     2.)  Hypertension:  History of hypertension, on medication.  No reported side effects from medications.    Reviewed last 6 BP readings in chart:  BP Readings from Last 6 Encounters:   03/04/25 118/70   10/02/24 118/66   09/16/24 132/79   09/03/24 116/60   08/20/24 109/67   08/09/24 120/64     No active cardiac complaints or symptoms with exercise.     3.)  The patient has a history of impaired glucose tolerance with regularly elevated blood sugars.    They have not been diagnosed with type II DM or placed on medications for diabetes before.   They deny polyuria, polydipsia.     The patient is obese with a BMI of Body mass index is 31.78 kg/m ..    Review of current labs show:    Lab Results   Component Value Date    A1C 6.1 03/01/2025    A1C 6.4 09/28/2024    A1C 6.2 03/17/2021    A1C 6.4 01/07/2020    A1C 6.5 12/11/2018    A1C 6.3 02/13/2018    A1C 6.4 08/05/2014    A1C 6.1 03/26/2014     @bgl@     4.)  unprovoked DVT NJuly 2024. Remains on ELiquis without side effects       5.)  GERD stable.  Taking meds as ordered, no reported side effects from medicines.  Eating properly to avoid sx.   No melena, no hematochezia, no diarrhea.  No unintended weigth loss.  No dysphagia.  Reports no significant regular difficulties swallowing foods or medications.      6.)  Prior of coronary artery disease as listed in medical history.  No current or recent cardiovascular symptoms.    Patient reports no shortness of breath, no episodes of chest pain/pressure, no dyspnea on exertion, no changes in their ability to perform physical exertion or tasks.  Takes the same amount of time to perform similar physical tasks.       Advance Care Planning  Patient does not have a Health Care Directive: Discussed advance care planning with patient; however, patient declined at this time.      3/4/2025   General Health   How would you rate your overall physical health? (!) FAIR   Feel stress (tense, anxious, or unable to sleep) Not at all         3/4/2025   Nutrition   Diet: Regular (no restrictions)         3/4/2025   Exercise   Days per week of moderate/strenous exercise 0 days   (!) EXERCISE CONCERN      3/4/2025   Social Factors   Frequency of gathering with friends or relatives Once a week   Worry food won't last until get money to buy more No   Food not last or not have enough money for food? No   Do you have housing? (Housing is defined as stable permanent housing and does not include staying ouside in a car, in a tent, in an abandoned building, in an overnight shelter, or couch-surfing.) Yes   Are you worried about losing your housing? No   Lack of transportation? No   Unable to get utilities (heat,electricity)? No         3/4/2025   Fall Risk   Fallen 2 or more times in the past year? No   Trouble with walking or balance? Yes   Gait Speed Test (Document in seconds) 5.41   Gait Speed Test Interpretation Greater than 5.01 seconds - ABNORMAL          3/4/2025   Activities of Daily Living- Home Safety   Needs help with the following daily activites None of the above   Safety concerns in the home None of the above         3/4/2025   Dental   Dentist two times every year? Yes         3/4/2025   Hearing Screening   Hearing concerns? (!) I FEEL THAT PEOPLE ARE MUMBLING OR NOT SPEAKING CLEARLY.         3/4/2025   Driving Risk Screening   Patient/family members have concerns about driving No         3/4/2025    General Alertness/Fatigue Screening   Have you been more tired than usual lately? No         3/4/2025   Urinary Incontinence Screening   Bothered by leaking urine in past 6 months Yes            Today's PHQ-2 Score:       3/4/2025    12:39 PM   PHQ-2 (  [employer])   Q1: Little interest or pleasure in doing things 0   Q2: Feeling down, depressed or hopeless 0   PHQ-2 Score 0    Q1: Little interest or pleasure in doing things Not at all   Q2: Feeling down, depressed or hopeless Not at all   PHQ-2 Score 0       Patient-reported           3/4/2025   Substance Use   Alcohol more than 3/day or more than 7/wk No   Do you have a current opioid prescription? No   How severe/bad is pain from 1 to 10? 2/10   Do you use any other substances recreationally? No     Social History     Tobacco Use    Smoking status: Former     Current packs/day: 0.00     Average packs/day: 0.5 packs/day for 20.4 years (10.2 ttl pk-yrs)     Types: Cigarettes     Start date:      Quit date: 1968     Years since quittin.7    Smokeless tobacco: Former   Vaping Use    Vaping status: Never Used   Substance Use Topics    Alcohol use: No     Alcohol/week: 0.0 standard drinks of alcohol    Drug use: No             Reviewed and updated as needed this visit by Provider                    **I reviewed the information recorded in the patient's EPIC chart (including but not limited to medical history, surgical history, family history, problem list, medication list, and allergy list) and updated the information as indicated based on the patients reported information.         Current providers sharing in care for this patient include:  Patient Care Team:  Neal Esteves MD as PCP - General (Internal Medicine)  Willy Johansen MD as MD (Cardiovascular Disease)  Neal Esteves MD as Assigned PCP  Domonique Alvarez MD as MD (Hematology & Oncology)  Domonique Alvarez MD as Assigned Cancer Care Provider  Yobani Jones MD as  "Assigned Heart and Vascular Provider    The following health maintenance items are reviewed in Epic and correct as of today:  Health Maintenance   Topic Date Due    RSV VACCINE (1 - 1-dose 75+ series) Never done    MEDICARE ANNUAL WELLNESS VISIT  01/24/2024    ANNUAL REVIEW OF HM ORDERS  01/24/2024    COVID-19 Vaccine (5 - 2024-25 season) 09/01/2024    ADVANCE CARE PLANNING  01/07/2025    BMP  03/01/2026    LIPID  03/01/2026    FALL RISK ASSESSMENT  03/04/2026    DTAP/TDAP/TD IMMUNIZATION (3 - Td or Tdap) 05/31/2033    PHQ-2 (once per calendar year)  Completed    INFLUENZA VACCINE  Completed    Pneumococcal Vaccine: 50+ Years  Completed    ZOSTER IMMUNIZATION  Completed    HPV IMMUNIZATION  Aged Out    MENINGITIS IMMUNIZATION  Aged Out    TSH W/FREE T4 REFLEX  Discontinued         Review of Systems  Constitutional, HEENT, cardiovascular, pulmonary, gi and gu systems are negative, except as otherwise noted.     Objective    Exam  /70   Pulse 64   Temp 98.4  F (36.9  C) (Temporal)   Resp 12   Ht 1.702 m (5' 7\")   Wt 92 kg (202 lb 14.4 oz)   SpO2 97%   BMI 31.78 kg/m     Estimated body mass index is 31.78 kg/m  as calculated from the following:    Height as of this encounter: 1.702 m (5' 7\").    Weight as of this encounter: 92 kg (202 lb 14.4 oz).    Physical Exam  Physical Exam  Vitals and nursing note reviewed.   Constitutional:       Comments: Moves normally, alert, no apparent distress  HENT:      Head: Normocephalic and atraumatic.      Nose: Nose normal.   Eyes:      Extraocular Movements: Extraocular movements intact.   Cardiovascular:      Rate and Rhythm: Normal rate and regular rhythm.      Heart sounds: Normal heart sounds.   Pulmonary:      Effort: Pulmonary effort is normal.      Breath sounds: Normal breath sounds.   Abdominal:      General: There is no distension.      Palpations: There is no mass.      Tenderness: No abdominal tenderness, no distention.     Bowel sounds: " normal  Musculoskeletal:         General: Normal range of motion, moves into the room normal, moves in and out of chair normally.    Extremity:  1+ bilateral lower extremity edema (significant improvement from the prior severe pymphedema)  Was not wearing his comrpession stockings today  Skin:     General: Skin is warm and dry.   Neurological:      General: No focal deficit present.      Mental Status: Alert, responds to questions, Speech coherent  Psychiatric:         Mood and Affect: Mood normal.          3/4/2025   Mini Cog   Clock Draw Score 2 Normal   3 Item Recall 2 objects recalled   Mini Cog Total Score 4              Signed Electronically by: Neal Esteves MD

## 2025-03-04 NOTE — PATIENT INSTRUCTIONS
"     Continue all medications at the same doses.  Contact your usual pharmacy if you need refills.      Follow up with the Cardiology Clinic     Follow up with the Oncology clinic with Dr. Alvarez to review the anticoagulation and determine how long you should be on the anticoagulation.           5 GOALS TO PREVENT VASCULAR DISEASE:     1.  Aggressive blood pressure control, under 130/80 ideally.  Using medications if needed.    Your blood pressure is under good control    BP Readings from Last 4 Encounters:   03/04/25 118/70   10/02/24 118/66   09/16/24 132/79   09/03/24 116/60       2.  Aggressive LDL cholesterol (\"bad cholesterol\") lowering as indicated.    Your goal is an LDL under 130 for sure, preferably under 100.  (If you have diabetes or previous vascular disease, the the LDL goals would be under 100 for sure, preferably under 70.)    New guidelines identify four high-risk groups who could benefit from statins:   *people with pre-existing heart disease, such as those who have had a heart attack;   *people ages 40 to 75 who have diabetes of any type  *patients ages 40 to 75 with at least a 7.5% risk of developing cardiovascular disease over the next decade, according to a formula described in the guidelines  *patients with the sort of super-high cholesterol that sometimes runs in families, as evidenced by an LDL of 190 milligrams per deciliter or higher    Your cholesterol levels are well controlled.    Recent Labs   Lab Test 03/01/25  0920 03/06/24  0938   CHOL 133 137   HDL 46 41   LDL 70 76   TRIG 83 98       --LDL (\"bad\" cholesterol): normal under 130, ideal under 100.  Best way to lower this is through better food choices ( lower fats, etc.), medication may be considered if indicated  --HDL (\"good\") cholesterol: (normal above 40 for men, above 50 for women).  Best way to improve the HDL level is through regular physical exercise.  There are no medications to raise HDL levels.   --Triglycerides (desirable " "under 150): triglycerides are more about metabolic issues, best way to improve this is through better diet choices, emphasizing reducing the intake of \"simple carbohydrates\" (e.g. White bread, white rice, pasta, noodles, potatoes, snack foods, regular soda, juices (except fresh squeezed), cakes, cookies, candy, etc.) as best possible. Medications may be considered for triglyceride levels routinely above 400.    3.  Aggressive diabetic prevention, screening and/or management.      You do not have diabetes as of the most recent blood tests.     4.  No smoking    5.  Consider daily preventative aspirin over age 50 if you have enough cardiac risk factors to place you at higher risk for the presence of vascular disease.    If you have any reason not to take aspirin such easy bruising or bleeding, stomach problems, other anticoagulant medications, or any other side effects, then you should not take Aspirin.     --Based on prior side effects from aspirin or other medications for which aspirin would cause potential problems, you should NOT take daily preventative aspirin.          Preventive Health Recommendations:   Male Ages 75 and over    Yearly exam:             See your health care provider every year in order to  o   Review health changes.   o   Discuss preventive care.    o   Review your medicines if your doctor has prescribed any.  Regular screening for diabetes. If you are at risk for diabetes, you should have this test more often.  At least every 5 years, have a cholesterol test. Have this test more often if you are at risk for high cholesterol or heart disease.   Routine colon cancer screening no longer indicated over age 80.  (If you ever need a colonoscopy over age 80, we would perform it but it would be a \"diagnostic\" colonoscopy)  Routine prostate cancer screening no longer indicated over age 75.  Talk to with your health care provider about screening for Abdominal Aortic Aneurysm if you have a family history " of AAA or have a history of smoking.        Vaccine recommendations:   Get a flu shot each fall.  Middle October is the optimal time to receive the flu shot.   Covid vaccines are now recommended annually.  Get the most updated Covid vaccine when it becomes available, consider getting this at the same time as the annual influenza vaccine.   Get a tetanus shot every 10 years. (Get this vaccine from a pharmacist in a pharmacy when you are over 65 on Medicare insurance)  Everyone over 65 should make sure to receive pneumonia vaccines to prevent the most common type of bacterial pneumonia: Pneumococcal pneumonia. There are now two you should receive - Pneumovax (PPSV 23) and Prevnar (PCV 20)  Strongly consider the Shingrix shingles vaccine to give you the best chance of avoiding future shingles infection (as many as 1 and 3 adults over age 50 may develop this condition in their lifetime).      --If you have Medicare insurance, investigate the cost and coverage for Shingrix shingles vaccines with a pharmacist at a pharmacy.  They can tell you the coverage and cost and then give it to you if the price is acceptable.    --Medicare sometimes does not cover these Shingrix shingles vaccines, and with Medicare insurance it is usually cheaper to receive this shingles vaccine from a pharmacist in a pharmacy rather than in our clinic.    --At this time, you only need the 2 Shingrix vaccines and then you are done.    Consider vaccination against Respiratory Syncytial Virus (RSV) infections, especially if you have active lung disease, history of smoking, and/or cardiac conditions.  The respiratory syncytial virus (RSV), is a common upper respiratory virus that causes severe inflammation in the airways, more than most upper respiratory viruses.   This vaccine is available at most pharmacies and clinics.  At this time, the RSV vaccine is a one time vaccine.    --If you are on Medicare insurance, you need to specifically get this  "vaccine from a pharmacist in a pharmacy for the best cost and to confirm coverage, it will be less expensive to get this there rather than from our clinic.         Nutrition:   Eat at least 5 servings of fruits and vegetables each day.   Eat whole-grain bread, whole-wheat pasta and brown rice instead of white grains and rice.   Talk to your provider about Calcium and Vitamin D.      --Good Grains:  Oats, brown rice, Quinoa (these do not raise the blood sugar as much)     --Bad grains:  Anything made from wheat or white rice     (because these raise the blood sugars significantly, and the possible gluten issue from wheat for some people).      --Proteins:  Aim for \"lean proteins\" including chicken, fish, seafood, pork, turkey, and eggs (in moderation); Eat red meat only occasionally    Lifestyle  Exercise for at least 150 minutes a week (30 minutes a day, 5 days a week). This will help you control your weight and prevent disease.   Limit alcohol to one drink per day.   No smoking.   Wear sunscreen to prevent skin cancer.   See your dentist every six months for an exam and cleaning.   See your eye doctor every 1 to 2 years to screen for conditions such as glaucoma, macular degeneration, cataracts, etc         "

## 2025-04-08 ENCOUNTER — ANCILLARY PROCEDURE (OUTPATIENT)
Dept: CT IMAGING | Facility: CLINIC | Age: 89
End: 2025-04-08
Attending: INTERNAL MEDICINE
Payer: COMMERCIAL

## 2025-04-08 DIAGNOSIS — R91.8 ABNORMAL CT SCAN OF LUNG: ICD-10-CM

## 2025-04-08 DIAGNOSIS — K86.2 PANCREATIC CYST: ICD-10-CM

## 2025-04-08 PROCEDURE — 74176 CT ABD & PELVIS W/O CONTRAST: CPT

## 2025-04-10 NOTE — RESULT ENCOUNTER NOTE
Dear Mr. 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 No suspicion of cancer. We will review it during appointment.    Please, call me with any questions.    Domonique Alvarez MD

## 2025-04-15 ENCOUNTER — ONCOLOGY VISIT (OUTPATIENT)
Dept: ONCOLOGY | Facility: CLINIC | Age: 89
End: 2025-04-15
Attending: INTERNAL MEDICINE
Payer: COMMERCIAL

## 2025-04-15 VITALS
BODY MASS INDEX: 33.4 KG/M2 | RESPIRATION RATE: 16 BRPM | WEIGHT: 212.8 LBS | OXYGEN SATURATION: 97 % | SYSTOLIC BLOOD PRESSURE: 145 MMHG | HEART RATE: 63 BPM | HEIGHT: 67 IN | DIASTOLIC BLOOD PRESSURE: 66 MMHG

## 2025-04-15 DIAGNOSIS — I82.411 ACUTE DEEP VEIN THROMBOSIS (DVT) OF FEMORAL VEIN OF RIGHT LOWER EXTREMITY (H): ICD-10-CM

## 2025-04-15 PROCEDURE — G0463 HOSPITAL OUTPT CLINIC VISIT: HCPCS | Performed by: INTERNAL MEDICINE

## 2025-04-15 PROCEDURE — 99214 OFFICE O/P EST MOD 30 MIN: CPT | Performed by: INTERNAL MEDICINE

## 2025-04-15 ASSESSMENT — PAIN SCALES - GENERAL: PAINLEVEL_OUTOF10: NO PAIN (0)

## 2025-04-15 NOTE — LETTER
"4/15/2025      Ivan John  [address]  [facility] [address]      Dear Colleague,    Thank you for referring your patient, Ivan John, to the [facility]. Please see a copy of my visit note below.    Oncology Rooming Note    April 15, 2025 2:36 PM   Ivan John is a 89 year old male who presents for:    Chief Complaint   Patient presents with     Oncology Clinic Visit     Initial Vitals: BP (!) 145/66   Pulse 63   Resp 16   Ht 1.702 m (5' 7\")   Wt 96.5 kg (212 lb 12.8 oz)   SpO2 97%   BMI 33.33 kg/m   Estimated body mass index is 33.33 kg/m  as calculated from the following:    Height as of this encounter: 1.702 m (5' 7\").    Weight as of this encounter: 96.5 kg (212 lb 12.8 oz). Body surface area is 2.14 meters squared.  No Pain (0) Comment: Data Unavailable   No LMP for male patient.  Allergies reviewed: Yes  Medications reviewed: Yes    Medications: Medication refills not needed today.  Pharmacy name entered into [employer]:    [id number] [address] [facility] [address] [place] [facility] [address] [place] [address]    Frailty Screening:   Is the patient here for a new oncology consult visit in cancer care? 2. No    PHQ9:  Did this patient require a PHQ9?: No          Maryanne Quiros MA              HEMATOLOGY HISTORY: Mr. John is an gentleman with unprovoked right lower extremity DVT.       Bilateral lower extremity ultrasound on 07/01/2024 revealed partially occlusive DVT extending from right common femoral vein through the deep femoral vein and upper to mid-portion of femoral vein.  No DVT in the left lower extremity.  Although his pain was in the left lower extremity, DVT was found in right lower extremity.    -Patient was started on Eliquis.  2.  On 08/02/2024, bilateral lower extremity ultrasound reveals nonocclusive DVT in one of the 2 right mid superficial femoral veins.  This is most likely chronic and was present in previous ultrasound.  3.  CT " chest, abdomen and pelvis on 08/30/2024 did not reveal any evidence of malignancy.  There is large hiatal hernia containing an intrathoracic stomach and a portion of the pancreas.  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     Subjective:  Mr. Payton is an 89-year-old gentleman with unprovoked right lower extremity DVT.  Patient is on Eliquis. No bleeding complications.    CT scan on 04/14/2025:  1.  The previously described pancreatic body lesion is questionably seen today and appears to be smaller than on the previous exam. Pancreatic parenchymal detail is not optimized in the absence of IV contrast. Consider follow-up pancreas protocol CT in 6  months.  2.  Large hiatal hernia.  3.  Enlarged prostate.     He is overall doing well for his age. No headache. No lightheadedness.  No chest pain.  No shortness of breath at rest. He gets short of breath on exertion.  No abdominal pain.  No nausea or vomiting.  No bleeding. No bowel problem. He has nocturia.    He has bilateral lower extremity swelling/lymphedema.  No worsening of swelling. He wears compression stocking.     Exam:  He is alert and oriented x 3.  Not in any respiratory distress.  Vitals: Reviewed.  Extremities; Bilateral swelling.  Rest of the system is not examined.    Labs: CBC and CMP on 03/01/25 reviewed.     Assessment:  1.  An 89-year-old gentleman with unprovoked right lower extremity DVT diagnosed on 07/01/2024.  Patient on Eliquis.  2.  Pancreatic cystic lesion. Stable.     Plan:  -Continue eliquis at lower dose of 2.5 mg twice a day.  -Follow-up with PCP.  -See me as needed.     Discussion:  1.  Patient is doing well for his age.  CT abdomen and pelvis was reviewed with him.  No evidence of malignancy.  Nodular structure seen along the pancreatic body is smaller.  No new lesion.  Explained to the patient that no follow-up scans needed.    2.  Discussed regarding right lower extremity DVT.  It was unprovoked.  He is on  Eliquis 5 mg twice a day.  Discussed regarding decreasing the dose to 2.5 mg twice a day.  He is agreeable for it.    Discussed regarding duration of anticoagulation.  For this unprovoked DVT, indefinite anticoagulation is recommended.  I would recommend continuing anticoagulation as long as he is not having any bleeding complication and he is not falling.    3.  He and his wife had few questions which were all answered.  Discussed regarding follow-up. Patient advised to follow-up with PCP.  He will return to hematology/oncology clinic as needed.  He is agreeable with this plan. Advised him to go to emergency room if he has chest pain, shortness of breath, pain/swelling/redness in the extremities, bleeding or any other concerns.     Total visit time of 30 minutes.  Time spent in today's visit, review of chart/investigations today and documentation today.      Again, thank you for allowing me to participate in the care of your patient.        Sincerely,        Domonique Alvarez MD    Electronically signed

## 2025-04-15 NOTE — PROGRESS NOTES
HEMATOLOGY HISTORY: Mr. John is an gentleman with unprovoked right lower extremity DVT.       Bilateral lower extremity ultrasound on 07/01/2024 revealed partially occlusive DVT extending from right common femoral vein through the deep femoral vein and upper to mid-portion of femoral vein.  No DVT in the left lower extremity.  Although his pain was in the left lower extremity, DVT was found in right lower extremity.    -Patient was started on Eliquis.  2.  On 08/02/2024, bilateral lower extremity ultrasound reveals nonocclusive DVT in one of the 2 right mid superficial femoral veins.  This is most likely chronic and was present in previous ultrasound.  3.  CT chest, abdomen and pelvis on 08/30/2024 did not reveal any evidence of malignancy.  There is large hiatal hernia containing an intrathoracic stomach and a portion of the pancreas.  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     Subjective:  Mr. John is an 89-year-old gentleman with unprovoked right lower extremity DVT.  Patient is on Eliquis. No bleeding complications.    CT scan on 04/14/2025:  1.  The previously described pancreatic body lesion is questionably seen today and appears to be smaller than on the previous exam. Pancreatic parenchymal detail is not optimized in the absence of IV contrast. Consider follow-up pancreas protocol CT in 6  months.  2.  Large hiatal hernia.  3.  Enlarged prostate.     He is overall doing well for his age. No headache. No lightheadedness.  No chest pain.  No shortness of breath at rest. He gets short of breath on exertion.  No abdominal pain.  No nausea or vomiting.  No bleeding. No bowel problem. He has nocturia.    He has bilateral lower extremity swelling/lymphedema.  No worsening of swelling. He wears compression stocking.     Exam:  He is alert and oriented x 3.  Not in any respiratory distress.  Vitals: Reviewed.  Extremities; Bilateral swelling.  Rest of the system is not  examined.    Labs: CBC and CMP on 03/01/25 reviewed.     Assessment:  1.  An 89-year-old gentleman with unprovoked right lower extremity DVT diagnosed on 07/01/2024.  Patient on Eliquis.  2.  Pancreatic cystic lesion. Stable.     Plan:  -Continue eliquis at lower dose of 2.5 mg twice a day.  -Follow-up with PCP.  -See me as needed.     Discussion:  1.  Patient is doing well for his age.  CT abdomen and pelvis was reviewed with him.  No evidence of malignancy.  Nodular structure seen along the pancreatic body is smaller.  No new lesion.  Explained to the patient that no follow-up scans needed.    2.  Discussed regarding right lower extremity DVT.  It was unprovoked.  He is on Eliquis 5 mg twice a day.  Discussed regarding decreasing the dose to 2.5 mg twice a day.  He is agreeable for it.    Discussed regarding duration of anticoagulation.  For this unprovoked DVT, indefinite anticoagulation is recommended.  I would recommend continuing anticoagulation as long as he is not having any bleeding complication and he is not falling.    3.  He and his wife had few questions which were all answered.  Discussed regarding follow-up. Patient advised to follow-up with PCP.  He will return to hematology/oncology clinic as needed.  He is agreeable with this plan. Advised him to go to emergency room if he has chest pain, shortness of breath, pain/swelling/redness in the extremities, bleeding or any other concerns.     Total visit time of 30 minutes.  Time spent in today's visit, review of chart/investigations today and documentation today.

## 2025-04-15 NOTE — LETTER
"    4/15/2025         RE: Ivan John  [address]  [facility] [address]      Oncology Rooming Note    April 15, 2025 2:36 PM   Ivan John is a 89 year old male who presents for:    Chief Complaint   Patient presents with     Oncology Clinic Visit     Initial Vitals: BP (!) 145/66   Pulse 63   Resp 16   Ht 1.702 m (5' 7\")   Wt 96.5 kg (212 lb 12.8 oz)   SpO2 97%   BMI 33.33 kg/m   Estimated body mass index is 33.33 kg/m  as calculated from the following:    Height as of this encounter: 1.702 m (5' 7\").    Weight as of this encounter: 96.5 kg (212 lb 12.8 oz). Body surface area is 2.14 meters squared.  No Pain (0) Comment: Data Unavailable   No LMP for male patient.  Allergies reviewed: Yes  Medications reviewed: Yes    Medications: Medication refills not needed today.  Pharmacy name entered into [employer]:    [id number] - [place], [place] - [address] [facility] [address] [place] [facility] - [place], [place] - [address]    Frailty Screening:   Is the patient here for a new oncology consult visit in cancer care? 2. No    PHQ9:  Did this patient require a PHQ9?: No          Maryanne Quiros MA              HEMATOLOGY HISTORY: Mr. John is an gentleman with unprovoked right lower extremity DVT.       Bilateral lower extremity ultrasound on 07/01/2024 revealed partially occlusive DVT extending from right common femoral vein through the deep femoral vein and upper to mid-portion of femoral vein.  No DVT in the left lower extremity.  Although his pain was in the left lower extremity, DVT was found in right lower extremity.    -Patient was started on Eliquis.  2.  On 08/02/2024, bilateral lower extremity ultrasound reveals nonocclusive DVT in one of the 2 right mid superficial femoral veins.  This is most likely chronic and was present in previous ultrasound.  3.  CT chest, abdomen and pelvis on 08/30/2024 did not reveal any evidence of malignancy.  There is large hiatal hernia containing an intrathoracic stomach and " a portion of the pancreas.  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     Subjective:  Mr. Payton is an 89-year-old gentleman with unprovoked right lower extremity DVT.  Patient is on Eliquis. No bleeding complications.    CT scan on 04/14/2025:  1.  The previously described pancreatic body lesion is questionably seen today and appears to be smaller than on the previous exam. Pancreatic parenchymal detail is not optimized in the absence of IV contrast. Consider follow-up pancreas protocol CT in 6  months.  2.  Large hiatal hernia.  3.  Enlarged prostate.     He is overall doing well for his age. No headache. No lightheadedness.  No chest pain.  No shortness of breath at rest. He gets short of breath on exertion.  No abdominal pain.  No nausea or vomiting.  No bleeding. No bowel problem. He has nocturia.    He has bilateral lower extremity swelling/lymphedema.  No worsening of swelling. He wears compression stocking.     Exam:  He is alert and oriented x 3.  Not in any respiratory distress.  Vitals: Reviewed.  Extremities; Bilateral swelling.  Rest of the system is not examined.    Labs: CBC and CMP on 03/01/25 reviewed.     Assessment:  1.  An 89-year-old gentleman with unprovoked right lower extremity DVT diagnosed on 07/01/2024.  Patient on Eliquis.  2.  Pancreatic cystic lesion. Stable.     Plan:  -Continue eliquis at lower dose of 2.5 mg twice a day.  -Follow-up with PCP.  -See me as needed.     Discussion:  1.  Patient is doing well for his age.  CT abdomen and pelvis was reviewed with him.  No evidence of malignancy.  Nodular structure seen along the pancreatic body is smaller.  No new lesion.  Explained to the patient that no follow-up scans needed.    2.  Discussed regarding right lower extremity DVT.  It was unprovoked.  He is on Eliquis 5 mg twice a day.  Discussed regarding decreasing the dose to 2.5 mg twice a day.  He is agreeable for it.    Discussed regarding duration of  anticoagulation.  For this unprovoked DVT, indefinite anticoagulation is recommended.  I would recommend continuing anticoagulation as long as he is not having any bleeding complication and he is not falling.    3.  He and his wife had few questions which were all answered.  Discussed regarding follow-up. Patient advised to follow-up with PCP.  He will return to hematology/oncology clinic as needed.  He is agreeable with this plan. Advised him to go to emergency room if he has chest pain, shortness of breath, pain/swelling/redness in the extremities, bleeding or any other concerns.     Total visit time of 30 minutes.  Time spent in today's visit, review of chart/investigations today and documentation today.        Domonique Alvarez MD

## 2025-04-15 NOTE — PROGRESS NOTES
"Oncology Rooming Note    April 15, 2025 2:36 PM   Ivan Payton is a 89 year old male who presents for:    Chief Complaint   Patient presents with    Oncology Clinic Visit     Initial Vitals: BP (!) 145/66   Pulse 63   Resp 16   Ht 1.702 m (5' 7\")   Wt 96.5 kg (212 lb 12.8 oz)   SpO2 97%   BMI 33.33 kg/m   Estimated body mass index is 33.33 kg/m  as calculated from the following:    Height as of this encounter: 1.702 m (5' 7\").    Weight as of this encounter: 96.5 kg (212 lb 12.8 oz). Body surface area is 2.14 meters squared.  No Pain (0) Comment: Data Unavailable   No LMP for male patient.  Allergies reviewed: Yes  Medications reviewed: Yes    Medications: Medication refills not needed today.  Pharmacy name entered into [employer]:    [id number] [address] [facility] [address] [place] [facility] [address] [place] [address]    Frailty Screening:   Is the patient here for a new oncology consult visit in cancer care? 2. No    PHQ9:  Did this patient require a PHQ9?: No          Maryanne Quiros MA            "

## 2025-04-28 ENCOUNTER — TRANSFERRED RECORDS (OUTPATIENT)
Dept: HEALTH INFORMATION MANAGEMENT | Facility: CLINIC | Age: 89
End: 2025-04-28
Payer: COMMERCIAL

## (undated) DEVICE — CATH BALLOON NC EMERGE 2.50X12MM H7493926712250

## (undated) DEVICE — KIT HAND CONTROL ANGIOTOUCH ACIST 65CM AT-P65

## (undated) DEVICE — CATH ANGIO JUDKINS JL3.5 6FRX100CM INFINITI 534618T

## (undated) DEVICE — CATH ANGIO JUDKINS JL4 6FRX100CM INFINITI 534620T

## (undated) DEVICE — VALVE HEMOSTASIS .096" COPILOT MECH 1003331

## (undated) DEVICE — CATH ANGIO JUDKINS JL5 6FRX100CM INFINITI 534622T

## (undated) DEVICE — CATH BALLOON EMERGE 2.0X20MM H7493918920200

## (undated) DEVICE — CATH BALLOON EMERGE 2.0X12MM H7493918912200

## (undated) DEVICE — INTRO GLIDESHEATH SLENDER 6FR 10X45CM 60-1060

## (undated) DEVICE — SLEEVE TR BAND RADIAL COMPRESSION DEVICE 24CM TRB24-REG

## (undated) DEVICE — CATH ANGIO INFINITI 3DRC 6FRX100CM 534676T

## (undated) DEVICE — Device

## (undated) DEVICE — DRAPE STERI FLUOROSCOPE 35X43"1012 LATEX FREE

## (undated) DEVICE — WIRE GUIDE 0.035"X260CM SAFE-T-J EXCHANGE G00517

## (undated) DEVICE — SYR ANGIOGRAPHY MULTIUSE KIT ACIST 014612

## (undated) DEVICE — RAD INFLATOR BASIC COMPAK  IN4130

## (undated) DEVICE — MANIFOLD KIT ANGIO AUTOMATED 014613

## (undated) DEVICE — CATH LAUNCHER 6FR JR 4.0 LA6JR40

## (undated) DEVICE — CATH GUIDELINER 6FR 5571

## (undated) DEVICE — CATH GUIDING BLUE YELLOW PTFE XB3.5 6FRX100CM 67005400

## (undated) DEVICE — KIT LG BORE TOUHY ACCESS PLUS MAP152

## (undated) DEVICE — GUIDEWIRE VASC 0.014INX180CM RUNTHROUGH 25-1011

## (undated) DEVICE — DEFIB PRO-PADZ LVP LQD GEL ADULT 8900-2105-01

## (undated) RX ORDER — HEPARIN SODIUM 1000 [USP'U]/ML
INJECTION, SOLUTION INTRAVENOUS; SUBCUTANEOUS
Status: DISPENSED
Start: 2018-12-18

## (undated) RX ORDER — REGADENOSON 0.08 MG/ML
INJECTION, SOLUTION INTRAVENOUS
Status: DISPENSED
Start: 2022-02-22

## (undated) RX ORDER — REGADENOSON 0.08 MG/ML
INJECTION, SOLUTION INTRAVENOUS
Status: DISPENSED
Start: 2017-08-08

## (undated) RX ORDER — VERAPAMIL HYDROCHLORIDE 2.5 MG/ML
INJECTION, SOLUTION INTRAVENOUS
Status: DISPENSED
Start: 2018-12-18

## (undated) RX ORDER — LIDOCAINE HYDROCHLORIDE 10 MG/ML
INJECTION, SOLUTION EPIDURAL; INFILTRATION; INTRACAUDAL; PERINEURAL
Status: DISPENSED
Start: 2018-12-18

## (undated) RX ORDER — REGADENOSON 0.08 MG/ML
INJECTION, SOLUTION INTRAVENOUS
Status: DISPENSED
Start: 2018-11-30

## (undated) RX ORDER — NITROGLYCERIN 5 MG/ML
VIAL (ML) INTRAVENOUS
Status: DISPENSED
Start: 2018-12-18

## (undated) RX ORDER — FENTANYL CITRATE 50 UG/ML
INJECTION, SOLUTION INTRAMUSCULAR; INTRAVENOUS
Status: DISPENSED
Start: 2018-12-18

## (undated) RX ORDER — CLOPIDOGREL 300 MG/1
TABLET, FILM COATED ORAL
Status: DISPENSED
Start: 2018-12-18